# Patient Record
Sex: FEMALE | Race: WHITE | Employment: UNEMPLOYED | URBAN - METROPOLITAN AREA
[De-identification: names, ages, dates, MRNs, and addresses within clinical notes are randomized per-mention and may not be internally consistent; named-entity substitution may affect disease eponyms.]

---

## 2017-04-20 ENCOUNTER — HOSPITAL ENCOUNTER (OUTPATIENT)
Dept: MAMMOGRAPHY | Age: 62
Discharge: HOME | End: 2017-04-20
Attending: FAMILY MEDICINE
Payer: COMMERCIAL

## 2017-04-20 DIAGNOSIS — Z12.31 VISIT FOR SCREENING MAMMOGRAM: ICD-10-CM

## 2017-04-20 PROCEDURE — 77067 SCR MAMMO BI INCL CAD: CPT

## 2018-04-26 ENCOUNTER — HOSPITAL ENCOUNTER (OUTPATIENT)
Dept: MAMMOGRAPHY | Age: 63
Discharge: HOME | End: 2018-04-26
Attending: FAMILY MEDICINE
Payer: COMMERCIAL

## 2018-04-26 DIAGNOSIS — Z12.31 VISIT FOR SCREENING MAMMOGRAM: ICD-10-CM

## 2018-04-26 PROCEDURE — 77063 BREAST TOMOSYNTHESIS BI: CPT

## 2021-05-18 ENCOUNTER — APPOINTMENT (OUTPATIENT)
Dept: GENERAL RADIOLOGY | Age: 66
DRG: 392 | End: 2021-05-18
Attending: EMERGENCY MEDICINE
Payer: COMMERCIAL

## 2021-05-18 ENCOUNTER — APPOINTMENT (OUTPATIENT)
Dept: CT IMAGING | Age: 66
DRG: 392 | End: 2021-05-18
Attending: EMERGENCY MEDICINE
Payer: COMMERCIAL

## 2021-05-18 ENCOUNTER — HOSPITAL ENCOUNTER (INPATIENT)
Age: 66
LOS: 3 days | Discharge: HOME | DRG: 392 | End: 2021-05-21
Attending: EMERGENCY MEDICINE | Admitting: FAMILY MEDICINE
Payer: COMMERCIAL

## 2021-05-18 DIAGNOSIS — K57.20 DIVERTICULITIS OF LARGE INTESTINE WITH ABSCESS WITHOUT BLEEDING: Primary | ICD-10-CM

## 2021-05-18 DIAGNOSIS — K57.92 DIVERTICULITIS: ICD-10-CM

## 2021-05-18 DIAGNOSIS — R10.84 ABDOMINAL PAIN, GENERALIZED: ICD-10-CM

## 2021-05-18 LAB
ALBUMIN SERPL-MCNC: 3.2 G/DL (ref 3.5–5)
ALBUMIN/GLOB SERPL: 0.7 {RATIO} (ref 1.1–2.2)
ALP SERPL-CCNC: 116 U/L (ref 45–117)
ALT SERPL-CCNC: 38 U/L (ref 12–78)
ANION GAP SERPL CALC-SCNC: 4 MMOL/L (ref 5–15)
APPEARANCE UR: CLEAR
AST SERPL-CCNC: 31 U/L (ref 15–37)
ATRIAL RATE: 77 BPM
BACTERIA URNS QL MICRO: NEGATIVE /HPF
BASOPHILS # BLD: 0.1 K/UL (ref 0–0.1)
BASOPHILS NFR BLD: 1 % (ref 0–1)
BILIRUB SERPL-MCNC: 0.9 MG/DL (ref 0.2–1)
BILIRUB UR QL: NEGATIVE
BUN SERPL-MCNC: 9 MG/DL (ref 6–20)
BUN/CREAT SERPL: 16 (ref 12–20)
CALCIUM SERPL-MCNC: 9.2 MG/DL (ref 8.5–10.1)
CALCULATED P AXIS, ECG09: 48 DEGREES
CALCULATED R AXIS, ECG10: 2 DEGREES
CALCULATED T AXIS, ECG11: 20 DEGREES
CHLORIDE SERPL-SCNC: 101 MMOL/L (ref 97–108)
CO2 SERPL-SCNC: 29 MMOL/L (ref 21–32)
COLOR UR: ABNORMAL
COMMENT, HOLDF: NORMAL
CREAT SERPL-MCNC: 0.56 MG/DL (ref 0.55–1.02)
DIAGNOSIS, 93000: NORMAL
DIFFERENTIAL METHOD BLD: ABNORMAL
EOSINOPHIL # BLD: 0.1 K/UL (ref 0–0.4)
EOSINOPHIL NFR BLD: 1 % (ref 0–7)
EPITH CASTS URNS QL MICRO: ABNORMAL /LPF
ERYTHROCYTE [DISTWIDTH] IN BLOOD BY AUTOMATED COUNT: 12.9 % (ref 11.5–14.5)
GLOBULIN SER CALC-MCNC: 4.6 G/DL (ref 2–4)
GLUCOSE SERPL-MCNC: 114 MG/DL (ref 65–100)
GLUCOSE UR STRIP.AUTO-MCNC: NEGATIVE MG/DL
HCT VFR BLD AUTO: 41.6 % (ref 35–47)
HGB BLD-MCNC: 13.1 G/DL (ref 11.5–16)
HGB UR QL STRIP: NEGATIVE
IMM GRANULOCYTES # BLD AUTO: 0.1 K/UL (ref 0–0.04)
IMM GRANULOCYTES NFR BLD AUTO: 1 % (ref 0–0.5)
KETONES UR QL STRIP.AUTO: NEGATIVE MG/DL
LACTATE SERPL-SCNC: 0.9 MMOL/L (ref 0.4–2)
LEUKOCYTE ESTERASE UR QL STRIP.AUTO: ABNORMAL
LYMPHOCYTES # BLD: 1.1 K/UL (ref 0.8–3.5)
LYMPHOCYTES NFR BLD: 11 % (ref 12–49)
MCH RBC QN AUTO: 27.9 PG (ref 26–34)
MCHC RBC AUTO-ENTMCNC: 31.5 G/DL (ref 30–36.5)
MCV RBC AUTO: 88.5 FL (ref 80–99)
MONOCYTES # BLD: 1.6 K/UL (ref 0–1)
MONOCYTES NFR BLD: 16 % (ref 5–13)
NEUTS SEG # BLD: 7.4 K/UL (ref 1.8–8)
NEUTS SEG NFR BLD: 70 % (ref 32–75)
NITRITE UR QL STRIP.AUTO: NEGATIVE
NRBC # BLD: 0 K/UL (ref 0–0.01)
NRBC BLD-RTO: 0 PER 100 WBC
P-R INTERVAL, ECG05: 154 MS
PH UR STRIP: 7.5 [PH] (ref 5–8)
PLATELET # BLD AUTO: 286 K/UL (ref 150–400)
PMV BLD AUTO: 8.8 FL (ref 8.9–12.9)
POTASSIUM SERPL-SCNC: 3.8 MMOL/L (ref 3.5–5.1)
PROT SERPL-MCNC: 7.8 G/DL (ref 6.4–8.2)
PROT UR STRIP-MCNC: NEGATIVE MG/DL
Q-T INTERVAL, ECG07: 380 MS
QRS DURATION, ECG06: 72 MS
QTC CALCULATION (BEZET), ECG08: 430 MS
RBC # BLD AUTO: 4.7 M/UL (ref 3.8–5.2)
RBC #/AREA URNS HPF: ABNORMAL /HPF (ref 0–5)
SAMPLES BEING HELD,HOLD: NORMAL
SODIUM SERPL-SCNC: 134 MMOL/L (ref 136–145)
SP GR UR REFRACTOMETRY: 1.01 (ref 1–1.03)
TROPONIN I SERPL-MCNC: <0.05 NG/ML
UR CULT HOLD, URHOLD: NORMAL
UROBILINOGEN UR QL STRIP.AUTO: 1 EU/DL (ref 0.2–1)
VENTRICULAR RATE, ECG03: 77 BPM
WBC # BLD AUTO: 10.4 K/UL (ref 3.6–11)
WBC URNS QL MICRO: ABNORMAL /HPF (ref 0–4)

## 2021-05-18 PROCEDURE — 74011250637 HC RX REV CODE- 250/637: Performed by: FAMILY MEDICINE

## 2021-05-18 PROCEDURE — 65410000002 HC RM PRIVATE OB

## 2021-05-18 PROCEDURE — 74011000258 HC RX REV CODE- 258: Performed by: EMERGENCY MEDICINE

## 2021-05-18 PROCEDURE — 51798 US URINE CAPACITY MEASURE: CPT

## 2021-05-18 PROCEDURE — 74011250636 HC RX REV CODE- 250/636: Performed by: FAMILY MEDICINE

## 2021-05-18 PROCEDURE — 80053 COMPREHEN METABOLIC PANEL: CPT

## 2021-05-18 PROCEDURE — 81001 URINALYSIS AUTO W/SCOPE: CPT

## 2021-05-18 PROCEDURE — 71046 X-RAY EXAM CHEST 2 VIEWS: CPT

## 2021-05-18 PROCEDURE — 93005 ELECTROCARDIOGRAM TRACING: CPT

## 2021-05-18 PROCEDURE — 74177 CT ABD & PELVIS W/CONTRAST: CPT

## 2021-05-18 PROCEDURE — 99252 IP/OBS CONSLTJ NEW/EST SF 35: CPT | Performed by: SURGERY

## 2021-05-18 PROCEDURE — 87040 BLOOD CULTURE FOR BACTERIA: CPT

## 2021-05-18 PROCEDURE — 83605 ASSAY OF LACTIC ACID: CPT

## 2021-05-18 PROCEDURE — 84484 ASSAY OF TROPONIN QUANT: CPT

## 2021-05-18 PROCEDURE — 74011250636 HC RX REV CODE- 250/636: Performed by: EMERGENCY MEDICINE

## 2021-05-18 PROCEDURE — 36415 COLL VENOUS BLD VENIPUNCTURE: CPT

## 2021-05-18 PROCEDURE — 85025 COMPLETE CBC W/AUTO DIFF WBC: CPT

## 2021-05-18 PROCEDURE — 99284 EMERGENCY DEPT VISIT MOD MDM: CPT

## 2021-05-18 PROCEDURE — 74011000258 HC RX REV CODE- 258: Performed by: FAMILY MEDICINE

## 2021-05-18 PROCEDURE — 74011000636 HC RX REV CODE- 636: Performed by: EMERGENCY MEDICINE

## 2021-05-18 RX ORDER — SODIUM CHLORIDE 0.9 % (FLUSH) 0.9 %
5-40 SYRINGE (ML) INJECTION EVERY 8 HOURS
Status: DISCONTINUED | OUTPATIENT
Start: 2021-05-18 | End: 2021-05-21 | Stop reason: HOSPADM

## 2021-05-18 RX ORDER — SODIUM CHLORIDE 0.9 % (FLUSH) 0.9 %
5-40 SYRINGE (ML) INJECTION AS NEEDED
Status: DISCONTINUED | OUTPATIENT
Start: 2021-05-18 | End: 2021-05-21 | Stop reason: HOSPADM

## 2021-05-18 RX ORDER — ACETAMINOPHEN 325 MG/1
650 TABLET ORAL
Status: DISCONTINUED | OUTPATIENT
Start: 2021-05-18 | End: 2021-05-21 | Stop reason: HOSPADM

## 2021-05-18 RX ORDER — ONDANSETRON 2 MG/ML
4 INJECTION INTRAMUSCULAR; INTRAVENOUS
Status: DISCONTINUED | OUTPATIENT
Start: 2021-05-18 | End: 2021-05-21 | Stop reason: HOSPADM

## 2021-05-18 RX ORDER — MORPHINE SULFATE 2 MG/ML
2 INJECTION, SOLUTION INTRAMUSCULAR; INTRAVENOUS
Status: DISCONTINUED | OUTPATIENT
Start: 2021-05-18 | End: 2021-05-21 | Stop reason: HOSPADM

## 2021-05-18 RX ORDER — METRONIDAZOLE 500 MG/100ML
500 INJECTION, SOLUTION INTRAVENOUS EVERY 12 HOURS
Status: DISCONTINUED | OUTPATIENT
Start: 2021-05-18 | End: 2021-05-21 | Stop reason: HOSPADM

## 2021-05-18 RX ORDER — SODIUM CHLORIDE 9 MG/ML
75 INJECTION, SOLUTION INTRAVENOUS CONTINUOUS
Status: DISCONTINUED | OUTPATIENT
Start: 2021-05-18 | End: 2021-05-21 | Stop reason: HOSPADM

## 2021-05-18 RX ORDER — SODIUM CHLORIDE 9 MG/ML
100 INJECTION, SOLUTION INTRAVENOUS CONTINUOUS
Status: DISCONTINUED | OUTPATIENT
Start: 2021-05-18 | End: 2021-05-21 | Stop reason: HOSPADM

## 2021-05-18 RX ORDER — HYDRALAZINE HYDROCHLORIDE 20 MG/ML
10 INJECTION INTRAMUSCULAR; INTRAVENOUS
Status: DISCONTINUED | OUTPATIENT
Start: 2021-05-18 | End: 2021-05-21 | Stop reason: HOSPADM

## 2021-05-18 RX ADMIN — CEFEPIME 2 G: 2 INJECTION, POWDER, FOR SOLUTION INTRAVENOUS at 20:20

## 2021-05-18 RX ADMIN — PIPERACILLIN AND TAZOBACTAM 3.38 G: 3; .375 INJECTION, POWDER, FOR SOLUTION INTRAVENOUS at 16:21

## 2021-05-18 RX ADMIN — METRONIDAZOLE 500 MG: 500 INJECTION, SOLUTION INTRAVENOUS at 18:56

## 2021-05-18 RX ADMIN — SODIUM CHLORIDE 75 ML/HR: 9 INJECTION, SOLUTION INTRAVENOUS at 18:55

## 2021-05-18 RX ADMIN — IOPAMIDOL 100 ML: 755 INJECTION, SOLUTION INTRAVENOUS at 14:42

## 2021-05-18 RX ADMIN — ACETAMINOPHEN 650 MG: 325 TABLET ORAL at 20:35

## 2021-05-18 NOTE — H&P
History & Physical    Primary Care Provider: Taylor Heard MD  Source of Information: Patient     History of Presenting Illness:   Rachael Dominguez is a 77 y.o. female who presents with abdominal pain. Patient reports she started experiencing abdominal pain that started about a week to 10 days back. Patient reports that initially the pain worsened lower part of the abdomen, she had some poor appetite and nausea associated with vomiting. Patient reports on May 3 she felt like she had a GI bug but her symptoms got better. Patient reports ports that she continued to have this abdominal pain, [facility], was diagnosed with UTI, was started on Macrobid, patient reports the pain continued and got much severe today. Yesterday she felt that she had constipation, and had intense pain in her belly, patient got concerned and decided to come to the hospital.  In the ER patient had a CT scan done which showed diverticulitis with an abscess the patient was requested to be admitted to the hospital service. Patient denies any other complaints or problems. The patient denies any Headache, blurry vision, sore throat, trouble swallowing, trouble with speech, chest pain, SOB, cough, fever, chills,, urinary symptoms, constipation, recent travels, sick contacts, focal or generalized neurological symptoms,  falls, injuries, rashes, contact with COVID-19 diagnosed patients, hematemesis, melena, hemoptysis, hematuria, rashes, denies starting any new medications and denies any other concerns or problems besides as mentioned above. Review of Systems:  A comprehensive review of systems was negative except for that written in the History of Present Illness.    All other systems reviewed, pertinent positives and negatives noted in HPI    Past Medical History:   Diagnosis Date    Glaucoma     H/O seasonal allergies     George Mountain spotted fever     Sinus infection     UTI (urinary tract infection)       History reviewed. No pertinent surgical history. Prior to Admission medications    Not on File     Allergies   Allergen Reactions    Codeine Hives    Levaquin [Levofloxacin] Other (comments)     Joint swelling    Pcn [Penicillins] Hives    Sulfa (Sulfonamide Antibiotics) Hives      History reviewed. No pertinent family history. Family history was discussed with the patient, all pertinent and relevant details are mentioned as above, no other pertinent and relevant family history was noted on my discussion with the patient.   Patient specifically denies any history of Gaucher disease in the family  SOCIAL HISTORY:  Patient resides:  Independently x   Assisted Living    SNF    With family care       Smoking history:   None x   Former    Chronic      Alcohol history:   None    Social x   Chronic      Ambulates:   Independently x   w/cane    w/walker    w/wc    CODE STATUS:  DNR    Full x   Other      Objective:     Physical Exam:     Visit Vitals  BP (!) 179/83   Pulse 80   Temp (!) 96.4 °F (35.8 °C)   Resp 16   SpO2 96%      O2 Device: None (Room air)    General : alert x 3, awake, no acute distress, resting in bed, pleasant /female, appears to be stated age  [de-identified]: PEERL, EOMI, moist mucus membrane, TM clear  Neck: supple, no JVD, no meningeal signs  Chest: Clear to auscultation bilaterally   CVS: S1 S2 heard, Capillary refill less than 2 seconds  Abd: soft/tender to palpation in the left lower quadrant/no rebound/+ guarding  Ext: no clubbing, no cyanosis, no edema, brisk 2+ DP pulses  Neuro/Psych: pleasant mood and affect, CN 2-12 grossly intact, sensory grossly within normal limit, Strength 5/5 in all extremities, DTR 1+ x 4  Skin: warm         Data Review:     Recent Days:  Recent Labs     05/18/21  1213   WBC 10.4   HGB 13.1   HCT 41.6        Recent Labs     05/18/21  1213   *   K 3.8      CO2 29   *   BUN 9   CREA 0.56   CA 9.2 ALB 3.2*   ALT 38     No results for input(s): PH, PCO2, PO2, HCO3, FIO2 in the last 72 hours. 24 Hour Results:  Recent Results (from the past 24 hour(s))   EKG, 12 LEAD, INITIAL    Collection Time: 05/18/21 12:12 PM   Result Value Ref Range    Ventricular Rate 77 BPM    Atrial Rate 77 BPM    P-R Interval 154 ms    QRS Duration 72 ms    Q-T Interval 380 ms    QTC Calculation (Bezet) 430 ms    Calculated P Axis 48 degrees    Calculated R Axis 2 degrees    Calculated T Axis 20 degrees    Diagnosis       Normal sinus rhythm  Inferior infarct , age undetermined  No previous ECGs available  Confirmed by Isaiah Leroy M.D., Isi Valle ([id number]) on 5/18/2021 4:59:37 PM     TROPONIN I    Collection Time: 05/18/21 12:13 PM   Result Value Ref Range    Troponin-I, Qt. <0.05 <0.05 ng/mL   CBC WITH AUTOMATED DIFF    Collection Time: 05/18/21 12:13 PM   Result Value Ref Range    WBC 10.4 3.6 - 11.0 K/uL    RBC 4.70 3.80 - 5.20 M/uL    HGB 13.1 11.5 - 16.0 g/dL    HCT 41.6 35.0 - 47.0 %    MCV 88.5 80.0 - 99.0 FL    MCH 27.9 26.0 - 34.0 PG    MCHC 31.5 30.0 - 36.5 g/dL    RDW 12.9 11.5 - 14.5 %    PLATELET 590 375 - 769 K/uL    MPV 8.8 (L) 8.9 - 12.9 FL    NRBC 0.0 0  WBC    ABSOLUTE NRBC 0.00 0.00 - 0.01 K/uL    NEUTROPHILS 70 32 - 75 %    LYMPHOCYTES 11 (L) 12 - 49 %    MONOCYTES 16 (H) 5 - 13 %    EOSINOPHILS 1 0 - 7 %    BASOPHILS 1 0 - 1 %    IMMATURE GRANULOCYTES 1 (H) 0.0 - 0.5 %    ABS. NEUTROPHILS 7.4 1.8 - 8.0 K/UL    ABS. LYMPHOCYTES 1.1 0.8 - 3.5 K/UL    ABS. MONOCYTES 1.6 (H) 0.0 - 1.0 K/UL    ABS. EOSINOPHILS 0.1 0.0 - 0.4 K/UL    ABS. BASOPHILS 0.1 0.0 - 0.1 K/UL    ABS. IMM.  GRANS. 0.1 (H) 0.00 - 0.04 K/UL    DF AUTOMATED     METABOLIC PANEL, COMPREHENSIVE    Collection Time: 05/18/21 12:13 PM   Result Value Ref Range    Sodium 134 (L) 136 - 145 mmol/L    Potassium 3.8 3.5 - 5.1 mmol/L    Chloride 101 97 - 108 mmol/L    CO2 29 21 - 32 mmol/L    Anion gap 4 (L) 5 - 15 mmol/L    Glucose 114 (H) 65 - 100 mg/dL    BUN 9 6 - 20 MG/DL    Creatinine 0.56 0.55 - 1.02 MG/DL    BUN/Creatinine ratio 16 12 - 20      GFR est AA >60 >60 ml/min/1.73m2    GFR est non-AA >60 >60 ml/min/1.73m2    Calcium 9.2 8.5 - 10.1 MG/DL    Bilirubin, total 0.9 0.2 - 1.0 MG/DL    ALT (SGPT) 38 12 - 78 U/L    AST (SGOT) 31 15 - 37 U/L    Alk. phosphatase 116 45 - 117 U/L    Protein, total 7.8 6.4 - 8.2 g/dL    Albumin 3.2 (L) 3.5 - 5.0 g/dL    Globulin 4.6 (H) 2.0 - 4.0 g/dL    A-G Ratio 0.7 (L) 1.1 - 2.2     LACTIC ACID    Collection Time: 05/18/21 12:13 PM   Result Value Ref Range    Lactic acid 0.9 0.4 - 2.0 MMOL/L   SAMPLES BEING HELD    Collection Time: 05/18/21 12:13 PM   Result Value Ref Range    SAMPLES BEING HELD 1red,1blu     COMMENT        Add-on orders for these samples will be processed based on acceptable specimen integrity and analyte stability, which may vary by analyte. URINALYSIS W/MICROSCOPIC    Collection Time: 05/18/21 12:44 PM   Result Value Ref Range    Color YELLOW/STRAW      Appearance CLEAR CLEAR      Specific gravity 1.006 1.003 - 1.030      pH (UA) 7.5 5.0 - 8.0      Protein Negative NEG mg/dL    Glucose Negative NEG mg/dL    Ketone Negative NEG mg/dL    Bilirubin Negative NEG      Blood Negative NEG      Urobilinogen 1.0 0.2 - 1.0 EU/dL    Nitrites Negative NEG      Leukocyte Esterase TRACE (A) NEG      WBC 0-4 0 - 4 /hpf    RBC 0-5 0 - 5 /hpf    Epithelial cells FEW FEW /lpf    Bacteria Negative NEG /hpf   URINE CULTURE HOLD SAMPLE    Collection Time: 05/18/21 12:44 PM    Specimen: Serum; Urine   Result Value Ref Range    Urine culture hold        Urine on hold in Microbiology dept for 2 days. If unpreserved urine is submitted, it cannot be used for addtional testing after 24 hours, recollection will be required. Imaging:   Xr Chest Pa Lat    Result Date: 5/18/2021  No acute cardiopulmonary process seen    Ct Abd Pelv W Cont    Result Date: 5/18/2021  Diverticulitis with abscess.  Incidental nonobstructing renal calculi, hepatic steatosis, cholelithiasis This result was verbally relayed by me at 1515 hours to Dr. Donna Varner. 1    Assessment/Plan     Acute Diverticulitis with intestinal abscess: Patient will be admitted on a telemetry bed, start patient on broad-spectrum IV antibiotics, IV fluids, n.p.o., blood cultures, pain control, general surgery consult, may consider IR consult if the abscess needs to be drained, supportive care close monitoring and further intervention per hospitalist, reassess as needed    Accelerated hypertension: Likely secondary to pain, IV hydralazine, pain control, supportive care, close monitoring, reassess as needed, further intervention per hospitalist    GI DVT prophylaxis: Patient will be on SCDs             Please note that this dictation was completed with [employer], the computer voice recognition software. Quite often unanticipated grammatical, syntax, homophones, and other interpretive errors are inadvertently transcribed by the computer software. Please disregard these errors. Please excuse any errors that have escaped final proofreading.          Signed By: Umm Munoz MD     May 18, 2021

## 2021-05-18 NOTE — ED TRIAGE NOTES
Pt dx with lower abd pain since Thursday, pt dx with uti but is not getting any better, pt thinks she has some other infection going on for 3 weeks, +fever-100.8 intermittent for weeks, +n/v, +diarrhea, has not been tested for Covid , +fatigue, denies sob or cough

## 2021-05-18 NOTE — PROGRESS NOTES
Admission Medication Reconciliation      In progress:    Unable to speak with patient face to face at this time due to general isolation precautions in the ED related to COVID-19 pandemic. Left voicemail for patient and spouse, awaiting return call and will update once additional information becomes available. Thank you for allowing me to participate in the care of your patient. Linda Oviedo PharmD, RN # [phone]       [facility] pharmacy benefit data reflects medications filled and processed through the patient's insurance, however   this data does NOT capture whether the medication was picked up or is currently being taken by the patient. Allergies:  Codeine, Levaquin [levofloxacin], Pcn [penicillins], and Sulfa (sulfonamide antibiotics)    Significant PMH/Disease States:   Past Medical History:   Diagnosis Date    Glaucoma     H/O seasonal allergies     George Mountain spotted fever     Sinus infection     UTI (urinary tract infection)      Chief Complaint for this Admission:    Chief Complaint   Patient presents with    Abdominal Pain     Prior to Admission Medications:   None     Please contact the main inpatient pharmacy with any questions or concerns at [phone] and we will direct you to the clinical pharmacist covering this patient's care while in-house.    HAL Chavis

## 2021-05-18 NOTE — PROGRESS NOTES
Bedside shift change report given to Roscoe RN and Pastor Arcos RN (oncoming nurse) by Michael Thompson RN (offgoing nurse). Report included the following information SBAR, Kardex, ED Summary, Procedure Summary, Intake/Output and MAR.

## 2021-05-18 NOTE — ED PROVIDER NOTES
68-year-old female with history of sinus infections with recent UTI several days ago which was treated with Avenny Barragan 103 presents to the emergency department today with chief complaint of abdominal discomfort with generalized not feeling well. She tells me that approximate 1 month ago she had a diarrheal illness which resolved and was followed by constipation. [facility] and started on Macrobid for a UTI as well as constipation. She was given symptomatic treatment for the constipation has had started to have stools the last few days. She complains of generalized abdominal discomfort with nausea and vomiting. She complains of fevers at home to 100.8 degrees. She feels that her UTI symptoms have improved since starting Macrobid. The history is provided by the patient, the spouse and medical records. Abdominal Pain   This is a new problem. The current episode started more than 2 days ago. The problem occurs constantly. The problem has been gradually worsening. The pain is associated with vomiting. The pain is located in the generalized abdominal region. The quality of the pain is dull. The pain is moderate. Associated symptoms include diarrhea, nausea, vomiting and constipation. Pertinent negatives include no fever, no dysuria, no headaches, no arthralgias, no myalgias and no chest pain. Past Medical History:   Diagnosis Date    Glaucoma     H/O seasonal allergies     George Mountain spotted fever     Sinus infection     UTI (urinary tract infection)        History reviewed. No pertinent surgical history. History reviewed. No pertinent family history.     Social History     Socioeconomic History    Marital status:      Spouse name: Not on file    Number of children: Not on file    Years of education: Not on file    Highest education level: Not on file   Occupational History    Not on file   Social Needs    Financial resource strain: Not on file   [place] insecurity     Worry: Not on file     Inability: Not on file    Transportation needs     Medical: Not on file     Non-medical: Not on file   Tobacco Use    Smoking status: Not on file   Substance and Sexual Activity    Alcohol use: Not on file    Drug use: Not on file    Sexual activity: Not on file   Lifestyle    Physical activity     Days per week: Not on file     Minutes per session: Not on file    Stress: Not on file   Relationships    Social connections     Talks on phone: Not on file     Gets together: Not on file     Attends Sikh service: Not on file     Active member of club or organization: Not on file     Attends meetings of clubs or organizations: Not on file     Relationship status: Not on file    Intimate partner violence     Fear of current or ex partner: Not on file     Emotionally abused: Not on file     Physically abused: Not on file     Forced sexual activity: Not on file   Other Topics Concern    Not on file   Social History Narrative    Not on file         ALLERGIES: Codeine, Levaquin [levofloxacin], Pcn [penicillins], and Sulfa (sulfonamide antibiotics)    Review of Systems   Constitutional: Negative for fatigue and fever. HENT: Negative for sneezing and sore throat. Respiratory: Negative for cough and shortness of breath. Cardiovascular: Negative for chest pain and leg swelling. Gastrointestinal: Positive for abdominal pain, constipation, diarrhea, nausea and vomiting. Genitourinary: Negative for difficulty urinating and dysuria. Musculoskeletal: Negative for arthralgias and myalgias. Skin: Negative for color change and rash. Neurological: Negative for weakness and headaches. Psychiatric/Behavioral: Negative for agitation and behavioral problems. Vitals:    05/18/21 1142   BP: (!) 179/83   Pulse: 80   Resp: 16   Temp: (!) 96.4 °F (35.8 °C)   SpO2: 96%            Physical Exam  Vitals signs and nursing note reviewed.    Constitutional:       General: She is not in acute distress. Appearance: Normal appearance. She is well-developed. She is obese. She is not ill-appearing, toxic-appearing or diaphoretic. HENT:      Head: Normocephalic and atraumatic. Nose: Nose normal.      Mouth/Throat:      Mouth: Mucous membranes are moist.      Pharynx: Oropharynx is clear. Eyes:      Extraocular Movements: Extraocular movements intact. Conjunctiva/sclera: Conjunctivae normal.      Pupils: Pupils are equal, round, and reactive to light. Neck:      Musculoskeletal: Normal range of motion and neck supple. No neck rigidity or muscular tenderness. Cardiovascular:      Rate and Rhythm: Normal rate and regular rhythm. Pulses: Normal pulses. Heart sounds: Normal heart sounds. Pulmonary:      Effort: Pulmonary effort is normal. No respiratory distress. Breath sounds: Normal breath sounds. No wheezing. Chest:      Chest wall: No tenderness. Abdominal:      General: Abdomen is flat. There is no distension. Palpations: Abdomen is soft. Tenderness: There is no abdominal tenderness. There is no guarding or rebound. Musculoskeletal: Normal range of motion. General: No swelling, tenderness, deformity or signs of injury. Right lower leg: No edema. Left lower leg: No edema. Skin:     General: Skin is warm and dry. Capillary Refill: Capillary refill takes less than 2 seconds. Neurological:      General: No focal deficit present. Mental Status: She is alert and oriented to person, place, and time. Psychiatric:         Mood and Affect: Mood normal.         Behavior: Behavior normal.          MDM  Number of Diagnoses or Management Options  Diagnosis management comments: 14-year-old female presents as above with abdominal pain with evidence of diverticulitis with abscess formation. Plan to start on antibiotics and admit to the hospital for further management.        Amount and/or Complexity of Data Reviewed  Clinical lab tests: reviewed  Tests in the radiology section of CPT®: reviewed  Tests in the medicine section of CPT®: reviewed           Procedures          ED EKG interpretation:  Rhythm: normal sinus rhythm. Rate (approx.): 77. Axis: normal.  ST segment:  No concerning ST elevations or depressions. This EKG was interpreted by Santa Ohara MD,ED Provider. Perfect Serve Consult for Admission  3:20 PM    ED Room Number: ER23/23  Patient Name and age:  Junior Castillo 77 y.o.  female  Working Diagnosis:   1. Diverticulitis of large intestine with abscess without bleeding    2.  Abdominal pain, generalized        COVID-19 Suspicion:  no  Sepsis present:  no  Reassessment needed: N/A  Code Status:  Full Code  Readmission: no  Isolation Requirements:  no  Recommended Level of Care:  med/surg  Department:[facility] Adult ED - [phone]

## 2021-05-18 NOTE — PROGRESS NOTES
Clinical Pharmacy Note: Metronidazole Dosing    Please note that the metronidazole dose for Vergia Gracia has been changed to 500 mg IV q12h per [facility]-approved protocol. Please contact the pharmacy with any questions.     Roberta Go, EPIFANIOD

## 2021-05-19 LAB
ANION GAP SERPL CALC-SCNC: 9 MMOL/L (ref 5–15)
BASOPHILS # BLD: 0.1 K/UL (ref 0–0.1)
BASOPHILS NFR BLD: 1 % (ref 0–1)
BUN SERPL-MCNC: 9 MG/DL (ref 6–20)
BUN/CREAT SERPL: 17 (ref 12–20)
CALCIUM SERPL-MCNC: 8.9 MG/DL (ref 8.5–10.1)
CHLORIDE SERPL-SCNC: 104 MMOL/L (ref 97–108)
CO2 SERPL-SCNC: 25 MMOL/L (ref 21–32)
CREAT SERPL-MCNC: 0.52 MG/DL (ref 0.55–1.02)
DIFFERENTIAL METHOD BLD: ABNORMAL
EOSINOPHIL # BLD: 0.1 K/UL (ref 0–0.4)
EOSINOPHIL NFR BLD: 1 % (ref 0–7)
ERYTHROCYTE [DISTWIDTH] IN BLOOD BY AUTOMATED COUNT: 13 % (ref 11.5–14.5)
GLUCOSE SERPL-MCNC: 109 MG/DL (ref 65–100)
HCT VFR BLD AUTO: 38.5 % (ref 35–47)
HGB BLD-MCNC: 12.4 G/DL (ref 11.5–16)
IMM GRANULOCYTES # BLD AUTO: 0.1 K/UL (ref 0–0.04)
IMM GRANULOCYTES NFR BLD AUTO: 1 % (ref 0–0.5)
LYMPHOCYTES # BLD: 1 K/UL (ref 0.8–3.5)
LYMPHOCYTES NFR BLD: 11 % (ref 12–49)
MCH RBC QN AUTO: 28.6 PG (ref 26–34)
MCHC RBC AUTO-ENTMCNC: 32.2 G/DL (ref 30–36.5)
MCV RBC AUTO: 88.7 FL (ref 80–99)
MONOCYTES # BLD: 1.2 K/UL (ref 0–1)
MONOCYTES NFR BLD: 13 % (ref 5–13)
NEUTS SEG # BLD: 6.7 K/UL (ref 1.8–8)
NEUTS SEG NFR BLD: 73 % (ref 32–75)
NRBC # BLD: 0 K/UL (ref 0–0.01)
NRBC BLD-RTO: 0 PER 100 WBC
PLATELET # BLD AUTO: 260 K/UL (ref 150–400)
PMV BLD AUTO: 9.1 FL (ref 8.9–12.9)
POTASSIUM SERPL-SCNC: 3.8 MMOL/L (ref 3.5–5.1)
RBC # BLD AUTO: 4.34 M/UL (ref 3.8–5.2)
SODIUM SERPL-SCNC: 138 MMOL/L (ref 136–145)
WBC # BLD AUTO: 9.1 K/UL (ref 3.6–11)

## 2021-05-19 PROCEDURE — 74011000258 HC RX REV CODE- 258: Performed by: FAMILY MEDICINE

## 2021-05-19 PROCEDURE — 85025 COMPLETE CBC W/AUTO DIFF WBC: CPT

## 2021-05-19 PROCEDURE — 80048 BASIC METABOLIC PNL TOTAL CA: CPT

## 2021-05-19 PROCEDURE — 36415 COLL VENOUS BLD VENIPUNCTURE: CPT

## 2021-05-19 PROCEDURE — 74011250636 HC RX REV CODE- 250/636: Performed by: FAMILY MEDICINE

## 2021-05-19 PROCEDURE — 99232 SBSQ HOSP IP/OBS MODERATE 35: CPT | Performed by: NURSE PRACTITIONER

## 2021-05-19 PROCEDURE — 74011250637 HC RX REV CODE- 250/637: Performed by: SURGERY

## 2021-05-19 PROCEDURE — 74011250637 HC RX REV CODE- 250/637: Performed by: FAMILY MEDICINE

## 2021-05-19 PROCEDURE — 65410000002 HC RM PRIVATE OB

## 2021-05-19 RX ORDER — DEXTROMETHORPHAN/PSEUDOEPHED 2.5-7.5/.8
40 DROPS ORAL
Status: DISCONTINUED | OUTPATIENT
Start: 2021-05-19 | End: 2021-05-21 | Stop reason: HOSPADM

## 2021-05-19 RX ADMIN — CEFEPIME 2 G: 2 INJECTION, POWDER, FOR SOLUTION INTRAVENOUS at 12:12

## 2021-05-19 RX ADMIN — METRONIDAZOLE 500 MG: 500 INJECTION, SOLUTION INTRAVENOUS at 07:13

## 2021-05-19 RX ADMIN — CEFEPIME 2 G: 2 INJECTION, POWDER, FOR SOLUTION INTRAVENOUS at 19:30

## 2021-05-19 RX ADMIN — SODIUM CHLORIDE 100 ML/HR: 9 INJECTION, SOLUTION INTRAVENOUS at 12:11

## 2021-05-19 RX ADMIN — CEFEPIME 2 G: 2 INJECTION, POWDER, FOR SOLUTION INTRAVENOUS at 04:25

## 2021-05-19 RX ADMIN — Medication 40 MG: at 17:47

## 2021-05-19 RX ADMIN — ACETAMINOPHEN 650 MG: 325 TABLET ORAL at 07:12

## 2021-05-19 RX ADMIN — Medication 40 MG: at 21:01

## 2021-05-19 RX ADMIN — METRONIDAZOLE 500 MG: 500 INJECTION, SOLUTION INTRAVENOUS at 18:24

## 2021-05-19 RX ADMIN — ACETAMINOPHEN 650 MG: 325 TABLET ORAL at 16:22

## 2021-05-19 NOTE — CONSULTS
Chief Complaint:  Perforated diverticulitis    HPI:  78 yo woman with hx glaucoma consulted for perforated diverticulitis. Pt reported she developed abdominal pain since May 5th. Pain has been in LLQ and suprapubic area. Pt reported nausea and vomiting. She also had fever. Pt denied any hx colonoscopy. She does have mild leukocytosis. CT scan showed perforated sigmoid diverticulitis with 4 cm fluid collection. Past Medical History:   Diagnosis Date    Glaucoma     H/O seasonal allergies     George Mountain spotted fever     Sinus infection     UTI (urinary tract infection)        History reviewed. No pertinent surgical history. No current facility-administered medications on file prior to encounter. No current outpatient medications on file prior to encounter.        Allergies   Allergen Reactions    Codeine Rash    Levaquin [Levofloxacin] Other (comments)     Joint swelling    Pcn [Penicillins] Hives    Sulfa (Sulfonamide Antibiotics) Hives     Review of Systems - General ROS: negative  Psychological ROS: negative  Respiratory ROS: negative  Cardiovascular ROS: negative  Gastrointestinal ROS: positive for - abdominal pain and nausea/vomiting    Visit Vitals  BP (!) 147/80 (BP 1 Location: Left upper arm, BP Patient Position: At rest;Semi fowlers)   Pulse 70   Temp 100 °F (37.8 °C)   Resp 18   Ht 5' 2\" (1.575 m)   Wt 212 lb 9.6 oz (96.4 kg)   SpO2 94%   BMI 38.89 kg/m²         Physical Exam:    Gen:  NAD  Pulm:  Unlabored  Abd:  S/ND/moderate TTP in LLQ and suprapubic area without guarding or rebound    Recent Results (from the past 24 hour(s))   EKG, 12 LEAD, INITIAL    Collection Time: 05/18/21 12:12 PM   Result Value Ref Range    Ventricular Rate 77 BPM    Atrial Rate 77 BPM    P-R Interval 154 ms    QRS Duration 72 ms    Q-T Interval 380 ms    QTC Calculation (Bezet) 430 ms    Calculated P Axis 48 degrees    Calculated R Axis 2 degrees    Calculated T Axis 20 degrees    Diagnosis Normal sinus rhythm  Inferior infarct , age undetermined  No previous ECGs available  Confirmed by Sindy Brantley M.D., Delphineshireen Olmedo ([id number]) on 5/18/2021 4:59:37 PM     TROPONIN I    Collection Time: 05/18/21 12:13 PM   Result Value Ref Range    Troponin-I, Qt. <0.05 <0.05 ng/mL   CBC WITH AUTOMATED DIFF    Collection Time: 05/18/21 12:13 PM   Result Value Ref Range    WBC 10.4 3.6 - 11.0 K/uL    RBC 4.70 3.80 - 5.20 M/uL    HGB 13.1 11.5 - 16.0 g/dL    HCT 41.6 35.0 - 47.0 %    MCV 88.5 80.0 - 99.0 FL    MCH 27.9 26.0 - 34.0 PG    MCHC 31.5 30.0 - 36.5 g/dL    RDW 12.9 11.5 - 14.5 %    PLATELET 351 669 - 628 K/uL    MPV 8.8 (L) 8.9 - 12.9 FL    NRBC 0.0 0  WBC    ABSOLUTE NRBC 0.00 0.00 - 0.01 K/uL    NEUTROPHILS 70 32 - 75 %    LYMPHOCYTES 11 (L) 12 - 49 %    MONOCYTES 16 (H) 5 - 13 %    EOSINOPHILS 1 0 - 7 %    BASOPHILS 1 0 - 1 %    IMMATURE GRANULOCYTES 1 (H) 0.0 - 0.5 %    ABS. NEUTROPHILS 7.4 1.8 - 8.0 K/UL    ABS. LYMPHOCYTES 1.1 0.8 - 3.5 K/UL    ABS. MONOCYTES 1.6 (H) 0.0 - 1.0 K/UL    ABS. EOSINOPHILS 0.1 0.0 - 0.4 K/UL    ABS. BASOPHILS 0.1 0.0 - 0.1 K/UL    ABS. IMM. GRANS. 0.1 (H) 0.00 - 0.04 K/UL    DF AUTOMATED     METABOLIC PANEL, COMPREHENSIVE    Collection Time: 05/18/21 12:13 PM   Result Value Ref Range    Sodium 134 (L) 136 - 145 mmol/L    Potassium 3.8 3.5 - 5.1 mmol/L    Chloride 101 97 - 108 mmol/L    CO2 29 21 - 32 mmol/L    Anion gap 4 (L) 5 - 15 mmol/L    Glucose 114 (H) 65 - 100 mg/dL    BUN 9 6 - 20 MG/DL    Creatinine 0.56 0.55 - 1.02 MG/DL    BUN/Creatinine ratio 16 12 - 20      GFR est AA >60 >60 ml/min/1.73m2    GFR est non-AA >60 >60 ml/min/1.73m2    Calcium 9.2 8.5 - 10.1 MG/DL    Bilirubin, total 0.9 0.2 - 1.0 MG/DL    ALT (SGPT) 38 12 - 78 U/L    AST (SGOT) 31 15 - 37 U/L    Alk.  phosphatase 116 45 - 117 U/L    Protein, total 7.8 6.4 - 8.2 g/dL    Albumin 3.2 (L) 3.5 - 5.0 g/dL    Globulin 4.6 (H) 2.0 - 4.0 g/dL    A-G Ratio 0.7 (L) 1.1 - 2.2     LACTIC ACID    Collection Time: 05/18/21 12:13 PM   Result Value Ref Range    Lactic acid 0.9 0.4 - 2.0 MMOL/L   SAMPLES BEING HELD    Collection Time: 05/18/21 12:13 PM   Result Value Ref Range    SAMPLES BEING HELD 1red,1blu     COMMENT        Add-on orders for these samples will be processed based on acceptable specimen integrity and analyte stability, which may vary by analyte. URINALYSIS W/MICROSCOPIC    Collection Time: 05/18/21 12:44 PM   Result Value Ref Range    Color YELLOW/STRAW      Appearance CLEAR CLEAR      Specific gravity 1.006 1.003 - 1.030      pH (UA) 7.5 5.0 - 8.0      Protein Negative NEG mg/dL    Glucose Negative NEG mg/dL    Ketone Negative NEG mg/dL    Bilirubin Negative NEG      Blood Negative NEG      Urobilinogen 1.0 0.2 - 1.0 EU/dL    Nitrites Negative NEG      Leukocyte Esterase TRACE (A) NEG      WBC 0-4 0 - 4 /hpf    RBC 0-5 0 - 5 /hpf    Epithelial cells FEW FEW /lpf    Bacteria Negative NEG /hpf   URINE CULTURE HOLD SAMPLE    Collection Time: 05/18/21 12:44 PM    Specimen: Serum; Urine   Result Value Ref Range    Urine culture hold        Urine on hold in Microbiology dept for 2 days. If unpreserved urine is submitted, it cannot be used for addtional testing after 24 hours, recollection will be required.        AP:  76 yo woman consulted for perforated diverticulitis    - Perforation diverticulitis:  No acute indication for operation.    - Agree with IV antibiotic  - Repeat CT scan on Thursday to evaluate for improvement  - Clear liquids  - Pain control  - Labs in am

## 2021-05-19 NOTE — PROGRESS NOTES
General Surgery Daily Progress Note    Admit Date: 2021  Post-Operative Day: * No surgery found * from * No surgery found *     Subjective:     Last 24 hrs: Pt feels better today; no n/v; had 2 BMs. On cefepime and flagyl       Objective:     Blood pressure 128/69, pulse 74, temperature 98.3 °F (36.8 °C), resp. rate 16, height 5' 2\" (1.575 m), weight 212 lb 9.6 oz (96.4 kg), SpO2 95 %. Temp (24hrs), Av.6 °F (37 °C), Min:96.4 °F (35.8 °C), Max:100 °F (37.8 °C)      _____________________  Physical Exam:     Alert and Oriented, x3, in no acute distress. Cardiovascular: RRR, no peripheral edema  Abdomen: obese, soft, mild tenderness LLQ      Assessment:   Active Problems:    Diverticulitis (2021)            Plan:     Cont IVF  Cont abx  Clear liquids  Interval CT ordered for tomorrow  OOB    Data Review:    Recent Labs     21  0718 21  1213   WBC 9.1 10.4   HGB 12.4 13.1   HCT 38.5 41.6    286     Recent Labs     21  0718 21  1213    134*   K 3.8 3.8    101   CO2 25 29   * 114*   BUN 9 9   CREA 0.52* 0.56   CA 8.9 9.2   ALB  --  3.2*   ALT  --  38     No results for input(s): AML, LPSE in the last 72 hours.         ______________________  Medications:    Current Facility-Administered Medications   Medication Dose Route Frequency    sodium chloride (NS) flush 5-40 mL  5-40 mL IntraVENous Q8H    sodium chloride (NS) flush 5-40 mL  5-40 mL IntraVENous PRN    0.9% sodium chloride infusion  100 mL/hr IntraVENous CONTINUOUS    metroNIDAZOLE (FLAGYL) IVPB premix 500 mg  500 mg IntraVENous Q12H    cefepime (MAXIPIME) 2 g in 0.9% sodium chloride (MBP/ADV) 100 mL MBP  2 g IntraVENous Q8H    acetaminophen (TYLENOL) tablet 650 mg  650 mg Oral Q4H PRN    ondansetron (ZOFRAN) injection 4 mg  4 mg IntraVENous Q4H PRN    morphine injection 2 mg  2 mg IntraVENous Q4H PRN    0.9% sodium chloride infusion  75 mL/hr IntraVENous CONTINUOUS    hydrALAZINE (APRESOLINE) 20 mg/mL injection 10 mg  10 mg IntraVENous Q6H PRN       Zeenat Thomas NP  5/19/2021

## 2021-05-19 NOTE — PROGRESS NOTES
[facility] [facility]  [facility]ist [facility]                                                                                          Hospitalist Progress Note  Gurmeet Jimenez MD  Answering service: [phone] OR [phone] from in house phone        Date of Service:  2021  NAME:  Rajiv Parada  :  1955  MRN:  [id number]      Admission Summary:   Admitted with increasing LLQ pain after a bout of viral like gastroenteritis       Interval history / Subjective:       2021 :    Pain better   surg rec's follow medically /consevatively for now   Pt mark abx, no rashes,   Else as below        Assessment & Plan:     Acute diverticulitis w perforation and abscess,   - on ivf/abx.   - surg following     Accelerated HTN  - resolved as pt's condition improves     Code status: full   DVT prophylaxis: [facility] Problems  Never Reviewed        Codes Class Noted POA    Diverticulitis ICD-10-CM: I16.62  ICD-9-CM: 562.11  2021 Unknown                Review of Systems:   Pertinent items are noted in HPI. Vital Signs:    Last 24hrs VS reviewed since prior progress note. Most recent are:  Visit Vitals  /69 (BP 1 Location: Left arm, BP Patient Position: At rest)   Pulse 74   Temp 98.3 °F (36.8 °C)   Resp 16   Ht 5' 2\" (1.575 m)   Wt 96.4 kg (212 lb 9.6 oz)   SpO2 95%   BMI 38.89 kg/m²         Intake/Output Summary (Last 24 hours) at 2021 1321  Last data filed at 2021 2018  Gross per 24 hour   Intake 0 ml   Output    Net 0 ml        Physical Examination:     I had a face to face encounter with this patient and independently examined them on 2021 as outlined below:          Constitutional:  No acute distress, cooperative, pleasant    ENT:  Oral mucosa moist, oropharynx benign. Resp:  CTA bilaterally. No wheezing/rhonchi/rales. No accessory muscle use   CV:  Regular rhythm, normal rate, no murmurs, gallops, rubs    GI:  Soft, non distended, non tender.  normoactive bowel sounds, no hepatosplenomegaly     Musculoskeletal:  No edema, warm, 2+ pulses throughout    Neurologic:  Moves all extremities. AAOx3, CN II-XII reviewed            Data Review:    Review and/or order of clinical lab test      Labs:     Recent Labs     05/19/21  0718 05/18/21  1213   WBC 9.1 10.4   HGB 12.4 13.1   HCT 38.5 41.6    286     Recent Labs     05/19/21  0718 05/18/21  1213    134*   K 3.8 3.8    101   CO2 25 29   BUN 9 9   CREA 0.52* 0.56   * 114*   CA 8.9 9.2     Recent Labs     05/18/21  1213   ALT 38      TBILI 0.9   TP 7.8   ALB 3.2*   GLOB 4.6*     No results for input(s): INR, PTP, APTT, INREXT in the last 72 hours. No results for input(s): FE, TIBC, PSAT, FERR in the last 72 hours. No results found for: FOL, RBCF   No results for input(s): PH, PCO2, PO2 in the last 72 hours.   Recent Labs     05/18/21  1213   TROIQ <0.05     No results found for: CHOL, CHOLX, CHLST, CHOLV, HDL, HDLP, LDL, LDLC, DLDLP, TGLX, TRIGL, TRIGP, CHHD, CHHDX  No results found for: [facility]  Lab Results   Component Value Date/Time    Color YELLOW/STRAW 05/18/2021 12:44 PM    Appearance CLEAR 05/18/2021 12:44 PM    Specific gravity 1.006 05/18/2021 12:44 PM    pH (UA) 7.5 05/18/2021 12:44 PM    Protein Negative 05/18/2021 12:44 PM    Glucose Negative 05/18/2021 12:44 PM    Ketone Negative 05/18/2021 12:44 PM    Bilirubin Negative 05/18/2021 12:44 PM    Urobilinogen 1.0 05/18/2021 12:44 PM    Nitrites Negative 05/18/2021 12:44 PM    Leukocyte Esterase TRACE (A) 05/18/2021 12:44 PM    Epithelial cells FEW 05/18/2021 12:44 PM    Bacteria Negative 05/18/2021 12:44 PM    WBC 0-4 05/18/2021 12:44 PM    RBC 0-5 05/18/2021 12:44 PM         Medications Reviewed:     Current Facility-Administered Medications   Medication Dose Route Frequency    sodium chloride (NS) flush 5-40 mL  5-40 mL IntraVENous Q8H    sodium chloride (NS) flush 5-40 mL  5-40 mL IntraVENous PRN    0.9% sodium chloride infusion  100 mL/hr IntraVENous CONTINUOUS    metroNIDAZOLE (FLAGYL) IVPB premix 500 mg  500 mg IntraVENous Q12H    cefepime (MAXIPIME) 2 g in 0.9% sodium chloride (MBP/ADV) 100 mL MBP  2 g IntraVENous Q8H    acetaminophen (TYLENOL) tablet 650 mg  650 mg Oral Q4H PRN    ondansetron (ZOFRAN) injection 4 mg  4 mg IntraVENous Q4H PRN    morphine injection 2 mg  2 mg IntraVENous Q4H PRN    0.9% sodium chloride infusion  75 mL/hr IntraVENous CONTINUOUS    hydrALAZINE (APRESOLINE) 20 mg/mL injection 10 mg  10 mg IntraVENous Q6H PRN     ______________________________________________________________________  EXPECTED LENGTH OF STAY: 2d 14h  ACTUAL LENGTH OF STAY:          1                 Anshul Cline MD

## 2021-05-19 NOTE — PROGRESS NOTES
Transition of Care Plan   RUR- Low 7%   DISPOSITION: Return home with    F/U with PCP/Specialist    Transport: , Ginger Medina    Reason for Admission: abdominal pain                     RUR Score:      7%               Plan for utilizing home health: Patient has not had home health before; not recommended currently         PCP: First and Last name:  Iwona Tellez MD     [facility] of [facility]   Are you a current patient: Yes/No: Yes   Approximate date of last visit: Feb 2020   Can you participate in a virtual visit with your PCP: Yes    Patient requested she schedule her own follow up given her 's work schedule. Current Advanced Directive/Advance Care Plan: Full Code; AMD at home      Healthcare Decision Maker: NOK is Ginger  Click here to [address] including selection of the Healthcare Decision Maker Relationship (ie \"Primary\")                         Transition of Care Plan:        CM met with patient at bedside to introduce self and role. Patient lives with  in a 4 story home, 4 steps to enter, about 12 steps on each floor. Patient is independent with ADLs; does not use DME to ambulate. CM confirmed demographics; uses [facility] in [place]. No CM needs identified at this time. Care Management Interventions  PCP Verified by CM: Yes  Last Visit to PCP: 02/19/20  Palliative Care Criteria Met (RRAT>21 & CHF Dx)?: No  Mode of Transport at Discharge: Other (see comment) ()  MyChart Signup: No  Discharge Durable Medical Equipment: No  Health Maintenance Reviewed: Yes  Physical Therapy Consult: No  Occupational Therapy Consult: No  Speech Therapy Consult: No  Current Support Network: Lives with Spouse  Confirm Follow Up Transport: Family  Discharge Location  Discharge Placement: [address] [place] [address] [place]

## 2021-05-20 ENCOUNTER — APPOINTMENT (OUTPATIENT)
Dept: CT IMAGING | Age: 66
DRG: 392 | End: 2021-05-20
Attending: NURSE PRACTITIONER
Payer: COMMERCIAL

## 2021-05-20 PROCEDURE — 74011250637 HC RX REV CODE- 250/637: Performed by: FAMILY MEDICINE

## 2021-05-20 PROCEDURE — 65410000002 HC RM PRIVATE OB

## 2021-05-20 PROCEDURE — 74011000636 HC RX REV CODE- 636: Performed by: RADIOLOGY

## 2021-05-20 PROCEDURE — 74011000258 HC RX REV CODE- 258: Performed by: RADIOLOGY

## 2021-05-20 PROCEDURE — 74011250636 HC RX REV CODE- 250/636: Performed by: FAMILY MEDICINE

## 2021-05-20 PROCEDURE — 74011000258 HC RX REV CODE- 258: Performed by: FAMILY MEDICINE

## 2021-05-20 PROCEDURE — 99231 SBSQ HOSP IP/OBS SF/LOW 25: CPT | Performed by: SURGERY

## 2021-05-20 PROCEDURE — 74011250637 HC RX REV CODE- 250/637: Performed by: SURGERY

## 2021-05-20 PROCEDURE — 94760 N-INVAS EAR/PLS OXIMETRY 1: CPT

## 2021-05-20 PROCEDURE — 74177 CT ABD & PELVIS W/CONTRAST: CPT

## 2021-05-20 RX ORDER — SODIUM CHLORIDE 0.9 % (FLUSH) 0.9 %
10 SYRINGE (ML) INJECTION
Status: DISPENSED | OUTPATIENT
Start: 2021-05-20 | End: 2021-05-20

## 2021-05-20 RX ADMIN — Medication 40 MG: at 03:03

## 2021-05-20 RX ADMIN — SODIUM CHLORIDE 100 ML: 900 INJECTION, SOLUTION INTRAVENOUS at 11:17

## 2021-05-20 RX ADMIN — IOPAMIDOL 100 ML: 755 INJECTION, SOLUTION INTRAVENOUS at 11:17

## 2021-05-20 RX ADMIN — CEFEPIME 2 G: 2 INJECTION, POWDER, FOR SOLUTION INTRAVENOUS at 04:15

## 2021-05-20 RX ADMIN — Medication 40 MG: at 19:03

## 2021-05-20 RX ADMIN — ACETAMINOPHEN 650 MG: 325 TABLET ORAL at 19:03

## 2021-05-20 RX ADMIN — CEFEPIME 2 G: 2 INJECTION, POWDER, FOR SOLUTION INTRAVENOUS at 12:10

## 2021-05-20 RX ADMIN — Medication 1 CAPSULE: at 16:31

## 2021-05-20 RX ADMIN — CEFEPIME 2 G: 2 INJECTION, POWDER, FOR SOLUTION INTRAVENOUS at 20:35

## 2021-05-20 RX ADMIN — ACETAMINOPHEN 650 MG: 325 TABLET ORAL at 03:03

## 2021-05-20 RX ADMIN — METRONIDAZOLE 500 MG: 500 INJECTION, SOLUTION INTRAVENOUS at 07:14

## 2021-05-20 RX ADMIN — METRONIDAZOLE 500 MG: 500 INJECTION, SOLUTION INTRAVENOUS at 19:05

## 2021-05-20 NOTE — PROGRESS NOTES
.Bedside and Verbal shift change report given to Donna Berger RN (oncoming nurse) by Jefferson Calles RN (offgoing nurse). Report included the following information SBAR, Kardex, Intake/Output, MAR, Accordion, Recent Results and Med Rec Status.

## 2021-05-20 NOTE — PROGRESS NOTES
[address] [facility]  Hospitalist Group                                                                                          Hospitalist Progress Note  Rubin DO Sterling  Answering service: [phone] OR [phone] from in house phone        Date of Service:  2021  NAME:  Rachael Dominguez  :  1955  MRN:  [id number]      Admission Summary:   Admitted with increasing LLQ pain and found to have evidence of diverticulitis with abscess       Interval history / Subjective: Follow up diverticulitis. Patient seen and examined. First encounter. States pain 1/10, improves with tylenol and simethicone. No fevers, leukocytosis. Plans for CT abd/pelvis today. Passing gas, +BM. Requesting diet advancement. Assessment & Plan:     Acute diverticulitis with abscess:   -presented with LLQ pain  -CT Abd/pelvis with diverticulitis, abscess  -Gen surgery consulted and following  -continue cefepime, Flagyl  -CT abdomen/pelvis  pending  -Pain control  -Monitor bowel movements    Accelerated hypertension: improved with pain control    Code status: full   DVT prophylaxis: ambulatory     Care Plan discussed with: Patient/Family  Anticipated Disposition: Home w/Family  Anticipated Discharge: 24 hours to 48 hours     Hospital Problems  Never Reviewed        Codes Class Noted POA    Diverticulitis ICD-10-CM: R62.33  ICD-9-CM: 562.11  2021 Unknown                Review of Systems:   Negative unless stated above      Vital Signs:    Last 24hrs VS reviewed since prior progress note.  Most recent are:  Visit Vitals  /81 (BP 1 Location: Left upper arm, BP Patient Position: At rest;Sitting)   Pulse 71   Temp 97.5 °F (36.4 °C)   Resp 16   Ht 5' 2\" (1.575 m)   Wt 96.4 kg (212 lb 9.6 oz)   SpO2 95%   BMI 38.89 kg/m²       No intake or output data in the 24 hours ending 21 1109     Physical Examination:     I had a face to face encounter with this patient and independently examined them on 2021 as outlined below:          Constitutional:  No acute distress, cooperative, pleasant    ENT:  Oral mucosa moist, oropharynx benign. Resp:  CTA bilaterally. No wheezing/rhonchi/rales. No accessory muscle use   CV:  Regular rhythm, normal rate, no murmurs, gallops, rubs    GI:  Soft, mildly distended, non tender. Hypoactive bowel sounds, no hepatosplenomegaly     Musculoskeletal:  No edema, warm, 2+ pulses throughout    Neurologic:  Moves all extremities. Data Review:    Review and/or order of clinical lab test  Review and/or order of tests in the radiology section of CPT  Review and/or order of tests in the medicine section of [facility]      Labs:     Recent Labs     05/19/21  0718 05/18/21  1213   WBC 9.1 10.4   HGB 12.4 13.1   HCT 38.5 41.6    286     Recent Labs     05/19/21  0718 05/18/21  1213    134*   K 3.8 3.8    101   CO2 25 29   BUN 9 9   CREA 0.52* 0.56   * 114*   CA 8.9 9.2     Recent Labs     05/18/21  1213   ALT 38      TBILI 0.9   TP 7.8   ALB 3.2*   GLOB 4.6*     No results for input(s): INR, PTP, APTT, INREXT in the last 72 hours. No results for input(s): FE, TIBC, PSAT, FERR in the last 72 hours. No results found for: FOL, RBCF   No results for input(s): PH, PCO2, PO2 in the last 72 hours.   Recent Labs     05/18/21  1213   TROIQ <0.05     No results found for: CHOL, CHOLX, CHLST, CHOLV, HDL, HDLP, LDL, LDLC, DLDLP, TGLX, TRIGL, TRIGP, CHHD, CHHDX  No results found for: [address]  Lab Results   Component Value Date/Time    Color YELLOW/STRAW 05/18/2021 12:44 PM    Appearance CLEAR 05/18/2021 12:44 PM    Specific gravity 1.006 05/18/2021 12:44 PM    pH (UA) 7.5 05/18/2021 12:44 PM    Protein Negative 05/18/2021 12:44 PM    Glucose Negative 05/18/2021 12:44 PM    Ketone Negative 05/18/2021 12:44 PM    Bilirubin Negative 05/18/2021 12:44 PM    Urobilinogen 1.0 05/18/2021 12:44 PM    Nitrites Negative 05/18/2021 12:44 PM    Leukocyte Esterase TRACE (A) 05/18/2021 12:44 PM    Epithelial cells FEW 05/18/2021 12:44 PM    Bacteria Negative 05/18/2021 12:44 PM    WBC 0-4 05/18/2021 12:44 PM    RBC 0-5 05/18/2021 12:44 PM         Medications Reviewed:     Current Facility-Administered Medications   Medication Dose Route Frequency    sodium chloride 0.9 % bolus infusion 100 mL  100 mL IntraVENous RAD ONCE    iopamidoL (ISOVUE-370) 76 % injection 100 mL  100 mL IntraVENous RAD ONCE    sodium chloride (NS) flush 10 mL  10 mL IntraVENous RAD ONCE    simethicone (MYLICON) 72OR/6.3NQ oral drops 40 mg  40 mg Oral QID PRN    sodium chloride (NS) flush 5-40 mL  5-40 mL IntraVENous Q8H    sodium chloride (NS) flush 5-40 mL  5-40 mL IntraVENous PRN    0.9% sodium chloride infusion  100 mL/hr IntraVENous CONTINUOUS    metroNIDAZOLE (FLAGYL) IVPB premix 500 mg  500 mg IntraVENous Q12H    cefepime (MAXIPIME) 2 g in 0.9% sodium chloride (MBP/ADV) 100 mL MBP  2 g IntraVENous Q8H    acetaminophen (TYLENOL) tablet 650 mg  650 mg Oral Q4H PRN    ondansetron (ZOFRAN) injection 4 mg  4 mg IntraVENous Q4H PRN    morphine injection 2 mg  2 mg IntraVENous Q4H PRN    0.9% sodium chloride infusion  75 mL/hr IntraVENous CONTINUOUS    hydrALAZINE (APRESOLINE) 20 mg/mL injection 10 mg  10 mg IntraVENous Q6H PRN     ______________________________________________________________________  EXPECTED LENGTH OF STAY: 2d 14h  ACTUAL LENGTH OF STAY:          2                 Conchita Burnham DO

## 2021-05-20 NOTE — PROGRESS NOTES
Progress Note    Patient: Angel Gaytan MRN: [id number]  SSN: [id number]    YOB: 1955  Age: 77 y.o. Sex: female      Admit Date: 2021    Perforated diverticulitis    Subjective:     No acute surgical issues. Pt is reporting a lot of diarrhea but is overall feeling better. CT scan showed improvement in sigmoid inflammation and abscess diminishing in size. No leukocytosis or fever. Objective:     Visit Vitals  /81 (BP 1 Location: Left upper arm, BP Patient Position: At rest;Sitting)   Pulse 71   Temp 97.5 °F (36.4 °C)   Resp 16   Ht 5' 2\" (1.575 m)   Wt 212 lb 9.6 oz (96.4 kg)   SpO2 95%   BMI 38.89 kg/m²       Temp (24hrs), Av °F (36.7 °C), Min:97.5 °F (36.4 °C), Max:98.3 °F (36.8 °C)        Physical Exam:    Gen:  NAD  Pulm:  Unlabored  Abd:  S/ND/mild TTP in suprapubic area and LLQ    No results found for this or any previous visit (from the past 24 hour(s)). Assessment:     Hospital Problems  Never Reviewed        Codes Class Noted POA    Diverticulitis ICD-10-CM: N25.83  ICD-9-CM: 562.11  2021 Unknown              Plan/Recommendations/Medical Decision Making:     - Perforated diverticulitis:  No acute indication for operation.   Patient is improving  - Advance to GI lite diet  - Pain control  - Hopefully can DC home tomorrow with oral antibiotic  - Recommend colonoscopy in 3-6 months

## 2021-05-21 VITALS
WEIGHT: 212.6 LBS | OXYGEN SATURATION: 95 % | TEMPERATURE: 98.3 F | HEIGHT: 62 IN | BODY MASS INDEX: 39.12 KG/M2 | RESPIRATION RATE: 16 BRPM | HEART RATE: 70 BPM | SYSTOLIC BLOOD PRESSURE: 162 MMHG | DIASTOLIC BLOOD PRESSURE: 73 MMHG

## 2021-05-21 LAB
ANION GAP SERPL CALC-SCNC: 7 MMOL/L (ref 5–15)
BUN SERPL-MCNC: 6 MG/DL (ref 6–20)
BUN/CREAT SERPL: 11 (ref 12–20)
CALCIUM SERPL-MCNC: 8.9 MG/DL (ref 8.5–10.1)
CHLORIDE SERPL-SCNC: 104 MMOL/L (ref 97–108)
CO2 SERPL-SCNC: 28 MMOL/L (ref 21–32)
CREAT SERPL-MCNC: 0.53 MG/DL (ref 0.55–1.02)
ERYTHROCYTE [DISTWIDTH] IN BLOOD BY AUTOMATED COUNT: 13.1 % (ref 11.5–14.5)
GLUCOSE SERPL-MCNC: 118 MG/DL (ref 65–100)
HCT VFR BLD AUTO: 38.8 % (ref 35–47)
HGB BLD-MCNC: 12.4 G/DL (ref 11.5–16)
MAGNESIUM SERPL-MCNC: 2 MG/DL (ref 1.6–2.4)
MCH RBC QN AUTO: 28.3 PG (ref 26–34)
MCHC RBC AUTO-ENTMCNC: 32 G/DL (ref 30–36.5)
MCV RBC AUTO: 88.6 FL (ref 80–99)
NRBC # BLD: 0 K/UL (ref 0–0.01)
NRBC BLD-RTO: 0 PER 100 WBC
PHOSPHATE SERPL-MCNC: 3.1 MG/DL (ref 2.6–4.7)
PLATELET # BLD AUTO: 307 K/UL (ref 150–400)
PMV BLD AUTO: 8.9 FL (ref 8.9–12.9)
POTASSIUM SERPL-SCNC: 3.7 MMOL/L (ref 3.5–5.1)
RBC # BLD AUTO: 4.38 M/UL (ref 3.8–5.2)
SODIUM SERPL-SCNC: 139 MMOL/L (ref 136–145)
WBC # BLD AUTO: 8.2 K/UL (ref 3.6–11)

## 2021-05-21 PROCEDURE — 85027 COMPLETE CBC AUTOMATED: CPT

## 2021-05-21 PROCEDURE — 99232 SBSQ HOSP IP/OBS MODERATE 35: CPT | Performed by: SURGERY

## 2021-05-21 PROCEDURE — 74011000258 HC RX REV CODE- 258: Performed by: FAMILY MEDICINE

## 2021-05-21 PROCEDURE — 80048 BASIC METABOLIC PNL TOTAL CA: CPT

## 2021-05-21 PROCEDURE — 83735 ASSAY OF MAGNESIUM: CPT

## 2021-05-21 PROCEDURE — 84100 ASSAY OF PHOSPHORUS: CPT

## 2021-05-21 PROCEDURE — 74011250637 HC RX REV CODE- 250/637: Performed by: SURGERY

## 2021-05-21 PROCEDURE — 36415 COLL VENOUS BLD VENIPUNCTURE: CPT

## 2021-05-21 PROCEDURE — 74011250637 HC RX REV CODE- 250/637: Performed by: FAMILY MEDICINE

## 2021-05-21 PROCEDURE — 74011250636 HC RX REV CODE- 250/636: Performed by: FAMILY MEDICINE

## 2021-05-21 RX ORDER — CEFUROXIME AXETIL 500 MG/1
500 TABLET ORAL 2 TIMES DAILY
Qty: 28 TABLET | Refills: 0 | Status: SHIPPED
Start: 2021-05-21 | End: 2021-05-28

## 2021-05-21 RX ORDER — METRONIDAZOLE 500 MG/1
500 TABLET ORAL 3 TIMES DAILY
Qty: 42 TABLET | Refills: 0 | Status: SHIPPED
Start: 2021-05-21 | End: 2021-05-28

## 2021-05-21 RX ADMIN — Medication 1 CAPSULE: at 09:40

## 2021-05-21 RX ADMIN — ACETAMINOPHEN 650 MG: 325 TABLET ORAL at 06:32

## 2021-05-21 RX ADMIN — CEFEPIME 2 G: 2 INJECTION, POWDER, FOR SOLUTION INTRAVENOUS at 04:27

## 2021-05-21 RX ADMIN — Medication 40 MG: at 06:32

## 2021-05-21 RX ADMIN — METRONIDAZOLE 500 MG: 500 INJECTION, SOLUTION INTRAVENOUS at 06:32

## 2021-05-21 NOTE — PROGRESS NOTES
Surgery Progress Note    5/21/2021    Admit Date: 5/18/2021    CC: Abd pain    Subjective:     Feels well. Tolerated GI lite diet. No nausea. No fever    Constitutional: No fever or chills  Neurologic: No headache  Eyes: No scleral icterus or irritated eyes  Nose: No nasal pain or drainage  Mouth: No oral lesions or sore throat  Cardiac: No palpations or chest pain  Pulmonary: No cough or shortness or breath  Gastrointestinal: Minimal abd pain, No nausea, emesis, diarrhea, or constipation  Genitourinary: No dysuria  Musculoskeletal: No muscle or joint tenderness  Skin: No rashes or lesions  Psychiatric: No anxiety or depressed mood    Objective:     Visit Vitals  /71 (BP 1 Location: Left upper arm, BP Patient Position: At rest)   Pulse 69   Temp 98.1 °F (36.7 °C)   Resp 16   Ht 5' 2\" (1.575 m)   Wt 212 lb 9.6 oz (96.4 kg)   SpO2 95%   BMI 38.89 kg/m²       General: No acute distress, conversant  Eyes: PERRLA, no scleral icterus  HENT: Normocephalic without oral lesions  Neck: Trachea midline without LAD  Cardiac: Normal pulse rate and rhythm  Pulmonary: Symmetric chest rise with normal effort  GI: Soft, NT, ND, no hernia, no splenomegaly  Skin: Warm without rash  Extremities: No edema or joint stiffness  Psych: Appropriate mood and affect    Labs, vital signs, and I/O reviewed. Assessment:     76 y/o F with complicated diverticulitis responding to abx    Plan:     PRN pain control  PO gram negative coverage + flagyl per primary team for 14 more days please  Two more weeks of low fiber diet and then transition to high fiber diet going forward  No surgical follow up needed  Follow up with PCP in next two weeks and told patient to try to schedule colonoscopy for end of July / August if able. Please call our office if any concerns.     Dee Dee Junior MD  Bariatric and General Surgeon  [facility]

## 2021-05-21 NOTE — DISCHARGE INSTRUCTIONS
You will need to take Metronidazole and Ceftin antibiotics for 14 days. You should take a probiotic while on antibiotic to prevent infectious diarrhea  You may resume your home medications. You should get a colonoscopy in 3-6 months. You may call [phone] to schedule a follow-up with Dr. Alex iWley as needed. Take tylenol 500 mg or 625 mg every 6 hours as needed for pain      Patient Education        Diverticulitis: Care Instructions  Overview     Diverticulitis occurs when pouches form in the wall of the colon and become inflamed or infected. It can be very painful. Doctors aren't sure what causes diverticulitis. There is no proof that foods such as nuts, seeds, or berries cause it or make it worse. A low-fiber diet may cause the colon to work harder to push stool forward. Pouches may form because of this extra work. It may be hard to think about healthy eating while you're in pain. But as you recover, you might think about how you can use healthy eating for overall better health. Healthy eating may help you avoid future attacks. Follow-up care is a key part of your treatment and safety. Be sure to make and go to all appointments, and call your doctor if you are having problems. It's also a good idea to know your test results and keep a list of the medicines you take. How can you care for yourself at home? · Drink plenty of fluids. If you have kidney, heart, or liver disease and have to limit fluids, talk with your doctor before you increase the amount of fluids you drink. · Stay with liquids or a bland diet (plain rice, bananas, dry toast or crackers, applesauce) until you are feeling better. Then you can return to regular foods and slowly increase the amount of fiber in your diet. · Use a heating pad set on low on your belly to relieve mild cramps and pain. · Get extra rest until you are feeling better. · Be safe with medicines. Read and follow all instructions on the label.   ? If the doctor gave you a prescription medicine for pain, take it as prescribed. ? If you are not taking a prescription pain medicine, ask your doctor if you can take an over-the-counter medicine. · If your doctor prescribed antibiotics, take them as directed. Do not stop taking them just because you feel better. You need to take the full course of antibiotics. · Do not use laxatives or enemas unless your doctor tells you to use them. When should you call for help? Call your doctor now or seek immediate medical care if:    · You have a fever.     · You are vomiting.     · You have new or worse belly pain.     · You cannot pass stools or gas. Watch closely for changes in your health, and be sure to contact your doctor if you have any problems. Where can you learn more? Go to http://www.gray.com/  Enter H901 in the search box to learn more about \"Diverticulitis: Care Instructions. \"  Current as of: April 15, 2020               Content Version: 12.8  © 2006-2021 [employer]. Care instructions adapted under license by [employer] (which disclaims liability or warranty for this information). If you have questions about a medical condition or this instruction, always ask your healthcare professional. [address] any warranty or liability for your use of this information.

## 2021-05-21 NOTE — DISCHARGE SUMMARY
Discharge Summary       PATIENT ID: Alena Rey  MRN: [id number]   YOB: 1955    DATE OF ADMISSION: 5/18/2021 11:53 AM    DATE OF DISCHARGE: 5/21/2021  PRIMARY CARE PROVIDER: Cheko Arango MD     ATTENDING PHYSICIAN: Noemy Vidal DO   DISCHARGING PROVIDER: Noemy Vidal DO    To contact this individual call [phone] and ask the  to page. If unavailable ask to be transferred the Adult Hospitalist Department. CONSULTATIONS: IP CONSULT TO GENERAL SURGERY    PROCEDURES/SURGERIES: * No surgery found *    ADMITTING DIAGNOSES & HOSPITAL COURSE:     68-year-old female presenting to [facility] with back pain for approximately 10 days and associated poor appetite, nausea, vomiting. CT abdomen/pelvis demonstrated diverticulitis with abscess. She was admitted for further evaluation and placed on IV antibiotics. General surgery consulted. Repeat CT scan demonstrated improvement in abscess collections. Patient was stable to discharge home. She had several antibiotic allergies and was transitioned to Ceftin/Flagyl. She was instructed to return to the emergency department for worsening symptoms. Patient voiced understanding and agreement to discharge.       CT abd/pel with contrast 5/8/2021:   IMPRESSION  Diverticulitis with abscess. CT abd/pelv with contrast 5/20/21:   IMPRESSION  Moderate to severe sigmoid colonic diverticulitis. Diminished extraluminal collections as described above. DISCHARGE DIAGNOSES / PLAN:      Acute diverticulitis with abscess:   -presented with LLQ pain  -CT Abd/pelvis with diverticulitis, abscess. Repeat scan with improvement  -Gen surgery consulted and following  -continue cefepime, Flagyl while inpatient.   Transition to Ceftin/Flagyl on discharge x14 additional days  -Pain control with Tylenol     Accelerated hypertension: improved with pain control        ADDITIONAL CARE RECOMMENDATIONS:   You will need to take Metronidazole and Ceftin antibiotics for 14 days. You should take a probiotic while on antibiotic to prevent infectious diarrhea  You may resume your home medications. You should get a colonoscopy in 3-6 months. You may call [phone] to schedule a follow-up with Dr. Manohar Dacosta as needed. Take tylenol 500 mg or 625 mg every 6 hours as needed for pain    PENDING TEST RESULTS:   At the time of discharge the following test results are still pending: none    FOLLOW UP APPOINTMENTS:    Follow-up Information     Follow up With Specialties Details Why Contact Babita Hall MD Family Medicine   [address] [place] [address]  [phone]            DISCHARGE MEDICATIONS:  Current Discharge Medication List      START taking these medications    Details   cefUROXime (CEFTIN) 500 mg tablet Take 1 Tablet by mouth two (2) times a day for 14 days. Qty: 28 Tablet, Refills: 0  Start date: 5/21/2021, End date: 6/4/2021      metroNIDAZOLE (FlagyL) 500 mg tablet Take 1 Tablet by mouth three (3) times daily for 14 days. Qty: 42 Tablet, Refills: 0  Start date: 5/21/2021, End date: 6/4/2021      simethicone 42 mg chew Take 42 mg by mouth four (4) times daily as needed for Nausea for up to 7 days. Qty: 30 Tablet, Refills: 0  Start date: 5/21/2021, End date: 5/28/2021               NOTIFY YOUR PHYSICIAN FOR ANY OF THE FOLLOWING:   Fever over 101 degrees for 24 hours. Chest pain, shortness of breath, fever, chills, nausea, vomiting, diarrhea, change in mentation, falling, weakness, bleeding. Severe pain or pain not relieved by medications. Or, any other signs or symptoms that you may have questions about.     DISPOSITION:   x Home With:   OT  PT  HH  RN       Long term SNF/Inpatient Rehab    Independent/assisted living    Hospice    Other:       PATIENT CONDITION AT DISCHARGE:     Functional status    Poor     Deconditioned    x Independent      Cognition   x  Lucid     Forgetful     Dementia      Catheters/lines (plus indication) Zambrano     PICC     PEG    x None      Code status   x  Full code     DNR      PHYSICAL EXAMINATION AT DISCHARGE:  Constitutional:  No acute distress, cooperative, pleasant    ENT:  Oral mucosa moist, oropharynx benign. Resp:  CTA bilaterally. No wheezing/rhonchi/rales. No accessory muscle use   CV:  Regular rhythm, normal rate, no murmurs, gallops, rubs    GI:  Soft, mildly distended, non tender. Hypoactive bowel sounds, no hepatosplenomegaly     Musculoskeletal:  No edema, warm, 2+ pulses throughout    Neurologic:  Moves all extremities.                                   CHRONIC MEDICAL DIAGNOSES:  Problem List as of 5/21/2021 Never Reviewed        Codes Class Noted - Resolved    Diverticulitis ICD-10-CM: R61.74  ICD-9-CM: 562.11  5/18/2021 - Present              Greater than 30 minutes were spent with the patient on counseling and coordination of care    Signed:   Jeanine Valentino DO  5/21/2021  12:21 PM

## 2021-05-21 NOTE — PROGRESS NOTES
Physician Progress Note      PATIENTKeitha Mortimer  CSN #:                  [id number]  :                       1955  ADMIT DATE:       2021 11:53 AM  DISCH DATE:        2021 2:37 PM  RESPONDING  PROVIDER #:        Héctor MCLAIN DO          QUERY TEXT:    Pt admitted with diverticulitis of large intestine with preformation and abscess. Pt noted to have 'accelerated HTN'. If possible, please document in progress notes and discharge summary if you are evaluating and/or treating any of the following: The medical record reflects the following:  Risk Factors: 65yo with pain  Clinical Indicators:  - H&P: Accelerated hypertension: Likely secondary to pain, IV hydralazine, pain control, supportive care, close monitoring, reassess as needed, further intervention per hospitalist  - Discharge Summary: Accelerated hypertension: improved with pain control  - BP: 114-179/69-83  Treatment: IV hydralazine and pain control    - Hypertensive Crisis, unspecified: at least 2 consecutive readings of SBP > 180 mmHg or DBP > 110 mmHg  - Hypertensive Urgency: Hypertensive crisis w/o associated organ dysfunction. S/s may or may not be present, but can include severe headache, SOB, epistaxis, severe anxiety. Tx: adjustment of oral antihypertensives; IV meds not usually required. - Hypertensive Emergency: Hypertensive crisis w/ associated organ damage (stroke, encephalopathy, WHITNEY, MI, angina, acute or decompensated CHF, acute pulmonary edema, HELLP, etc.). Requires immediate treatment (usually IV meds) & possible ICU admission. Associated organ dysfunction needs documented.   [email or web address]    Thank you,  Yoselin Richardson  [phone]  [phone]  Options provided:  -- Hypertensive Crisis  -- Hypertensive Emergency  -- Hypertensive Urgency  -- Essential HTN  -- Accelerated HTN ruled out  -- Other - I will add my own diagnosis  -- Disagree - Not applicable / Not valid  -- Disagree - Clinically unable to determine / Unknown  -- Refer to Clinical Documentation Reviewer    PROVIDER RESPONSE TEXT:    This patient has hypertensive urgency.     Query created by: Elise Paulino on 5/21/2021 3:40 PM      Electronically signed by:  Wai Cano DO 5/21/2021 6:23 PM

## 2021-05-21 NOTE — PROGRESS NOTES
NUTRITION       Pt admitted for perforated diverticulitis on antibiotics. Reviewed low fiber diet information with pt  And provided high fiber information as well. Continue with low fiber for 2 weeks to gradually increase afterwards. Expect good compliance.      Radha Pena RD

## 2021-05-21 NOTE — PROGRESS NOTES
Bedside and Verbal shift change report given to Ana Laura Chau RN (oncoming nurse) by DELILAH Herrera RN (offgoing nurse). Report included the following information SBAR, Kardex, Intake/Output, MAR and Recent Results.

## 2021-05-23 LAB
BACTERIA SPEC CULT: NORMAL
SERVICE CMNT-IMP: NORMAL

## 2021-05-25 ENCOUNTER — HOSPITAL ENCOUNTER (INPATIENT)
Age: 66
LOS: 2 days | Discharge: HOME | DRG: 607 | End: 2021-05-28
Attending: EMERGENCY MEDICINE | Admitting: STUDENT IN AN ORGANIZED HEALTH CARE EDUCATION/TRAINING PROGRAM
Payer: COMMERCIAL

## 2021-05-25 DIAGNOSIS — T78.40XA ALLERGIC REACTION, INITIAL ENCOUNTER: ICD-10-CM

## 2021-05-25 DIAGNOSIS — G47.09 OTHER INSOMNIA: ICD-10-CM

## 2021-05-25 DIAGNOSIS — B37.2 CANDIDAL DERMATITIS: Primary | ICD-10-CM

## 2021-05-25 DIAGNOSIS — K57.92 DIVERTICULITIS: ICD-10-CM

## 2021-05-25 LAB
ALBUMIN SERPL-MCNC: 3.2 G/DL (ref 3.5–5)
ALBUMIN/GLOB SERPL: 0.7 {RATIO} (ref 1.1–2.2)
ALP SERPL-CCNC: 106 U/L (ref 45–117)
ALT SERPL-CCNC: 38 U/L (ref 12–78)
ANION GAP SERPL CALC-SCNC: 9 MMOL/L (ref 5–15)
AST SERPL-CCNC: 46 U/L (ref 15–37)
BASOPHILS # BLD: 0 K/UL (ref 0–0.1)
BASOPHILS NFR BLD: 0 % (ref 0–1)
BILIRUB SERPL-MCNC: 0.3 MG/DL (ref 0.2–1)
BUN SERPL-MCNC: 11 MG/DL (ref 6–20)
BUN/CREAT SERPL: 20 (ref 12–20)
CALCIUM SERPL-MCNC: 9.5 MG/DL (ref 8.5–10.1)
CHLORIDE SERPL-SCNC: 102 MMOL/L (ref 97–108)
CO2 SERPL-SCNC: 25 MMOL/L (ref 21–32)
COMMENT, HOLDF: NORMAL
CREAT SERPL-MCNC: 0.56 MG/DL (ref 0.55–1.02)
DIFFERENTIAL METHOD BLD: ABNORMAL
EOSINOPHIL # BLD: 0.1 K/UL (ref 0–0.4)
EOSINOPHIL NFR BLD: 1 % (ref 0–7)
ERYTHROCYTE [DISTWIDTH] IN BLOOD BY AUTOMATED COUNT: 13.2 % (ref 11.5–14.5)
GLOBULIN SER CALC-MCNC: 4.6 G/DL (ref 2–4)
GLUCOSE SERPL-MCNC: 156 MG/DL (ref 65–100)
HCT VFR BLD AUTO: 41.8 % (ref 35–47)
HGB BLD-MCNC: 13.2 G/DL (ref 11.5–16)
IMM GRANULOCYTES # BLD AUTO: 0.1 K/UL (ref 0–0.04)
IMM GRANULOCYTES NFR BLD AUTO: 1 % (ref 0–0.5)
LYMPHOCYTES # BLD: 1.5 K/UL (ref 0.8–3.5)
LYMPHOCYTES NFR BLD: 15 % (ref 12–49)
MCH RBC QN AUTO: 27.8 PG (ref 26–34)
MCHC RBC AUTO-ENTMCNC: 31.6 G/DL (ref 30–36.5)
MCV RBC AUTO: 88.2 FL (ref 80–99)
MONOCYTES # BLD: 0.9 K/UL (ref 0–1)
MONOCYTES NFR BLD: 9 % (ref 5–13)
NEUTS SEG # BLD: 7.3 K/UL (ref 1.8–8)
NEUTS SEG NFR BLD: 74 % (ref 32–75)
NRBC # BLD: 0 K/UL (ref 0–0.01)
NRBC BLD-RTO: 0 PER 100 WBC
PLATELET # BLD AUTO: 463 K/UL (ref 150–400)
PMV BLD AUTO: 8.8 FL (ref 8.9–12.9)
POTASSIUM SERPL-SCNC: 4.1 MMOL/L (ref 3.5–5.1)
PROT SERPL-MCNC: 7.8 G/DL (ref 6.4–8.2)
RBC # BLD AUTO: 4.74 M/UL (ref 3.8–5.2)
SAMPLES BEING HELD,HOLD: NORMAL
SODIUM SERPL-SCNC: 136 MMOL/L (ref 136–145)
WBC # BLD AUTO: 9.8 K/UL (ref 3.6–11)

## 2021-05-25 PROCEDURE — 80053 COMPREHEN METABOLIC PANEL: CPT

## 2021-05-25 PROCEDURE — 99283 EMERGENCY DEPT VISIT LOW MDM: CPT

## 2021-05-25 PROCEDURE — 85025 COMPLETE CBC W/AUTO DIFF WBC: CPT

## 2021-05-25 PROCEDURE — 96375 TX/PRO/DX INJ NEW DRUG ADDON: CPT

## 2021-05-25 PROCEDURE — 74011250636 HC RX REV CODE- 250/636: Performed by: EMERGENCY MEDICINE

## 2021-05-25 PROCEDURE — 96374 THER/PROPH/DIAG INJ IV PUSH: CPT

## 2021-05-25 PROCEDURE — 36415 COLL VENOUS BLD VENIPUNCTURE: CPT

## 2021-05-25 RX ORDER — FAMOTIDINE 20 MG/1
20 TABLET, FILM COATED ORAL
Status: DISCONTINUED | OUTPATIENT
Start: 2021-05-25 | End: 2021-05-25

## 2021-05-25 RX ORDER — DEXAMETHASONE SODIUM PHOSPHATE 10 MG/ML
10 INJECTION INTRAMUSCULAR; INTRAVENOUS EVERY 6 HOURS
Status: DISCONTINUED | OUTPATIENT
Start: 2021-05-26 | End: 2021-05-26

## 2021-05-25 RX ORDER — PREDNISONE 20 MG/1
60 TABLET ORAL ONCE
Status: DISCONTINUED | OUTPATIENT
Start: 2021-05-25 | End: 2021-05-25

## 2021-05-25 RX ORDER — DIPHENHYDRAMINE HCL 50 MG
50 CAPSULE ORAL
Status: DISCONTINUED | OUTPATIENT
Start: 2021-05-25 | End: 2021-05-25

## 2021-05-25 RX ORDER — DIPHENHYDRAMINE HYDROCHLORIDE 50 MG/ML
50 INJECTION, SOLUTION INTRAMUSCULAR; INTRAVENOUS
Status: COMPLETED | OUTPATIENT
Start: 2021-05-25 | End: 2021-05-25

## 2021-05-25 RX ADMIN — DIPHENHYDRAMINE HYDROCHLORIDE 50 MG: 50 INJECTION, SOLUTION INTRAMUSCULAR; INTRAVENOUS at 22:50

## 2021-05-25 RX ADMIN — DEXAMETHASONE SODIUM PHOSPHATE 10 MG: 10 INJECTION, SOLUTION INTRAMUSCULAR; INTRAVENOUS at 23:08

## 2021-05-25 RX ADMIN — FAMOTIDINE 20 MG: 10 INJECTION, SOLUTION INTRAVENOUS at 22:50

## 2021-05-26 PROBLEM — T78.40XA ALLERGIC REACTION CAUSED BY A DRUG: Status: ACTIVE | Noted: 2021-05-26

## 2021-05-26 PROCEDURE — 74011000258 HC RX REV CODE- 258: Performed by: EMERGENCY MEDICINE

## 2021-05-26 PROCEDURE — 74011250636 HC RX REV CODE- 250/636: Performed by: INTERNAL MEDICINE

## 2021-05-26 PROCEDURE — 74011250637 HC RX REV CODE- 250/637: Performed by: INTERNAL MEDICINE

## 2021-05-26 PROCEDURE — 74011250637 HC RX REV CODE- 250/637: Performed by: STUDENT IN AN ORGANIZED HEALTH CARE EDUCATION/TRAINING PROGRAM

## 2021-05-26 PROCEDURE — 96375 TX/PRO/DX INJ NEW DRUG ADDON: CPT

## 2021-05-26 PROCEDURE — 97161 PT EVAL LOW COMPLEX 20 MIN: CPT

## 2021-05-26 PROCEDURE — 74011250636 HC RX REV CODE- 250/636: Performed by: STUDENT IN AN ORGANIZED HEALTH CARE EDUCATION/TRAINING PROGRAM

## 2021-05-26 PROCEDURE — 74011000258 HC RX REV CODE- 258: Performed by: STUDENT IN AN ORGANIZED HEALTH CARE EDUCATION/TRAINING PROGRAM

## 2021-05-26 PROCEDURE — 65270000029 HC RM PRIVATE

## 2021-05-26 PROCEDURE — 74011250636 HC RX REV CODE- 250/636: Performed by: EMERGENCY MEDICINE

## 2021-05-26 RX ORDER — HEPARIN SODIUM 5000 [USP'U]/ML
5000 INJECTION, SOLUTION INTRAVENOUS; SUBCUTANEOUS EVERY 8 HOURS
Status: DISCONTINUED | OUTPATIENT
Start: 2021-05-26 | End: 2021-05-28 | Stop reason: HOSPADM

## 2021-05-26 RX ORDER — SODIUM CHLORIDE 0.9 % (FLUSH) 0.9 %
5-40 SYRINGE (ML) INJECTION EVERY 8 HOURS
Status: DISCONTINUED | OUTPATIENT
Start: 2021-05-26 | End: 2021-05-28 | Stop reason: HOSPADM

## 2021-05-26 RX ORDER — ONDANSETRON 2 MG/ML
4 INJECTION INTRAMUSCULAR; INTRAVENOUS
Status: DISCONTINUED | OUTPATIENT
Start: 2021-05-26 | End: 2021-05-28 | Stop reason: HOSPADM

## 2021-05-26 RX ORDER — ZOLPIDEM TARTRATE 5 MG/1
5 TABLET ORAL
Status: DISCONTINUED | OUTPATIENT
Start: 2021-05-26 | End: 2021-05-28 | Stop reason: HOSPADM

## 2021-05-26 RX ORDER — SODIUM CHLORIDE 9 MG/ML
75 INJECTION, SOLUTION INTRAVENOUS CONTINUOUS
Status: DISCONTINUED | OUTPATIENT
Start: 2021-05-26 | End: 2021-05-26

## 2021-05-26 RX ORDER — ACETAMINOPHEN 325 MG/1
650 TABLET ORAL
Status: DISCONTINUED | OUTPATIENT
Start: 2021-05-26 | End: 2021-05-28 | Stop reason: HOSPADM

## 2021-05-26 RX ORDER — SODIUM CHLORIDE 0.9 % (FLUSH) 0.9 %
5-40 SYRINGE (ML) INJECTION AS NEEDED
Status: DISCONTINUED | OUTPATIENT
Start: 2021-05-26 | End: 2021-05-28 | Stop reason: HOSPADM

## 2021-05-26 RX ORDER — PROMETHAZINE HYDROCHLORIDE 25 MG/1
12.5 TABLET ORAL
Status: DISCONTINUED | OUTPATIENT
Start: 2021-05-26 | End: 2021-05-28 | Stop reason: HOSPADM

## 2021-05-26 RX ORDER — POLYETHYLENE GLYCOL 3350 17 G/17G
17 POWDER, FOR SOLUTION ORAL DAILY PRN
Status: DISCONTINUED | OUTPATIENT
Start: 2021-05-26 | End: 2021-05-28 | Stop reason: HOSPADM

## 2021-05-26 RX ORDER — DEXAMETHASONE SODIUM PHOSPHATE 10 MG/ML
10 INJECTION INTRAMUSCULAR; INTRAVENOUS EVERY 12 HOURS
Status: COMPLETED | OUTPATIENT
Start: 2021-05-26 | End: 2021-05-27

## 2021-05-26 RX ORDER — ACETAMINOPHEN 650 MG/1
650 SUPPOSITORY RECTAL
Status: DISCONTINUED | OUTPATIENT
Start: 2021-05-26 | End: 2021-05-28 | Stop reason: HOSPADM

## 2021-05-26 RX ADMIN — DEXAMETHASONE SODIUM PHOSPHATE 10 MG: 10 INJECTION, SOLUTION INTRAMUSCULAR; INTRAVENOUS at 05:40

## 2021-05-26 RX ADMIN — ZOLPIDEM TARTRATE 5 MG: 5 TABLET ORAL at 21:56

## 2021-05-26 RX ADMIN — MEROPENEM 500 MG: 500 INJECTION, POWDER, FOR SOLUTION INTRAVENOUS at 14:57

## 2021-05-26 RX ADMIN — Medication 10 ML: at 05:40

## 2021-05-26 RX ADMIN — Medication 10 ML: at 18:09

## 2021-05-26 RX ADMIN — SODIUM CHLORIDE 100 ML/HR: 9 INJECTION, SOLUTION INTRAVENOUS at 03:28

## 2021-05-26 RX ADMIN — MEROPENEM 500 MG: 500 INJECTION, POWDER, FOR SOLUTION INTRAVENOUS at 07:14

## 2021-05-26 RX ADMIN — MEROPENEM 500 MG: 500 INJECTION, POWDER, FOR SOLUTION INTRAVENOUS at 01:51

## 2021-05-26 RX ADMIN — SODIUM CHLORIDE 75 ML/HR: 9 INJECTION, SOLUTION INTRAVENOUS at 14:07

## 2021-05-26 RX ADMIN — MEROPENEM 500 MG: 500 INJECTION, POWDER, FOR SOLUTION INTRAVENOUS at 19:51

## 2021-05-26 RX ADMIN — DEXAMETHASONE SODIUM PHOSPHATE 10 MG: 10 INJECTION, SOLUTION INTRAMUSCULAR; INTRAVENOUS at 18:08

## 2021-05-26 RX ADMIN — ACETAMINOPHEN 650 MG: 325 TABLET ORAL at 11:18

## 2021-05-26 NOTE — PROGRESS NOTES
Patient refused 12 pm dose of Heparin. Patient stated she has been up at john and ambulating in the halls so she did not need it. Patient was educated on the importance of Heparin.

## 2021-05-26 NOTE — PROGRESS NOTES
PHYSICAL THERAPY EVALUATION/DISCHARGE  Patient: Peri Cuevas (82 y.o. female)  Date: 5/26/2021  Primary Diagnosis: Diverticulitis [K57.92]  Allergic reaction caused by a drug [T78.40XA]       Precautions:          ASSESSMENT  Based on the objective data described below, the patient reports fatigue 2/2 lack of sleep. 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 Pt able to ambulated without DME and navigate stairs using step to pattern. Pt appears to be at baseline for functional mobility and reports no concerns for home. Anticipate no needs and recommend pt to continue OOB to chair and ambulation 3x/day with nursing to prevent deconditioning. Pt returned to bed to rest.     Functional Outcome Measure: The patient scored 100/100 on the Barthel Index outcome measure which is indicative of patient is 0% impaired with self care. Other factors to consider for discharge: lives with      Further skilled acute physical therapy is not indicated at this time. PLAN :  Recommendation for discharge: (in order for the patient to meet his/her long term goals)  No skilled physical therapy/ follow up rehabilitation needs identified at this time. This discharge recommendation:  Has been made in collaboration with the attending provider and/or case management    IF patient discharges home will need the following DME: none       SUBJECTIVE:   Patient stated I did not sleep last night.     OBJECTIVE DATA SUMMARY:   HISTORY:    Past Medical History:   Diagnosis Date    Glaucoma     H/O seasonal allergies     George Mountain spotted fever     Sinus infection     UTI (urinary tract infection)    History reviewed. No pertinent surgical history.     Prior level of function: independent   Personal factors and/or comorbidities impacting plan of care: assists neighbor with IADLs    Home Situation  Home Environment: Private residence  # Steps to Enter: 5  Rails to Enter: Yes  Hand Rails : Bilateral  One/Two Story Residence: Two story  # of Interior Steps: 13  Interior Rails: Both  Living Alone: No  Support Systems: Spouse/Significant Other/Partner  Current DME Used/Available at Home: None  Tub or Shower Type: Shower    EXAMINATION/PRESENTATION/DECISION MAKING:   Hearing: Auditory  Auditory Impairment: None  Skin:  intact  Edema: none  Range Of Motion:  AROM: Within functional limits           PROM: Within functional limits           Strength:    Strength: Within functional limits                    Tone & Sensation:   Tone: Normal              Sensation: Intact               Coordination:  Coordination: Within functional limits  Vision:      Functional Mobility:  Bed Mobility:     Supine to Sit: Independent  Sit to Supine: Independent     Transfers:  Sit to Stand: Modified independent  Stand to Sit: Modified independent                       Balance:   Sitting: Intact  Standing: Intact; Without support  Ambulation/Gait Training:  Distance (ft): 100 Feet (ft) (X2)  Assistive Device: Gait belt  Ambulation - Level of Assistance: Modified independent        Gait Abnormalities: Decreased step clearance              Speed/Chacha: Fluctuations     Stairs:  Number of Stairs Trained: 2  Stairs - Level of Assistance: Modified independent   Rail Use: Both    Functional Measure:  Barthel Index:    Bathin  Bladder: 10  Bowels: 10  Groomin  Dressing: 10  Feeding: 10  Mobility: 15  Stairs: 10  Toilet Use: 10  Transfer (Bed to Chair and Back): 15  Total: 100/100       The Barthel ADL Index: Guidelines  1. The index should be used as a record of what a patient does, not as a record of what a patient could do. 2. The main aim is to establish degree of independence from any help, physical or verbal, however minor and for whatever reason. 3. The need for supervision renders the patient not independent. 4. A patient's performance should be established using the best available evidence.  Asking the patient, friends/relatives and nurses are the usual sources, but direct observation and common sense are also important. However direct testing is not needed. 5. Usually the patient's performance over the preceding 24-48 hours is important, but occasionally longer periods will be relevant. 6. Middle categories imply that the patient supplies over 50 per cent of the effort. 7. Use of aids to be independent is allowed. Misael Johnson., Barthel, D.W. (8302). Functional evaluation: the Barthel Index. [facility] (14)2. Dacia Pickering anais VIN Kam, Rere Montoya., Araceli Hull., [place], [address] (1999). Measuring the change indisability after inpatient rehabilitation; comparison of the responsiveness of the Barthel Index and Functional [place]on Measure. Journal of Neurology, Neurosurgery, and Psychiatry, 66(4), 242-673. SHIRA Mcclellan, HAO Barrett, & Daria Aaron MZacariasA. (2004.) Assessment of post-stroke quality of life in cost-effectiveness studies: The usefulness of the Barthel Index and the EuroQoL-5D. Quality of Life Research, 15, 380-49            Physical Therapy Evaluation Charge Determination   History Examination Presentation Decision-Making   MEDIUM  Complexity : 1-2 comorbidities / personal factors will impact the outcome/ POC  LOW Complexity : 1-2 Standardized tests and measures addressing body structure, function, activity limitation and / or participation in recreation  LOW Complexity : Stable, uncomplicated  Other outcome measures barthel index  LOW       Based on the above components, the patient evaluation is determined to be of the following complexity level: LOW       Activity Tolerance:   Good      After treatment patient left in no apparent distress:   Supine in bed, Call bell within reach and Side rails x 3    COMMUNICATION/EDUCATION:   The patients plan of care was discussed with: Registered nurse, Physician and Case management.      Fall prevention education was provided and the patient/caregiver indicated understanding. and Patient/family have participated as able in goal setting and plan of care.     Thank you for this referral.  Jose Angel Son, PT   Time Calculation: 10 mins

## 2021-05-26 NOTE — ED PROVIDER NOTES
Patient is a 55-year-old female with past medical history significant for UTI, glaucoma, seasonal allergies and recent diverticulitis with abscess requiring admission. She states she was discharged home on a cephalosporin and Flagyl about a week ago and then today started to have significant itching and hives rash around her hairline. This is progressed to around her neck under her breasts. She states that she is not taking any medicine for it thus far. She states she was seen by her primary care doctor earlier today and prescribed some kind of ointment which the pharmacy did not have so she not been able to start that either. She states this evening she started to have swelling of her lip which prompted her visit to the emergency department. She states her abdominal pain is improved but she still having some. She notes that they wanted her to be on antibiotics for 14 days total.  Patient denies any difficulty swallowing or breathing. No voice change           Past Medical History:   Diagnosis Date    Glaucoma     H/O seasonal allergies     George Mountain spotted fever     Sinus infection     UTI (urinary tract infection)        History reviewed. No pertinent surgical history. History reviewed. No pertinent family history.     Social History     Socioeconomic History    Marital status:      Spouse name: Not on file    Number of children: Not on file    Years of education: Not on file    Highest education level: Not on file   Occupational History    Not on file   Tobacco Use    Smoking status: Never Smoker    Smokeless tobacco: Never Used   Substance and Sexual Activity    Alcohol use: Not Currently    Drug use: Not Currently    Sexual activity: Not on file   Other Topics Concern    Not on file   Social History Narrative    Not on file     Social Determinants of Health     Financial Resource Strain:     Difficulty of Paying Living Expenses:    Food Insecurity:     Worried About Running Out of Food in the Last Year:    [address] in the Last Year:    Transportation Needs:     Lack of Transportation (Medical):  Lack of Transportation (Non-Medical):    Physical Activity:     Days of Exercise per Week:     Minutes of Exercise per Session:    Stress:     Feeling of Stress :    Social Connections:     Frequency of Communication with Friends and Family:     Frequency of Social Gatherings with Friends and Family:     Attends Holiness Services:     Active Member of Clubs or Organizations:     Attends Club or Organization Meetings:     Marital Status:    Intimate Partner Violence:     Fear of Current or Ex-Partner:     Emotionally Abused:     Physically Abused:     Sexually Abused: ALLERGIES: Codeine, Levaquin [levofloxacin], Pcn [penicillins], and Sulfa (sulfonamide antibiotics)    Review of Systems   Constitutional: Negative for chills and fever. HENT: Positive for facial swelling. Negative for drooling, trouble swallowing and voice change. Eyes: Negative for photophobia. Respiratory: Negative for choking and shortness of breath. Cardiovascular: Negative for chest pain. Gastrointestinal: Negative for abdominal pain and vomiting. Musculoskeletal: Negative for joint swelling and neck stiffness. Skin: Positive for rash. Allergic/Immunologic: Negative for immunocompromised state. Neurological: Negative for dizziness, seizures, syncope and speech difficulty. Hematological: Does not bruise/bleed easily. Psychiatric/Behavioral: Negative. Vitals:    05/25/21 2156   BP: (!) 160/90   Pulse: 89   Resp: 16   Temp: 98 °F (36.7 °C)   SpO2: 98%            Physical Exam  Vitals and nursing note reviewed. Constitutional:       General: She is not in acute distress. Appearance: Normal appearance. She is not ill-appearing, toxic-appearing or diaphoretic. HENT:      Head: Normocephalic and atraumatic.       Right Ear: External ear normal. Left Ear: External ear normal.      Nose: Nose normal.      Mouth/Throat:      Mouth: No oral lesions. Pharynx: Uvula midline. No pharyngeal swelling, posterior oropharyngeal erythema or uvula swelling. Comments: Minimal swelling of lower lip  Eyes:      General: No scleral icterus. Cardiovascular:      Rate and Rhythm: Normal rate. Pulses: Normal pulses. Pulmonary:      Effort: Pulmonary effort is normal. No respiratory distress. Breath sounds: Normal breath sounds. No stridor. No wheezing or rales. Abdominal:      Palpations: Abdomen is soft. Musculoskeletal:      Cervical back: Neck supple. Right lower leg: No edema. Left lower leg: No edema. Skin:     Findings: Rash present. Comments: Urticarial rash most pronounced around neckline and hairline. Also with some erythema under breasts bilaterally possibly consistent with Candida   Neurological:      Mental Status: She is alert and oriented to person, place, and time. Psychiatric:         Mood and Affect: Mood normal.         Behavior: Behavior normal.         Thought Content: Thought content normal.         Judgment: Judgment normal.          MDM  Number of Diagnoses or Management Options  Allergic reaction, initial encounter: new and requires workup  Candidal dermatitis  Diverticulitis: established and improving  Diagnosis management comments: Patient discussed with pharmacist.  Unfortunately does not seem like there is any other viable options to treat her known diverticulitis other than IV antibiotics. Patient and her family advised of this and agrees to admission.        Amount and/or Complexity of Data Reviewed  Clinical lab tests: ordered and reviewed  Tests in the radiology section of CPT®: reviewed  Discuss the patient with other providers: yes           Procedures      Perfect Serve Consult for Admission  12:54 AM    ED Room Number: ER20/20  Patient Name and age:  Sadi Arce 77 y.o.  female  Working Diagnosis:   1. Candidal dermatitis    2. Allergic reaction, initial encounter    3. Diverticulitis        COVID-19 Suspicion:  no  Sepsis present:  no  Reassessment needed: no  Code Status:  Full Code  Readmission: yes  Isolation Requirements:  no  Recommended Level of Care:  med/surg  Department:[facility] Adult ED - 21   Other: Patient is a 58-year-old female who was recently admitted to the hospital for severe diverticulitis with small abscess that improved with IV antibiotics alone. CT scan still showed severe diverticulitis however upon discharge. Patient was sent home on a cephalosporin and Flagyl however today started to have a urticarial rash and lip swelling. Patient has had improvement with medicines for allergic reaction however she is allergic to Levaquin penicillins and sulfa. As such she has no other options other than IV antibiotics for continued management of her known diverticulitis. Patient states that abdominal pain is still present although not worse than it was on discharge she states she does feel that it is little bit better. Vital signs and labs are stable. Recommendation is for admission for IV meropenem and repeat CT scan in the next day or 2 as initial recommendation was for at least 14 days of antibiotics.

## 2021-05-26 NOTE — PROGRESS NOTES
Transition of Care    RUR: 10%  Disposition: home  Follow up: pcp  Transport:     Chart reviewed, discussed during rounds. Patient voiced frustration with frequent visitors/interruptions, so CM will complete assessment at another time.      Marin MAZARIEGOS, ACM-SW

## 2021-05-26 NOTE — ED TRIAGE NOTES
Patient arrives from home. Patient was discharged Friday from here after being treated for Diverticulitis. Patient is still taking antibiotics, patient began breaking out into hives starting this evening. +lip swelling. Denies shortness of breath , respirations even and unlabored.

## 2021-05-26 NOTE — PROGRESS NOTES
Orders received, chart reviewed and patient evaluated by physical therapy. Recommend:  No skilled physical therapy/ follow up rehabilitation needs identified at this time. Recommend with nursing OOB to chair 3x/day and walking daily with supervision. Thank you for completing as able in order to maintain patient strength, endurance and independence. Full evaluation to follow.

## 2021-05-26 NOTE — PROGRESS NOTES
Pt seen and examined at bedside, history revisited, charts reviewed. Agree with Dr Zeferino Green p/w Diverticulitis- allergy to cephalosporines. Allergic to different abx. Will keep inpatient to finish 10days course of abx '2 more days of meropenm'. Tired from lack of sleep, allow rest/minimize interruption.

## 2021-05-26 NOTE — ROUTINE PROCESS
TRANSFER - OUT REPORT: 
 
Verbal report given to CATHY Maria(name) on Alex Cazares  being transferred to (unit) for routine progression of care Report consisted of patients Situation, Background, Assessment and  
Recommendations(SBAR). Information from the following report(s) SBAR, ED Summary, [facility] and Recent Results was reviewed with the receiving nurse. Lines:  
Peripheral IV 05/25/21 Anterior; Left Hand (Active) Opportunity for questions and clarification was provided. Patient transported with: 
 Banjo

## 2021-05-26 NOTE — PROGRESS NOTES
Occupational Therapy    Acknowledge OT order and completed chart review. She was admitted on 5/25/21 for allergic reactions to her treatment for diverticulitis. 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 She denies any difficulty with ADL tasks or mobility at this time. Will complete orders at this time. Please re-consult if change in function.      Thank you,    Alan King, OT

## 2021-05-26 NOTE — ROUTINE PROCESS
TRANSFER - IN REPORT: 
 
Verbal report received from Vadim (name) on Kaylin Fowler  being received from ED (unit) for routine progression of care Report consisted of patients Situation, Background, Assessment and  
Recommendations(SBAR). Information from the following report(s) SBAR, Kardex, ED Summary, Intake/Output and MAR was reviewed with the receiving nurse. Opportunity for questions and clarification was provided. Assessment completed upon patients arrival to unit and care assumed.

## 2021-05-26 NOTE — H&P
History & Physical    Primary Care Provider: Luda Espinosa MD  Source of Information: Patient and chart review    History of Presenting Illness:   Evangelina Putnam is a 77 y.o. female with known past medical history of hypertension diverticulitis who presents to hospital with concerns of allergic reaction. Chart review shows patient was recently admitted to hospital from May 18-21 with diagnosis of diverticulitis with contained abscess. She was treated with IV antibiotics and discharged on regiment of Ceftin plus Flagyl for 14 additional days of therapy. States she has been compliant with that regimen but today noted hives and lip swelling after taking antibiotics. Patient still complains of mild intermittent abdominal pain but overall markedly improved from her previous admission. Denies any melena or hematochezia. Denies any nausea, vomiting, fevers, chills or night sweats. Has had no associated hematuria, increased urinary frequency, urgency or dysuria. Vitals on ER presentation remarkable for BP of 160/90. Labs overall unremarkable. Patient was treated in ER with Decadron, Benadryl and Pepcid. Review of Systems:  A comprehensive review of systems was negative except for that written in the History of Present Illness. Past Medical History:   Diagnosis Date    Glaucoma     H/O seasonal allergies     [place] spotted fever     Sinus infection     UTI (urinary tract infection)       History reviewed. No pertinent surgical history. Prior to Admission medications    Medication Sig Start Date End Date Taking? Authorizing Provider   cefUROXime (CEFTIN) 500 mg tablet Take 1 Tablet by mouth two (2) times a day for 14 days. 5/21/21 6/4/21  Dipak MARTINES DO   metroNIDAZOLE (FlagyL) 500 mg tablet Take 1 Tablet by mouth three (3) times daily for 14 days.  5/21/21 6/4/21  Dipak MARTINES DO   simethicone 42 mg chew Take 42 mg by mouth four (4) times daily as needed for Nausea for up to 7 days. 5/21/21 5/28/21  Bobo Amin M, DO     Allergies   Allergen Reactions    Codeine Rash    Levaquin [Levofloxacin] Other (comments)     Joint swelling    Pcn [Penicillins] Hives    Sulfa (Sulfonamide Antibiotics) Hives      History reviewed. No pertinent family history. SOCIAL HISTORY:  Patient resides:  Independently x   Assisted Living    SNF    With family care       Smoking history:   None x   Former    Chronic      Alcohol history:   None x   Social    Chronic      Ambulates:   Independently x   w/cane    w/walker    w/wc    CODE STATUS:  DNR    Full x   Other      Objective:     Physical Exam:     Visit Vitals  /79   Pulse 89   Temp 98 °F (36.7 °C)   Resp 16   SpO2 96%      O2 Device: None (Room air)    General:  Alert, cooperative, no distress, appears stated age. Head:  Normocephalic, without obvious abnormality, atraumatic. Eyes:  Conjunctivae/corneas clear. PERRL, EOMs intact. Nose: Nares normal. Septum midline. Mucosa normal.        Neck: Supple, symmetrical, trachea midline, no carotid bruit and no JVD. Lungs:   Clear to auscultation bilaterally. Chest wall:  No tenderness or deformity. Heart:  Regular rate and rhythm, S1, S2 normal, no murmur, click, rub or gallop. Abdomen:   Soft, non-tender. Obese abdomen. Bowel sounds normal. No masses,  No organomegaly. Extremities: Extremities normal, atraumatic, no cyanosis or edema. Pulses: 2+ and symmetric all extremities. Skin: Skin color, texture, turgor normal. No rashes or lesions   Neurologic: CNII-XII intact. Data Review:     Recent Days:  Recent Labs     05/25/21  2202   WBC 9.8   HGB 13.2   HCT 41.8   *     Recent Labs     05/25/21  2202      K 4.1      CO2 25   *   BUN 11   CREA 0.56   CA 9.5   ALB 3.2*   ALT 38     No results for input(s): PH, PCO2, PO2, HCO3, FIO2 in the last 72 hours.     24 Hour Results:  Recent Results (from the past 24 hour(s))   SAMPLES BEING HELD    Collection Time: 05/25/21 10:02 PM   Result Value Ref Range    SAMPLES BEING HELD 1RED,1BLU     COMMENT        Add-on orders for these samples will be processed based on acceptable specimen integrity and analyte stability, which may vary by analyte. CBC WITH AUTOMATED DIFF    Collection Time: 05/25/21 10:02 PM   Result Value Ref Range    WBC 9.8 3.6 - 11.0 K/uL    RBC 4.74 3.80 - 5.20 M/uL    HGB 13.2 11.5 - 16.0 g/dL    HCT 41.8 35.0 - 47.0 %    MCV 88.2 80.0 - 99.0 FL    MCH 27.8 26.0 - 34.0 PG    MCHC 31.6 30.0 - 36.5 g/dL    RDW 13.2 11.5 - 14.5 %    PLATELET 666 (H) 690 - 400 K/uL    MPV 8.8 (L) 8.9 - 12.9 FL    NRBC 0.0 0  WBC    ABSOLUTE NRBC 0.00 0.00 - 0.01 K/uL    NEUTROPHILS 74 32 - 75 %    LYMPHOCYTES 15 12 - 49 %    MONOCYTES 9 5 - 13 %    EOSINOPHILS 1 0 - 7 %    BASOPHILS 0 0 - 1 %    IMMATURE GRANULOCYTES 1 (H) 0.0 - 0.5 %    ABS. NEUTROPHILS 7.3 1.8 - 8.0 K/UL    ABS. LYMPHOCYTES 1.5 0.8 - 3.5 K/UL    ABS. MONOCYTES 0.9 0.0 - 1.0 K/UL    ABS. EOSINOPHILS 0.1 0.0 - 0.4 K/UL    ABS. BASOPHILS 0.0 0.0 - 0.1 K/UL    ABS. IMM. GRANS. 0.1 (H) 0.00 - 0.04 K/UL    DF AUTOMATED     METABOLIC PANEL, COMPREHENSIVE    Collection Time: 05/25/21 10:02 PM   Result Value Ref Range    Sodium 136 136 - 145 mmol/L    Potassium 4.1 3.5 - 5.1 mmol/L    Chloride 102 97 - 108 mmol/L    CO2 25 21 - 32 mmol/L    Anion gap 9 5 - 15 mmol/L    Glucose 156 (H) 65 - 100 mg/dL    BUN 11 6 - 20 MG/DL    Creatinine 0.56 0.55 - 1.02 MG/DL    BUN/Creatinine ratio 20 12 - 20      GFR est AA >60 >60 ml/min/1.73m2    GFR est non-AA >60 >60 ml/min/1.73m2    Calcium 9.5 8.5 - 10.1 MG/DL    Bilirubin, total 0.3 0.2 - 1.0 MG/DL    ALT (SGPT) 38 12 - 78 U/L    AST (SGOT) 46 (H) 15 - 37 U/L    Alk.  phosphatase 106 45 - 117 U/L    Protein, total 7.8 6.4 - 8.2 g/dL    Albumin 3.2 (L) 3.5 - 5.0 g/dL    Globulin 4.6 (H) 2.0 - 4.0 g/dL    A-G Ratio 0.7 (L) 1.1 - 2.2           Imaging: Assessment:     Sadi Arce is a 77 y.o. female with known past medical history of hypertension diverticulitis who is admitted for diverticulitis and acute allergic reaction. Plan:       Drug Allergic reaction  -Suspect cephalosporin as trigger for allergic reaction given penicillin allergy  -Marked improvement with Decadron, Benadryl and Pepcid  -Discontinue Ceftin and start meropenem  -Monitor closely for signs of allergic reaction    Diverticulitis  -Continue antibiotic therapy with IV meropenem  -Plan for repeat CT in 2 to 3 days  -GI lyte diet    Hypertension  -On no chronic antihypertensive  -Monitor and treatment.   Medications                FEN/GI -  NS @ 100 ml/hr  Activity - As tolerated  DVT prophylaxis - Heparin  GI prophylaxis -  NI  Disposition - Home    CODE STATUS:  Full code       Signed By: Carmelita Coelho MD     May 26, 2021

## 2021-05-27 LAB
ALBUMIN SERPL-MCNC: 3 G/DL (ref 3.5–5)
ALBUMIN/GLOB SERPL: 0.8 {RATIO} (ref 1.1–2.2)
ALP SERPL-CCNC: 89 U/L (ref 45–117)
ALT SERPL-CCNC: 29 U/L (ref 12–78)
ANION GAP SERPL CALC-SCNC: 8 MMOL/L (ref 5–15)
AST SERPL-CCNC: 16 U/L (ref 15–37)
BASOPHILS # BLD: 0 K/UL (ref 0–0.1)
BASOPHILS NFR BLD: 0 % (ref 0–1)
BILIRUB SERPL-MCNC: 0.3 MG/DL (ref 0.2–1)
BUN SERPL-MCNC: 9 MG/DL (ref 6–20)
BUN/CREAT SERPL: 20 (ref 12–20)
CALCIUM SERPL-MCNC: 9.2 MG/DL (ref 8.5–10.1)
CHLORIDE SERPL-SCNC: 106 MMOL/L (ref 97–108)
CO2 SERPL-SCNC: 25 MMOL/L (ref 21–32)
CREAT SERPL-MCNC: 0.45 MG/DL (ref 0.55–1.02)
DIFFERENTIAL METHOD BLD: ABNORMAL
EOSINOPHIL # BLD: 0 K/UL (ref 0–0.4)
EOSINOPHIL NFR BLD: 0 % (ref 0–7)
ERYTHROCYTE [DISTWIDTH] IN BLOOD BY AUTOMATED COUNT: 13.6 % (ref 11.5–14.5)
GLOBULIN SER CALC-MCNC: 4 G/DL (ref 2–4)
GLUCOSE SERPL-MCNC: 159 MG/DL (ref 65–100)
HCT VFR BLD AUTO: 39.3 % (ref 35–47)
HGB BLD-MCNC: 12.5 G/DL (ref 11.5–16)
IMM GRANULOCYTES # BLD AUTO: 0.2 K/UL (ref 0–0.04)
IMM GRANULOCYTES NFR BLD AUTO: 1 % (ref 0–0.5)
LYMPHOCYTES # BLD: 1 K/UL (ref 0.8–3.5)
LYMPHOCYTES NFR BLD: 8 % (ref 12–49)
MCH RBC QN AUTO: 28 PG (ref 26–34)
MCHC RBC AUTO-ENTMCNC: 31.8 G/DL (ref 30–36.5)
MCV RBC AUTO: 88.1 FL (ref 80–99)
MONOCYTES # BLD: 0.4 K/UL (ref 0–1)
MONOCYTES NFR BLD: 3 % (ref 5–13)
NEUTS SEG # BLD: 11.7 K/UL (ref 1.8–8)
NEUTS SEG NFR BLD: 88 % (ref 32–75)
NRBC # BLD: 0 K/UL (ref 0–0.01)
NRBC BLD-RTO: 0 PER 100 WBC
PLATELET # BLD AUTO: 397 K/UL (ref 150–400)
PMV BLD AUTO: 8.7 FL (ref 8.9–12.9)
POTASSIUM SERPL-SCNC: 4.2 MMOL/L (ref 3.5–5.1)
PROT SERPL-MCNC: 7 G/DL (ref 6.4–8.2)
RBC # BLD AUTO: 4.46 M/UL (ref 3.8–5.2)
SODIUM SERPL-SCNC: 139 MMOL/L (ref 136–145)
WBC # BLD AUTO: 13.3 K/UL (ref 3.6–11)

## 2021-05-27 PROCEDURE — 65270000029 HC RM PRIVATE

## 2021-05-27 PROCEDURE — 36415 COLL VENOUS BLD VENIPUNCTURE: CPT

## 2021-05-27 PROCEDURE — 74011000258 HC RX REV CODE- 258: Performed by: STUDENT IN AN ORGANIZED HEALTH CARE EDUCATION/TRAINING PROGRAM

## 2021-05-27 PROCEDURE — 74011250636 HC RX REV CODE- 250/636: Performed by: INTERNAL MEDICINE

## 2021-05-27 PROCEDURE — 85025 COMPLETE CBC W/AUTO DIFF WBC: CPT

## 2021-05-27 PROCEDURE — 80053 COMPREHEN METABOLIC PANEL: CPT

## 2021-05-27 PROCEDURE — 74011250637 HC RX REV CODE- 250/637: Performed by: STUDENT IN AN ORGANIZED HEALTH CARE EDUCATION/TRAINING PROGRAM

## 2021-05-27 PROCEDURE — 74011250637 HC RX REV CODE- 250/637: Performed by: INTERNAL MEDICINE

## 2021-05-27 PROCEDURE — 74011250636 HC RX REV CODE- 250/636: Performed by: STUDENT IN AN ORGANIZED HEALTH CARE EDUCATION/TRAINING PROGRAM

## 2021-05-27 RX ORDER — DIPHENHYDRAMINE HYDROCHLORIDE 50 MG/ML
25 INJECTION, SOLUTION INTRAMUSCULAR; INTRAVENOUS
Status: DISCONTINUED | OUTPATIENT
Start: 2021-05-27 | End: 2021-05-28 | Stop reason: HOSPADM

## 2021-05-27 RX ADMIN — MEROPENEM 500 MG: 500 INJECTION, POWDER, FOR SOLUTION INTRAVENOUS at 07:00

## 2021-05-27 RX ADMIN — DIPHENHYDRAMINE HYDROCHLORIDE 25 MG: 50 INJECTION, SOLUTION INTRAMUSCULAR; INTRAVENOUS at 19:15

## 2021-05-27 RX ADMIN — ACETAMINOPHEN 650 MG: 325 TABLET ORAL at 14:19

## 2021-05-27 RX ADMIN — MEROPENEM 500 MG: 500 INJECTION, POWDER, FOR SOLUTION INTRAVENOUS at 02:32

## 2021-05-27 RX ADMIN — MEROPENEM 500 MG: 500 INJECTION, POWDER, FOR SOLUTION INTRAVENOUS at 14:19

## 2021-05-27 RX ADMIN — ZOLPIDEM TARTRATE 5 MG: 5 TABLET ORAL at 23:07

## 2021-05-27 RX ADMIN — DEXAMETHASONE SODIUM PHOSPHATE 10 MG: 10 INJECTION, SOLUTION INTRAMUSCULAR; INTRAVENOUS at 06:58

## 2021-05-27 RX ADMIN — Medication 10 ML: at 23:08

## 2021-05-27 RX ADMIN — MEROPENEM 500 MG: 500 INJECTION, POWDER, FOR SOLUTION INTRAVENOUS at 19:14

## 2021-05-27 RX ADMIN — Medication 10 ML: at 06:59

## 2021-05-27 RX ADMIN — Medication 1 CAPSULE: at 12:13

## 2021-05-27 NOTE — ROUTINE PROCESS
Bedside shift change report given to Zonia N Rafaela Ponce (oncoming nurse) by Faby Dunn RN (offgoing nurse). Report included the following information SBAR and Kardex.

## 2021-05-27 NOTE — PROGRESS NOTES
Transition of Care    RUR: 10%  Disposition: home with family support  Follow up: pcp  Transport: family (spouse is Eddie Bolaños)      Readmission Assessment  Number of days since last admission?: 8-30 days  Previous disposition: Home with Family  Who is being interviewed?: Patient     Reason for Readmission:     Allergic reaction         RUR Score/Risk Level:     10%, low    PCP: First and Last name:  Rizwan Coffey   Name of Practice:    Are you a current patient: Yes/No: yes   Approximate date of last visit:    Can you participate in a virtual visit with your PCP: yes    Is a Care Conference indicated: no      Did you attend your follow up appointment (s): If not, why not:yes         Resources/supports as identified by patient/family:   Lives with supportive        Top Challenges facing patient (as identified by patient/family and CM): Finances/Medication cost?     No concerns  Transportation       to transport  Support system or lack thereof? See above  Living arrangements? Lives with           Self-care/ADLs/Cognition? Independent with ADLs prior to admission       Current Advanced Directive/Advance Care Plan:  No documents on file, patient is a [address] [place] [address] Ave for utilizing home health:   Most likely will not be needed             Transition of Care Plan:    Based on readmission, the patient's previous Plan of Care   has been evaluated and/or modified. The current Transition of Care Plan is:       Patient admitted due to having an allergic reaction from antibiotics. Patient was admitted to the hospital May 18-21 for diverticulitis with contained abscess. Patient was discharged on ABX and returned with an allergic reaction. No CM needs anticipated at this time. Will follow along. Care Management Interventions  PCP Verified by CM:  Yes  Palliative Care Criteria Met (RRAT>21 & CHF Dx)?: No  MyChart Signup: Yes  Discharge Durable Medical Equipment: No  Health Maintenance Reviewed: Yes  Physical Therapy Consult: Yes  Occupational Therapy Consult: Yes  Speech Therapy Consult: No  Current Support Network: Lives with Spouse  Confirm Follow Up Transport: Family  [employer] Information Provided?: No  Discharge Location  Discharge Placement: Home with family assistance    S.  [employer] , [place]

## 2021-05-27 NOTE — PROGRESS NOTES
Bedside shift change report given to [address] I[address] Shaikh (oncoming nurse) by Nile Ling (offgoing nurse). Report included the following information SBAR, Kardex, MAR and Recent Results.

## 2021-05-27 NOTE — ADT AUTH CERT NOTES
[address] 
  
FACILITY NPI : [id number] FACILITY TAX ID :  
  
[id number]  
[address] [place] [address] [phone] 
  
  
  DEPT CONTACT: 
Waleska Roberts 
FLOWER#[phone] MBO#[phone] Patient Name :Rambo Kasper  : 1955 (66 yrs) MRN : [id number] 
  
Patient Mailing Address [address] [place] [address] [] , [phone]          
  
  . 
  
   
Insurance Plan Payor: BLUE CROSS / Plan: [address] [place] [address] / Product Type: PPO /  
  
Primary Coverage Subscriber ID : [id number] 
  
Secondary [place]age:  [place] 
  
Secondary Subscriber ID :  [id number] 
   
Current Patient Class : INPATIENT -ER ADMISSION Admit Date : 2021 
  
REQUESTED LEVEL OF CARE: INPATIENT [101] Diagnosis : Diverticulitis; Allergic reaction caused by a drug 
                    
  
ICD10 Code : Diverticulitis [K57.92] Allergic reaction caused by a drug [T78.40XA] 
  
Current Room and Bed 506/01 
  
Admitting and Attending Info: 
Admitting Provider : Germania Leroy MD   NPI: [id number] Admitting Provider Phone. [phone] Admitting Provider Address: [address] [place] [address], [address] Floor 
  
Attending Provider Aman Bender MD   [id number] Attending Provider Address:  [facility] 
  
Attending Provider Phone: Attending jocelin phone: [phone] 
  
   
 
 
 
Utilization Reviews 
 
  
Systemic or Infectious Condition GRG - Care Day 2 (2021) by Santa Matson RN 
 
  
Review Entered Review Status 2021 13:18 Completed  
  
Criteria Review Care Day: 2 Care Date: 5/27/2021 Level of Care: Telemetry Guideline Day 2 Level Of Care (X) Floor 5/27/2021 13:18:34 EDT by Yvette trent medical   
Clinical Status   
(X) * No ICU or intermediate care needs 5/27/2021 13:18:34 EDT by Yvette Parra   
  IP medical   
Interventions (X) Inpatient interventions continue 5/27/2021 13:18:34 EDT by Yvette Parra   
  iv abx until tomorrow * Milestone Additional Notes 5/27/2021 LOC IP MEDICAL  
VS  98.4 68 142/81 18 94% ROOM AIR  
LABS WBC 13.3 Glucose: 159 (H) BUN: 9 Creatinine: 0.45 (L) BUN/Creatinine ratio: 20 Calcium: 9.2 GFR est non-AA: >60  
GFR est AA: >60 Bilirubin, total: 0.3 Protein, total: 7.0 Albumin: 3.0 (L) Globulin: 4.0 A-G Ratio: 0.8 (L)               
  
MEDS Risaquad 1 cap po qday Merrem 500mg iv q6 Decadron 10mg iv q12 ATTENDING NOTE Patient seen and examined at bedside, feels well, rash disappeared. Tolerating diet well. Will finish meropenem 'total abx 10 course' tomorrow. Then dc.  
   
Assessment & Plan:  
   
Drug Allergic reaction  
-Suspect cephalosporin as trigger for allergic reaction given penicillin allergy  
-Marked improvement with Decadron, Benadryl and Pepcid  
-Discontinue Ceftin and start meropenem  
   
Diverticulitis- IV meropenem to finish total 10 days course. -GI lyte diet Hypertension-On no chronic antihypertensive-Monitor   
   
Code status: Full DVT prophylaxis: SCDs Care Plan discussed with: Patient/Family and Nurse Disposition: TBD 1-2days General:  Alert, oriented, No acute distress, pleasant [employer]. Comfortable. Card:  S1, S2 without murmur, good peripheral perfusion Resp:  No accessory muscle use, Good AE, no wheezes. no crepitations Abd:  Soft, non-tender, non-distended, BS+ Extremities:  No cyanosis or clubbing, no significant edema Neuro:  Grossly normal, no focal neuro deficits, follows commands Psych:  Good insight, not agitated.   
Systemic or Infectious Condition GRG - Care Day 1 (5/26/2021) by Mercedes Ibarra RN 
 
  
Review Entered Review Status 5/27/2021 10:31 Completed  
  
Criteria Review Care Day: 1 Care Date: 5/26/2021 Level of Care: Telemetry Guideline Day 1 Level Of Care (X) ICU or intermediate care 5/27/2021 10:31:38 EDT by Kunal Deglado   
  ip telemetry Clinical Status ( ) * Clinical Indications met Interventions (X) Inpatient interventions as needed 5/27/2021 10:31:38 EDT by Kunal Delgado   
  iv steroids, iv abx since allergic reaction to PO meds,   
* Milestone Additional Notes 5/26/2021 LOC IP TELEMETRY  
VS 98 89 160/90 16 98% ROOM AIR  
LABS Albumin: 3.2 (L) Globulin: 4.6 (H) A-G Ratio: 0.7 (L) ALT: 38 AST: 46 (H) MEDS Tylenol 650mg po q6 prn x1 Merrem 500mg iv q6 Decadron 10mg iv q12 NS @ 75 cc/hr PLAN: activity as tolerated w/ assistance, regular low fiber diet,   
  
H&P Emy Stacy is a 77 y.o. female with known past medical history of hypertension diverticulitis who presents to hospital with concerns of allergic reaction.  Chart review shows patient was recently admitted to hospital from May 18-21 with diagnosis of diverticulitis with contained abscess.  She was treated with IV antibiotics and discharged on regiment of Ceftin plus Flagyl for 14 additional days of therapy.  States she has been compliant with that regimen but today noted hives and lip swelling after taking antibiotics. Patient still complains of mild intermittent abdominal pain but overall markedly improved from her previous admission.  Denies any melena or hematochezia.  Denies any nausea, vomiting, fevers, chills or night sweats.  Has had no associated hematuria, increased urinary frequency, urgency or dysuria.  
   
   
Vitals on ER presentation remarkable for BP of 160/90. Labs overall unremarkable.   
Patient was treated in ER with Decadron, Benadryl and Pepcid. General: Alert, cooperative, no distress, appears stated age. Head: Normocephalic, without obvious abnormality, atraumatic. Eyes: Conjunctivae/corneas clear. PERRL, EOMs intact. Nose: Nares normal. Septum midline. Mucosa normal.   
    
Neck: Supple, symmetrical, trachea midline, no carotid bruit and no JVD.  
    
Lungs:   Clear to auscultation bilaterally. Chest wall: No tenderness or deformity. Heart: Regular rate and rhythm, S1, S2 normal, no murmur, click, rub or gallop. Abdomen:   Soft, non-tender.  Obese abdomen.  Bowel sounds normal. No masses,  No organomegaly. Extremities: Extremities normal, atraumatic, no cyanosis or edema. Pulses: 2+ and symmetric all extremities. Skin: Skin color, texture, turgor normal. No rashes or lesions Neurologic: CNII-XII intact.   
   
Otf Coulter is a 77 y.o. female with known past medical history of hypertension diverticulitis who is admitted for diverticulitis and acute allergic reaction.  
   
   
Plan:  
   
   
Drug Allergic reaction  
-Suspect cephalosporin as trigger for allergic reaction given penicillin allergy  
-Marked improvement with Decadron, Benadryl and Pepcid  
-Discontinue Ceftin and start meropenem  
-Monitor closely for signs of allergic reaction  
   
Diverticulitis  
-Continue antibiotic therapy with IV meropenem  
-Plan for repeat CT in 2 to 3 days  
-GI lyte diet  
   
Hypertension  
-On no chronic antihypertensive  
-Monitor and treatment.  Medications  
 FEN/GI -  NS @ 100 ml/hr Activity - As tolerated DVT prophylaxis - Heparin GI prophylaxis -  NI Disposition - Home  
   
CODE STATUS:  Full code  
  
  
Systemic or Infectious Condition GRG - Clinical Indications for Admission to Inpatient Care by Dillan Blank RN 
 
  
Review Entered Review Status 5/27/2021 10:30 Completed  
  
Criteria Review Clinical Indications for Admission to Inpatient Care Most Recent : Venancio Arias Most Recent Date: 5/27/2021 10:30:30 EDT Additional Notes ED COURSE Patient is a 80-year-old female with past medical history significant for UTI, glaucoma, seasonal allergies and recent diverticulitis with abscess requiring admission.  She states she was discharged home on a cephalosporin and Flagyl about a week ago and then today started to have significant itching and hives rash around her hairline.  This is progressed to around her neck under her breasts. Lamar Angelnando states that she is not taking any medicine for it thus far.  She states she was seen by her primary care doctor earlier today and prescribed some kind of ointment which the pharmacy did not have so she not been able to start that either.  She states this evening she started to have swelling of her lip which prompted her visit to the emergency department. Lamar Roque states her abdominal pain is improved but she still having some.  She notes that they wanted her to be on antibiotics for 14 days total.  Patient denies any difficulty swallowing or breathing.  No voice change  
 Physical Exam  
Vitals and nursing note reviewed. Constitutional:    
   General: She is not in acute distress.  
   Appearance: Normal appearance. She is not ill-appearing, toxic-appearing or diaphoretic. HENT:   
   Head: Normocephalic and atraumatic.   
   Right Ear: External ear normal.   
   Left Ear: External ear normal.   
   Nose: Nose normal.   
   Mouth/Throat:   
   Mouth: No oral lesions.   
   Pharynx: Uvula midline. No pharyngeal swelling, posterior oropharyngeal erythema or uvula swelling.   
   Comments: Minimal swelling of lower lip Eyes:   
   General: No scleral icterus. Cardiovascular:   
   Rate and Rhythm: Normal rate.   
   Pulses: Normal pulses. Pulmonary:   
   Effort: Pulmonary effort is normal. No respiratory distress.   
   Breath sounds: Normal breath sounds. No stridor. No wheezing or rales.    
Abdominal:   
   Palpations: Abdomen is soft. Musculoskeletal:   
   Cervical back: Neck supple.   
   Right lower leg: No edema.   
   Left lower leg: No edema. Skin:  
   Findings: Rash present.   
   Comments: Urticarial rash most pronounced around neckline and hairline.  Also with some erythema under breasts bilaterally possibly consistent with Candida Neurological:   
   Mental Status: She is alert and oriented to person, place, and time. Psychiatric:      
   Mood and Affect: Mood normal.      
   Behavior: Behavior normal.      
   Thought Content: Thought content normal.      
   Judgment: Judgment normal.  
Diagnosis management comments: Patient discussed with pharmacist.  Unfortunately does not seem like there is any other viable options to treat her known diverticulitis other than IV antibiotics.  Patient and her family advised of this and agrees to admission.  
   
Working Diagnosis: 1. Candidal dermatitis 2. Allergic reaction, initial encounter 3. Diverticulitis   
   
  
  
 
 
 
H&P Notes 
 
 H&P by Jose Danielle MD at 05/26/21 0141 documented on ED to Hosp-Admission (Current) from 5/25/2021 in [facility] [phone] Author: Jose Danielle MD Author Type: Physician Filed: 05/26/21 0918 Note Status: Signed Cosign: Cosign Not Required Date of Service: 05/26/21 0141 : Jose Danielle MD (Physician) Expand AllCollapse All   
                              
  
  
History & Physical 
  
Primary Care Provider: Gary Colon MD 
Source of Information: Patient and chart review 
  History of Presenting Illness:  
Srinivasa Carroll is a 77 y.o. female with known past medical history of hypertension diverticulitis who presents to hospital with concerns of allergic reaction. Chart review shows patient was recently admitted to hospital from May 18-21 with diagnosis of diverticulitis with contained abscess.   She was treated with IV antibiotics and discharged on regiment of Ceftin plus Flagyl for 14 additional days of therapy. States she has been compliant with that regimen but today noted hives and lip swelling after taking antibiotics. Patient still complains of mild intermittent abdominal pain but overall markedly improved from her previous admission. Denies any melena or hematochezia. Denies any nausea, vomiting, fevers, chills or night sweats. Has had no associated hematuria, increased urinary frequency, urgency or dysuria. 
  
  
Vitals on ER presentation remarkable for BP of 160/90. Labs overall unremarkable. Patient was treated in ER with Decadron, Benadryl and Pepcid.   
  
Review of Systems: A comprehensive review of systems was negative except for that written in the History of Present Illness.  
  
    
Past Medical History:  
Diagnosis Date  Glaucoma    
 H/O seasonal allergies    
 [place] spotted fever    
 Sinus infection    
 UTI (urinary tract infection)   History reviewed. No pertinent surgical history. Prior to Admission medications Medication Sig Start Date End Date Taking? Authorizing Provider  
cefUROXime (CEFTIN) 500 mg tablet Take 1 Tablet by mouth two (2) times a day for 14 days. 5/21/21 6/4/21   Conchita El DO  
metroNIDAZOLE (FlagyL) 500 mg tablet Take 1 Tablet by mouth three (3) times daily for 14 days. 5/21/21 6/4/21   Conchita El DO  
simethicone 42 mg chew Take 42 mg by mouth four (4) times daily as needed for Nausea for up to 7 days. 5/21/21 5/28/21   Harley Rose DO  
  
     
Allergies Allergen Reactions  Codeine Rash  Levaquin [Levofloxacin] Other (comments)  
    Joint swelling  Pcn [Penicillins] Hives  Sulfa (Sulfonamide Antibiotics) Hives History reviewed. No pertinent family history.  
  
SOCIAL HISTORY: 
Patient resides: 
Independently x Assisted Living    
SNF    
With family care    
  
 Smoking history:  
None x Former    
Chronic    
  
Alcohol history:  
None x Social    
Chronic    
  
Ambulates:  
Independently x  
w/cane    
w/walker    
w/wc    
CODE STATUS: 
DNR    
Full x Other    
  
Objective:  
  
Physical Exam:  
  
Visit Vitals /79 Pulse 89 Temp 98 °F (36.7 °C) Resp 16 SpO2 96% O2 Device: None (Room air) 
  
General:  Alert, cooperative, no distress, appears stated age. Head:  Normocephalic, without obvious abnormality, atraumatic. Eyes:  Conjunctivae/corneas clear. PERRL, EOMs intact. Nose: Nares normal. Septum midline. Mucosa normal.   
     
Neck: Supple, symmetrical, trachea midline, no carotid bruit and no JVD.  
     
Lungs:   Clear to auscultation bilaterally. Chest wall:  No tenderness or deformity. Heart:  Regular rate and rhythm, S1, S2 normal, no murmur, click, rub or gallop. Abdomen:   Soft, non-tender. Obese abdomen. Bowel sounds normal. No masses,  No organomegaly. Extremities: Extremities normal, atraumatic, no cyanosis or edema. Pulses: 2+ and symmetric all extremities. Skin: Skin color, texture, turgor normal. No rashes or lesions Neurologic: CNII-XII intact.   
  
  
Data Review:  
  
Recent Days: 
   
Recent Labs  
  05/25/21 
2202 WBC 9.8 HGB 13.2 HCT 41.8 *  
  
   
Recent Labs  
  05/25/21 
2202   
K 4.1  CO2 25 * BUN 11  
CREA 0.56 CA 9.5 ALB 3.2* ALT 38  
  
No results for input(s): PH, PCO2, PO2, HCO3, FIO2 in the last 72 hours. 
  
24 Hour Results: 
Recent Results Recent Results (from the past 24 hour(s)) SAMPLES BEING HELD  
  Collection Time: 05/25/21 10:02 PM  
Result Value Ref Range  
  SAMPLES BEING HELD 1RED,1BLU    
  COMMENT      
    Add-on orders for these samples will be processed based on acceptable specimen integrity and analyte stability, which may vary by analyte.   
CBC WITH AUTOMATED DIFF  
  Collection Time: 05/25/21 10:02 PM  
Result Value Ref Range  
  WBC 9.8 3.6 - 11.0 K/uL  
  RBC 4.74 3.80 - 5.20 M/uL  
  HGB 13.2 11.5 - 16.0 g/dL  
  HCT 41.8 35.0 - 47.0 %  
  MCV 88.2 80.0 - 99.0 FL  
  MCH 27.8 26.0 - 34.0 PG  
  MCHC 31.6 30.0 - 36.5 g/dL  
  RDW 13.2 11.5 - 14.5 %  
  PLATELET 847 (H) 401 - 400 K/uL  
  MPV 8.8 (L) 8.9 - 12.9 FL  
  NRBC 0.0 0  WBC  
  ABSOLUTE NRBC 0.00 0.00 - 0.01 K/uL  
  NEUTROPHILS 74 32 - 75 %  
  LYMPHOCYTES 15 12 - 49 %  
  MONOCYTES 9 5 - 13 %  
  EOSINOPHILS 1 0 - 7 %  
  BASOPHILS 0 0 - 1 %  
  IMMATURE GRANULOCYTES 1 (H) 0.0 - 0.5 %  
  ABS. NEUTROPHILS 7.3 1.8 - 8.0 K/UL  
  ABS. LYMPHOCYTES 1.5 0.8 - 3.5 K/UL  
  ABS. MONOCYTES 0.9 0.0 - 1.0 K/UL  
  ABS. EOSINOPHILS 0.1 0.0 - 0.4 K/UL  
  ABS. BASOPHILS 0.0 0.0 - 0.1 K/UL  
  ABS. IMM. GRANS. 0.1 (H) 0.00 - 0.04 K/UL  
  DF AUTOMATED METABOLIC PANEL, COMPREHENSIVE  
  Collection Time: 05/25/21 10:02 PM  
Result Value Ref Range  
  Sodium 136 136 - 145 mmol/L  
  Potassium 4.1 3.5 - 5.1 mmol/L  
  Chloride 102 97 - 108 mmol/L  
  CO2 25 21 - 32 mmol/L  
  Anion gap 9 5 - 15 mmol/L  
  Glucose 156 (H) 65 - 100 mg/dL  
  BUN 11 6 - 20 MG/DL  
  Creatinine 0.56 0.55 - 1.02 MG/DL  
  BUN/Creatinine ratio 20 12 - 20    
  GFR est AA >60 >60 ml/min/1.73m2  
  GFR est non-AA >60 >60 ml/min/1.73m2  
  Calcium 9.5 8.5 - 10.1 MG/DL  
  Bilirubin, total 0.3 0.2 - 1.0 MG/DL  
  ALT (SGPT) 38 12 - 78 U/L  
  AST (SGOT) 46 (H) 15 - 37 U/L  
  Alk.  phosphatase 106 45 - 117 U/L  
  Protein, total 7.8 6.4 - 8.2 g/dL  
  Albumin 3.2 (L) 3.5 - 5.0 g/dL  
  Globulin 4.6 (H) 2.0 - 4.0 g/dL  
  A-G Ratio 0.7 (L) 1.1 - 2.2    
  
  
  
  
Imaging:  
  
Assessment:  
  
Latosha Coleman is a 77 y.o. female with known past medical history of hypertension diverticulitis who is admitted for diverticulitis and acute allergic reaction.  
  
  
Plan:  
  
  
Drug Allergic reaction 
-Suspect cephalosporin as trigger for allergic reaction given penicillin allergy 
-Marked improvement with Decadron, Benadryl and Pepcid 
-Discontinue Ceftin and start meropenem 
-Monitor closely for signs of allergic reaction 
  
Diverticulitis 
-Continue antibiotic therapy with IV meropenem 
-Plan for repeat CT in 2 to 3 days 
-GI lyte diet 
  
Hypertension 
-On no chronic antihypertensive 
-Monitor and treatment. Medications   
  
  
  
  
  
  
FEN/GI -  NS @ 100 ml/hr Activity - As tolerated DVT prophylaxis - Heparin GI prophylaxis -  NI Disposition - Home 
  
CODE STATUS:  Full code  
  
  
Signed By: Mehrdad Isidro MD   
  May 26, 2021

## 2021-05-27 NOTE — PROGRESS NOTES
Hospitalist Progress Note  Nicky Choi MD  Answering service: [phone] from in house phone      Date of Service:  2021  NAME:  Angel Gaytan  :  1955  MRN:  [id number]    Admission Summary:   66F p/w allergic reaction to cephalosporines 'getting them for diverticulitis'. Interval history / Subjective:   Patient seen and examined at bedside, feels well, rash disappeared. Tolerating diet well. Will finish meropenem 'total abx 10 course' tomorrow. Then dc. Assessment & Plan:     Drug Allergic reaction  -Suspect cephalosporin as trigger for allergic reaction given penicillin allergy  -Marked improvement with Decadron, Benadryl and Pepcid  -Discontinue Ceftin and start meropenem     Diverticulitis- IV meropenem to finish total 10 days course. -GI lyte diet  Hypertension-On no chronic antihypertensive-Monitor     Code status: Full  DVT prophylaxis: SCDs  Care Plan discussed with: Patient/Family and Nurse  Disposition: TBD 1-2days     Hospital Problems  Never Reviewed        Codes Class Noted POA    Allergic reaction caused by a drug ICD-10-CM: T78.40XA  ICD-9-CM: 995.27  2021 Unknown        Diverticulitis ICD-10-CM: P08.59  ICD-9-CM: 562.11  2021 Unknown            Review of Systems:   Pertinent items are mentioned in interval history. Vital Signs:    Last 24hrs VS reviewed since prior progress note. Most recent are:  Visit Vitals  BP (!) 142/81   Pulse 68   Temp 98.4 °F (36.9 °C)   Resp 18   Ht 5' 2\" (1.575 m)   Wt 96.2 kg (212 lb)   SpO2 94%   BMI 38.78 kg/m²       No intake or output data in the 24 hours ending 21 1116     Physical Examination:   Evaluated face to face and examined 21    General:  Alert, oriented, No acute distress, pleasant [employer]. Comfortable. Card:  S1, S2 without murmur, good peripheral perfusion  Resp:  No accessory muscle use, Good AE, no wheezes.  no crepitations  Abd:  Soft, non-tender, non-distended, BS+  Extremities:  No cyanosis or clubbing, no significant edema  Neuro:  Grossly normal, no focal neuro deficits, follows commands   Psych:  Good insight, not agitated. Data Review:    Review and/or order of clinical lab test  Review and/or order of tests in the radiology section of [facility]  Review and/or order of tests in the medicine section of [facility]  Labs:     Recent Labs     05/27/21 0328 05/25/21 2202   WBC 13.3* 9.8   HGB 12.5 13.2   HCT 39.3 41.8    463*     Recent Labs     05/27/21 0328 05/25/21 2202    136   K 4.2 4.1    102   CO2 25 25   BUN 9 11   CREA 0.45* 0.56   * 156*   CA 9.2 9.5     Recent Labs     05/27/21 0328 05/25/21 2202   ALT 29 38   AP 89 106   TBILI 0.3 0.3   TP 7.0 7.8   ALB 3.0* 3.2*   GLOB 4.0 4.6*     No results for input(s): INR, PTP, APTT, INREXT in the last 72 hours. No results for input(s): FE, TIBC, PSAT, FERR in the last 72 hours. No results found for: FOL, RBCF   No results for input(s): PH, PCO2, PO2 in the last 72 hours. No results for input(s): CPK, CKNDX, TROIQ in the last 72 hours.     No lab exists for component: CPKMB  No results found for: CHOL, CHOLX, CHLST, CHOLV, HDL, HDLP, LDL, LDLC, DLDLP, TGLX, TRIGL, TRIGP, CHHD, CHHDX  No results found for: [facility]  Lab Results   Component Value Date/Time    Color YELLOW/STRAW 05/18/2021 12:44 PM    Appearance CLEAR 05/18/2021 12:44 PM    Specific gravity 1.006 05/18/2021 12:44 PM    pH (UA) 7.5 05/18/2021 12:44 PM    Protein Negative 05/18/2021 12:44 PM    Glucose Negative 05/18/2021 12:44 PM    Ketone Negative 05/18/2021 12:44 PM    Bilirubin Negative 05/18/2021 12:44 PM    Urobilinogen 1.0 05/18/2021 12:44 PM    Nitrites Negative 05/18/2021 12:44 PM    Leukocyte Esterase TRACE (A) 05/18/2021 12:44 PM    Epithelial cells FEW 05/18/2021 12:44 PM    Bacteria Negative 05/18/2021 12:44 PM    WBC 0-4 05/18/2021 12:44 PM    RBC 0-5 05/18/2021 12:44 PM     Medications Reviewed:     Current Facility-Administered Medications   Medication Dose Route Frequency    L.acidophilus-paracasei-S.thermophil-bifidobacter (RISAQUAD) 8 billion cell capsule  1 Capsule Oral DAILY    sodium chloride (NS) flush 5-40 mL  5-40 mL IntraVENous Q8H    sodium chloride (NS) flush 5-40 mL  5-40 mL IntraVENous PRN    acetaminophen (TYLENOL) tablet 650 mg  650 mg Oral Q6H PRN    Or    acetaminophen (TYLENOL) suppository 650 mg  650 mg Rectal Q6H PRN    polyethylene glycol (MIRALAX) packet 17 g  17 g Oral DAILY PRN    promethazine (PHENERGAN) tablet 12.5 mg  12.5 mg Oral Q6H PRN    Or    ondansetron (ZOFRAN) injection 4 mg  4 mg IntraVENous Q6H PRN    heparin (porcine) injection 5,000 Units  5,000 Units SubCUTAneous Q8H    meropenem (MERREM) 500 mg in 0.9% sodium chloride (MBP/ADV) 50 mL MBP  0.5 g IntraVENous Q6H    zolpidem (AMBIEN) tablet 5 mg  5 mg Oral QHS PRN   ______________________________________________________________________  EXPECTED LENGTH OF STAY: 2d 7h  ACTUAL LENGTH OF STAY:          1               Shaina Henson MD

## 2021-05-28 VITALS
BODY MASS INDEX: 39.01 KG/M2 | RESPIRATION RATE: 17 BRPM | SYSTOLIC BLOOD PRESSURE: 148 MMHG | DIASTOLIC BLOOD PRESSURE: 83 MMHG | OXYGEN SATURATION: 96 % | WEIGHT: 212 LBS | HEART RATE: 61 BPM | TEMPERATURE: 98.4 F | HEIGHT: 62 IN

## 2021-05-28 PROCEDURE — 74011250636 HC RX REV CODE- 250/636: Performed by: STUDENT IN AN ORGANIZED HEALTH CARE EDUCATION/TRAINING PROGRAM

## 2021-05-28 PROCEDURE — 74011250637 HC RX REV CODE- 250/637: Performed by: INTERNAL MEDICINE

## 2021-05-28 PROCEDURE — 74011000258 HC RX REV CODE- 258: Performed by: STUDENT IN AN ORGANIZED HEALTH CARE EDUCATION/TRAINING PROGRAM

## 2021-05-28 RX ORDER — ZOLPIDEM TARTRATE 5 MG/1
5 TABLET ORAL
Qty: 5 TABLET | Refills: 0 | Status: SHIPPED | OUTPATIENT
Start: 2021-05-28 | End: 2021-06-02

## 2021-05-28 RX ADMIN — Medication 1 CAPSULE: at 09:52

## 2021-05-28 RX ADMIN — Medication 10 ML: at 13:06

## 2021-05-28 RX ADMIN — MEROPENEM 500 MG: 500 INJECTION, POWDER, FOR SOLUTION INTRAVENOUS at 07:46

## 2021-05-28 RX ADMIN — Medication 10 ML: at 02:50

## 2021-05-28 RX ADMIN — MEROPENEM 500 MG: 500 INJECTION, POWDER, FOR SOLUTION INTRAVENOUS at 02:50

## 2021-05-28 RX ADMIN — MEROPENEM 500 MG: 500 INJECTION, POWDER, FOR SOLUTION INTRAVENOUS at 13:05

## 2021-05-28 RX ADMIN — Medication 10 ML: at 07:46

## 2021-05-28 NOTE — ROUTINE PROCESS
Pt d/c'd home in stable condition per md order. Pt provided discharge instructions. All questions answered. IV site removed. Pt requested transport to be called when  arrives to the room. Pt to be taken via wheelchair to private vehicle.

## 2021-05-28 NOTE — PROGRESS NOTES
Hospital follow-up PCP transitional care appointment has been scheduled with Dr. Jenny Hamilton for Thursday, 6/10/21 at 11:30 a.m. This is the earliest appointment available. Pending patient discharge.   Gurmeet Zavala, Care Management Specialist.

## 2021-05-28 NOTE — ROUTINE PROCESS
Bedside and Verbal shift change report given to [facility] (oncoming nurse) by Linda Velasco (offgoing nurse). Report included the following information SBAR, Kardex, MAR and Recent Results. Patient called out stating that they thought they were breaking out in hives. This RN notified MD Matos that this was occurring after assessing the patient. Order was placed for IV Benadryl and was given to the patient. Will continue to monitor. IV appeared to be infiltrated when ABX was infusing. After being unhooked from the IV the patient wanted to be rehooked to the IV despite this RN and [facility] RN advising against this since medications can damage the body when not in the vein. Patient stated she though the antibiotic was infusing too quickly and wanted to be rehooked up.  Patient was attached to IV and [facility] RN will assess IV at a later time per the patient's request.

## 2021-05-28 NOTE — DISCHARGE SUMMARY
Discharge Summary       PATIENT ID: Srinivasa Carroll  MRN: [id number]   YOB: 1955    DATE OF ADMISSION: 5/25/2021 10:05 PM    DATE OF DISCHARGE: 5/28/2021   PRIMARY CARE PROVIDER: Gary Colon MD     ATTENDING PHYSICIAN: Rosy Yip MD  DISCHARGING PROVIDER: Rosy Yip MD    To contact this individual call [phone] and ask the  to page. If unavailable ask to be transferred the Adult Hospitalist Department. CONSULTATIONS: IP CONSULT TO HOSPITALIST    PROCEDURES/SURGERIES: * No surgery found *    ADMITTING [address] [place] [address] COURSE:   Evaluated and treated for allergic reaction to cephalosporine abx, changed to finish course iv meropenem. Doing well. Risk of Re-Admission: low  DISCHARGE DIAGNOSES / PLAN:      Drug Allergic reaction  -Suspect cephalosporin as trigger for allergic reaction given penicillin allergy  -Marked improvement with Decadron, Benadryl and Pepcid  -Discontinue Ceftin and start meropenem to finish total 10days course. Doing well on dc. She has multiple allergies, will f/u with her allergist and use antiHistamines prn.     Diverticulitis- IV meropenem to finish total 10 days course. -GI lyte diet  Hypertension-On no chronic antihypertensive-Monitor  Insomnia: PRN ambien for short course. FOLLOW UP APPOINTMENTS:    Follow-up Information     Follow up With Specialties Details Why Contact Info    Gary Colon MD Family Medicine In 1 week  [address] [place] [address] [phone]        In 2 weeks  allergy specialist         ADDITIONAL CARE RECOMMENDATIONS:  Follow up with PCP, and allergy specialist    DIET: Resume previous diet    ACTIVITY: Activity as tolerated    DISCHARGE MEDICATIONS:  Current Discharge Medication List      START taking these medications    Details   zolpidem (AMBIEN) 5 mg tablet Take 1 Tablet by mouth nightly as needed for Sleep for up to 5 days. Max Daily Amount: 5 mg.   Qty: 5 Tablet, Refills: 0  Start date: 5/28/2021, End date: 6/2/2021    Associated Diagnoses: Other insomnia         CONTINUE these medications which have NOT CHANGED    Details   simethicone 42 mg chew Take 42 mg by mouth four (4) times daily as needed for Nausea for up to 7 days. Qty: 30 Tablet, Refills: 0         STOP taking these medications       cefUROXime (CEFTIN) 500 mg tablet Comments:   Reason for Stopping:         metroNIDAZOLE (FlagyL) 500 mg tablet Comments:   Reason for Stopping:             NOTIFY YOUR PHYSICIAN FOR ANY OF THE FOLLOWING:   Fever over 101 degrees for 24 hours. Chest pain, shortness of breath, fever, chills, nausea, vomiting, diarrhea, change in mentation, falling, weakness, bleeding. Severe pain or pain not relieved by medications. Or, any other signs or symptoms that you may have questions about. DISPOSITION:    Home With:   OT  PT  HH  RN       Long term SNF/Inpatient Rehab   x Independent/assisted living    Hospice    Other:     PATIENT CONDITION AT DISCHARGE:     Functional status    Poor     Deconditioned     Independent      Cognition     Lucid     Forgetful     Dementia      Catheters/lines (plus indication)    Zambrano     PICC     PEG     None      Code status     Full code     DNR      PHYSICAL EXAMINATION AT DISCHARGE:  Patient seen and examined at bedside, Condition stable, explained discharge and follow up plans. BP (!) 148/83 (BP 1 Location: Right upper arm, BP Patient Position: Sitting)   Pulse 61   Temp 98.4 °F (36.9 °C)   Resp 17   Ht 5' 2\" (1.575 m)   Wt 96.2 kg (212 lb)   SpO2 96%   BMI 38.78 kg/m²   General:  Alert, oriented, No acute distress. Comfortable, no skin rashes. Resp:  No accessory muscle use, Good AE.   Neuro:  Grossly normal, no focal neuro deficits, follows commands     CHRONIC MEDICAL DIAGNOSES:  Problem List as of 5/28/2021 Never Reviewed        Codes Class Noted - Resolved    Allergic reaction caused by a drug ICD-10-CM: T78.40XA  ICD-9-CM: 995.27  5/26/2021 - Present Diverticulitis ICD-10-CM: V74.47  ICD-9-CM: 562.11  5/18/2021 - Present              38 minutes were spent with the patient on counseling and coordination of care.     Signed:   Lisa Pillai MD  5/28/2021  11:28 AM

## 2021-05-28 NOTE — DISCHARGE INSTRUCTIONS
Discharge Instructions       PATIENT ID: Sadi Arce  MRN: [id number]   YOB: 1955    DATE OF ADMISSION: 5/25/2021 10:05 PM    DATE OF DISCHARGE: 5/28/2021    PRIMARY CARE PROVIDER: Elli Ross MD     ATTENDING PHYSICIAN: Rachael Salazar MD  DISCHARGING PROVIDER: Temitope Wells MD    To contact this individual call [phone] and ask the  to page. If unavailable ask to be transferred the Adult Hospitalist Department. DISCHARGE DIAGNOSES Acute diverticulitis- developed allergy to po meds, finished course of abx iv inpatient. CONSULTATIONS: IP CONSULT TO HOSPITALIST    PROCEDURES/SURGERIES: * No surgery found *    FOLLOW UP APPOINTMENTS:   Follow-up Information     Follow up With Specialties Details Why Contact Info    Elli Ross MD Family Medicine In 1 week  [address] [place] [address]        In 2 weeks  allergy specialist           ADDITIONAL CARE RECOMMENDATIONS: follow up with PCP and allergy team    DIET: Resume previous diet    DISCHARGE MEDICATIONS:  ambien short course of insomnia    · It is important that you take the medication exactly as they are prescribed. · Keep your medication in the bottles provided by the pharmacist and keep a list of the medication names, dosages, and times to be taken in your wallet. · Do not take other medications without consulting your doctor. NOTIFY YOUR PHYSICIAN FOR ANY OF THE FOLLOWING:   Fever over 101 degrees for 24 hours. Chest pain, shortness of breath, fever, chills, nausea, vomiting, diarrhea, change in mentation, falling, weakness, bleeding. Severe pain or pain not relieved by medications. Or, any other signs or symptoms that you may have questions about. It was our pleasure to help take care of you and we hope you get well very soon.     DISPOSITION:    Home With:   OT  PT  HH  RN       SNF/Inpatient Rehab/LTAC   x Independent/assisted living    Hospice    Other:     CDMP Checked:   Yes x     PROBLEM LIST Updated:  Yes x     Signed:   Joyce Benitez MD  5/28/2021  11:34 AM

## 2021-05-28 NOTE — ROUTINE PROCESS
Bedside shift change report given to German Regan (oncoming nurse) by Melyssa Alexis RN (offgoing nurse). Report included the following information SBAR and Kardex.

## 2021-06-25 ENCOUNTER — TRANSCRIBE ORDER (OUTPATIENT)
Dept: SCHEDULING | Age: 66
End: 2021-06-25

## 2021-06-25 DIAGNOSIS — Z12.31 VISIT FOR SCREENING MAMMOGRAM: Primary | ICD-10-CM

## 2021-07-30 ENCOUNTER — HOSPITAL ENCOUNTER (OUTPATIENT)
Dept: MAMMOGRAPHY | Age: 66
Discharge: HOME | End: 2021-07-30
Attending: FAMILY MEDICINE
Payer: COMMERCIAL

## 2021-07-30 DIAGNOSIS — Z12.31 VISIT FOR SCREENING MAMMOGRAM: ICD-10-CM

## 2021-07-30 PROCEDURE — 77067 SCR MAMMO BI INCL CAD: CPT

## 2021-08-29 ENCOUNTER — HOSPITAL ENCOUNTER (INPATIENT)
Age: 66
LOS: 3 days | Discharge: HOME | DRG: 872 | End: 2021-09-01
Attending: EMERGENCY MEDICINE | Admitting: HOSPITALIST
Payer: COMMERCIAL

## 2021-08-29 ENCOUNTER — APPOINTMENT (OUTPATIENT)
Dept: CT IMAGING | Age: 66
DRG: 872 | End: 2021-08-29
Attending: PHYSICIAN ASSISTANT
Payer: COMMERCIAL

## 2021-08-29 DIAGNOSIS — N39.0 URINARY TRACT INFECTION WITH HEMATURIA, SITE UNSPECIFIED: Primary | ICD-10-CM

## 2021-08-29 DIAGNOSIS — K57.32 DIVERTICULITIS LARGE INTESTINE W/O PERFORATION OR ABSCESS W/O BLEEDING: ICD-10-CM

## 2021-08-29 DIAGNOSIS — R31.9 URINARY TRACT INFECTION WITH HEMATURIA, SITE UNSPECIFIED: Primary | ICD-10-CM

## 2021-08-29 DIAGNOSIS — K57.20 ABSCESS OF SIGMOID COLON DUE TO DIVERTICULITIS: ICD-10-CM

## 2021-08-29 DIAGNOSIS — K57.32 SIGMOID DIVERTICULITIS: ICD-10-CM

## 2021-08-29 LAB
ALBUMIN SERPL-MCNC: 3.2 G/DL (ref 3.5–5)
ALBUMIN/GLOB SERPL: 0.7 {RATIO} (ref 1.1–2.2)
ALP SERPL-CCNC: 172 U/L (ref 45–117)
ALT SERPL-CCNC: 32 U/L (ref 12–78)
ANION GAP SERPL CALC-SCNC: 8 MMOL/L (ref 5–15)
APPEARANCE UR: ABNORMAL
AST SERPL-CCNC: 25 U/L (ref 15–37)
BACTERIA URNS QL MICRO: ABNORMAL /HPF
BASOPHILS # BLD: 0.1 K/UL (ref 0–0.1)
BASOPHILS NFR BLD: 0 % (ref 0–1)
BILIRUB SERPL-MCNC: 0.7 MG/DL (ref 0.2–1)
BILIRUB UR QL: NEGATIVE
BUN SERPL-MCNC: 9 MG/DL (ref 6–20)
BUN/CREAT SERPL: 17 (ref 12–20)
CALCIUM SERPL-MCNC: 9.3 MG/DL (ref 8.5–10.1)
CHLORIDE SERPL-SCNC: 100 MMOL/L (ref 97–108)
CO2 SERPL-SCNC: 26 MMOL/L (ref 21–32)
COLOR UR: ABNORMAL
COMMENT, HOLDF: NORMAL
CREAT SERPL-MCNC: 0.52 MG/DL (ref 0.55–1.02)
DIFFERENTIAL METHOD BLD: ABNORMAL
EOSINOPHIL # BLD: 0 K/UL (ref 0–0.4)
EOSINOPHIL NFR BLD: 0 % (ref 0–7)
EPITH CASTS URNS QL MICRO: ABNORMAL /LPF
ERYTHROCYTE [DISTWIDTH] IN BLOOD BY AUTOMATED COUNT: 13 % (ref 11.5–14.5)
GLOBULIN SER CALC-MCNC: 4.6 G/DL (ref 2–4)
GLUCOSE SERPL-MCNC: 126 MG/DL (ref 65–100)
GLUCOSE UR STRIP.AUTO-MCNC: NEGATIVE MG/DL
HCT VFR BLD AUTO: 39 % (ref 35–47)
HGB BLD-MCNC: 12.9 G/DL (ref 11.5–16)
HGB UR QL STRIP: ABNORMAL
IMM GRANULOCYTES # BLD AUTO: 0.2 K/UL (ref 0–0.04)
IMM GRANULOCYTES NFR BLD AUTO: 1 % (ref 0–0.5)
KETONES UR QL STRIP.AUTO: 80 MG/DL
LACTATE BLD-SCNC: 1.03 MMOL/L (ref 0.4–2)
LEUKOCYTE ESTERASE UR QL STRIP.AUTO: ABNORMAL
LIPASE SERPL-CCNC: 339 U/L (ref 73–393)
LYMPHOCYTES # BLD: 1 K/UL (ref 0.8–3.5)
LYMPHOCYTES NFR BLD: 9 % (ref 12–49)
MCH RBC QN AUTO: 29.3 PG (ref 26–34)
MCHC RBC AUTO-ENTMCNC: 33.1 G/DL (ref 30–36.5)
MCV RBC AUTO: 88.6 FL (ref 80–99)
MONOCYTES # BLD: 1.7 K/UL (ref 0–1)
MONOCYTES NFR BLD: 14 % (ref 5–13)
NEUTS SEG # BLD: 8.7 K/UL (ref 1.8–8)
NEUTS SEG NFR BLD: 76 % (ref 32–75)
NITRITE UR QL STRIP.AUTO: POSITIVE
NRBC # BLD: 0 K/UL (ref 0–0.01)
NRBC BLD-RTO: 0 PER 100 WBC
PH UR STRIP: 5.5 [PH] (ref 5–8)
PLATELET # BLD AUTO: 305 K/UL (ref 150–400)
PMV BLD AUTO: 8.8 FL (ref 8.9–12.9)
POTASSIUM SERPL-SCNC: 3.6 MMOL/L (ref 3.5–5.1)
PROT SERPL-MCNC: 7.8 G/DL (ref 6.4–8.2)
PROT UR STRIP-MCNC: 30 MG/DL
RBC # BLD AUTO: 4.4 M/UL (ref 3.8–5.2)
RBC #/AREA URNS HPF: ABNORMAL /HPF (ref 0–5)
SAMPLES BEING HELD,HOLD: NORMAL
SODIUM SERPL-SCNC: 134 MMOL/L (ref 136–145)
SP GR UR REFRACTOMETRY: 1.03 (ref 1–1.03)
TROPONIN I SERPL-MCNC: <0.05 NG/ML
TROPONIN I SERPL-MCNC: <0.05 NG/ML
UR CULT HOLD, URHOLD: NORMAL
UROBILINOGEN UR QL STRIP.AUTO: 1 EU/DL (ref 0.2–1)
WBC # BLD AUTO: 11.6 K/UL (ref 3.6–11)
WBC URNS QL MICRO: ABNORMAL /HPF (ref 0–4)

## 2021-08-29 PROCEDURE — 74011250637 HC RX REV CODE- 250/637: Performed by: HOSPITALIST

## 2021-08-29 PROCEDURE — 87086 URINE CULTURE/COLONY COUNT: CPT

## 2021-08-29 PROCEDURE — 96365 THER/PROPH/DIAG IV INF INIT: CPT

## 2021-08-29 PROCEDURE — 74011250636 HC RX REV CODE- 250/636: Performed by: HOSPITALIST

## 2021-08-29 PROCEDURE — 36415 COLL VENOUS BLD VENIPUNCTURE: CPT

## 2021-08-29 PROCEDURE — 96375 TX/PRO/DX INJ NEW DRUG ADDON: CPT

## 2021-08-29 PROCEDURE — 99253 IP/OBS CNSLTJ NEW/EST LOW 45: CPT | Performed by: SURGERY

## 2021-08-29 PROCEDURE — 84484 ASSAY OF TROPONIN QUANT: CPT

## 2021-08-29 PROCEDURE — 81001 URINALYSIS AUTO W/SCOPE: CPT

## 2021-08-29 PROCEDURE — 74011250636 HC RX REV CODE- 250/636: Performed by: PHYSICIAN ASSISTANT

## 2021-08-29 PROCEDURE — 93005 ELECTROCARDIOGRAM TRACING: CPT

## 2021-08-29 PROCEDURE — 87040 BLOOD CULTURE FOR BACTERIA: CPT

## 2021-08-29 PROCEDURE — 99284 EMERGENCY DEPT VISIT MOD MDM: CPT

## 2021-08-29 PROCEDURE — 83605 ASSAY OF LACTIC ACID: CPT

## 2021-08-29 PROCEDURE — 83690 ASSAY OF LIPASE: CPT

## 2021-08-29 PROCEDURE — 65270000029 HC RM PRIVATE

## 2021-08-29 PROCEDURE — 74177 CT ABD & PELVIS W/CONTRAST: CPT

## 2021-08-29 PROCEDURE — 80053 COMPREHEN METABOLIC PANEL: CPT

## 2021-08-29 PROCEDURE — 85025 COMPLETE CBC W/AUTO DIFF WBC: CPT

## 2021-08-29 PROCEDURE — 96361 HYDRATE IV INFUSION ADD-ON: CPT

## 2021-08-29 PROCEDURE — 74011000258 HC RX REV CODE- 258: Performed by: PHYSICIAN ASSISTANT

## 2021-08-29 PROCEDURE — 74011000636 HC RX REV CODE- 636: Performed by: RADIOLOGY

## 2021-08-29 RX ORDER — HYDROMORPHONE HYDROCHLORIDE 1 MG/ML
1 INJECTION, SOLUTION INTRAMUSCULAR; INTRAVENOUS; SUBCUTANEOUS
Status: DISCONTINUED | OUTPATIENT
Start: 2021-08-29 | End: 2021-09-01

## 2021-08-29 RX ORDER — SODIUM CHLORIDE 0.9 % (FLUSH) 0.9 %
5-40 SYRINGE (ML) INJECTION AS NEEDED
Status: DISCONTINUED | OUTPATIENT
Start: 2021-08-29 | End: 2021-09-01 | Stop reason: HOSPADM

## 2021-08-29 RX ORDER — ONDANSETRON 2 MG/ML
4 INJECTION INTRAMUSCULAR; INTRAVENOUS
Status: DISCONTINUED | OUTPATIENT
Start: 2021-08-29 | End: 2021-09-01 | Stop reason: HOSPADM

## 2021-08-29 RX ORDER — SODIUM CHLORIDE 0.9 % (FLUSH) 0.9 %
5-40 SYRINGE (ML) INJECTION EVERY 8 HOURS
Status: DISCONTINUED | OUTPATIENT
Start: 2021-08-29 | End: 2021-09-01 | Stop reason: HOSPADM

## 2021-08-29 RX ORDER — ONDANSETRON 4 MG/1
4 TABLET, ORALLY DISINTEGRATING ORAL
Status: DISCONTINUED | OUTPATIENT
Start: 2021-08-29 | End: 2021-09-01 | Stop reason: HOSPADM

## 2021-08-29 RX ORDER — TRAMADOL HYDROCHLORIDE 50 MG/1
50 TABLET ORAL
Status: DISCONTINUED | OUTPATIENT
Start: 2021-08-29 | End: 2021-09-01 | Stop reason: HOSPADM

## 2021-08-29 RX ORDER — ACETAMINOPHEN 325 MG/1
650 TABLET ORAL
Status: DISCONTINUED | OUTPATIENT
Start: 2021-08-29 | End: 2021-09-01 | Stop reason: HOSPADM

## 2021-08-29 RX ORDER — KETOROLAC TROMETHAMINE 30 MG/ML
15 INJECTION, SOLUTION INTRAMUSCULAR; INTRAVENOUS
Status: COMPLETED | OUTPATIENT
Start: 2021-08-29 | End: 2021-08-29

## 2021-08-29 RX ORDER — SODIUM CHLORIDE 9 MG/ML
75 INJECTION, SOLUTION INTRAVENOUS CONTINUOUS
Status: DISCONTINUED | OUTPATIENT
Start: 2021-08-29 | End: 2021-08-31

## 2021-08-29 RX ORDER — ACETAMINOPHEN 650 MG/1
650 SUPPOSITORY RECTAL
Status: DISCONTINUED | OUTPATIENT
Start: 2021-08-29 | End: 2021-09-01 | Stop reason: HOSPADM

## 2021-08-29 RX ORDER — ONDANSETRON 2 MG/ML
4 INJECTION INTRAMUSCULAR; INTRAVENOUS
Status: COMPLETED | OUTPATIENT
Start: 2021-08-29 | End: 2021-08-29

## 2021-08-29 RX ADMIN — SODIUM CHLORIDE 1000 ML: 9 INJECTION, SOLUTION INTRAVENOUS at 15:33

## 2021-08-29 RX ADMIN — ONDANSETRON 4 MG: 2 INJECTION INTRAMUSCULAR; INTRAVENOUS at 15:33

## 2021-08-29 RX ADMIN — IOPAMIDOL 100 ML: 755 INJECTION, SOLUTION INTRAVENOUS at 16:06

## 2021-08-29 RX ADMIN — PIPERACILLIN AND TAZOBACTAM 3.38 G: 3; .375 INJECTION, POWDER, LYOPHILIZED, FOR SOLUTION INTRAVENOUS at 17:49

## 2021-08-29 RX ADMIN — SODIUM CHLORIDE 100 ML/HR: 9 INJECTION, SOLUTION INTRAVENOUS at 23:49

## 2021-08-29 RX ADMIN — KETOROLAC TROMETHAMINE 15 MG: 30 INJECTION, SOLUTION INTRAMUSCULAR; INTRAVENOUS at 15:33

## 2021-08-29 RX ADMIN — ACETAMINOPHEN 650 MG: 325 TABLET ORAL at 23:48

## 2021-08-29 RX ADMIN — Medication 10 ML: at 23:48

## 2021-08-29 NOTE — H&P
HISTORY AND PHYSICAL      PCP: Kwaku Walker MD  History source: the patient      CC: abdominal pain      HPI: 77 y.o lady with a history of RMSF and recent admission for acute complicated diverticulitis who presents with abdominal pain. She was admitted here and treated with IV antibiotics, then discharged home on oral antibiotics and returned here for re-admission due to an allergic reaction. She then completed her faull treatment course with IV meropenem. She was feeling better but never felt completely better, she felt fatigued and had low-grade temperatures. A few days ago she developed bilateral flank pain as well as mucus-like diarrhea. Her fatigue got worse as did her fever. She didn't have any vomiting or bleeding. PMH/PSH:  Past Medical History:   Diagnosis Date    Glaucoma     H/O seasonal allergies     [place] spotted fever     Sinus infection     UTI (urinary tract infection)      No past surgical history on file. Home meds:   Prior to Admission medications    Not on File       Allergies: Allergies   Allergen Reactions    Ceftin [Cefuroxime Axetil] Hives    Codeine Rash    Levaquin [Levofloxacin] Other (comments)     Joint swelling    Pcn [Penicillins] Hives    Sulfa (Sulfonamide Antibiotics) Hives       FH:  No family history on file. SH:  Social History     Tobacco Use    Smoking status: Never Smoker    Smokeless tobacco: Never Used   Substance Use Topics    Alcohol use: Not Currently       ROS: A comprehensive review of systems was negative except for that written in the HPI.       PHYSICAL EXAM:  Visit Vitals  BP (!) 168/80 (BP 1 Location: Right upper arm, BP Patient Position: At rest)   Pulse 88   Temp 99.3 °F (37.4 °C)   Resp 18   SpO2 98%       Gen: NAD, non-toxic  HEENT: anicteric sclerae, normal conjunctiva  Neck: supple, trachea midline, no adenopathy  Heart: RRR, no MRG, no JVD, no peripheral edema  Lungs: CTA b/l, non-labored respirations  Abd: soft, NT, ND, BS+, no organomegaly  Extr: warm  Skin: dry, no rash  Neuro: CN II-XII grossly intact, normal speech, moves all extremities  Psych: normal mood, appropriate affect      Labs/Imaging:  Recent Results (from the past 24 hour(s))   POC LACTIC ACID    Collection Time: 08/29/21  3:05 PM   Result Value Ref Range    Lactic Acid (POC) 1.03 0.40 - 2.00 mmol/L   SAMPLES BEING HELD    Collection Time: 08/29/21  3:09 PM   Result Value Ref Range    SAMPLES BEING HELD 1red     COMMENT        Add-on orders for these samples will be processed based on acceptable specimen integrity and analyte stability, which may vary by analyte. CBC WITH AUTOMATED DIFF    Collection Time: 08/29/21  3:09 PM   Result Value Ref Range    WBC 11.6 (H) 3.6 - 11.0 K/uL    RBC 4.40 3.80 - 5.20 M/uL    HGB 12.9 11.5 - 16.0 g/dL    HCT 39.0 35.0 - 47.0 %    MCV 88.6 80.0 - 99.0 FL    MCH 29.3 26.0 - 34.0 PG    MCHC 33.1 30.0 - 36.5 g/dL    RDW 13.0 11.5 - 14.5 %    PLATELET 642 914 - 485 K/uL    MPV 8.8 (L) 8.9 - 12.9 FL    NRBC 0.0 0  WBC    ABSOLUTE NRBC 0.00 0.00 - 0.01 K/uL    NEUTROPHILS 76 (H) 32 - 75 %    LYMPHOCYTES 9 (L) 12 - 49 %    MONOCYTES 14 (H) 5 - 13 %    EOSINOPHILS 0 0 - 7 %    BASOPHILS 0 0 - 1 %    IMMATURE GRANULOCYTES 1 (H) 0.0 - 0.5 %    ABS. NEUTROPHILS 8.7 (H) 1.8 - 8.0 K/UL    ABS. LYMPHOCYTES 1.0 0.8 - 3.5 K/UL    ABS. MONOCYTES 1.7 (H) 0.0 - 1.0 K/UL    ABS. EOSINOPHILS 0.0 0.0 - 0.4 K/UL    ABS. BASOPHILS 0.1 0.0 - 0.1 K/UL    ABS. IMM.  GRANS. 0.2 (H) 0.00 - 0.04 K/UL    DF AUTOMATED     METABOLIC PANEL, COMPREHENSIVE    Collection Time: 08/29/21  3:09 PM   Result Value Ref Range    Sodium 134 (L) 136 - 145 mmol/L    Potassium 3.6 3.5 - 5.1 mmol/L    Chloride 100 97 - 108 mmol/L    CO2 26 21 - 32 mmol/L    Anion gap 8 5 - 15 mmol/L    Glucose 126 (H) 65 - 100 mg/dL    BUN 9 6 - 20 MG/DL    Creatinine 0.52 (L) 0.55 - 1.02 MG/DL    BUN/Creatinine ratio 17 12 - 20      GFR est AA >60 >60 ml/min/1.73m2    GFR est non-AA >60 >60 ml/min/1.73m2    Calcium 9.3 8.5 - 10.1 MG/DL    Bilirubin, total 0.7 0.2 - 1.0 MG/DL    ALT (SGPT) 32 12 - 78 U/L    AST (SGOT) 25 15 - 37 U/L    Alk. phosphatase 172 (H) 45 - 117 U/L    Protein, total 7.8 6.4 - 8.2 g/dL    Albumin 3.2 (L) 3.5 - 5.0 g/dL    Globulin 4.6 (H) 2.0 - 4.0 g/dL    A-G Ratio 0.7 (L) 1.1 - 2.2     LIPASE    Collection Time: 08/29/21  3:09 PM   Result Value Ref Range    Lipase 339 73 - 393 U/L   TROPONIN I    Collection Time: 08/29/21  3:09 PM   Result Value Ref Range    Troponin-I, Qt. <0.05 <0.05 ng/mL   URINALYSIS W/MICROSCOPIC    Collection Time: 08/29/21  3:52 PM   Result Value Ref Range    Color YELLOW/STRAW      Appearance CLOUDY (A) CLEAR      Specific gravity 1.026 1.003 - 1.030      pH (UA) 5.5 5.0 - 8.0      Protein 30 (A) NEG mg/dL    Glucose Negative NEG mg/dL    Ketone 80 (A) NEG mg/dL    Bilirubin Negative NEG      Blood SMALL (A) NEG      Urobilinogen 1.0 0.2 - 1.0 EU/dL    Nitrites Positive (A) NEG      Leukocyte Esterase MODERATE (A) NEG      WBC 20-50 0 - 4 /hpf    RBC 0-5 0 - 5 /hpf    Epithelial cells FEW FEW /lpf    Bacteria 2+ (A) NEG /hpf   URINE CULTURE HOLD SAMPLE    Collection Time: 08/29/21  3:52 PM    Specimen: Serum; Urine   Result Value Ref Range    Urine culture hold        Urine on hold in Microbiology dept for 2 days. If unpreserved urine is submitted, it cannot be used for addtional testing after 24 hours, recollection will be required.    EKG, 12 LEAD, INITIAL    Collection Time: 08/29/21  4:55 PM   Result Value Ref Range    Ventricular Rate 90 BPM    Atrial Rate 90 BPM    P-R Interval 154 ms    QRS Duration 74 ms    Q-T Interval 334 ms    QTC Calculation (Bezet) 408 ms    Calculated P Axis 53 degrees    Calculated R Axis 14 degrees    Calculated T Axis 0 degrees    Diagnosis       Normal sinus rhythm  Nonspecific ST abnormality  Abnormal ECG  When compared with ECG of 18-MAY-2021 12:12,  No significant change was found     TROPONIN I Collection Time: 08/29/21  5:51 PM   Result Value Ref Range    Troponin-I, Qt. <0.05 <0.05 ng/mL       Recent Labs     08/29/21  1509   WBC 11.6*   HGB 12.9   HCT 39.0        Recent Labs     08/29/21  1509   *   K 3.6      CO2 26   BUN 9   CREA 0.52*   *   CA 9.3     Recent Labs     08/29/21  1509   ALT 32   *   TBILI 0.7   TP 7.8   ALB 3.2*   GLOB 4.6*   LPSE 339       Recent Labs     08/29/21  1751 08/29/21  1509   TROIQ <0.05 <0.05       No results for input(s): INR, PTP, APTT, INREXT in the last 72 hours. No results for input(s): PH, PCO2, PO2 in the last 72 hours. CT ABD PELV W CONT    Result Date: 8/29/2021  1. Sigmoid diverticulitis complicated by perisigmoid abscess and portal venous gas in the liver as well as probable secondary inflammation of the superior bladder wall. 2. Cholecystolithiasis and additional incidentals as above. The findings were called to ED PA on 8/29/2021 at 16:38 by myself. [id number]           Assessment & Plan:     Acute complicated diverticulitis: suspect her previous episode never completely resolved.  -resume IV meropenem given multiple drug allergies.  Consult ID for antibiotic recs in the AM  -general surgery consulted and feels that the collection is not amenable to drainage, but the patient may need a colectomy in the future  -bowel rest and IV fluids  -pain control    Abnormal UA:   -f/u urine culture  -covered by current abx    Mild hyponatremia, due to dehydration    History of RMSF    DVT ppx: SCDs  Code status: full  Disposition: home when ready    Signed By: Marcos Sexton MD     August 29, 2021

## 2021-08-29 NOTE — ED PROVIDER NOTES
Fatimah Payne is a 77 y.o. female with PMhx of UTI, recommend spotted fever, diverticulitis who presents to ED with cc of onset of right upper quadrant and left lower quadrant abdominal pain since Monday. Patient endorses nausea without vomiting. Notes decreased p.o. intake. Denies fevers and notes she has been checking daily. She notes intermittent constipation and loose stools, denies blood in her stool. Notes admission in May for diverticulitis with abscess and states she had difficulty with antibiotics. Notes they tried to send her out with p.o. antibiotic course and she returned with rash secondary to one of the medications, states that they kept her for 11 days for IV antibiotics after such. Pt denies additional symptoms of cough, congestion, CP, SOB. PMHx: Reviewed, as below. PSHx: Reviewed, as below. PCP: Kwaku Walker MD    There are no other complaints verbalized at this time. Past Medical History:   Diagnosis Date    Glaucoma     H/O seasonal allergies     [place] spotted fever     Sinus infection     UTI (urinary tract infection)        No past surgical history on file. No family history on file.     Social History     Socioeconomic History    Marital status:      Spouse name: Not on file    Number of children: Not on file    Years of education: Not on file    Highest education level: Not on file   Occupational History    Not on file   Tobacco Use    Smoking status: Never Smoker    Smokeless tobacco: Never Used   Substance and Sexual Activity    Alcohol use: Not Currently    Drug use: Not Currently    Sexual activity: Not on file   Other Topics Concern    Not on file   Social History Narrative    Not on file     Social Determinants of Health     Financial Resource Strain:     Difficulty of Paying Living Expenses:    Food Insecurity:     Worried About Running Out of Food in the Last Year:     [address] in the Last Year: Transportation Needs:     Lack of Transportation (Medical):  Lack of Transportation (Non-Medical):    Physical Activity:     Days of Exercise per Week:     Minutes of Exercise per Session:    Stress:     Feeling of Stress :    Social Connections:     Frequency of Communication with Friends and Family:     Frequency of Social Gatherings with Friends and Family:     Attends Sabianism Services:     Active Member of Clubs or Organizations:     Attends Club or Organization Meetings:     Marital Status:    Intimate Partner Violence:     Fear of Current or Ex-Partner:     Emotionally Abused:     Physically Abused:     Sexually Abused: ALLERGIES: Ceftin [cefuroxime axetil], Codeine, Levaquin [levofloxacin], Pcn [penicillins], and Sulfa (sulfonamide antibiotics)    Review of Systems   Constitutional: Negative for fever. HENT: Negative for congestion. Respiratory: Negative for cough and shortness of breath. Cardiovascular: Negative for chest pain. Gastrointestinal: Positive for abdominal pain, constipation, diarrhea and nausea. Negative for blood in stool and vomiting. All other systems reviewed and are negative. Vitals:    08/29/21 1441 08/29/21 1748   BP: 125/89 (!) 168/80   Pulse: (!) 101 88   Resp: 18 18   Temp: (!) 102.5 °F (39.2 °C) 99.3 °F (37.4 °C)   SpO2: 95% 98%            Physical Exam  Vitals and nursing note reviewed. Constitutional:       Appearance: Normal appearance. She is not diaphoretic. Comments: Febrile. HENT:      Head: Atraumatic. Right Ear: External ear normal.      Left Ear: External ear normal.   Eyes:      Conjunctiva/sclera: Conjunctivae normal.   Cardiovascular:      Rate and Rhythm: Normal rate and regular rhythm. Heart sounds: Normal heart sounds. No murmur heard. No friction rub. No gallop. Pulmonary:      Effort: No respiratory distress. Breath sounds: Normal breath sounds. No stridor. No wheezing, rhonchi or rales. Abdominal:      General: Bowel sounds are normal. There is no distension. Palpations: Abdomen is soft. Tenderness: There is no abdominal tenderness. There is no guarding or rebound. Hernia: No hernia is present. Musculoskeletal:         General: No swelling or deformity. Cervical back: Normal range of motion. No rigidity. Skin:     General: Skin is warm and dry. Neurological:      Mental Status: She is alert and oriented to person, place, and time. Mental status is at baseline. MDM  Number of Diagnoses or Management Options     Amount and/or Complexity of Data Reviewed  Clinical lab tests: ordered and reviewed  Tests in the radiology section of CPT®: ordered and reviewed  Tests in the medicine section of CPT®: ordered and reviewed  Discuss the patient with other providers: yes (Dr Dayanna Orta, ED attending)           Procedures        CONSULT NOTE:   5:40 PM  Yudy Charles PA-C spoke with Dr Yvette Au,   Specialty: General Surgery  Discussed pt's hx, disposition, and available diagnostic and imaging results. Reviewed care plans. States he will go see and speak with patient. Dr Yvette Au recommends admission to hospitalist service. Perfect Serve Consult for Admission  6:54 PM    ED Room Number: R40/R40  Patient Name and age:  Jesús Olea 77 y.o.  female  Working Diagnosis:   1. Urinary tract infection with hematuria, site unspecified    2. Sigmoid diverticulitis    3. Abscess of sigmoid colon due to diverticulitis        COVID-19 Suspicion:  no  Sepsis present:  Other  Reassessment needed: yes  Code Status:  Full Code  Readmission: no  Isolation Requirements:  no  Recommended Level of Care:  telemetry  Department:[facility] Adult ED - 21   Other: 60-year-old female history of sigmoid diverticulitis with abscess in May, required admission, failed outpatient p.o. treatment and required admission with IV antibiotics for 11 days.  Presents with left lower quadrant and right upper quadrant abdominal pain. UA demonstrating infection. CT demonstrating sigmoid diverticulitis complicated by perisigmoid abscess and portal venous gas in the liver as well as probable secondary inflammation of the superior bladder wall. I have discussed with general surgery who recommends admission but states there is no surgical plan at this time. Has had an episode of chest tightness here in ED for which we got an EKG and troponin both of which were negative. VITAL SIGNS:  Vitals:    08/29/21 1441 08/29/21 1748 08/29/21 1940   BP: 125/89 (!) 168/80    Pulse: (!) 101 88    Resp: 18 18    Temp: (!) 102.5 °F (39.2 °C) 99.3 °F (37.4 °C)    SpO2: 95% 98%    Weight:   96.1 kg (211 lb 13.8 oz)   Height:   5' 2\" (1.575 m)         LABS:  Recent Results (from the past 24 hour(s))   POC LACTIC ACID    Collection Time: 08/29/21  3:05 PM   Result Value Ref Range    Lactic Acid (POC) 1.03 0.40 - 2.00 mmol/L   SAMPLES BEING HELD    Collection Time: 08/29/21  3:09 PM   Result Value Ref Range    SAMPLES BEING HELD 1red     COMMENT        Add-on orders for these samples will be processed based on acceptable specimen integrity and analyte stability, which may vary by analyte. CBC WITH AUTOMATED DIFF    Collection Time: 08/29/21  3:09 PM   Result Value Ref Range    WBC 11.6 (H) 3.6 - 11.0 K/uL    RBC 4.40 3.80 - 5.20 M/uL    HGB 12.9 11.5 - 16.0 g/dL    HCT 39.0 35.0 - 47.0 %    MCV 88.6 80.0 - 99.0 FL    MCH 29.3 26.0 - 34.0 PG    MCHC 33.1 30.0 - 36.5 g/dL    RDW 13.0 11.5 - 14.5 %    PLATELET 069 433 - 179 K/uL    MPV 8.8 (L) 8.9 - 12.9 FL    NRBC 0.0 0  WBC    ABSOLUTE NRBC 0.00 0.00 - 0.01 K/uL    NEUTROPHILS 76 (H) 32 - 75 %    LYMPHOCYTES 9 (L) 12 - 49 %    MONOCYTES 14 (H) 5 - 13 %    EOSINOPHILS 0 0 - 7 %    BASOPHILS 0 0 - 1 %    IMMATURE GRANULOCYTES 1 (H) 0.0 - 0.5 %    ABS. NEUTROPHILS 8.7 (H) 1.8 - 8.0 K/UL    ABS. LYMPHOCYTES 1.0 0.8 - 3.5 K/UL    ABS.  MONOCYTES 1.7 (H) 0.0 - 1.0 K/UL    ABS. EOSINOPHILS 0.0 0.0 - 0.4 K/UL    ABS. BASOPHILS 0.1 0.0 - 0.1 K/UL    ABS. IMM. GRANS. 0.2 (H) 0.00 - 0.04 K/UL    DF AUTOMATED     METABOLIC PANEL, COMPREHENSIVE    Collection Time: 08/29/21  3:09 PM   Result Value Ref Range    Sodium 134 (L) 136 - 145 mmol/L    Potassium 3.6 3.5 - 5.1 mmol/L    Chloride 100 97 - 108 mmol/L    CO2 26 21 - 32 mmol/L    Anion gap 8 5 - 15 mmol/L    Glucose 126 (H) 65 - 100 mg/dL    BUN 9 6 - 20 MG/DL    Creatinine 0.52 (L) 0.55 - 1.02 MG/DL    BUN/Creatinine ratio 17 12 - 20      GFR est AA >60 >60 ml/min/1.73m2    GFR est non-AA >60 >60 ml/min/1.73m2    Calcium 9.3 8.5 - 10.1 MG/DL    Bilirubin, total 0.7 0.2 - 1.0 MG/DL    ALT (SGPT) 32 12 - 78 U/L    AST (SGOT) 25 15 - 37 U/L    Alk. phosphatase 172 (H) 45 - 117 U/L    Protein, total 7.8 6.4 - 8.2 g/dL    Albumin 3.2 (L) 3.5 - 5.0 g/dL    Globulin 4.6 (H) 2.0 - 4.0 g/dL    A-G Ratio 0.7 (L) 1.1 - 2.2     LIPASE    Collection Time: 08/29/21  3:09 PM   Result Value Ref Range    Lipase 339 73 - 393 U/L   TROPONIN I    Collection Time: 08/29/21  3:09 PM   Result Value Ref Range    Troponin-I, Qt. <0.05 <0.05 ng/mL   URINALYSIS W/MICROSCOPIC    Collection Time: 08/29/21  3:52 PM   Result Value Ref Range    Color YELLOW/STRAW      Appearance CLOUDY (A) CLEAR      Specific gravity 1.026 1.003 - 1.030      pH (UA) 5.5 5.0 - 8.0      Protein 30 (A) NEG mg/dL    Glucose Negative NEG mg/dL    Ketone 80 (A) NEG mg/dL    Bilirubin Negative NEG      Blood SMALL (A) NEG      Urobilinogen 1.0 0.2 - 1.0 EU/dL    Nitrites Positive (A) NEG      Leukocyte Esterase MODERATE (A) NEG      WBC 20-50 0 - 4 /hpf    RBC 0-5 0 - 5 /hpf    Epithelial cells FEW FEW /lpf    Bacteria 2+ (A) NEG /hpf   URINE CULTURE HOLD SAMPLE    Collection Time: 08/29/21  3:52 PM    Specimen: Serum; Urine   Result Value Ref Range    Urine culture hold        Urine on hold in Microbiology dept for 2 days.   If unpreserved urine is submitted, it cannot be used for addtional testing after 24 hours, recollection will be required. EKG, 12 LEAD, INITIAL    Collection Time: 08/29/21  4:55 PM   Result Value Ref Range    Ventricular Rate 90 BPM    Atrial Rate 90 BPM    P-R Interval 154 ms    QRS Duration 74 ms    Q-T Interval 334 ms    QTC Calculation (Bezet) 408 ms    Calculated P Axis 53 degrees    Calculated R Axis 14 degrees    Calculated T Axis 0 degrees    Diagnosis       Normal sinus rhythm  Nonspecific ST abnormality  Abnormal ECG  When compared with ECG of 18-MAY-2021 12:12,  No significant change was found     TROPONIN I    Collection Time: 08/29/21  5:51 PM   Result Value Ref Range    Troponin-I, Qt. <0.05 <0.05 ng/mL         IMAGING:  CT ABD PELV W CONT   Final Result      1. Sigmoid diverticulitis complicated by perisigmoid abscess and portal venous   gas in the liver as well as probable secondary inflammation of the superior   bladder wall. 2. Cholecystolithiasis and additional incidentals as above. The findings were called to ED PA on 8/29/2021 at 16:38 by myself.   [id number]                  Medications During Visit:  Medications   sodium chloride (NS) flush 5-40 mL (has no administration in time range)   sodium chloride (NS) flush 5-40 mL (has no administration in time range)   acetaminophen (TYLENOL) tablet 650 mg (has no administration in time range)     Or   acetaminophen (TYLENOL) suppository 650 mg (has no administration in time range)   ondansetron (ZOFRAN ODT) tablet 4 mg (has no administration in time range)     Or   ondansetron (ZOFRAN) injection 4 mg (has no administration in time range)   traMADoL (ULTRAM) tablet 50 mg (has no administration in time range)   HYDROmorphone (DILAUDID) injection 1 mg (has no administration in time range)   0.9% sodium chloride infusion (has no administration in time range)   meropenem (MERREM) 500 mg in 0.9% sodium chloride (MBP/ADV) 50 mL MBP (has no administration in time range) L.acidophilus-paracasei-S.thermophil-bifidobacter (RISAQUAD) 8 billion cell capsule (has no administration in time range)   sodium chloride 0.9 % bolus infusion 1,000 mL (1,000 mL IntraVENous New Bag 8/29/21 1533)   ondansetron (ZOFRAN) injection 4 mg (4 mg IntraVENous Given 8/29/21 1533)   ketorolac (TORADOL) injection 15 mg (15 mg IntraVENous Given 8/29/21 1533)   iopamidoL (ISOVUE-370) 76 % injection 100 mL (100 mL IntraVENous Given 8/29/21 1606)   piperacillin-tazobactam (ZOSYN) 3.375 g in 0.9% sodium chloride (MBP/ADV) 100 mL MBP (3.375 g IntraVENous New Bag 8/29/21 1749)         DECISION MAKING:    Batsheva Stoddard is a 77 y.o. female who comes in as above. Presents febrile and with complaint of abdominal pain, nausea. On physical exam, she has no tenderness to palpation to either areas of complaints. CBC with WBC 11.6. CMP and lipase without acute etiology. POC lactic 1.03.  UA demonstrating nitrite positive, moderate leukocytes with 2050 WBCs and 2+ bacteria. CT demonstrating sigmoid diverticulitis complicated by perisigmoid abscess and portal venous gas in the liver as well as probable secondary inflammation of the superior bladder wall. I have consulted general surgery who has been in to see and speak with patient. Notes he does not believe there is a drainable collection at this point, recommends consulting hospitalist for admission. Hospitalist has been consulted and patient has been updated on results as we have obtained. Zosyn has been started while in ED given CT and UA findings. Symptoms treated with 1 dose of Zofran and Toradol and patient notes good improvement of nausea and discomfort with such. *Patient was noted to have episode of chest tightness while in ED. He EKG and initial troponin noted to be negative. Repeat troponin also noted to be negative. Pt denies cardiac history. IMPRESSION:  1. Urinary tract infection with hematuria, site unspecified    2.  Sigmoid diverticulitis    3. Abscess of sigmoid colon due to diverticulitis        DISPOSITION:  Admitted      Please note that this dictation was completed with [employer], the computer voice recognition software. Quite often unanticipated grammatical, syntax, homophones, and other interpretive errors are inadvertently transcribed by the computer software. Please disregard these errors. Please excuse any errors that have escaped final proofreading.

## 2021-08-29 NOTE — CONSULTS
Surgery Consult    Subjective:      Erik Hill is a 77 y.o. female who was initially diagnosed with diverticulitis with abscess approximately the 3-1/2 months ago. At that time she was treated for a few days of IV antibiotics and then sent home with oral antibiotics. She was home for about 3 days had an allergic reaction and came back to the hospital where she had another 3 days of antibiotics. Apparently they could not decide on a an oral antibiotic for her to be discharged on. She had a total of 11 days of antibiotics. During this time she had a repeat CT scan that showed mild improvement of the diverticulitis. She had been doing well until about 6 days ago when she began having similar abdominal pain again. She states that she has pain on both sides down on the left lower quadrant and epigastric region. She said this is very similar. She was watching it at home and planning to see her primary care physician tomorrow however she developed a temperature of 100.4 and decided to come in to be evaluated. She states it does not really hurt when anybody presses on her. Patient Active Problem List    Diagnosis Date Noted    Allergic reaction caused by a drug 05/26/2021    Diverticulitis 05/18/2021     Past Medical History:   Diagnosis Date    Glaucoma     H/O seasonal allergies     [place] spotted fever     Sinus infection     UTI (urinary tract infection)       No past surgical history on file. Social History     Tobacco Use    Smoking status: Never Smoker    Smokeless tobacco: Never Used   Substance Use Topics    Alcohol use: Not Currently      No family history on file. No current facility-administered medications for this encounter. No current outpatient medications on file.       Allergies   Allergen Reactions    Ceftin [Cefuroxime Axetil] Hives    Codeine Rash    Levaquin [Levofloxacin] Other (comments)     Joint swelling    Pcn [Penicillins] Hives    Sulfa (Sulfonamide Antibiotics) Hives       Review of Systems:    Pertinent items are noted in the History of Present Illness. Objective:        Visit Vitals  BP (!) 168/80 (BP 1 Location: Right upper arm, BP Patient Position: At rest)   Pulse 88   Temp 99.3 °F (37.4 °C)   Resp 18   SpO2 98%       Physical Exam:  GENERAL: alert, cooperative, no distress, appears stated age, EYE: negative, THROAT & NECK: normal and no erythema or exudates noted. , LUNG: clear to auscultation bilaterally, HEART: regular rate and rhythm, ABDOMEN: Soft nondistended minimal tenderness in the left lower quadrant no rebound no guarding no peritoneal signs, EXTREMITIES:  no edema, SKIN: Normal., NEUROLOGIC: negative, PSYCH: non focal    Imaging:  images and reports reviewed  CT-Sigmoid diverticulitis complicated by perisigmoid abscess and portal venous  gas in the liver as well as probable secondary inflammation of the superior  bladder wall.     2. Cholecystolithiasis and additional incidentals as above  Lab/Data Review: All lab results for the last 24 hours reviewed. Recent Results (from the past 24 hour(s))   POC LACTIC ACID    Collection Time: 08/29/21  3:05 PM   Result Value Ref Range    Lactic Acid (POC) 1.03 0.40 - 2.00 mmol/L   SAMPLES BEING HELD    Collection Time: 08/29/21  3:09 PM   Result Value Ref Range    SAMPLES BEING HELD 1red     COMMENT        Add-on orders for these samples will be processed based on acceptable specimen integrity and analyte stability, which may vary by analyte.    CBC WITH AUTOMATED DIFF    Collection Time: 08/29/21  3:09 PM   Result Value Ref Range    WBC 11.6 (H) 3.6 - 11.0 K/uL    RBC 4.40 3.80 - 5.20 M/uL    HGB 12.9 11.5 - 16.0 g/dL    HCT 39.0 35.0 - 47.0 %    MCV 88.6 80.0 - 99.0 FL    MCH 29.3 26.0 - 34.0 PG    MCHC 33.1 30.0 - 36.5 g/dL    RDW 13.0 11.5 - 14.5 %    PLATELET 974 275 - 961 K/uL    MPV 8.8 (L) 8.9 - 12.9 FL    NRBC 0.0 0  WBC    ABSOLUTE NRBC 0.00 0.00 - 0.01 K/uL NEUTROPHILS 76 (H) 32 - 75 %    LYMPHOCYTES 9 (L) 12 - 49 %    MONOCYTES 14 (H) 5 - 13 %    EOSINOPHILS 0 0 - 7 %    BASOPHILS 0 0 - 1 %    IMMATURE GRANULOCYTES 1 (H) 0.0 - 0.5 %    ABS. NEUTROPHILS 8.7 (H) 1.8 - 8.0 K/UL    ABS. LYMPHOCYTES 1.0 0.8 - 3.5 K/UL    ABS. MONOCYTES 1.7 (H) 0.0 - 1.0 K/UL    ABS. EOSINOPHILS 0.0 0.0 - 0.4 K/UL    ABS. BASOPHILS 0.1 0.0 - 0.1 K/UL    ABS. IMM. GRANS. 0.2 (H) 0.00 - 0.04 K/UL    DF AUTOMATED     METABOLIC PANEL, COMPREHENSIVE    Collection Time: 08/29/21  3:09 PM   Result Value Ref Range    Sodium 134 (L) 136 - 145 mmol/L    Potassium 3.6 3.5 - 5.1 mmol/L    Chloride 100 97 - 108 mmol/L    CO2 26 21 - 32 mmol/L    Anion gap 8 5 - 15 mmol/L    Glucose 126 (H) 65 - 100 mg/dL    BUN 9 6 - 20 MG/DL    Creatinine 0.52 (L) 0.55 - 1.02 MG/DL    BUN/Creatinine ratio 17 12 - 20      GFR est AA >60 >60 ml/min/1.73m2    GFR est non-AA >60 >60 ml/min/1.73m2    Calcium 9.3 8.5 - 10.1 MG/DL    Bilirubin, total 0.7 0.2 - 1.0 MG/DL    ALT (SGPT) 32 12 - 78 U/L    AST (SGOT) 25 15 - 37 U/L    Alk.  phosphatase 172 (H) 45 - 117 U/L    Protein, total 7.8 6.4 - 8.2 g/dL    Albumin 3.2 (L) 3.5 - 5.0 g/dL    Globulin 4.6 (H) 2.0 - 4.0 g/dL    A-G Ratio 0.7 (L) 1.1 - 2.2     LIPASE    Collection Time: 08/29/21  3:09 PM   Result Value Ref Range    Lipase 339 73 - 393 U/L   TROPONIN I    Collection Time: 08/29/21  3:09 PM   Result Value Ref Range    Troponin-I, Qt. <0.05 <0.05 ng/mL   URINALYSIS W/MICROSCOPIC    Collection Time: 08/29/21  3:52 PM   Result Value Ref Range    Color YELLOW/STRAW      Appearance CLOUDY (A) CLEAR      Specific gravity 1.026 1.003 - 1.030      pH (UA) 5.5 5.0 - 8.0      Protein 30 (A) NEG mg/dL    Glucose Negative NEG mg/dL    Ketone 80 (A) NEG mg/dL    Bilirubin Negative NEG      Blood SMALL (A) NEG      Urobilinogen 1.0 0.2 - 1.0 EU/dL    Nitrites Positive (A) NEG      Leukocyte Esterase MODERATE (A) NEG      WBC 20-50 0 - 4 /hpf    RBC 0-5 0 - 5 /hpf    Epithelial cells FEW FEW /lpf    Bacteria 2+ (A) NEG /hpf   URINE CULTURE HOLD SAMPLE    Collection Time: 08/29/21  3:52 PM    Specimen: Serum; Urine   Result Value Ref Range    Urine culture hold        Urine on hold in Microbiology dept for 2 days. If unpreserved urine is submitted, it cannot be used for addtional testing after 24 hours, recollection will be required. EKG, 12 LEAD, INITIAL    Collection Time: 08/29/21  4:55 PM   Result Value Ref Range    Ventricular Rate 90 BPM    Atrial Rate 90 BPM    P-R Interval 154 ms    QRS Duration 74 ms    Q-T Interval 334 ms    QTC Calculation (Bezet) 408 ms    Calculated P Axis 53 degrees    Calculated R Axis 14 degrees    Calculated T Axis 0 degrees    Diagnosis       Normal sinus rhythm  Nonspecific ST abnormality  Abnormal ECG  When compared with ECG of 18-MAY-2021 12:12,  No significant change was found     TROPONIN I    Collection Time: 08/29/21  5:51 PM   Result Value Ref Range    Troponin-I, Qt. <0.05 <0.05 ng/mL       Assessment: Plan   Continued diverticulitis with abscess. I am not sure that this ever completely got better. In comparing the CT scans from a few months ago to today the images are very similar. I do not believe that there is a drainable collection at this point. With regards to the dots of portal venous gas, while these can reflect intestinal ischemia the patient has no clinical signs of this. She her lactic acid is normal and she has no abdominal tenderness or peritoneal signs. For now would treat with conservative treatment with IV fluids and antibiotics. May need ID consult as previously the patient was unable to tolerate any oral antibiotics she may eventually need to be discharged home with a PICC line in order to get a full course.   No plans for operative intervention at this time though eventually once all of this cools down may require colectomy

## 2021-08-29 NOTE — ED TRIAGE NOTES
Pt arrives due to a fever of 100.4, nausea, and abdominal pain. Pt states that she was in the hospital in May due to diverticulitis and she feels as though this is a similar reflection of that.

## 2021-08-29 NOTE — PROGRESS NOTES
Renal Dosing/Monitoring  Medication: meropenem   Current regimen:  500mg every 8 hr  Recent Labs     08/29/21  1509   CREA 0.52*   BUN 9     Estimated CrCl:  85 ml/min  Plan: Change to meropenem 500mg q 6h

## 2021-08-29 NOTE — PROGRESS NOTES
Renal Dosing/Monitoring  Medication: meropenem   Current regimen:  500mg every 8 hr  Recent Labs     08/29/21  1509   CREA 0.52*   BUN 9     Estimated CrCl:  85 ml/min  Plan: Change to 500mg q6h

## 2021-08-30 PROBLEM — K57.32 DIVERTICULITIS LARGE INTESTINE W/O PERFORATION OR ABSCESS W/O BLEEDING: Status: ACTIVE | Noted: 2021-08-30

## 2021-08-30 LAB
ATRIAL RATE: 90 BPM
CALCULATED P AXIS, ECG09: 53 DEGREES
CALCULATED R AXIS, ECG10: 14 DEGREES
CALCULATED T AXIS, ECG11: 0 DEGREES
CEA SERPL-MCNC: 0.8 NG/ML
COMMENT, HOLDF: NORMAL
DIAGNOSIS, 93000: NORMAL
P-R INTERVAL, ECG05: 154 MS
Q-T INTERVAL, ECG07: 334 MS
QRS DURATION, ECG06: 74 MS
QTC CALCULATION (BEZET), ECG08: 408 MS
SAMPLES BEING HELD,HOLD: NORMAL
VENTRICULAR RATE, ECG03: 90 BPM

## 2021-08-30 PROCEDURE — 74011250637 HC RX REV CODE- 250/637: Performed by: HOSPITALIST

## 2021-08-30 PROCEDURE — 36415 COLL VENOUS BLD VENIPUNCTURE: CPT

## 2021-08-30 PROCEDURE — 74011250636 HC RX REV CODE- 250/636: Performed by: NURSE PRACTITIONER

## 2021-08-30 PROCEDURE — 74011250637 HC RX REV CODE- 250/637: Performed by: NURSE PRACTITIONER

## 2021-08-30 PROCEDURE — 65270000032 HC RM SEMIPRIVATE

## 2021-08-30 PROCEDURE — 99255 IP/OBS CONSLTJ NEW/EST HI 80: CPT | Performed by: INTERNAL MEDICINE

## 2021-08-30 PROCEDURE — 74011000258 HC RX REV CODE- 258: Performed by: HOSPITALIST

## 2021-08-30 PROCEDURE — 74011250636 HC RX REV CODE- 250/636: Performed by: HOSPITALIST

## 2021-08-30 PROCEDURE — 82378 CARCINOEMBRYONIC ANTIGEN: CPT

## 2021-08-30 PROCEDURE — 99233 SBSQ HOSP IP/OBS HIGH 50: CPT | Performed by: SURGERY

## 2021-08-30 RX ORDER — HEPARIN SODIUM 5000 [USP'U]/ML
5000 INJECTION, SOLUTION INTRAVENOUS; SUBCUTANEOUS EVERY 8 HOURS
Status: DISCONTINUED | OUTPATIENT
Start: 2021-08-30 | End: 2021-09-01 | Stop reason: HOSPADM

## 2021-08-30 RX ORDER — ZOLPIDEM TARTRATE 5 MG/1
5 TABLET ORAL
Status: DISCONTINUED | OUTPATIENT
Start: 2021-08-30 | End: 2021-08-31

## 2021-08-30 RX ORDER — ZOLPIDEM TARTRATE 5 MG/1
5 TABLET ORAL
COMMUNITY

## 2021-08-30 RX ORDER — POTASSIUM CHLORIDE 7.45 MG/ML
10 INJECTION INTRAVENOUS
Status: COMPLETED | OUTPATIENT
Start: 2021-08-30 | End: 2021-08-30

## 2021-08-30 RX ADMIN — MEROPENEM 500 MG: 500 INJECTION, POWDER, FOR SOLUTION INTRAVENOUS at 07:03

## 2021-08-30 RX ADMIN — SODIUM CHLORIDE 75 ML/HR: 9 INJECTION, SOLUTION INTRAVENOUS at 21:44

## 2021-08-30 RX ADMIN — ZOLPIDEM TARTRATE 5 MG: 5 TABLET ORAL at 21:47

## 2021-08-30 RX ADMIN — MEROPENEM 500 MG: 500 INJECTION, POWDER, FOR SOLUTION INTRAVENOUS at 15:25

## 2021-08-30 RX ADMIN — Medication 1 CAPSULE: at 08:03

## 2021-08-30 RX ADMIN — ACETAMINOPHEN 650 MG: 325 TABLET ORAL at 07:04

## 2021-08-30 RX ADMIN — POTASSIUM CHLORIDE 10 MEQ: 7.46 INJECTION, SOLUTION INTRAVENOUS at 09:05

## 2021-08-30 RX ADMIN — MEROPENEM 500 MG: 500 INJECTION, POWDER, FOR SOLUTION INTRAVENOUS at 21:46

## 2021-08-30 RX ADMIN — Medication 10 ML: at 15:25

## 2021-08-30 RX ADMIN — Medication 10 ML: at 21:46

## 2021-08-30 RX ADMIN — ONDANSETRON 4 MG: 2 INJECTION INTRAMUSCULAR; INTRAVENOUS at 08:10

## 2021-08-30 RX ADMIN — POTASSIUM CHLORIDE 10 MEQ: 7.46 INJECTION, SOLUTION INTRAVENOUS at 08:04

## 2021-08-30 RX ADMIN — ACETAMINOPHEN 650 MG: 325 TABLET ORAL at 19:32

## 2021-08-30 RX ADMIN — ACETAMINOPHEN 650 MG: 325 TABLET ORAL at 13:57

## 2021-08-30 NOTE — CONSULTS
Infectious Disease Consult Note    Reason for Consult: Sigmoid diverticulitis, abscess  Date of Consultation: August 30, 2021  Date of Admission: 8/29/2021  Referring Physician: Dr Aide Avila       HPI: I received a consult request today for antibiotic recommendations  Patient was seen earlier in the day today    Ms Jelena Grimaldo is a 70-year-old lady with a history of diverticulitis, RMSF who presented with abdominal pain. She had similar symptoms in May of this year and treated with antibiotics for diverticulitis. She was discharged on Flagyl and oral cephalosporin and developed hives after discharge. She was readmitted and antibiotic switched to meropenem which she tolerated well in May 2021 per discussion. Patient reports history of allergy to multiple antibiotics including sulfa, penicillin. She is currently tolerating meropenem. She reports feeling fairly at her baseline in June and July of this year. She says she was happy that her stool was formed and she was almost having normal bowel movements. About 2 weeks ago she started developing abdominal pain and some diarrhea. She denies being on recent antibiotics at home. She also developed fevers. She denies any chills or night sweats. She denies any nausea or vomiting. She denies any other symptoms including headache or upper respiratory symptoms. She reports she is received the Covid vaccine and completed vaccination. She says she was supposed to get her shingles second dose but has not received it yet. She denies any sick contacts. She denies any travel history or eating anything differently or new. She denies any uncooked meat. She says she had adjusted her diet to what was recommended to help her with her abdominal symptoms after her last discharge.   Upon asking she denied any symptoms of dysuria or flank pain          Past Medical History:  Past Medical History:   Diagnosis Date    Glaucoma     H/O seasonal allergies    [address] spotted fever     Sinus infection     UTI (urinary tract infection)          Surgical History:  No past surgical history on file. Family History:   No family history on file. Social History:     · Living Situation: At home   · Tobacco: Denied  · Alcohol: Denied  · Illicit Drugs: Denied  · Animal exposure she has a dog at home  · Travel History denied    Allergies: Allergies   Allergen Reactions    Ceftin [Cefuroxime Axetil] Hives    Codeine Rash    Levaquin [Levofloxacin] Other (comments)     Joint swelling    Pcn [Penicillins] Hives    Sulfa (Sulfonamide Antibiotics) Hives         Review of Systems:     Gen: Positive for fever    HEENT: Negative for headache, vision changes, ear ache or discharge, tingling,  nasal discharge, swelling, lumps in neck, sores on tongue   CV:  Negative for chest pain, dyspnea on exertion, leg edema   Lungs: Negative for shortness of breath, cough, wheezing   Abdomen: Positive for abdominal pain and diarrhea   Genitourinary: Negative for genital pain or genital discharge     Neuro: Negative for headache, numbness, tingling, extremity weakness,  syncope, seizures    Skin: Negative for rash, sores/open wounds   Musculoskeletal: Negative for joint pain, joint swelling, joint erythema    Endocrine: Negative for high or low blood sugars, heat or cold intolerance    Psych: Negative for manic behavior     Medications:  No current facility-administered medications on file prior to encounter. No current outpatient medications on file prior to encounter.            Physical Exam:    Vitals:   Patient Vitals for the past 24 hrs:   Temp Pulse Resp BP SpO2   08/30/21 1446 98.9 °F (37.2 °C) 74 16 (!) 146/80 96 %   08/30/21 1305 97.8 °F (36.6 °C) 75 16 128/88 98 %   08/30/21 1009 98.3 °F (36.8 °C) 73 14 (!) 140/79 96 %   08/30/21 0811 99.1 °F (37.3 °C) 73 17 129/74 99 %   ·   · GEN: NAD  · HEENT: , no thrush no scleral icterus  ·  CV: S1, S2 heard regularly  · Lungs: Clear to auscultation bilaterally  · Abdomen: soft, non distended, non tender,   · Extremities: no edema  · Neuro: Alert, oriented to time, place and situation, moves all extremities to commands, verbal   · Skin: no rash  · Psych: None tearful  · Musculoskeletal nontender to palpation of knees ankles bilaterally      Labs:   Recent Results (from the past 24 hour(s))   CEA    Collection Time: 08/30/21 12:27 PM   Result Value Ref Range    CEA 0.8 ng/mL   SAMPLES BEING HELD    Collection Time: 08/30/21 12:27 PM   Result Value Ref Range    SAMPLES BEING HELD 1BLU 1PST 1LAV     COMMENT        Add-on orders for these samples will be processed based on acceptable specimen integrity and analyte stability, which may vary by analyte. Microbiology Data:        Blood:0 Result Notes   Ref Range & Units 8/29/21 1509   Special Requests:   NO SPECIAL REQUESTS P    Culture result:   NO GROWTH AFTER 12 HOURS P                      Urine:  Specimen Information: Clean catch; Urine         0 Result Notes   Ref Range & Units 8/29/21 1552   Special Requests:   NO SPECIAL REQUESTS P    Anderson Count   >100,000   COLONIES/mL  P      Culture result:   CULTURE IN PROGRESS,FURTHER UPDATES TO FOLLOW                  Imaging:   FINDINGS:   LOWER THORAX: No significant abnormality in the incidentally imaged lower chest.  LIVER: There is peripheral portal venous gas in the left lobe. BILIARY TREE: Multiple calcified stones within gallbladder lumen which otherwise  appears unremarkable. CBD is not dilated. SPLEEN: Unremarkable. PANCREAS: No mass or ductal dilatation. ADRENALS: Unremarkable. KIDNEYS: Punctate hypodensities which are too small to characterize but  statistically likely to be benign and for which no further follow-up is thought  to be required. No calculus, hydronephrosis, enhancing mass, or other  abnormality evident. STOMACH: Unremarkable. SMALL BOWEL: No dilatation or wall thickening.   COLON: Sigmoid inflammation with multiple diverticula and a perisigmoid gas and  fluid containing collection in the deep pelvis measuring 1.7 x 2.4 x 3.6 cm. Left colon diverticula are also noted. APPENDIX: Unremarkable. PERITONEUM: No ascites or pneumoperitoneum. RETROPERITONEUM: No lymphadenopathy or aortic aneurysm. REPRODUCTIVE ORGANS: Uterus is surgically absent. Right ovary is unremarkable. Left ovary is not seen and may be surgically absent. URINARY BLADDER: Partially distended with thickened appearing wall particularly  at the roof which underlies inflammatory changes associated with the sigmoid  colon. BONES: Degenerative spine change. No acute fracture or aggressive lesion. ABDOMINAL WALL: No mass or hernia. ADDITIONAL COMMENTS: N/A     IMPRESSION     1. Sigmoid diverticulitis complicated by perisigmoid abscess and portal venous  gas in the liver as well as probable secondary inflammation of the superior  bladder wall.     2. Cholecystolithiasis and additional incidentals as above. Assessment / Plan:     Sigmoid diverticulitis, with concern for abscess  History of diverticulitis in May 2021  Multiple antibiotic allergies   History of RMSF  Fever  Leukocytosis    She is tolerating meropenem IV and would continue  Surgical intervention per general surgery given the concern for abscess, recurrent diverticulitis  Pending final blood cultures  At risk for C. difficile and other antibiotic associated adverse effects  Even though urine cultures with 100,000 gram-negative ema, she denies having had patsy dysuria symptoms to me/or flank pain--regardless on antibiotics that would cover urinary organisms        Thank for the opportunity to participate in the care of this patient. Please contact with questions or concerns.        David Ching,   6:21 PM

## 2021-08-30 NOTE — PROGRESS NOTES
[facility]  Hospitalist Group                                                                                          Hospitalist Progress Note  Raquel Harden NP  Answering service: [phone] OR [phone] from in house phone        Date of Service:  2021  NAME:  Jesús Olea  :  1955  MRN:  [id number]      Admission Summary:   77year old female presents to ED with abdominal pain and low grade fever. She was admitted for acute complicated diverticulitis from  to 2021 and treated with cefepime/flagyl as inpatient then transitioned to ceftin/flagyl at discharge to complete 14 days of antibiotics. She was readmitted from  to  for an acute allergic reaction to ceftin and treated with decadron/benadryl/pepcid and at that time was transitioned to meropenem to finish her 10 day course. She had a total of 11 days of antibiotics. On the day of admission she complained of abdominal pain in the left lower quadrant and epigastric region. She says this is similar to her previous pain. She also had a low grade temperature of 100.4 which prompted her to come to the hospital. She has been seen in consult by General Surgery with recommendations for conservative management with no plans for operative intervention at this time, but may require colectomy in future. Interval history / Subjective:   Remains in ED, up and moving around in room. Denies abdominal pain but does complain of back pain in lumbar area from sitting in chair all night. She says she is hungry, exhausted from not sleeping all night. Also complains of nausea.       Assessment & Plan:     Acute complicated diverticulitis  -gen surg following with no plans for surgical intervention at this time - recommend consulting GI for flexible sigmoidoscopy in the setting of the perforation to determine if malignancy present, check CEA, GI consulted  -continue merem, may need PICC and outpatient antibiotics, consult ID  -nausea and pain control  -clear liquid diet, advance as tolerated    Sepsis secondary to diverticulitis/UTI, present on admission  -now afebrile, continue merem. CBC AM    Urinary tract infection, present on admission  -urine culture pending, she is on merem which will cover    Hypokalemia, mild  -replace and recheck BMP in AM    Hypovolemic hyponatremia, mild  -maintenance IVF until able to take PO, recheck BMP AM    Back pain  -prn tylenol    Code status: Full code  DVT prophylaxis: add 3 Andrey Cleveland discussed with: Patient/Family  Anticipated Disposition: Home w/Family  Anticipated Discharge: 24 hours to 48 hours     Hospital Problems  Never Reviewed        Codes Class Noted POA    Diverticulitis ICD-10-CM: O66.96  ICD-9-CM: 562.11  5/18/2021 Unknown                Review of Systems:   A comprehensive review of systems was negative except for that written in the HPI. Vital Signs:    Last 24hrs VS reviewed since prior progress note. Most recent are:  Visit Vitals  /74   Pulse 73   Temp 99.1 °F (37.3 °C)   Resp 17   Ht 5' 2\" (1.575 m)   Wt 96.1 kg (211 lb 13.8 oz)   SpO2 99%   BMI 38.75 kg/m²       No intake or output data in the 24 hours ending 08/30/21 0857     Physical Examination:     I had a face to face encounter with this patient and independently examined them on 8/30/2021 as outlined below:          Constitutional:  No acute distress, cooperative, pleasant    ENT:  Oral mucosa moist, oropharynx benign. Resp:  CTA bilaterally. No wheezing/rhonchi/rales. No accessory muscle use   CV:  Regular rhythm, normal rate, no murmurs, gallops, rubs    GI:  Soft, non distended, mild LLQ tenderness, no rebound or guarding. normoactive bowel sounds, no hepatosplenomegaly     Musculoskeletal:  No edema, warm, 2+ pulses throughout    Neurologic:  Moves all extremities.   AAOx3, CN II-XII reviewed            Data Review:    Review and/or order of clinical lab test   Review of imaging and consult notes. Labs:     Recent Labs     08/29/21  1509   WBC 11.6*   HGB 12.9   HCT 39.0        Recent Labs     08/29/21  1509   *   K 3.6      CO2 26   BUN 9   CREA 0.52*   *   CA 9.3     Recent Labs     08/29/21  1509   ALT 32   *   TBILI 0.7   TP 7.8   ALB 3.2*   GLOB 4.6*   LPSE 339     No results for input(s): INR, PTP, APTT, INREXT in the last 72 hours. No results for input(s): FE, TIBC, PSAT, FERR in the last 72 hours. No results found for: FOL, RBCF   No results for input(s): PH, PCO2, PO2 in the last 72 hours.   Recent Labs     08/29/21  1751 08/29/21  1509   TROIQ <0.05 <0.05     No results found for: CHOL, CHOLX, CHLST, CHOLV, HDL, HDLP, LDL, LDLC, DLDLP, TGLX, TRIGL, TRIGP, CHHD, CHHDX  No results found for: [facility]  Lab Results   Component Value Date/Time    Color YELLOW/STRAW 08/29/2021 03:52 PM    Appearance CLOUDY (A) 08/29/2021 03:52 PM    Specific gravity 1.026 08/29/2021 03:52 PM    pH (UA) 5.5 08/29/2021 03:52 PM    Protein 30 (A) 08/29/2021 03:52 PM    Glucose Negative 08/29/2021 03:52 PM    Ketone 80 (A) 08/29/2021 03:52 PM    Bilirubin Negative 08/29/2021 03:52 PM    Urobilinogen 1.0 08/29/2021 03:52 PM    Nitrites Positive (A) 08/29/2021 03:52 PM    Leukocyte Esterase MODERATE (A) 08/29/2021 03:52 PM    Epithelial cells FEW 08/29/2021 03:52 PM    Bacteria 2+ (A) 08/29/2021 03:52 PM    WBC 20-50 08/29/2021 03:52 PM    RBC 0-5 08/29/2021 03:52 PM         Medications Reviewed:     Current Facility-Administered Medications   Medication Dose Route Frequency    potassium chloride 10 mEq in 100 ml IVPB  10 mEq IntraVENous Q1H    sodium chloride (NS) flush 5-40 mL  5-40 mL IntraVENous Q8H    sodium chloride (NS) flush 5-40 mL  5-40 mL IntraVENous PRN    acetaminophen (TYLENOL) tablet 650 mg  650 mg Oral Q6H PRN    Or    acetaminophen (TYLENOL) suppository 650 mg  650 mg Rectal Q6H PRN    ondansetron (ZOFRAN ODT) tablet 4 mg  4 mg Oral Q8H PRN    Or    ondansetron (ZOFRAN) injection 4 mg  4 mg IntraVENous Q6H PRN    traMADoL (ULTRAM) tablet 50 mg  50 mg Oral Q6H PRN    HYDROmorphone (DILAUDID) injection 1 mg  1 mg IntraVENous Q3H PRN    0.9% sodium chloride infusion  75 mL/hr IntraVENous CONTINUOUS    meropenem (MERREM) 500 mg in 0.9% sodium chloride (MBP/ADV) 50 mL MBP  0.5 g IntraVENous Q6H    L.acidophilus-paracasei-S.thermophil-bifidobacter (RISAQUAD) 8 billion cell capsule  1 Capsule Oral DAILY     No current outpatient medications on file.     ______________________________________________________________________  EXPECTED LENGTH OF STAY: 2-3 days  ACTUAL LENGTH OF STAY:          1                 Marquise Osorio NP

## 2021-08-30 NOTE — PROGRESS NOTES
HESHAM PLAN:  RUR-11%  Disposition-Home with family  Transportation with   F/U- with PCP/Specialist    Medicare pt has received, reviewed, and signed 1 st IM letter informing them of their right to appeal the admission. Signed copied has been placed on pt bedside chart. Reason for Admission:  Fatigue, nausea, abd pain, fever                     RUR Score:    11%                 Plan for utilizing home health:   TBD       PCP: First and Last name:  Lachelle Solano MD     Name of Practice:    Are you a current patient: Yes/No: Yes   Approximate date of last visit: 6/10/21   Can you participate in a virtual visit with your PCP: Yes                    Current Advanced Directive/Advance Care Plan: Full Code      Healthcare Decision Maker: Ryan Mora  Click here to [address] including selection of the Healthcare Decision Maker Relationship (ie \"Primary\")                             Transition of Care Plan:  CM spoke with patient to discuss discharge planning. Patient lives with her  in their private residence. She is independent without any assistive devices. Patient verified her demographic information and did not voice any discharge barriers. CM will continue to follow for any discharge needs that may arise. Deepika Garrett MSA, RN, CRM    Care Management Interventions  PCP Verified by CM: Yes  Palliative Care Criteria Met (RRAT>21 & CHF Dx)?: No  Mode of Transport at Discharge:  Other (see comment)  Transition of Care Consult (CM Consult): Discharge Planning  MyChart Signup: No  Discharge Durable Medical Equipment: No  Health Maintenance Reviewed: Yes  Physical Therapy Consult: No  Occupational Therapy Consult: No  Speech Therapy Consult: No  Current Support Network: Lives with Spouse  Confirm Follow Up Transport: Family  Discharge Location  Discharge Placement: Home with family assistance

## 2021-08-30 NOTE — ROUTINE PROCESS
TRANSFER - OUT REPORT:    Verbal report given to [address] RN (name) on Minal Moya  being transferred to [phone] (01)(unit) for routine progression of care       Report consisted of patients Situation, Background, Assessment and   Recommendations(SBAR). Information from the following report(s) SBAR, Kardex, ED Summary, Intake/Output, MAR, Recent Results and Cardiac Rhythm NSR was reviewed with the receiving nurse. Lines:   Peripheral IV 08/30/21 Right Antecubital (Active)   Site Assessment Clean, dry, & intact 08/30/21 1225   Phlebitis Assessment 0 08/30/21 1225   Infiltration Assessment 0 08/30/21 1225   Dressing Status Clean, dry, & intact 08/30/21 1225   Dressing Type Transparent 08/30/21 1225        Opportunity for questions and clarification was provided.       Patient transported with:   [place]

## 2021-08-30 NOTE — PROGRESS NOTES
Admit Date: 2021      POD * No surgery found *  * No surgery found *      Procedure:  * No surgery found *        HOSPITAL DAY:     ANTIBIOTICS: Meropenem    HPI:  Patient with only minimal abdominal pain main reason she came to the hospital with fever and off-and-on food intolerance, CT scan suggest persistent pelvic diverticular abscess although has not shown any improvement and she has never had a colonoscopy. It is possible this could be a perforated colon cancer as well. Abdomen is nonsurgical despite the CT findings. Review of Systems   Constitutional: Positive for fatigue. Gastrointestinal: Positive for nausea. Minimal abdominal pain and fever   All other systems reviewed and are negative. Temp:  [98.3 °F (36.8 °C)-102.5 °F (39.2 °C)]   Pulse (Heart Rate):  []   BP: (125-168)/(74-89)   Resp Rate:  [14-18]   O2 Sat (%):  [95 %-99 %]   Weight:  [96.1 kg (211 lb 13.8 oz)]      Blood pressure (!) 140/79, pulse 73, temperature 98.3 °F (36.8 °C), resp. rate 14, height 5' 2\" (1.575 m), weight 96.1 kg (211 lb 13.8 oz), SpO2 96 %. Temp (24hrs), Av.8 °F (37.7 °C), Min:98.3 °F (36.8 °C), Max:102.5 °F (39.2 °C)      Recent Results (from the past 48 hour(s))   POC LACTIC ACID    Collection Time: 21  3:05 PM   Result Value Ref Range    Lactic Acid (POC) 1.03 0.40 - 2.00 mmol/L   SAMPLES BEING HELD    Collection Time: 21  3:09 PM   Result Value Ref Range    SAMPLES BEING HELD 1red     COMMENT        Add-on orders for these samples will be processed based on acceptable specimen integrity and analyte stability, which may vary by analyte.    CBC WITH AUTOMATED DIFF    Collection Time: 21  3:09 PM   Result Value Ref Range    WBC 11.6 (H) 3.6 - 11.0 K/uL    RBC 4.40 3.80 - 5.20 M/uL    HGB 12.9 11.5 - 16.0 g/dL    HCT 39.0 35.0 - 47.0 %    MCV 88.6 80.0 - 99.0 FL    MCH 29.3 26.0 - 34.0 PG    MCHC 33.1 30.0 - 36.5 g/dL    RDW 13.0 11.5 - 14.5 %    PLATELET 789 466 - 608 K/uL    MPV 8.8 (L) 8.9 - 12.9 FL    NRBC 0.0 0  WBC    ABSOLUTE NRBC 0.00 0.00 - 0.01 K/uL    NEUTROPHILS 76 (H) 32 - 75 %    LYMPHOCYTES 9 (L) 12 - 49 %    MONOCYTES 14 (H) 5 - 13 %    EOSINOPHILS 0 0 - 7 %    BASOPHILS 0 0 - 1 %    IMMATURE GRANULOCYTES 1 (H) 0.0 - 0.5 %    ABS. NEUTROPHILS 8.7 (H) 1.8 - 8.0 K/UL    ABS. LYMPHOCYTES 1.0 0.8 - 3.5 K/UL    ABS. MONOCYTES 1.7 (H) 0.0 - 1.0 K/UL    ABS. EOSINOPHILS 0.0 0.0 - 0.4 K/UL    ABS. BASOPHILS 0.1 0.0 - 0.1 K/UL    ABS. IMM. GRANS. 0.2 (H) 0.00 - 0.04 K/UL    DF AUTOMATED     METABOLIC PANEL, COMPREHENSIVE    Collection Time: 08/29/21  3:09 PM   Result Value Ref Range    Sodium 134 (L) 136 - 145 mmol/L    Potassium 3.6 3.5 - 5.1 mmol/L    Chloride 100 97 - 108 mmol/L    CO2 26 21 - 32 mmol/L    Anion gap 8 5 - 15 mmol/L    Glucose 126 (H) 65 - 100 mg/dL    BUN 9 6 - 20 MG/DL    Creatinine 0.52 (L) 0.55 - 1.02 MG/DL    BUN/Creatinine ratio 17 12 - 20      GFR est AA >60 >60 ml/min/1.73m2    GFR est non-AA >60 >60 ml/min/1.73m2    Calcium 9.3 8.5 - 10.1 MG/DL    Bilirubin, total 0.7 0.2 - 1.0 MG/DL    ALT (SGPT) 32 12 - 78 U/L    AST (SGOT) 25 15 - 37 U/L    Alk.  phosphatase 172 (H) 45 - 117 U/L    Protein, total 7.8 6.4 - 8.2 g/dL    Albumin 3.2 (L) 3.5 - 5.0 g/dL    Globulin 4.6 (H) 2.0 - 4.0 g/dL    A-G Ratio 0.7 (L) 1.1 - 2.2     CULTURE, BLOOD, PAIRED    Collection Time: 08/29/21  3:09 PM    Specimen: Blood   Result Value Ref Range    Special Requests: NO SPECIAL REQUESTS      Culture result: NO GROWTH AFTER 12 HOURS     LIPASE    Collection Time: 08/29/21  3:09 PM   Result Value Ref Range    Lipase 339 73 - 393 U/L   TROPONIN I    Collection Time: 08/29/21  3:09 PM   Result Value Ref Range    Troponin-I, Qt. <0.05 <0.05 ng/mL   URINALYSIS W/MICROSCOPIC    Collection Time: 08/29/21  3:52 PM   Result Value Ref Range    Color YELLOW/STRAW      Appearance CLOUDY (A) CLEAR      Specific gravity 1.026 1.003 - 1.030      pH (UA) 5.5 5.0 - 8.0      Protein 30 (A) NEG mg/dL    Glucose Negative NEG mg/dL    Ketone 80 (A) NEG mg/dL    Bilirubin Negative NEG      Blood SMALL (A) NEG      Urobilinogen 1.0 0.2 - 1.0 EU/dL    Nitrites Positive (A) NEG      Leukocyte Esterase MODERATE (A) NEG      WBC 20-50 0 - 4 /hpf    RBC 0-5 0 - 5 /hpf    Epithelial cells FEW FEW /lpf    Bacteria 2+ (A) NEG /hpf   URINE CULTURE HOLD SAMPLE    Collection Time: 08/29/21  3:52 PM    Specimen: Serum; Urine   Result Value Ref Range    Urine culture hold        Urine on hold in Microbiology dept for 2 days. If unpreserved urine is submitted, it cannot be used for addtional testing after 24 hours, recollection will be required. EKG, 12 LEAD, INITIAL    Collection Time: 08/29/21  4:55 PM   Result Value Ref Range    Ventricular Rate 90 BPM    Atrial Rate 90 BPM    P-R Interval 154 ms    QRS Duration 74 ms    Q-T Interval 334 ms    QTC Calculation (Bezet) 408 ms    Calculated P Axis 53 degrees    Calculated R Axis 14 degrees    Calculated T Axis 0 degrees    Diagnosis       Normal sinus rhythm  Nonspecific ST abnormality  Abnormal ECG  When compared with ECG of 18-MAY-2021 12:12,  No significant change was found     TROPONIN I    Collection Time: 08/29/21  5:51 PM   Result Value Ref Range    Troponin-I, Qt. <0.05 <0.05 ng/mL         Physical Exam  Vitals and nursing note reviewed. Exam conducted with a chaperone present (KRISHAN Michele). Constitutional:       Appearance: Normal appearance. She is obese. She is not ill-appearing. Comments: Tired appearing   Cardiovascular:      Rate and Rhythm: Normal rate. Pulmonary:      Effort: Pulmonary effort is normal.   Abdominal:      General: Abdomen is flat. Palpations: Abdomen is soft. Comments: Obese, minimally to nontender in the pelvic area, no peritoneal signs   Skin:     General: Skin is warm and dry. Neurological:      General: No focal deficit present. Mental Status: She is alert and oriented to person, place, and time. Psychiatric:         Mood and Affect: Mood normal.         Behavior: Behavior normal.         Thought Content: Thought content normal.         Judgment: Judgment normal.           Active Problems:    Diverticulitis (5/18/2021)      Diverticulitis large intestine w/o perforation or abscess w/o bleeding (8/30/2021)          ASSESSMENT/PLAN  Plan IV antibiotics for the recurring/chronic diverticulitis, may require home IV antibiotics, infectious disease consulted, may need PICC line, if does not improve may well need surgical intervention for resection of this area plus or minus anastomosis plus or minus diverting ostomy or ileostomy depending on the operative findings. Okay for p.o. at this point, follow-up CT scan in several days, will check CEA as a screen to see if any evidence of colon cancer is the source of her persistent perforation,    Consider GI consult to see if they would be willing to perform a flexible sigmoidoscopy in the setting of the perforation to help document whether this is malignancy or not because if it is malignant then the perforation likely will not improve with time and antibiotics.     All of the above reviewed with patient and secondarily with  through nurse practitioner Leandro Cordova, benefits risks and alternatives and if does improve needs colonoscopy in several months      [facility] time including review of any indicated imaging, discussion with patient, and other providers, exam and discussion with patient:   35         minutes    END:

## 2021-08-31 ENCOUNTER — TELEPHONE (OUTPATIENT)
Dept: INTERNAL MEDICINE CLINIC | Age: 66
End: 2021-08-31

## 2021-08-31 DIAGNOSIS — K57.92 DIVERTICULITIS: ICD-10-CM

## 2021-08-31 DIAGNOSIS — N19 RENAL FAILURE, UNSPECIFIED CHRONICITY: ICD-10-CM

## 2021-08-31 DIAGNOSIS — K57.32 DIVERTICULITIS LARGE INTESTINE W/O PERFORATION OR ABSCESS W/O BLEEDING: Primary | ICD-10-CM

## 2021-08-31 LAB
ANION GAP SERPL CALC-SCNC: 9 MMOL/L (ref 5–15)
BACTERIA SPEC CULT: NORMAL
BACTERIA SPEC CULT: NORMAL
BASOPHILS # BLD: 0.1 K/UL (ref 0–0.1)
BASOPHILS NFR BLD: 1 % (ref 0–1)
BUN SERPL-MCNC: 5 MG/DL (ref 6–20)
BUN/CREAT SERPL: 16 (ref 12–20)
CALCIUM SERPL-MCNC: 8.7 MG/DL (ref 8.5–10.1)
CC UR VC: NORMAL
CC UR VC: NORMAL
CEA SERPL-MCNC: 0.7 NG/ML
CHLORIDE SERPL-SCNC: 106 MMOL/L (ref 97–108)
CO2 SERPL-SCNC: 24 MMOL/L (ref 21–32)
CREAT SERPL-MCNC: 0.32 MG/DL (ref 0.55–1.02)
DIFFERENTIAL METHOD BLD: ABNORMAL
EOSINOPHIL # BLD: 0.1 K/UL (ref 0–0.4)
EOSINOPHIL NFR BLD: 1 % (ref 0–7)
ERYTHROCYTE [DISTWIDTH] IN BLOOD BY AUTOMATED COUNT: 13.1 % (ref 11.5–14.5)
GLUCOSE SERPL-MCNC: 110 MG/DL (ref 65–100)
HCT VFR BLD AUTO: 36 % (ref 35–47)
HGB BLD-MCNC: 11.5 G/DL (ref 11.5–16)
IMM GRANULOCYTES # BLD AUTO: 0.2 K/UL (ref 0–0.04)
IMM GRANULOCYTES NFR BLD AUTO: 2 % (ref 0–0.5)
LYMPHOCYTES # BLD: 1 K/UL (ref 0.8–3.5)
LYMPHOCYTES NFR BLD: 11 % (ref 12–49)
MCH RBC QN AUTO: 29.2 PG (ref 26–34)
MCHC RBC AUTO-ENTMCNC: 31.9 G/DL (ref 30–36.5)
MCV RBC AUTO: 91.4 FL (ref 80–99)
MONOCYTES # BLD: 1.1 K/UL (ref 0–1)
MONOCYTES NFR BLD: 12 % (ref 5–13)
NEUTS SEG # BLD: 6.4 K/UL (ref 1.8–8)
NEUTS SEG NFR BLD: 73 % (ref 32–75)
NRBC # BLD: 0.02 K/UL (ref 0–0.01)
NRBC BLD-RTO: 0.2 PER 100 WBC
PLATELET # BLD AUTO: 268 K/UL (ref 150–400)
PMV BLD AUTO: 8.5 FL (ref 8.9–12.9)
POTASSIUM SERPL-SCNC: 3.9 MMOL/L (ref 3.5–5.1)
RBC # BLD AUTO: 3.94 M/UL (ref 3.8–5.2)
RBC MORPH BLD: ABNORMAL
SERVICE CMNT-IMP: NORMAL
SERVICE CMNT-IMP: NORMAL
SODIUM SERPL-SCNC: 139 MMOL/L (ref 136–145)
WBC # BLD AUTO: 8.9 K/UL (ref 3.6–11)

## 2021-08-31 PROCEDURE — 80048 BASIC METABOLIC PNL TOTAL CA: CPT

## 2021-08-31 PROCEDURE — 74011250636 HC RX REV CODE- 250/636: Performed by: HOSPITALIST

## 2021-08-31 PROCEDURE — 82378 CARCINOEMBRYONIC ANTIGEN: CPT

## 2021-08-31 PROCEDURE — 99232 SBSQ HOSP IP/OBS MODERATE 35: CPT | Performed by: INTERNAL MEDICINE

## 2021-08-31 PROCEDURE — 36573 INSJ PICC RS&I 5 YR+: CPT | Performed by: NURSE PRACTITIONER

## 2021-08-31 PROCEDURE — 74011000258 HC RX REV CODE- 258: Performed by: HOSPITALIST

## 2021-08-31 PROCEDURE — 65270000032 HC RM SEMIPRIVATE

## 2021-08-31 PROCEDURE — 99232 SBSQ HOSP IP/OBS MODERATE 35: CPT | Performed by: NURSE PRACTITIONER

## 2021-08-31 PROCEDURE — 74011250637 HC RX REV CODE- 250/637: Performed by: HOSPITALIST

## 2021-08-31 PROCEDURE — 36415 COLL VENOUS BLD VENIPUNCTURE: CPT

## 2021-08-31 PROCEDURE — 74011250637 HC RX REV CODE- 250/637: Performed by: NURSE PRACTITIONER

## 2021-08-31 PROCEDURE — 85025 COMPLETE CBC W/AUTO DIFF WBC: CPT

## 2021-08-31 RX ORDER — TRAZODONE HYDROCHLORIDE 50 MG/1
25 TABLET ORAL
Status: DISCONTINUED | OUTPATIENT
Start: 2021-08-31 | End: 2021-09-01

## 2021-08-31 RX ORDER — DEXTROMETHORPHAN/PSEUDOEPHED 2.5-7.5/.8
40 DROPS ORAL
Status: DISCONTINUED | OUTPATIENT
Start: 2021-08-31 | End: 2021-09-01 | Stop reason: HOSPADM

## 2021-08-31 RX ORDER — ZOLPIDEM TARTRATE 5 MG/1
5 TABLET ORAL
Status: DISCONTINUED | OUTPATIENT
Start: 2021-08-31 | End: 2021-09-01 | Stop reason: HOSPADM

## 2021-08-31 RX ADMIN — ZOLPIDEM TARTRATE 5 MG: 5 TABLET ORAL at 20:43

## 2021-08-31 RX ADMIN — MEROPENEM 500 MG: 500 INJECTION, POWDER, FOR SOLUTION INTRAVENOUS at 18:38

## 2021-08-31 RX ADMIN — MEROPENEM 500 MG: 500 INJECTION, POWDER, FOR SOLUTION INTRAVENOUS at 11:20

## 2021-08-31 RX ADMIN — MEROPENEM 500 MG: 500 INJECTION, POWDER, FOR SOLUTION INTRAVENOUS at 06:17

## 2021-08-31 RX ADMIN — Medication 10 ML: at 16:31

## 2021-08-31 RX ADMIN — Medication 10 ML: at 06:00

## 2021-08-31 RX ADMIN — Medication 1 CAPSULE: at 11:20

## 2021-08-31 NOTE — PROGRESS NOTES
[address] [facility] Adult  Hospitalist Group                                                                                          Hospitalist Progress Note  Gilberto Nurse, NP  Answering service: [phone] OR [phone] from in house phone        Date of Service:  2021  NAME:  Glenna Park  :  1955  MRN:  [id number]      Admission Summary:   77year old female presents to ED with abdominal pain and low grade fever. She was admitted for acute complicated diverticulitis from  to 2021 and treated with cefepime/flagyl as inpatient then transitioned to ceftin/flagyl at discharge to complete 14 days of antibiotics. She was readmitted from  to  for an acute allergic reaction to ceftin and treated with decadron/benadryl/pepcid and at that time was transitioned to meropenem to finish her 10 day course. She had a total of 11 days of antibiotics. On the day of admission she complained of abdominal pain in the left lower quadrant and epigastric region. She says this is similar to her previous pain. She also had a low grade temperature of 100.4 which prompted her to come to the hospital. She has been seen in consult by General Surgery with recommendations for conservative management with no plans for operative intervention at this time, but may require colectomy in future.      Interval history / Subjective:   *Abdominal pain improved  *Nausea resolved  *C/O low back muscular pain   *C/O insomnia  *having BMs - small, but formed, no melena/hematochezia     Assessment & Plan:     Acute complicated diverticulitis  -gen surg following with no plans for surgical intervention at this time - recommended consulting GI for flexible sigmoidoscopy in the setting of the perforation to determine if malignancy present,   -CEA 0.7  -continue merem, PICC ordered, ID following recommend outpatient merem x 2-4 weeks then repeat CT to track antibiotic response  -nausea and pain control  -advance to GI bland diet    Sepsis secondary to diverticulitis/UTI, present on admission  -afebrile, white count downtrending, continue merem    Urinary tract infection, present on admission  -urine culture with mixed ludwin, she has no dysuria, on merem     Hypokalemia, mild  -resolved    Hypovolemic hyponatremia, mild  -resolved, d/c maintenance IVF, advance diet to GI bland    Back pain  -prn tylenol    Insomnia  -didn't sleep well on ambien d/t being waked up all night, will change to trazodone for tonight    Code status: Full code  DVT prophylaxis: add 3 Andrey Cleveland discussed with: Patient/Family  Anticipated Disposition: Home w/Family  Anticipated Discharge: possible d/c  Home tomorrow     Hospital Problems  Never Reviewed        Codes Class Noted POA    Diverticulitis large intestine w/o perforation or abscess w/o bleeding ICD-10-CM: K57.32  ICD-9-CM: 562.11  8/30/2021 Unknown        Diverticulitis ICD-10-CM: F02.56  ICD-9-CM: 562.11  5/18/2021 Unknown                Review of Systems:   A comprehensive review of systems was negative except for that written in the HPI. Vital Signs:    Last 24hrs VS reviewed since prior progress note. Most recent are:  Visit Vitals  /78 (BP 1 Location: Left arm, BP Patient Position: At rest)   Pulse 71   Temp 99.2 °F (37.3 °C)   Resp 16   Ht 5' 2\" (1.575 m)   Wt 96.1 kg (211 lb 13.8 oz)   SpO2 96%   BMI 38.75 kg/m²       No intake or output data in the 24 hours ending 08/31/21 1241     Physical Examination:     I had a face to face encounter with this patient and independently examined them on 8/31/2021 as outlined below:          Constitutional:  No acute distress, cooperative, pleasant    ENT:  Oral mucosa moist, oropharynx benign. Resp:  CTA bilaterally. No wheezing/rhonchi/rales. No accessory muscle use   CV:  Regular rhythm, normal rate, no murmurs, gallops, rubs    GI:  Soft, non distended, nontenderness, no rebound or guarding.  normoactive bowel sounds, no hepatosplenomegaly     Musculoskeletal:  No edema, warm, 2+ pulses throughout    Neurologic:  Moves all extremities. AAOx3, CN II-XII reviewed            Data Review:    Review and/or order of clinical lab test   Review of imaging and consult notes. Labs:     Recent Labs     08/31/21  0344 08/29/21  1509   WBC 8.9 11.6*   HGB 11.5 12.9   HCT 36.0 39.0    305     Recent Labs     08/31/21  0344 08/29/21  1509    134*   K 3.9 3.6    100   CO2 24 26   BUN 5* 9   CREA 0.32* 0.52*   * 126*   CA 8.7 9.3     Recent Labs     08/29/21  1509   ALT 32   *   TBILI 0.7   TP 7.8   ALB 3.2*   GLOB 4.6*   LPSE 339     No results for input(s): INR, PTP, APTT, INREXT, INREXT in the last 72 hours. No results for input(s): FE, TIBC, PSAT, FERR in the last 72 hours. No results found for: FOL, RBCF   No results for input(s): PH, PCO2, PO2 in the last 72 hours.   Recent Labs     08/29/21  1751 08/29/21  1509   TROIQ <0.05 <0.05     No results found for: CHOL, CHOLX, CHLST, CHOLV, HDL, HDLP, LDL, LDLC, DLDLP, TGLX, TRIGL, TRIGP, CHHD, CHHDX  No results found for: [facility]  Lab Results   Component Value Date/Time    Color YELLOW/STRAW 08/29/2021 03:52 PM    Appearance CLOUDY (A) 08/29/2021 03:52 PM    Specific gravity 1.026 08/29/2021 03:52 PM    pH (UA) 5.5 08/29/2021 03:52 PM    Protein 30 (A) 08/29/2021 03:52 PM    Glucose Negative 08/29/2021 03:52 PM    Ketone 80 (A) 08/29/2021 03:52 PM    Bilirubin Negative 08/29/2021 03:52 PM    Urobilinogen 1.0 08/29/2021 03:52 PM    Nitrites Positive (A) 08/29/2021 03:52 PM    Leukocyte Esterase MODERATE (A) 08/29/2021 03:52 PM    Epithelial cells FEW 08/29/2021 03:52 PM    Bacteria 2+ (A) 08/29/2021 03:52 PM    WBC 20-50 08/29/2021 03:52 PM    RBC 0-5 08/29/2021 03:52 PM         Medications Reviewed:     Current Facility-Administered Medications   Medication Dose Route Frequency    traZODone (DESYREL) tablet 25 mg  25 mg Oral QHS PRN    heparin (porcine) injection 5,000 Units  5,000 Units SubCUTAneous Q8H    sodium chloride (NS) flush 5-40 mL  5-40 mL IntraVENous Q8H    sodium chloride (NS) flush 5-40 mL  5-40 mL IntraVENous PRN    acetaminophen (TYLENOL) tablet 650 mg  650 mg Oral Q6H PRN    Or    acetaminophen (TYLENOL) suppository 650 mg  650 mg Rectal Q6H PRN    ondansetron (ZOFRAN ODT) tablet 4 mg  4 mg Oral Q8H PRN    Or    ondansetron (ZOFRAN) injection 4 mg  4 mg IntraVENous Q6H PRN    traMADoL (ULTRAM) tablet 50 mg  50 mg Oral Q6H PRN    HYDROmorphone (DILAUDID) injection 1 mg  1 mg IntraVENous Q3H PRN    meropenem (MERREM) 500 mg in 0.9% sodium chloride (MBP/ADV) 50 mL MBP  0.5 g IntraVENous Q6H    L.acidophilus-paracasei-S.thermophil-bifidobacter (RISAQUAD) 8 billion cell capsule  1 Capsule Oral DAILY     ______________________________________________________________________  EXPECTED LENGTH OF STAY: 2-3 days  ACTUAL LENGTH OF STAY:          2                 Pj Massey NP

## 2021-08-31 NOTE — PROGRESS NOTES
TRANSITIONS OF CARE:  RUR:11%  DISPOSITION: HOME ON 9/1/21 WITH IVAB AND PICC LINE. CRM asked to see patient by her nurse. Per patient and nurse she will have a PICC line inserted this evening and will go home on 9/1/2021 once IVAB are set  Up. She lives with her spouse in [place] and has given her own IVAB  In the past She does not have a preference for companies at this time. I have asked CRM's on unit to follow up in am with patient. Her  Hallie España is available to pick her up on Wed at discharge once the  [address] are set  Up.

## 2021-08-31 NOTE — CONSULTS
[address] NOTE  [facility] office  [phone] NP in-hospital cell phone M-F until 4:30  After 5pm or on weekends, please call  for physician on call        NAME:  Héctor Malave   :   1955   MRN:   [id number]       Referring Physician: Aleks Fountain Date: 2021 11:55 AM    Chief Complaint: diverticulitis     History of Present Illness:  Patient is a 77 y.o. who is seen in consultation at the request of Ramiro Shelton for flexible sigmoidoscopy to determine perforated diverticulum vs malignancy. Medical history as listed below. She presented for diverticulitis with abscess in May, was treated with antibiotics. She reports mostly feeling well but has intermittent LLQ pain since May, she came back to the hospital when she had low grade temperatures. Noted to have unchanged diverticulitis with abscess. She is on antibiotics. She currently has resolution of pain, no nausea, tolerating diet. She reports normal bowel movements. Denies NSAID's, alcohol, or tobacco. No history of EGD or colonoscopy. Grandfather had colon cancer. I have reviewed the emergency room note, hospital admission note, notes by all other clinicians who have seen the patient during this hospitalization to date. I have reviewed the problem list and the reason for this hospitalization. I have reviewed the allergies and the medications the patient was taking at home prior to this hospitalization. PMH:  Past Medical History:   Diagnosis Date    Glaucoma     H/O seasonal allergies     [place] spotted fever     Sinus infection     UTI (urinary tract infection)        PSH:  No past surgical history on file. Allergies:   Allergies   Allergen Reactions    Ceftin [Cefuroxime Axetil] Hives    Codeine Rash    Levaquin [Levofloxacin] Other (comments)     Joint swelling    Pcn [Penicillins] Hives    Sulfa (Sulfonamide Antibiotics) Hives       Home Medications:  Prior to Admission Medications   Prescriptions Last Dose Informant Patient Reported? Taking?   zolpidem (AMBIEN) 5 mg tablet 8/23/2021 at Unknown time  Yes Yes   Sig: Take 5 mg by mouth nightly as needed for Sleep. Facility-Administered Medications: None       Hospital Medications:  Current Facility-Administered Medications   Medication Dose Route Frequency    heparin (porcine) injection 5,000 Units  5,000 Units SubCUTAneous Q8H    zolpidem (AMBIEN) tablet 5 mg  5 mg Oral QHS PRN    sodium chloride (NS) flush 5-40 mL  5-40 mL IntraVENous Q8H    sodium chloride (NS) flush 5-40 mL  5-40 mL IntraVENous PRN    acetaminophen (TYLENOL) tablet 650 mg  650 mg Oral Q6H PRN    Or    acetaminophen (TYLENOL) suppository 650 mg  650 mg Rectal Q6H PRN    ondansetron (ZOFRAN ODT) tablet 4 mg  4 mg Oral Q8H PRN    Or    ondansetron (ZOFRAN) injection 4 mg  4 mg IntraVENous Q6H PRN    traMADoL (ULTRAM) tablet 50 mg  50 mg Oral Q6H PRN    HYDROmorphone (DILAUDID) injection 1 mg  1 mg IntraVENous Q3H PRN    0.9% sodium chloride infusion  75 mL/hr IntraVENous CONTINUOUS    meropenem (MERREM) 500 mg in 0.9% sodium chloride (MBP/ADV) 50 mL MBP  0.5 g IntraVENous Q6H    L.acidophilus-paracasei-S.thermophil-bifidobacter (RISAQUAD) 8 billion cell capsule  1 Capsule Oral DAILY       Social History:  Social History     Tobacco Use    Smoking status: Never Smoker    Smokeless tobacco: Never Used   Substance Use Topics    Alcohol use: Not Currently       Family History:  No family history on file.     Review of Systems:  Constitutional: negative fever, negative chills, negative weight loss  Eyes:   negative visual changes  ENT:   negative sore throat, tongue or lip swelling  Respiratory:  negative cough, negative dyspnea  Cards:  negative for chest pain, palpitations, lower extremity edema  GI:   See HPI  :  negative for frequency, dysuria  Integument:  negative for rash and pruritus  Heme:  negative for easy bruising and gum/nose bleeding  Musculoskeletal:negative for myalgias, back pain and muscle weakness  Neuro:    negative for headaches, dizziness, vertigo  Psych: negative for feelings of anxiety, depression     Objective:     Patient Vitals for the past 8 hrs:   BP Temp Pulse Resp SpO2   08/31/21 0927 136/78 99.2 °F (37.3 °C) 71 16 96 %     No intake/output data recorded. No intake/output data recorded. EXAM:     CONST:  Pleasant lady sitting in bed, no acute distress   NEURO:  alert and oriented x 3   HEENT: EOMI, no scleral icterus   LUNGS: No increased WOB   CARD:   Regular rate   ABD:  soft, no tenderness, no rebound, no masses, non distended   EXT:  no edema, warm   PSYCH: full, not anxious     Data Review     Recent Labs     08/31/21  0344 08/29/21  1509   WBC 8.9 11.6*   HGB 11.5 12.9   HCT 36.0 39.0    305     Recent Labs     08/31/21  0344 08/29/21  1509    134*   K 3.9 3.6    100   CO2 24 26   BUN 5* 9   CREA 0.32* 0.52*   * 126*   CA 8.7 9.3     Recent Labs     08/29/21  1509   *   TP 7.8   ALB 3.2*   GLOB 4.6*   LPSE 339     No results for input(s): INR, PTP, APTT, INREXT in the last 72 hours. IMPRESSION  1. Sigmoid diverticulitis complicated by perisigmoid abscess and portal venous  gas in the liver as well as probable secondary inflammation of the superior  bladder wall.   2. Cholecystolithiasis  Assessment:     · Diverticulitis with perisigmoid abscess: CEA normal     Patient Active Problem List   Diagnosis Code    Diverticulitis K57.92    Allergic reaction caused by a drug T78.40XA    Diverticulitis large intestine w/o perforation or abscess w/o bleeding K57.32     Plan:       · Antibiotics  · Surgery and ID following  · Plan for GI follow-up then for colonoscopy in ~6 weeks  · Patient discussed with Dr. Gerhardt Lima  · Thank you for allowing me to participate in care of [address] [place] [address] Martha Dillon     Signed By: Babatunde Fuentes NP     8/31/2021  11:55 AM     Agree with above  Sigmoidoscopy is of limited value in this case; difficult to visualize well colon in presence of severe diverticulitis with risks of perforation  Needs follow up with our GI clinic in 2 to 4 weeks after d/c   Colonoscopy in 6 weeks if possible then surgery   Will follow as needed , please call for questions

## 2021-08-31 NOTE — PROGRESS NOTES
General Surgery Daily Progress Note    Admit Date: 2021  Post-Operative Day: * No surgery found * from * No surgery found *     Subjective:     Last 24 hrs: pt is doing well; no c/o pain; wants to eat. Having BMs and some formed       Objective:     Blood pressure 136/78, pulse 71, temperature 99.2 °F (37.3 °C), resp. rate 16, height 5' 2\" (1.575 m), weight 211 lb 13.8 oz (96.1 kg), SpO2 96 %. Temp (24hrs), Av.6 °F (37 °C), Min:97.8 °F (36.6 °C), Max:99.2 °F (37.3 °C)      _____________________  Physical Exam:     Alert and Oriented, x3, in no acute distress.   Cardiovascular: RRR, no peripheral edema  Abdomen: soft, NT, nl BS      Assessment:   Active Problems:    Diverticulitis (2021)      Diverticulitis large intestine w/o perforation or abscess w/o bleeding (2021)            Plan:     Cont IV merrem  Will need PICC line for 2-4 weeks abx per ID  Reg diet; GI bland  Likely home tomorrow    Data Review:    Recent Labs     21  0344 21  1509   WBC 8.9 11.6*   HGB 11.5 12.9   HCT 36.0 39.0    305     Recent Labs     21  0344 21  1509    134*   K 3.9 3.6    100   CO2 24 26   * 126*   BUN 5* 9   CREA 0.32* 0.52*   CA 8.7 9.3   ALB  --  3.2*   ALT  --  32     Recent Labs     21  1509   LPSE 339           ______________________  Medications:    Current Facility-Administered Medications   Medication Dose Route Frequency    heparin (porcine) injection 5,000 Units  5,000 Units SubCUTAneous Q8H    zolpidem (AMBIEN) tablet 5 mg  5 mg Oral QHS PRN    sodium chloride (NS) flush 5-40 mL  5-40 mL IntraVENous Q8H    sodium chloride (NS) flush 5-40 mL  5-40 mL IntraVENous PRN    acetaminophen (TYLENOL) tablet 650 mg  650 mg Oral Q6H PRN    Or    acetaminophen (TYLENOL) suppository 650 mg  650 mg Rectal Q6H PRN    ondansetron (ZOFRAN ODT) tablet 4 mg  4 mg Oral Q8H PRN    Or    ondansetron (ZOFRAN) injection 4 mg  4 mg IntraVENous Q6H PRN    traMADoL (ULTRAM) tablet 50 mg  50 mg Oral Q6H PRN    HYDROmorphone (DILAUDID) injection 1 mg  1 mg IntraVENous Q3H PRN    0.9% sodium chloride infusion  75 mL/hr IntraVENous CONTINUOUS    meropenem (MERREM) 500 mg in 0.9% sodium chloride (MBP/ADV) 50 mL MBP  0.5 g IntraVENous Q6H    L.acidophilus-paracasei-S.thermophil-bifidobacter (RISAQUAD) 8 billion cell capsule  1 Capsule Oral DAILY       Anny Ayoub NP  8/31/2021     patient seen and examined  Agree with above  Feeling much better today no abdominal pain  General alert no acute distress  Abdomen soft nontender nondistended  Plan  IV antibiotics per ID-once recovered from acute episode recommend colonoscopy then elective sigmoid resection. Will need to follow-up in the office to discuss.

## 2021-08-31 NOTE — TELEPHONE ENCOUNTER
Spoke w/pt and she states Dr. Jada Domingo said  something about getting a CT scan in two weeks after IV abx but I didn't see an order.   Advised pt I will send a msg to Dr. Gautam Giang and respond back to her asap   Pt is still in the hospital and had a CT done on 8/29/21 by another physician     lvm for pt to r/c to advise of   CT A/P wt contrast in 2 weeks as far as creatinine is normal   Needs to get CT scan done before appt w/ Dr. Jada Domingo

## 2021-08-31 NOTE — PROGRESS NOTES
Infectious Disease progress Note        HPI:   Ms Reggy Snellen seen  Says her abd pain and loose stools improved  Forming stools now   Overall much better  Wants to eat solids  Tolerating antibiotics       Physical Exam:    Vitals:   Patient Vitals for the past 24 hrs:   Temp Pulse Resp BP SpO2   08/31/21 0927 99.2 °F (37.3 °C) 71 16 136/78 96 %   08/31/21 0206 99 °F (37.2 °C) 72 18 (!) 149/62 98 %   08/30/21 2029 98.3 °F (36.8 °C) 78 16 (!) 148/80 96 %   08/30/21 1446 98.9 °F (37.2 °C) 74 16 (!) 146/80 96 %   08/30/21 1305 97.8 °F (36.6 °C) 75 16 128/88 98 %     · GEN: NAD  · HEENT: , no thrush no scleral icterus  ·  CV: S1, S2 heard regularly  · Lungs: Clear to auscultation bilaterally  · Abdomen: soft, non distended, non tender,   · Extremities: no edema  · Skin: no rash        Labs:   Recent Results (from the past 24 hour(s))   CEA    Collection Time: 08/30/21 12:27 PM   Result Value Ref Range    CEA 0.8 ng/mL   SAMPLES BEING HELD    Collection Time: 08/30/21 12:27 PM   Result Value Ref Range    SAMPLES BEING HELD 1BLU 1PST 1LAV     COMMENT        Add-on orders for these samples will be processed based on acceptable specimen integrity and analyte stability, which may vary by analyte. CBC WITH AUTOMATED DIFF    Collection Time: 08/31/21  3:44 AM   Result Value Ref Range    WBC 8.9 3.6 - 11.0 K/uL    RBC 3.94 3.80 - 5.20 M/uL    HGB 11.5 11.5 - 16.0 g/dL    HCT 36.0 35.0 - 47.0 %    MCV 91.4 80.0 - 99.0 FL    MCH 29.2 26.0 - 34.0 PG    MCHC 31.9 30.0 - 36.5 g/dL    RDW 13.1 11.5 - 14.5 %    PLATELET 860 456 - 662 K/uL    MPV 8.5 (L) 8.9 - 12.9 FL    NRBC 0.2 (H) 0  WBC    ABSOLUTE NRBC 0.02 (H) 0.00 - 0.01 K/uL    NEUTROPHILS 73 32 - 75 %    LYMPHOCYTES 11 (L) 12 - 49 %    MONOCYTES 12 5 - 13 %    EOSINOPHILS 1 0 - 7 %    BASOPHILS 1 0 - 1 %    IMMATURE GRANULOCYTES 2 (H) 0.0 - 0.5 %    ABS. NEUTROPHILS 6.4 1.8 - 8.0 K/UL    ABS. LYMPHOCYTES 1.0 0.8 - 3.5 K/UL    ABS. MONOCYTES 1.1 (H) 0.0 - 1.0 K/UL    ABS. EOSINOPHILS 0.1 0.0 - 0.4 K/UL    ABS. BASOPHILS 0.1 0.0 - 0.1 K/UL    ABS. IMM. GRANS. 0.2 (H) 0.00 - 0.04 K/UL    DF SMEAR SCANNED      RBC COMMENTS NORMOCYTIC, NORMOCHROMIC     METABOLIC PANEL, BASIC    Collection Time: 08/31/21  3:44 AM   Result Value Ref Range    Sodium 139 136 - 145 mmol/L    Potassium 3.9 3.5 - 5.1 mmol/L    Chloride 106 97 - 108 mmol/L    CO2 24 21 - 32 mmol/L    Anion gap 9 5 - 15 mmol/L    Glucose 110 (H) 65 - 100 mg/dL    BUN 5 (L) 6 - 20 MG/DL    Creatinine 0.32 (L) 0.55 - 1.02 MG/DL    BUN/Creatinine ratio 16 12 - 20      GFR est AA >60 >60 ml/min/1.73m2    GFR est non-AA >60 >60 ml/min/1.73m2    Calcium 8.7 8.5 - 10.1 MG/DL   CEA    Collection Time: 08/31/21  3:50 AM   Result Value Ref Range    CEA 0.7 ng/mL       Microbiology Data:        Blood:0 Result Notes   Ref Range & Units 8/29/21 1509   Special Requests:   NO SPECIAL REQUESTS P    Culture result:   NO GROWTH AFTER 12 HOURS P                      Urine:  Specimen Information: Clean catch; Urine         0 Result Notes   Ref Range & Units 8/29/21 1552   Special Requests:   NO SPECIAL REQUESTS P    B[place]ord Count   >100,000   COLONIES/mL  P      Culture result:   CULTURE IN PROGRESS,FURTHER UPDATES TO FOLLOW                  Imaging:   FINDINGS:   LOWER THORAX: No significant abnormality in the incidentally imaged lower chest.  LIVER: There is peripheral portal venous gas in the left lobe. BILIARY TREE: Multiple calcified stones within gallbladder lumen which otherwise  appears unremarkable. CBD is not dilated. SPLEEN: Unremarkable. PANCREAS: No mass or ductal dilatation. ADRENALS: Unremarkable. KIDNEYS: Punctate hypodensities which are too small to characterize but  statistically likely to be benign and for which no further follow-up is thought  to be required. No calculus, hydronephrosis, enhancing mass, or other  abnormality evident. STOMACH: Unremarkable. SMALL BOWEL: No dilatation or wall thickening.   COLON: Sigmoid inflammation with multiple diverticula and a perisigmoid gas and  fluid containing collection in the deep pelvis measuring 1.7 x 2.4 x 3.6 cm. Left colon diverticula are also noted. APPENDIX: Unremarkable. PERITONEUM: No ascites or pneumoperitoneum. RETROPERITONEUM: No lymphadenopathy or aortic aneurysm. REPRODUCTIVE ORGANS: Uterus is surgically absent. Right ovary is unremarkable. Left ovary is not seen and may be surgically absent. URINARY BLADDER: Partially distended with thickened appearing wall particularly  at the roof which underlies inflammatory changes associated with the sigmoid  colon. BONES: Degenerative spine change. No acute fracture or aggressive lesion. ABDOMINAL WALL: No mass or hernia. ADDITIONAL COMMENTS: N/A     IMPRESSION     1. Sigmoid diverticulitis complicated by perisigmoid abscess and portal venous  gas in the liver as well as probable secondary inflammation of the superior  bladder wall.     2. Cholecystolithiasis and additional incidentals as above. Assessment / Plan:     Sigmoid diverticulitis, with concern for abscess  History of diverticulitis in May 2021  Multiple antibiotic allergies   History of RMSF  Fever  Leukocytosis    She is tolerating meropenem IV and would continue for 2 week to 4 weeks and re assess wt CT to track antibiotic response   Surgical intervention per general surgery given the concern for abscess, recurrent diverticulitis  At risk for C. difficile and other antibiotic associated adverse effects  Even though urine cultures with 100,000 gram-negative ema, she denies having had patsy dysuria symptoms to me/or flank pain--regardless on antibiotics that would cover urinary organisms  picc per primary team and removal once IV therapy completed          Thank for the opportunity to participate in the care of this patient. Please contact with questions or concerns.        Negro Yuan DO  12:14 PM

## 2021-09-01 VITALS
BODY MASS INDEX: 38.99 KG/M2 | OXYGEN SATURATION: 94 % | HEART RATE: 73 BPM | RESPIRATION RATE: 18 BRPM | DIASTOLIC BLOOD PRESSURE: 80 MMHG | WEIGHT: 211.86 LBS | HEIGHT: 62 IN | TEMPERATURE: 99.3 F | SYSTOLIC BLOOD PRESSURE: 125 MMHG

## 2021-09-01 PROBLEM — T78.40XA ALLERGIC REACTION CAUSED BY A DRUG: Status: RESOLVED | Noted: 2021-05-26 | Resolved: 2021-09-01

## 2021-09-01 LAB
ATRIAL RATE: 78 BPM
CALCULATED P AXIS, ECG09: 36 DEGREES
CALCULATED R AXIS, ECG10: 1 DEGREES
CALCULATED T AXIS, ECG11: 10 DEGREES
DIAGNOSIS, 93000: NORMAL
GLUCOSE BLD STRIP.AUTO-MCNC: 163 MG/DL (ref 65–117)
P-R INTERVAL, ECG05: 154 MS
Q-T INTERVAL, ECG07: 388 MS
QRS DURATION, ECG06: 68 MS
QTC CALCULATION (BEZET), ECG08: 442 MS
SERVICE CMNT-IMP: ABNORMAL
TROPONIN I SERPL-MCNC: <0.05 NG/ML
VENTRICULAR RATE, ECG03: 78 BPM

## 2021-09-01 PROCEDURE — 76937 US GUIDE VASCULAR ACCESS: CPT

## 2021-09-01 PROCEDURE — 02HV33Z INSERTION OF INFUSION DEVICE INTO SUPERIOR VENA CAVA, PERCUTANEOUS APPROACH: ICD-10-PCS | Performed by: HOSPITALIST

## 2021-09-01 PROCEDURE — 74011000258 HC RX REV CODE- 258: Performed by: HOSPITALIST

## 2021-09-01 PROCEDURE — 36415 COLL VENOUS BLD VENIPUNCTURE: CPT

## 2021-09-01 PROCEDURE — C1751 CATH, INF, PER/CENT/MIDLINE: HCPCS

## 2021-09-01 PROCEDURE — 77030020847 HC STATLOK BARD -A

## 2021-09-01 PROCEDURE — 82962 GLUCOSE BLOOD TEST: CPT

## 2021-09-01 PROCEDURE — 84484 ASSAY OF TROPONIN QUANT: CPT

## 2021-09-01 PROCEDURE — C1769 GUIDE WIRE: HCPCS

## 2021-09-01 PROCEDURE — 93005 ELECTROCARDIOGRAM TRACING: CPT

## 2021-09-01 PROCEDURE — 77030020365 HC SOL INJ SOD CL 0.9% 50ML

## 2021-09-01 PROCEDURE — 74011250636 HC RX REV CODE- 250/636: Performed by: HOSPITALIST

## 2021-09-01 PROCEDURE — 74011250637 HC RX REV CODE- 250/637: Performed by: HOSPITALIST

## 2021-09-01 RX ORDER — ONDANSETRON 4 MG/1
4 TABLET, ORALLY DISINTEGRATING ORAL
Qty: 9 TABLET | Refills: 0 | Status: SHIPPED | OUTPATIENT
Start: 2021-09-01 | End: 2021-09-04

## 2021-09-01 RX ADMIN — MEROPENEM 500 MG: 500 INJECTION, POWDER, FOR SOLUTION INTRAVENOUS at 00:15

## 2021-09-01 RX ADMIN — MEROPENEM 500 MG: 500 INJECTION, POWDER, FOR SOLUTION INTRAVENOUS at 06:14

## 2021-09-01 RX ADMIN — ACETAMINOPHEN 650 MG: 325 TABLET ORAL at 11:30

## 2021-09-01 RX ADMIN — Medication 1 CAPSULE: at 11:29

## 2021-09-01 RX ADMIN — MEROPENEM 500 MG: 500 INJECTION, POWDER, FOR SOLUTION INTRAVENOUS at 11:41

## 2021-09-01 RX ADMIN — Medication 10 ML: at 14:13

## 2021-09-01 RX ADMIN — Medication 5 ML: at 06:14

## 2021-09-01 NOTE — DISCHARGE SUMMARY
Discharge Summary       PATIENT ID: Michelle Lang  MRN: [id number]   YOB: 1955    DATE OF ADMISSION: 8/29/2021  3:10 PM    DATE OF DISCHARGE: 9/1/2021  PRIMARY CARE PROVIDER: Donald Watson MD     ATTENDING PHYSICIAN: Angie Carmichael MD  DISCHARGING PROVIDER: Roseanne Ignacio NP    To contact this individual call [phone] and ask the  to page. If unavailable ask to be transferred the Adult Hospitalist Department. CONSULTATIONS: IP CONSULT TO GENERAL SURGERY  IP CONSULT TO HOSPITALIST  IP CONSULT TO INFECTIOUS DISEASES  IP CONSULT TO GASTROENTEROLOGY    PROCEDURES/SURGERIES: * No surgery found *    ADMITTING DIAGNOSES & HOSPITAL COURSE:   77year old female presents to ED with abdominal pain and low grade fever. She was admitted for acute complicated diverticulitis from 5/28 to 5/21/2021 and treated with cefepime/flagyl as inpatient then transitioned to ceftin/flagyl at discharge to complete 14 days of antibiotics. She was readmitted from 5/25 to 5/28 for an acute allergic reaction to ceftin and treated with decadron/benadryl/pepcid and at that time was transitioned to meropenem to finish her 10 day course. She had a total of 11 days of antibiotics.      On the day of admission she complained of abdominal pain in the left lower quadrant and epigastric region. She says this is similar to her previous pain. She also had a low grade temperature of 100.4 which prompted her to come to the hospital.     She was seen by Infectious Disease who recommended 2-4 week course of IV antibiotics after PICC placement. She was seen by General Surgery who recommended IV antibiotics and no operative intervention during this hospital stay, but sigmoid colectomy in the future after completion of antibiotics and colonoscopy. Gastroenterology saw in consult and recommend colonoscopy in 6 weeks. She will need a CT abdomen/pelvis in 2 weeks to guide length of antibiotic therapy. DISCHARGE DIAGNOSES / PLAN:      Acute complicated diverticulitis  -outpatient antibiotic therapy with meropenem for 2 - 4 weeks depending on what CT abdomen done at 2 weeks shows  -repeat CT abdomen/pelvis in 2 weeks  -outpatient GI follow up in 2 - 4 weeks; colonoscopy in 6 weeks  -surgical follow up for sigmoid colectomy after course of abx and colonoscopy     Chest pain, related to anxiety - rapidly resolved  -occurred while she was having PICC line inserted  -stat ECG - no dynamic change, negative troponin    Sepsis secondary to diverticulitis/UTI, present on admission  -respolved     Urinary tract infection, present on admission   -urine culture with mixed ludwin, she has no dysuria, on merem      Hypokalemia, mild  -resolved     Hypovolemic hyponatremia, mild  -resolved     Back pain  -prn tylenol     Insomnia  -continue ambien       PENDING TEST RESULTS:   At the time of discharge the following test results are still pending: None    FOLLOW UP APPOINTMENTS:    Follow-up Information     Follow up With Specialties Details Why Contact Info    Hui Salas MD General Surgery, Bariatrics In 4 weeks to  discuss possible colon surgery  [address] [facility]  [address]  [address]  [phone]      Sam Forbes MD Family Medicine Make appointment for one week Schedule CT of the abdomen/pelvis with contrast, set up weekly labs for CBC with differential, CMP and fax to Dr. Kate Ortiz [phone]                ADDITIONAL CARE RECOMMENDATIONS:  *[facility], Dr. Le Galeana, for GI follow up in 2 weeks after discharge, then in 6 weeks for colonoscopy. Call for appointment. Follow up with Dr. Azalia Emery, Infectious Disease in 3 weeks. Office address [address] [place] [address] , [address], [address]  Phone [phone]   Fax [phone]     *Take all discharge paperwork with your to your follow up appointments.     Outpatient antibiotic instructions:  Meropenem 1 gm Q 8 hours for 2-4 weeks   Antibiotic duration will depend on repeat CT in 2 weeks  Pharmacy to renally adjust antibiotics   Check weekly labs on mondays for cbc wt diff, cmp and fax to Dr Jessa Anne  Follow up with Dr Jessa nAne in 3 weeks  Remove picc line at end of completion of therapy         DIET: Regular Diet - low fiber  Oral Nutritional Supplements: No Oral Supplement prescribed    ACTIVITY: Activity as tolerated    WOUND CARE: N/A    EQUIPMENT needed: None      DISCHARGE MEDICATIONS:  Current Discharge Medication List      START taking these medications    Details   ondansetron (ZOFRAN ODT) 4 mg disintegrating tablet Take 1 Tablet by mouth every eight (8) hours as needed for Nausea or Vomiting for up to 3 days. Indications: nausea with diverticulitis  Qty: 9 Tablet, Refills: 0  Start date: 9/1/2021, End date: 9/4/2021      meropenem 500 mg IVPB 500 mg by IntraVENous route every eight (8) hours for 28 days. Qty: 84 Dose, Refills: 0  Start date: 9/1/2021, End date: 9/29/2021      L.acid,para-B. bifidum-S.therm (RISAQUAD) 8 billion cell cap cap Take 1 Capsule by mouth daily. Qty: 30 Capsule, Refills: 0  Start date: 9/2/2021         CONTINUE these medications which have NOT CHANGED    Details   zolpidem (AMBIEN) 5 mg tablet Take 5 mg by mouth nightly as needed for Sleep. NOTIFY YOUR PHYSICIAN FOR ANY OF THE FOLLOWING:   Fever over 101 degrees for 24 hours. Chest pain, shortness of breath, fever, chills, nausea, vomiting, diarrhea, change in mentation, falling, weakness, bleeding. Severe pain or pain not relieved by medications. Or, any other signs or symptoms that you may have questions about.     DISPOSITION:  x  Home With:   OT  PT x HH  RN       Long term SNF/Inpatient Rehab    Independent/assisted living    Hospice    Other:       PATIENT CONDITION AT DISCHARGE:     Functional status    Poor     Deconditioned    x Independent      Cognition    x Lucid Forgetful     Dementia      Catheters/lines (plus indication)    Zambrano     PICC     PEG    x None      Code status    x Full code     DNR      PHYSICAL EXAMINATION AT DISCHARGE:                                             Constitutional:  No acute distress, cooperative, pleasant    ENT:  Oral mucosa moist, oropharynx benign. Resp:  CTA bilaterally. No wheezing/rhonchi/rales. No accessory muscle use   CV:  Regular rhythm, normal rate, no murmurs, gallops, rubs    GI:  Soft, non distended, nontenderness, no rebound or guarding. normoactive bowel sounds, no hepatosplenomegaly     Musculoskeletal:  No edema, warm, 2+ pulses throughout    Neurologic:  Moves all extremities.   AAOx3, CN II-XII reviewed         CHRONIC MEDICAL DIAGNOSES:  Problem List as of 9/1/2021 Date Reviewed: 9/1/2021        Codes Class Noted - Resolved    Diverticulitis large intestine w/o perforation or abscess w/o bleeding ICD-10-CM: K57.32  ICD-9-CM: 562.11  8/30/2021 - Present        Diverticulitis ICD-10-CM: K57.92  ICD-9-CM: 562.11  5/18/2021 - Present        RESOLVED: Allergic reaction caused by a drug ICD-10-CM: T78.40XA  ICD-9-CM: 995.27  5/26/2021 - 9/1/2021              Greater than 30 minutes were spent with the patient on counseling and coordination of care    Signed:   Raymundo Pierre NP  9/1/2021  2:20 PM

## 2021-09-01 NOTE — PROGRESS NOTES
Hospitalist Night Cover     Name: Sania Murillo  YOB: 1955      Overnight update:        Contacted by RN regarding Ambien. Patient originally home dose Ambien last night, did not sleep well. Changed to Trazodone by attending team. Patient now requesting to change back to Ambien due to concern over taking new medication.  - Trazodone held, Ambien re-ordered.      Binh ZABALAP-C, PA-C  [phone] or Stefanie

## 2021-09-01 NOTE — PROCEDURES
PICC Placement Note    PRE-PROCEDURE VERIFICATION  Correct Procedure: yes  Correct Site:  yes  Temperature: Temp: 98.4 °F (36.9 °C), Temperature Source: Temp Source: Oral  Recent Labs     08/31/21  0344   BUN 5*   CREA 0.32*      WBC 8.9     Allergies: Ceftin [cefuroxime axetil], Codeine, Levaquin [levofloxacin], Pcn [penicillins], and Sulfa (sulfonamide antibiotics)  Education materials, including PICC Booklet, for PICC Care given to patient: yes. See Patient Education activity for further details. PROCEDURE DETAIL  A double lumen PICC line was started for Home IV Therapy. The following documentation is in addition to the PICC properties in the lines/airways flowsheet :  Lot #: [id number]  Was xylocaine 1% used intradermally:  yes  Catheter Length: 40 at 1 (cm)  Vein Selection for PICC:right basilic  Central Line Bundle followed yes  Complication Related to Insertion: Pt requested left am PICC. Unable  to advance catheter left basilic vein despite flushing and repositioning techniques. PICC removed. Dressing applied. Pt c/o of pain level 8 chest pressure at 9:50. RRT called. See RRT notes. Pt pain level went down to 2. Right PICC successfully placed. Denies chest pain at 11:00. The placement was verified by ECG/Sapiens technology: The  tip location is on the right side and the tip is in the  superior vena cava. See ECG results for PICC tip placement. Report given to nurse Kang Garcia. Line is okay to use.     Osmel Torres RN

## 2021-09-01 NOTE — PROGRESS NOTES
Problem: Discharge Planning  Goal: *Discharge to safe environment  Description: See CM's progress note  Outcome: Resolved/Met     Problem: Falls - Risk of  Goal: *Absence of Falls  Description: Document Cristina Garvey Fall Risk and appropriate interventions in the flowsheet.   Outcome: Resolved/Met     Problem: Patient Education: Go to Patient Education Activity  Goal: Patient/Family Education  Outcome: Resolved/Met

## 2021-09-01 NOTE — PROGRESS NOTES
Spiritual Care Assessment/Progress Note  [facility]      NAME: Minal Moya      MRN: [id number]  AGE: 77 y.o. SEX: female  Jehovah's witness Affiliation: No preference   Language: English     9/1/2021     Total Time (in minutes): 12     Spiritual Assessment begun in [facility] through conversation with:         []Patient        [] Family    [] Friend(s)        Reason for Consult: Rapid response team     Spiritual beliefs: (Please include comment if needed)     [] Identifies with a neptali tradition:         [] Supported by a neptali community:            [] Claims no spiritual orientation:           [] Seeking spiritual identity:                [] Adheres to an individual form of spirituality:           [x] Not able to assess:                           Identified resources for coping:      [] Prayer                               [] Music                  [] Guided Imagery     [] Family/friends                 [] Pet visits     [] Devotional reading                         [x] Unknown     [] Other:                                               Interventions offered during this visit: (See comments for more details)    Patient Interventions: Initial visit           Plan of Care:     [] Support spiritual and/or cultural needs    [] Support AMD and/or advance care planning process      [] Support grieving process   [] Coordinate Rites and/or Rituals    [] Coordination with community clergy   [] No spiritual needs identified at this time   [] Detailed Plan of Care below (See Comments)  [] Make referral to Music Therapy  [] Make referral to Pet Therapy     [] Make referral to Addiction services  [] Make referral to [facility]  [] Make referral to Spiritual Care Partner  [] No future visits requested        [x] Follow up visits as needed     Responded to a RRT. Pt was being attended to by medical staff and no family present.    Chaplain Guerrero MDiv, MS, [facility]  [phone])

## 2021-09-01 NOTE — PROGRESS NOTES
Problem: Falls - Risk of  Goal: *Absence of Falls  Description: Document Ernestina Nayeli Fall Risk and appropriate interventions in the flowsheet.   Outcome: Progressing Towards Goal  Note: Fall Risk Interventions:            Medication Interventions: Evaluate medications/consider consulting pharmacy

## 2021-09-01 NOTE — PROGRESS NOTES
HESHAM:  RUR: 11%    Disposition:  Home with IV abx today. CM sent referral to [facility] earlier. [facility] [address] [place] [address]  [phone]) liaison with [facility]. She confirms receipt of referral.     CM  Continuing to follow. Kg Nielsen, [address] [facility], CRM  [phone]    1:46pm  [facility]/HCP has approved patient for servicing. Mateo Akins will be here shortly to meet with patient for teaching.     2:09p  CM met with Fan Davis RN. She had met with patient who is familiar with home IV abx therapy where patient assisted  a family member. Patient desires to have her spouse participate in teaching. He will be here at 3pm. Today. [facility] will have RN availability on Saturday. Patient is in agreement with this plan. Fan Davis will check back with this CM after visit with patient/spouse.     Kg Nielsen, [address] [facility], [address]

## 2021-09-01 NOTE — PROGRESS NOTES
[facility] Adult  Hospitalist Group                                                                                          Hospitalist Progress Note  Pj Massey NP  Answering service: [phone] OR [phone] from in house phone        Date of Service:  2021  NAME:  Anatoly Mayes  :  1955  MRN:  [id number]      Admission Summary:   77year old female presents to ED with abdominal pain and low grade fever. She was admitted for acute complicated diverticulitis from  to 2021 and treated with cefepime/flagyl as inpatient then transitioned to ceftin/flagyl at discharge to complete 14 days of antibiotics. She was readmitted from  to  for an acute allergic reaction to ceftin and treated with decadron/benadryl/pepcid and at that time was transitioned to meropenem to finish her 10 day course. She had a total of 11 days of antibiotics. On the day of admission she complained of abdominal pain in the left lower quadrant and epigastric region. She says this is similar to her previous pain. She also had a low grade temperature of 100.4 which prompted her to come to the hospital. She has been seen in consult by General Surgery with recommendations for conservative management with no plans for operative intervention at this time, but may require colectomy in future.      Interval history / Subjective:   Abdominal pain resolved  Back pain improved  Did not sleep at all last night because her room mate was \"yelling all night\"  8263 - episode of chest pain, nonradiating with associated diaphoresis/lightheadedness during PICC insertion, said she had a similar episode during her CT scan, ECG done and shows no dynamic changes, troponin ordered and pending     Assessment & Plan:     Acute complicated diverticulitis  -gen surg following with no plans for surgical intervention at this time - recommended consulting GI for flexible sigmoidoscopy in the setting of the perforation to determine if malignancy present,   -CEA 0.7  -continue merem, PICC today, ID following recommend outpatient merem x 2-4 weeks then repeat CT to track antibiotic response  -outpatient GI follow up 2-4 weeks after d/c, colonoscopy in 6 weeks  -gen surg recommends elective sigmoid resection after course of IV abx and colonoscopy  -nausea and pain control  -tolerating GI bland diet    Chest pain, likely related to anxiety  -ECG - no dynamic change  -troponin pending - will follow    Sepsis secondary to diverticulitis/UTI, present on admission  -resp;jackie    Urinary tract infection, present on admission  -urine culture with mixed ludwin, she has no dysuria, on merem     Hypokalemia, mild  -resolved    Hypovolemic hyponatremia, mild  -resolved    Back pain  -prn tylenol    Insomnia  -continue ambien    Code status: Full code  DVT prophylaxis: add 3 Andrey Cleveland discussed with: Patient/Family  Anticipated Disposition: Home w/Family  Anticipated Discharge: today     Hospital Problems  Never Reviewed        Codes Class Noted POA    Diverticulitis large intestine w/o perforation or abscess w/o bleeding ICD-10-CM: K57.32  ICD-9-CM: 562.11  8/30/2021 Unknown        Diverticulitis ICD-10-CM: K04.41  ICD-9-CM: 562.11  5/18/2021 Unknown                Review of Systems:   A comprehensive review of systems was negative except for that written in the HPI. Vital Signs:    Last 24hrs VS reviewed since prior progress note. Most recent are:  Visit Vitals  BP (!) 144/73   Pulse 76   Temp 98.4 °F (36.9 °C)   Resp 18   Ht 5' 2\" (1.575 m)   Wt 96.1 kg (211 lb 13.8 oz)   SpO2 96%   BMI 38.75 kg/m²       No intake or output data in the 24 hours ending 09/01/21 1033     Physical Examination:     I had a face to face encounter with this patient and independently examined them on 9/1/2021 as outlined below:          Constitutional:  No acute distress, cooperative, pleasant    ENT:  Oral mucosa moist, oropharynx benign.     Resp:  CTA bilaterally. No wheezing/rhonchi/rales. No accessory muscle use   CV:  Regular rhythm, normal rate, no murmurs, gallops, rubs    GI:  Soft, non distended, nontenderness, no rebound or guarding. normoactive bowel sounds, no hepatosplenomegaly     Musculoskeletal:  No edema, warm, 2+ pulses throughout    Neurologic:  Moves all extremities. AAOx3, CN II-XII reviewed            Data Review:    Review and/or order of clinical lab test   Review of imaging and consult notes. Labs:     Recent Labs     08/31/21  0344 08/29/21  1509   WBC 8.9 11.6*   HGB 11.5 12.9   HCT 36.0 39.0    305     Recent Labs     08/31/21  0344 08/29/21  1509    134*   K 3.9 3.6    100   CO2 24 26   BUN 5* 9   CREA 0.32* 0.52*   * 126*   CA 8.7 9.3     Recent Labs     08/29/21  1509   ALT 32   *   TBILI 0.7   TP 7.8   ALB 3.2*   GLOB 4.6*   LPSE 339     No results for input(s): INR, PTP, APTT, INREXT, INREXT in the last 72 hours. No results for input(s): FE, TIBC, PSAT, FERR in the last 72 hours. No results found for: FOL, RBCF   No results for input(s): PH, PCO2, PO2 in the last 72 hours.   Recent Labs     08/29/21  1751 08/29/21  1509   TROIQ <0.05 <0.05     No results found for: CHOL, CHOLX, CHLST, CHOLV, HDL, HDLP, LDL, LDLC, DLDLP, TGLX, TRIGL, TRIGP, CHHD, CHHDX  Lab Results   Component Value Date/Time    Glucose (POC) 163 (H) 09/01/2021 09:56 AM     Lab Results   Component Value Date/Time    Color YELLOW/STRAW 08/29/2021 03:52 PM    Appearance CLOUDY (A) 08/29/2021 03:52 PM    Specific gravity 1.026 08/29/2021 03:52 PM    pH (UA) 5.5 08/29/2021 03:52 PM    Protein 30 (A) 08/29/2021 03:52 PM    Glucose Negative 08/29/2021 03:52 PM    Ketone 80 (A) 08/29/2021 03:52 PM    Bilirubin Negative 08/29/2021 03:52 PM    Urobilinogen 1.0 08/29/2021 03:52 PM    Nitrites Positive (A) 08/29/2021 03:52 PM    Leukocyte Esterase MODERATE (A) 08/29/2021 03:52 PM    Epithelial cells FEW 08/29/2021 03:52 PM    Bacteria 2+ (A) 08/29/2021 03:52 PM    WBC 20-50 08/29/2021 03:52 PM    RBC 0-5 08/29/2021 03:52 PM         Medications Reviewed:     Current Facility-Administered Medications   Medication Dose Route Frequency    simethicone (MYLICON) 99EF/9.8HS oral drops 40 mg  40 mg Oral QID PRN    zolpidem (AMBIEN) tablet 5 mg  5 mg Oral QHS PRN    heparin (porcine) injection 5,000 Units  5,000 Units SubCUTAneous Q8H    sodium chloride (NS) flush 5-40 mL  5-40 mL IntraVENous Q8H    sodium chloride (NS) flush 5-40 mL  5-40 mL IntraVENous PRN    acetaminophen (TYLENOL) tablet 650 mg  650 mg Oral Q6H PRN    Or    acetaminophen (TYLENOL) suppository 650 mg  650 mg Rectal Q6H PRN    ondansetron (ZOFRAN ODT) tablet 4 mg  4 mg Oral Q8H PRN    Or    ondansetron (ZOFRAN) injection 4 mg  4 mg IntraVENous Q6H PRN    traMADoL (ULTRAM) tablet 50 mg  50 mg Oral Q6H PRN    HYDROmorphone (DILAUDID) injection 1 mg  1 mg IntraVENous Q3H PRN    meropenem (MERREM) 500 mg in 0.9% sodium chloride (MBP/ADV) 50 mL MBP  0.5 g IntraVENous Q6H    L.acidophilus-paracasei-S.thermophil-bifidobacter (RISAQUAD) 8 billion cell capsule  1 Capsule Oral DAILY     ______________________________________________________________________  EXPECTED LENGTH OF STAY: 2-3 days  ACTUAL LENGTH OF STAY:          3                 Elin Batres NP

## 2021-09-01 NOTE — PROGRESS NOTES
2100 [facility] called RRT for CP/pressure. 2072 RT at bedside. Mateo Suarez 17, CCU director at bedside. Pt rating CP as \"8\". [facility] Franklin Stubbs [facility] of RRT. 1000 EKG completed. 26 Notified Dr. Kaitlin Natarajan of RRT.    [phone] RRT ended. 1004 Dr. Kaitlin Natarajan at bedside. 3851 [facility] Davis, NP at bedside.

## 2021-09-01 NOTE — TELEPHONE ENCOUNTER
Advised pt of CT scan that's needed 2 weeks from today per Dr. Susanna Cushing. around 9/15/21 before NOV on 9/22/21. Sent Via outpatient number  Through [employer] to schedule the CT  There was verbalizing understanding.

## 2021-09-01 NOTE — PROGRESS NOTES
Hospital follow-up PCP transitional care appointment has been scheduled with Dr. Jimi Nazario for Wednesday, 9/8/21 at 11:15 a.m. Pending patient discharge.   Jovanna Nicholas, Care Management Specialist.

## 2021-09-01 NOTE — PROGRESS NOTES
I have reviewed discharge instructions with the patient and spouse. Prescriptions given to patient. The patient and spouse verbalized understanding. Questions and concerns addressed. Peripheral IV access removed and PICC line left in place for home IV antibiotic treatment. Patient signature placed on chart for discharge paperwork d/t signature pad not working.

## 2021-09-01 NOTE — DISCHARGE INSTRUCTIONS
Discharge Instructions       PATIENT ID: Glenna Park  MRN: [id number]   YOB: 1955    DATE OF ADMISSION: 8/29/2021  3:10 PM    DATE OF DISCHARGE: 9/1/2021    PRIMARY CARE PROVIDER: Sky Calle MD     ATTENDING PHYSICIAN: Gerson Rojas MD  DISCHARGING PROVIDER: Gilberto Mayberry, NP    To contact this individual call [phone] and ask the  to page. If unavailable ask to be transferred the Adult Hospitalist Department. DISCHARGE DIAGNOSES   Acute complicated diverticulitis  Chest pain, related to anxiety - rapidly resolved  Sepsis secondary to diverticulitis/UTI, present on admission  Urinary tract infection, present on admission   Hypokalemia, mild  Hypovolemic hyponatremia, mild  Back pain  Insomnia      CONSULTATIONS: IP CONSULT TO GENERAL SURGERY  IP CONSULT TO HOSPITALIST  IP CONSULT TO INFECTIOUS DISEASES  IP CONSULT TO GASTROENTEROLOGY    PROCEDURES/SURGERIES: * No surgery found *    PENDING TEST RESULTS:   At the time of discharge the following test results are still pending: none    FOLLOW UP APPOINTMENTS:   Follow-up Information     Follow up With Specialties Details Why Contact Info    Humera Peter MD General Surgery, Bariatrics In 4 weeks to  discuss possible colon surgery  [address] [facility]  [address]  [address]  [phone]      Christianne Forbes MD Family Medicine Make appointment for one week Schedule CT of the abdomen/pelvis with contrast, set up weekly labs for CBC with differential, CMP and fax to Dr. Ravin Marie [phone]             ADDITIONAL CARE RECOMMENDATIONS:   *[facility], Dr. Lencho Man, for GI follow up in 2 weeks after discharge, then in 6 weeks for colonoscopy. Call for appointment.     *Follow up with Dr. Feli Hogan, Infectious Disease in 3 weeks.    Office address [address] [facility] [address] , [address] [facility], [facility] [address]  Phone [phone]   Fax [phone]     *Take all discharge paperwork with your to your follow up appointments.     Outpatient antibiotic instructions:  Meropenem 1 gm Q 8 hours for 2-4 weeks   Antibiotic duration will depend on repeat CT in 2 weeks  Pharmacy to renally adjust antibiotics   Check weekly labs on mondays for cbc wt diff, cmp and fax to Dr Jessa Anne  Follow up with Dr Jessa Anne in 3 weeks  Remove picc line at end of completion of therapy           DIET: Regular Diet - low fiber     ACTIVITY: Activity as tolerated    WOUND CARE: NA    EQUIPMENT needed: NA      DISCHARGE MEDICATIONS:   See Medication Reconciliation Form    · It is important that you take the medication exactly as they are prescribed. · Keep your medication in the bottles provided by the pharmacist and keep a list of the medication names, dosages, and times to be taken in your wallet. · Do not take other medications without consulting your doctor. NOTIFY YOUR PHYSICIAN FOR ANY OF THE FOLLOWING:   Fever over 101 degrees for 24 hours. Chest pain, shortness of breath, fever, chills, nausea, vomiting, diarrhea, change in mentation, falling, weakness, bleeding. Severe pain or pain not relieved by medications. Or, any other signs or symptoms that you may have questions about. DISPOSITION:  x  Home With:   OT  PT x HH  RN       SNF/Inpatient Rehab/LTAC    Independent/assisted living    Hospice    Other:     CDMP Checked:   Yes x     PROBLEM LIST Updated: Yes X        Signed:   Mary Kay Bledsoe NP  9/1/2021  2:19 PM    Patient Education        Learning About Diverticulosis and Diverticulitis  What are diverticulosis and diverticulitis? In diverticulosis and diverticulitis, pouches called diverticula form in the wall of the large intestine, or colon. · In diverticulosis, the pouches do not cause any pain or other symptoms. · In diverticulitis, the pouches get inflamed or infected and cause symptoms.   Doctors aren't sure what causes these pouches in the colon. But they think that a low-fiber diet may play a role. Without fiber to add bulk to the stool, the colon has to work harder than normal to push the stool forward. The pressure from this may cause pouches to form in weak spots along the colon. Some people with diverticulosis get diverticulitis. But experts don't know why this happens. What are the symptoms? · In diverticulosis, most people don't have symptoms. But pouches sometimes bleed. · In diverticulitis, symptoms may last from a few hours to a week or more. They include:  ? Belly pain. This is usually in the lower left side. It is sometimes worse when you move. This is the most common symptom. ? Fever and chills. ? Bloating and gas. ? Diarrhea or constipation. ? Nausea and sometimes vomiting.  ? Not feeling like eating. How can you prevent diverticulitis? You may be able to lower your chance of getting diverticulitis. You can do this by taking steps to prevent constipation. · Eat fruits, vegetables, beans, and whole grains every day. These foods are high in fiber. · Drink plenty of fluids. If you have kidney, heart, or liver disease and have to limit fluids, talk with your doctor before you increase the amount of fluids you drink. · Get at least 30 minutes of exercise on most days of the week. Walking is a good choice. You also may want to do other activities, such as running, swimming, cycling, or playing tennis or team sports. · Take a fiber supplement, such as Citrucel or Metamucil, every day if needed. Read and follow all instructions on the label. · Schedule time each day for a bowel movement. Having a daily routine may help. Take your time and do not strain when having a bowel movement. Some people avoid nuts, seeds, berries, and popcorn. They believe that these foods might get trapped in the diverticula and cause pain. But there is no proof that these foods cause diverticulitis or make it worse.   How are these problems treated? · The best way to treat diverticulosis is to avoid constipation. · Treatment for diverticulitis includes antibiotics. It often includes a change in your diet. You may need only liquids at first. Your doctor may suggest pain medicines for pain or belly cramps. In some cases, surgery may be needed. Follow-up care is a key part of your treatment and safety. Be sure to make and go to all appointments, and call your doctor if you are having problems. It's also a good idea to know your test results and keep a list of the medicines you take. Where can you learn more? Go to http://www.gray.com/  Enter T569 in the search box to learn more about \"Learning About Diverticulosis and Diverticulitis. \"  Current as of: April 15, 2020               Content Version: 12.8  © 6879-8478 Healthwise, Incorporated. Care instructions adapted under license by [employer] (which disclaims liability or warranty for this information). If you have questions about a medical condition or this instruction, always ask your healthcare professional. [address] any warranty or liability for your use of this information.

## 2021-09-03 LAB
BACTERIA SPEC CULT: NORMAL
SERVICE CMNT-IMP: NORMAL

## 2021-09-14 ENCOUNTER — HOSPITAL ENCOUNTER (OUTPATIENT)
Dept: CT IMAGING | Age: 66
Discharge: HOME | End: 2021-09-14
Attending: INTERNAL MEDICINE
Payer: COMMERCIAL

## 2021-09-14 DIAGNOSIS — N19 RENAL FAILURE, UNSPECIFIED CHRONICITY: ICD-10-CM

## 2021-09-14 DIAGNOSIS — K57.92 DIVERTICULITIS: ICD-10-CM

## 2021-09-14 DIAGNOSIS — K57.32 DIVERTICULITIS LARGE INTESTINE W/O PERFORATION OR ABSCESS W/O BLEEDING: ICD-10-CM

## 2021-09-14 PROCEDURE — 74011000636 HC RX REV CODE- 636: Performed by: INTERNAL MEDICINE

## 2021-09-14 PROCEDURE — 74177 CT ABD & PELVIS W/CONTRAST: CPT

## 2021-09-14 RX ORDER — BARIUM SULFATE 20 MG/ML
900 SUSPENSION ORAL ONCE
Status: DISCONTINUED | OUTPATIENT
Start: 2021-09-14 | End: 2021-09-14

## 2021-09-14 RX ADMIN — IOPAMIDOL 100 ML: 755 INJECTION, SOLUTION INTRAVENOUS at 09:48

## 2021-09-15 ENCOUNTER — TELEPHONE (OUTPATIENT)
Dept: INTERNAL MEDICINE CLINIC | Age: 66
End: 2021-09-15

## 2021-09-15 NOTE — TELEPHONE ENCOUNTER
Pt verified name and    Advised pt that   Dr. Loraine Bowers  forwarded her CT scan to her surgeon to find out what their plans are   For now continue IV antibiotics  There was verbalizing understanding.

## 2021-09-22 ENCOUNTER — OFFICE VISIT (OUTPATIENT)
Dept: INTERNAL MEDICINE CLINIC | Age: 66
End: 2021-09-22
Payer: COMMERCIAL

## 2021-09-22 ENCOUNTER — TELEPHONE (OUTPATIENT)
Dept: INTERNAL MEDICINE CLINIC | Age: 66
End: 2021-09-22

## 2021-09-22 VITALS
SYSTOLIC BLOOD PRESSURE: 142 MMHG | DIASTOLIC BLOOD PRESSURE: 70 MMHG | TEMPERATURE: 97.9 F | BODY MASS INDEX: 32.94 KG/M2 | HEART RATE: 65 BPM | RESPIRATION RATE: 18 BRPM | HEIGHT: 62 IN | OXYGEN SATURATION: 97 % | WEIGHT: 179 LBS

## 2021-09-22 DIAGNOSIS — K57.32 SIGMOID DIVERTICULITIS: Primary | ICD-10-CM

## 2021-09-22 PROCEDURE — G8417 CALC BMI ABV UP PARAM F/U: HCPCS | Performed by: INTERNAL MEDICINE

## 2021-09-22 PROCEDURE — 1111F DSCHRG MED/CURRENT MED MERGE: CPT | Performed by: INTERNAL MEDICINE

## 2021-09-22 PROCEDURE — 1101F PT FALLS ASSESS-DOCD LE1/YR: CPT | Performed by: INTERNAL MEDICINE

## 2021-09-22 PROCEDURE — 1090F PRES/ABSN URINE INCON ASSESS: CPT | Performed by: INTERNAL MEDICINE

## 2021-09-22 PROCEDURE — G8510 SCR DEP NEG, NO PLAN REQD: HCPCS | Performed by: INTERNAL MEDICINE

## 2021-09-22 PROCEDURE — 99213 OFFICE O/P EST LOW 20 MIN: CPT | Performed by: INTERNAL MEDICINE

## 2021-09-22 PROCEDURE — G9899 SCRN MAM PERF RSLTS DOC: HCPCS | Performed by: INTERNAL MEDICINE

## 2021-09-22 PROCEDURE — 3017F COLORECTAL CA SCREEN DOC REV: CPT | Performed by: INTERNAL MEDICINE

## 2021-09-22 PROCEDURE — G8400 PT W/DXA NO RESULTS DOC: HCPCS | Performed by: INTERNAL MEDICINE

## 2021-09-22 PROCEDURE — G8427 DOCREV CUR MEDS BY ELIG CLIN: HCPCS | Performed by: INTERNAL MEDICINE

## 2021-09-22 PROCEDURE — G8536 NO DOC ELDER MAL SCRN: HCPCS | Performed by: INTERNAL MEDICINE

## 2021-09-22 RX ORDER — TIMOLOL MALEATE 2.5 MG/ML
SOLUTION/ DROPS OPHTHALMIC
Status: ON HOLD | COMMUNITY
Start: 2021-06-22 | End: 2022-04-12 | Stop reason: CLARIF

## 2021-09-22 RX ORDER — CHOLECALCIFEROL (VITAMIN D3) 50 MCG
5000 CAPSULE ORAL DAILY
COMMUNITY

## 2021-09-22 RX ORDER — ONDANSETRON 4 MG/1
TABLET, ORALLY DISINTEGRATING ORAL
Status: ON HOLD | COMMUNITY
Start: 2021-09-08 | End: 2022-04-12 | Stop reason: CLARIF

## 2021-09-22 RX ORDER — BRIMONIDINE TARTRATE 2 MG/ML
SOLUTION/ DROPS OPHTHALMIC
COMMUNITY
Start: 2021-06-17

## 2021-09-22 NOTE — PROGRESS NOTES
Infectious Disease progress Note        HPI:   Ms Azael Roberts seen  Patient states that she feels much better and has no abdominal pain or diarrhea symptoms  Denies any fevers or chills  Says had a local reaction with some type tape use around PICC line site which is completely resolved      Physical Exam:    Vitals:   Visit Vitals  BP (!) 142/70 (BP 1 Location: Left upper arm, BP Patient Position: Sitting, BP Cuff Size: Adult)   Pulse 65   Temp 97.9 °F (36.6 °C) (Oral)   Resp 18   Ht 5' 2\" (1.575 m)   Wt 179 lb (81.2 kg)   SpO2 97%   BMI 32.74 kg/m²       · GEN: NAD  · HEENT: , no thrush no scleral icterus  ·  CV: S1, S2 heard regularly  · Lungs: Clear to auscultation bilaterally  · Abdomen: soft,  non tender,   · Extremities: no edema  · Skin: no rash        Labs:   No results found for this or any previous visit (from the past 24 hour(s)). Microbiology Data:        Blood:0 Result Notes   Ref Range & Units 8/29/21 1509   Special Requests:   NO SPECIAL REQUESTS P    Culture result:   NO GROWTH AFTER 12 HOURS P                      Urine:  Specimen Information: Clean catch; Urine         0 Result Notes   Ref Range & Units 8/29/21 1552   Special Requests:   NO SPECIAL REQUESTS P    Pennock Count   >100,000   COLONIES/mL  P      Culture result:   CULTURE IN PROGRESS,FURTHER UPDATES TO FOLLOW                  Imaging:     CT A/P 9/14/2020  FINDINGS:   LOWER THORAX: No significant abnormality in the incidentally imaged lower chest.  LIVER: No mass. BILIARY TREE: Gallstones are noted in the gallbladder. CBD is not dilated. SPLEEN: Calcified splenic granulomas are noted. PANCREAS: No mass or ductal dilatation. ADRENALS: Unremarkable. KIDNEYS: No mass, calculus, or hydronephrosis. STOMACH: Unremarkable. SMALL BOWEL: There is inflammation of some small bowel loops adjacent to the  diverticulitis in the pelvis, with extraluminal gas tract extending to this  loop.  Extra luminal gas tract also extends to the bladder dome.  COLON: Perforated sigmoid diverticulitis is again demonstrated. The amount of  adjacent inflammation has decreased. Furthermore, the amount of apparent bowel  wall thickening has also improved. The previously described perisigmoid abscess  is unchanged in size, but now only contains gas and no fluid. No new  complicating features are identified. APPENDIX: Normal  PERITONEUM: No free intraperitoneal air  RETROPERITONEUM: No lymphadenopathy or aortic aneurysm. REPRODUCTIVE ORGANS: Status post hysterectomy  URINARY BLADDER: Bladder dome is mildly thickened, and extraluminal gas tract  extends to the superior bladder wall. BONES: No destructive bone lesion. ABDOMINAL WALL: No mass or hernia. ADDITIONAL COMMENTS: N/A     IMPRESSION     1. Chronic perforated sigmoid diverticulitis. Degree of colonic inflammation and  pericolonic inflammation has mildly improved. Small perisigmoid abscess now  contains only gas and no fluid, but is unchanged in size. Extraluminal gas  tracks extend to thickened small bowel loops in the pelvis as well as thickened  superior bladder wall; this is concerning for developing fistula to the small  bowel and bladder. These findings are not significantly changed. 2. Cholelithiasis. 3. Normal imaging of the liver. No evidence of portal venous gas or liver  abscess. CT A/P 8/29/21   FINDINGS:   LOWER THORAX: No significant abnormality in the incidentally imaged lower chest.  LIVER: There is peripheral portal venous gas in the left lobe. BILIARY TREE: Multiple calcified stones within gallbladder lumen which otherwise  appears unremarkable. CBD is not dilated. SPLEEN: Unremarkable. PANCREAS: No mass or ductal dilatation. ADRENALS: Unremarkable. KIDNEYS: Punctate hypodensities which are too small to characterize but  statistically likely to be benign and for which no further follow-up is thought  to be required.  No calculus, hydronephrosis, enhancing mass, or other  abnormality evident. STOMACH: Unremarkable. SMALL BOWEL: No dilatation or wall thickening. COLON: Sigmoid inflammation with multiple diverticula and a perisigmoid gas and  fluid containing collection in the deep pelvis measuring 1.7 x 2.4 x 3.6 cm. Left colon diverticula are also noted. APPENDIX: Unremarkable. PERITONEUM: No ascites or pneumoperitoneum. RETROPERITONEUM: No lymphadenopathy or aortic aneurysm. REPRODUCTIVE ORGANS: Uterus is surgically absent. Right ovary is unremarkable. Left ovary is not seen and may be surgically absent. URINARY BLADDER: Partially distended with thickened appearing wall particularly  at the roof which underlies inflammatory changes associated with the sigmoid  colon. BONES: Degenerative spine change. No acute fracture or aggressive lesion. ABDOMINAL WALL: No mass or hernia. ADDITIONAL COMMENTS: N/A     IMPRESSION     1. Sigmoid diverticulitis complicated by perisigmoid abscess and portal venous  gas in the liver as well as probable secondary inflammation of the superior  bladder wall.     2. Cholecystolithiasis and additional incidentals as above. Assessment / Plan:     Sigmoid diverticulitis, with concern for abscess  History of diverticulitis in May 2021  Multiple antibiotic allergies   History of RMSF    Plan:   Currently on IV meropenem and tolerating well  Will request labs as we have not received any labs for review recently from [facility]  Latest CT done on 9/14/2020 \" Chronic perforated sigmoid diverticulitis. Degree of colonic inflammation and pericolonic inflammation has mildly improved. Small perisigmoid abscess now contains only gas and no fluid, but is unchanged in size. Extraluminal gas tracks extend to thickened small bowel loops in the pelvis as well as thickened superior bladder wall; this is concerning for developing fistula to the small bowel and bladder.  These findings are not significantly changed\"    Have reached out to surgery to discuss if she is having a sigmoid colectomy and when    Discussed with patient that she has been on IV antibiotics close to 4 weeks and further antibiotic recommendations to be determined based on surgical plans      At risk for C. difficile and other antibiotic associated adverse effects    PICC line care. Thank for the opportunity to participate in the care of this patient. Please contact with questions or concerns.        Torie Pelayo,   12:15 PM

## 2021-09-22 NOTE — TELEPHONE ENCOUNTER
Spoke w/ pt and received name of [facility] ( [facility] ) called and spoke w/ apolinar  She states recent labs will be faxed over. Fax # given. There was verbalizing understanding.

## 2021-09-22 NOTE — PROGRESS NOTES
Health Maintenance Due   Topic Date Due    Hepatitis C Screening  Never done    DTaP/Tdap/Td series (1 - Tdap) Never done    Lipid Screen  Never done    Colorectal Cancer Screening Combo  Never done    Shingrix Vaccine Age 50> (1 of 2) Never done    Bone Densitometry (Dexa) Screening  Never done    Pneumococcal 65+ years (1 of 1 - PPSV23) Never done    Flu Vaccine (1) Never done       Chief Complaint   Patient presents with   [facility] Follow Up     Sigmoid diverticulitis, with concern for abscess    Nausea       1. Have you been to the ER, urgent care clinic since your last visit? Hospitalized since your last visit? Yes, [facility], 8/29/21-9/1/21  2. Have you seen or consulted any other health care providers outside of the [address] [place] [address] since your last visit? Include any pap smears or colon screening. No    3) Do you have an Advance Directive on file? no    4) Are you interested in receiving information on Advance Directives? NO      Patient is accompanied by  I have received verbal consent from [address] [place] [address] to discuss any/all medical information while they are present in the room.

## 2021-09-23 ENCOUNTER — TELEPHONE (OUTPATIENT)
Dept: INTERNAL MEDICINE CLINIC | Age: 66
End: 2021-09-23

## 2021-09-23 NOTE — TELEPHONE ENCOUNTER
Advised pt that Dr. Magno Osorio reached out to  surgery and that she needs to call Dr Padmini Sánchez office to make an appt. # given.

## 2021-09-24 ENCOUNTER — TELEPHONE (OUTPATIENT)
Dept: INTERNAL MEDICINE CLINIC | Age: 66
End: 2021-09-24

## 2021-09-24 ENCOUNTER — DOCUMENTATION ONLY (OUTPATIENT)
Dept: INTERNAL MEDICINE CLINIC | Age: 66
End: 2021-09-24

## 2021-09-24 NOTE — TELEPHONE ENCOUNTER
Spoke w/ Felix Pinzon @ [facility] and advised him to remove piccline and stop IV abx asap due to pt feeling very nausea and etc per Dr. Elin Canchola

## 2021-09-24 NOTE — TELEPHONE ENCOUNTER
----- Message from [facility] sent at 9/24/2021 12:57 PM EDT -----  Regarding: José Luis Parada / Telephone  Patient return call    Caller's first and last name and relationship (if not the patient): N/A      Best contact number(s):[phone]      Whose call is being returned: Alpa       Details to clarify the request:      [facility]

## 2021-09-24 NOTE — TELEPHONE ENCOUNTER
Pt states  she hasn't called  Dr. Derrick Stanford ( General Surgeon ,per Dr. Daiana Huffman )office yet because she doesn't want to plan surgery right away because she wants to know the whole plan before jumping the gun, she states she would like a referral to GI and also that she doesn't understand why IV abx may have to be longer because she feels like 4 weeks is enough especially if it's suppose to end on 9/28/21  and she said she's thinking about a ID second opinion and doesn't want Dr. Becerril Must to feel like she's telling her what should be done. She states she just really wants to know the actual plan of everything and reasoning.   Advised pt I will call her back once talking with

## 2021-09-24 NOTE — TELEPHONE ENCOUNTER
Trudy Velez and spoke w/ [facility] to remove picline on Wednesday 9/29/21 because IV abx end on   9/28/21 Per Dr. Tomasz Green.  There was verbalizing understanding.

## 2021-09-24 NOTE — PROGRESS NOTES
Patient called stating she is very nauseous and cannot do the IV abx anymore  She has a follow up wt surgery on Tuesday in 3 days   She is allergic to levaquin, penicillin and therefore do not have oral options  Will stop meropenem as she is close to 4 weeks therapy  Home  Health to remove picc line     Beatriz Pelayo,   3:20 PM

## 2021-09-28 ENCOUNTER — OFFICE VISIT (OUTPATIENT)
Dept: SURGERY | Age: 66
End: 2021-09-28
Payer: COMMERCIAL

## 2021-09-28 VITALS
WEIGHT: 179.4 LBS | SYSTOLIC BLOOD PRESSURE: 136 MMHG | BODY MASS INDEX: 31.79 KG/M2 | RESPIRATION RATE: 18 BRPM | HEIGHT: 63 IN | HEART RATE: 77 BPM | TEMPERATURE: 98.2 F | OXYGEN SATURATION: 95 % | DIASTOLIC BLOOD PRESSURE: 77 MMHG

## 2021-09-28 DIAGNOSIS — K57.92 DIVERTICULITIS: Primary | ICD-10-CM

## 2021-09-28 PROCEDURE — 99204 OFFICE O/P NEW MOD 45 MIN: CPT | Performed by: SURGERY

## 2021-09-28 RX ORDER — CETIRIZINE HCL 10 MG
TABLET ORAL
Status: ON HOLD | COMMUNITY
End: 2022-04-12 | Stop reason: CLARIF

## 2021-09-28 RX ORDER — DEXTROMETHORPHAN/PSEUDOEPHED 2.5-7.5/.8
40 DROPS ORAL
Status: ON HOLD | COMMUNITY
End: 2022-04-12 | Stop reason: CLARIF

## 2021-09-28 NOTE — PROGRESS NOTES
[facility] at [facility] Surgery History and Physical    History of Present Illness:      Batsheva Stoddard is a 77 y.o. female who has been having recent issues with diverticulitis. She was hospitalized back in May for diverticulitis with microperforation. She improved with antibiotics. A few months later she had another attack of diverticulitis with a microperforation that was contained. She had had issues with antibiotics and was discharged home with a PICC line and IV antibiotics prescribed by infectious disease. She was on meropenem. She is currently off antibiotics. She is feeling much better now she is not having any abdominal pain and no abdominal issues currently. She has been having some diarrhea but currently does not have any. During her hospitalization she never needed a percutaneous drain or surgery. Past Medical History:   Diagnosis Date    Glaucoma     H/O seasonal allergies     [place] spotted fever     Sinus infection     UTI (urinary tract infection)      Past surgical history-none    Current Outpatient Medications:     cetirizine (ZYRTEC) 10 mg tablet, Take  by mouth nightly., Disp: , Rfl:     vit A/vit C/vit E/zinc/copper (PRESERVISION AREDS PO), Take  by mouth two (2) times a day., Disp: , Rfl:     simethicone (MYLICON) 40 WJ/6.8 mL drops, Take 40 mg by mouth four (4) times daily as needed. , Disp: , Rfl:     brimonidine (ALPHAGAN) 0.2 % ophthalmic solution, INSTILL 1 DROP INTO BOTH EYES TWICE DAILY, Disp: , Rfl:     timolol (TIMOPTIC) 0.25 % ophthalmic solution, INSTILL 1 DROP IN BOTH EYES TWICE DAILY, Disp: , Rfl:     cholecalciferol, vitamin D3, (Vitamin D3) 100 mcg (4,000 unit) cap, Take  by mouth daily. , Disp: , Rfl:     L.acid,para-B. bifidum-S.therm (RISAQUAD) 8 billion cell cap cap, Take 1 Capsule by mouth daily. , Disp: 30 Capsule, Rfl: 0    ondansetron (ZOFRAN ODT) 4 mg disintegrating tablet, , Disp: , Rfl:     meropenem 1 g IVPB, 1 g by IntraVENous route every eight (8) hours. (Patient not taking: Reported on 9/28/2021), Disp: 84 Dose, Rfl: 0    zolpidem (AMBIEN) 5 mg tablet, Take 5 mg by mouth nightly as needed for Sleep. (Patient not taking: Reported on 9/28/2021), Disp: , Rfl:     Allergies   Allergen Reactions    Ceftin [Cefuroxime Axetil] Hives    Codeine Rash    Levaquin [Levofloxacin] Other (comments)     Joint swelling    Pcn [Penicillins] Hives    Sulfa (Sulfonamide Antibiotics) Hives       Social History     Socioeconomic History    Marital status:      Spouse name: Not on file    Number of children: Not on file    Years of education: Not on file    Highest education level: Not on file   Occupational History    Not on file   Tobacco Use    Smoking status: Never Smoker    Smokeless tobacco: Never Used   Substance and Sexual Activity    Alcohol use: Not Currently    Drug use: Not Currently    Sexual activity: Not on file   Other Topics Concern    Not on file   Social History Narrative    Not on file     Social Determinants of Health     Financial Resource Strain:     Difficulty of Paying Living Expenses:    Food Insecurity:     Worried About Running Out of Food in the Last Year:     [address] in the Last Year:    Transportation Needs:     Lack of Transportation (Medical):  Lack of Transportation (Non-Medical):    Physical Activity:     Days of Exercise per Week:     Minutes of Exercise per Session:    Stress:     Feeling of Stress :    Social Connections:     Frequency of Communication with Friends and Family:     Frequency of Social Gatherings with Friends and Family:     Attends Mandaen Services:     Active Member of Clubs or Organizations:     Attends Club or Organization Meetings:     Marital Status:    Intimate Partner Violence:     Fear of Current or Ex-Partner:     Emotionally Abused:     Physically Abused:     Sexually Abused:        No family history on file.     ROS Constitutional: negative  Ears, Nose, Mouth, Throat, and Face: negative  Respiratory: negative  Cardiovascular: negative  Gastrointestinal: negative  Genitourinary:negative  Integument/Breast: negative  Hematologic/Lymphatic: negative  Behavioral/Psychiatric: negative  Allergic/Immunologic: negative      Physical Exam:     Visit Vitals  /77 (BP 1 Location: Left arm, BP Patient Position: Sitting, BP Cuff Size: Adult)   Pulse 77   Temp 98.2 °F (36.8 °C) (Oral)   Resp 18   Ht 5' 2.5\" (1.588 m)   Wt 179 lb 6.4 oz (81.4 kg)   SpO2 95%   BMI 32.29 kg/m²       General - alert and oriented, no apparent distress  HEENT - no jaundice, no hearing imparement  Pulm - CTAB, no C/W/R  CV - RRR, no M/R/G  Abd -soft, nondistended, bowel sounds present, nontender to palpation, no hernia  Ext - pulses intact in UE and LE bilaterally, no edema  Skin - supple, no rashes  Psychiatric - normal affect, good mood    Labs  Lab Results   Component Value Date/Time    Sodium 139 08/31/2021 03:44 AM    Potassium 3.9 08/31/2021 03:44 AM    Chloride 106 08/31/2021 03:44 AM    CO2 24 08/31/2021 03:44 AM    Anion gap 9 08/31/2021 03:44 AM    Glucose 110 (H) 08/31/2021 03:44 AM    BUN 5 (L) 08/31/2021 03:44 AM    Creatinine 0.32 (L) 08/31/2021 03:44 AM    BUN/Creatinine ratio 16 08/31/2021 03:44 AM    GFR est AA >60 08/31/2021 03:44 AM    GFR est non-AA >60 08/31/2021 03:44 AM    Calcium 8.7 08/31/2021 03:44 AM    Bilirubin, total 0.7 08/29/2021 03:09 PM    Alk.  phosphatase 172 (H) 08/29/2021 03:09 PM    Protein, total 7.8 08/29/2021 03:09 PM    Albumin 3.2 (L) 08/29/2021 03:09 PM    Globulin 4.6 (H) 08/29/2021 03:09 PM    A-G Ratio 0.7 (L) 08/29/2021 03:09 PM    ALT (SGPT) 32 08/29/2021 03:09 PM    AST (SGOT) 25 08/29/2021 03:09 PM     Lab Results   Component Value Date/Time    WBC 8.9 08/31/2021 03:44 AM    HGB 11.5 08/31/2021 03:44 AM    HCT 36.0 08/31/2021 03:44 AM    PLATELET 675 53/38/8042 03:44 AM    MCV 91.4 08/31/2021 03:44 AM Imaging  CT abdomen-FINDINGS:   LOWER THORAX: No significant abnormality in the incidentally imaged lower chest.  LIVER: No mass. BILIARY TREE: Gallstones are noted in the gallbladder. CBD is not dilated. SPLEEN: Calcified splenic granulomas are noted. PANCREAS: No mass or ductal dilatation. ADRENALS: Unremarkable. KIDNEYS: No mass, calculus, or hydronephrosis. STOMACH: Unremarkable. SMALL BOWEL: There is inflammation of some small bowel loops adjacent to the  diverticulitis in the pelvis, with extraluminal gas tract extending to this  loop. Extra luminal gas tract also extends to the bladder dome. COLON: Perforated sigmoid diverticulitis is again demonstrated. The amount of  adjacent inflammation has decreased. Furthermore, the amount of apparent bowel  wall thickening has also improved. The previously described perisigmoid abscess  is unchanged in size, but now only contains gas and no fluid. No new  complicating features are identified. APPENDIX: Normal  PERITONEUM: No free intraperitoneal air  RETROPERITONEUM: No lymphadenopathy or aortic aneurysm. REPRODUCTIVE ORGANS: Status post hysterectomy  URINARY BLADDER: Bladder dome is mildly thickened, and extraluminal gas tract  extends to the superior bladder wall. BONES: No destructive bone lesion. ABDOMINAL WALL: No mass or hernia. ADDITIONAL COMMENTS: N/A     IMPRESSION     1. Chronic perforated sigmoid diverticulitis. Degree of colonic inflammation and  pericolonic inflammation has mildly improved. Small perisigmoid abscess now  contains only gas and no fluid, but is unchanged in size. Extraluminal gas  tracks extend to thickened small bowel loops in the pelvis as well as thickened  superior bladder wall; this is concerning for developing fistula to the small  bowel and bladder. These findings are not significantly changed. 2. Cholelithiasis. 3. Normal imaging of the liver. No evidence of portal venous gas or liver  abscess.   I have reviewed and agree with all of the pertinent images    Assessment:     Luis E Flores is a 77 y.o. female with recurrent diverticulitis with microperforation    Recommendations:     1. The patient has had some recent episodes of diverticulitis with microperforation. The attacks have been within about 3 months of each other. Due to the chronic and recent nature of these perforations I do think that surgical resection will be indicated. At this point we need to move forward with a plan for colonoscopy. She has not had a colonoscopy in her life yet. We will need to get this done here in a few weeks. She should be able to get this 6 weeks after her inflammation is improved. I will also order a CT scan to evaluate the inflammation that was seen on a previous scan to make sure this is resolved. She will come back and see me in the office after the colonoscopy and CT scan are done and they will make further plans for scheduling surgery under [facility] protocol for laparoscopic sigmoid colon resection. She will follow-up with me in 1 to 2 months. Caron Arana MD    Greater than half of the time: 45 minutes was used in counciling the patient about diagnosis and treatment plan    Ms. Aniceto Cruz has a reminder for a \"due or due soon\" health maintenance. I have asked that she contact her primary care provider for follow-up on this health maintenance.

## 2021-09-28 NOTE — PROGRESS NOTES
1. Have you been to the ER, urgent care clinic since your last visit? Hospitalized since your last visit? No    2. Have you seen or consulted any other health care providers outside of the [address] [place] [address] since your last visit? Include any pap smears or colon screening.  No

## 2021-09-28 NOTE — LETTER
9/28/2021    Patient: Castillo Rhodes   YOB: 1955   Date of Visit: 9/28/2021     Fito Villegas MD  [address] [place] [id number]  Via Fax: [phone]    Dear Fito Villegas MD,      Thank you for referring Ms. Bertha Rodriguez to [facility] [place] [address] for evaluation. My notes for this consultation are attached. If you have questions, please do not hesitate to call me. I look forward to following your patient along with you.       Sincerely,    Ashwini Montes De Oca MD

## 2021-09-29 ENCOUNTER — TELEPHONE (OUTPATIENT)
Dept: INTERNAL MEDICINE CLINIC | Age: 66
End: 2021-09-29

## 2021-09-29 NOTE — TELEPHONE ENCOUNTER
Returned call to pt, pt states the  referred her to [address] and have colonoscopy and CT scan done . Then surgery the first part of November. She is feeling better and wanted to let  know. I advised I would let her know.

## 2021-10-04 ENCOUNTER — DOCUMENTATION ONLY (OUTPATIENT)
Dept: SURGERY | Age: 66
End: 2021-10-04

## 2021-10-04 NOTE — PROGRESS NOTES
Faxed referral to [facility] with confirmation. The office will call patient to schedule appointment.

## 2021-10-29 ENCOUNTER — HOSPITAL ENCOUNTER (OUTPATIENT)
Dept: CT IMAGING | Age: 66
Discharge: HOME | End: 2021-10-29
Attending: SURGERY

## 2021-10-29 ENCOUNTER — TELEPHONE (OUTPATIENT)
Dept: SURGERY | Age: 66
End: 2021-10-29

## 2021-10-29 RX ORDER — BARIUM SULFATE 20 MG/ML
900 SUSPENSION ORAL ONCE
Status: DISPENSED | OUTPATIENT
Start: 2021-10-29 | End: 2021-10-29

## 2021-10-29 NOTE — TELEPHONE ENCOUNTER
Patients  called back wanting to inform the medical team that the patient had a normal bowel movement and they have decided they will just treat the symptoms as a cold for now and monitor the symptoms for the next few days. If things worsen, he will take her to the ER and inform us.

## 2021-10-29 NOTE — TELEPHONE ENCOUNTER
Patients  called stating that his wife is having reoccurring symptoms of diverticulitis. She is having a low grade fever, as well difficulty in bowel movements, distinct odder, and etc. They informed Dr. Barbara Beltrán as well. Patient did see the GI doctor on 10/26/21 Dr. Antony Henderson who is concerned about do a colonoscopy because he wants the patient to have the CAT scan done first. The Cat scan was scheduled for this morning, but due to low grade fever, patient was unable to proceed.  would like advice on next step of care.

## 2021-10-29 NOTE — TELEPHONE ENCOUNTER
Returned call to Mr Jonathan Gusman verified all PHI. Reviewed notes patient last seen by Dr Shruthi Clark on 9/28/21 for the Diverticulitis. Dr Shruthi Clark recommended a Colonoscopy and Ct Scan was advised to follow up after the Ct Scan. Patient was unable to have the Ct Scan done today, due to low grade fever. Mr Jonathan Gusman states they have done everything done as advised by Dr Shruthi Clark. He states Dr Alejo Alarcon didn't do the colonoscopy due to weakness in her colon. Mr Jonathan Gusman states the fever will not break she has been through this before and has been admitted for the same issue in the past.     Mr Jonathan Gusman wants to know what the plan is. He don't want his wife to end up back in the ER again.      Message forwarded to Dr Shruthi Clark and Crystal Cleveland NP.

## 2021-10-29 NOTE — TELEPHONE ENCOUNTER
Spoke with patient's , Gavi Rosa. Has been states that they were unable to get colonoscopy due to needing CT scan done before. Patient states that she started congestion. She has been treated with doxycycline for possible sinus infection. She is running a low-grade fever today and was unable to get CT scan done due to low-grade fever. She complains of abdominal pain and constipation. These are similar symptoms that she has had when she has had diverticulitis flares. Advised  to take patient to the ER. Since these are similar symptoms to her previous diverticulitis admissions.   Patient in agreement

## 2021-10-31 ENCOUNTER — HOSPITAL ENCOUNTER (INPATIENT)
Age: 66
LOS: 4 days | Discharge: HOME HEALTH | DRG: 392 | End: 2021-11-04
Attending: EMERGENCY MEDICINE | Admitting: HOSPITALIST
Payer: MEDICARE

## 2021-10-31 ENCOUNTER — APPOINTMENT (OUTPATIENT)
Dept: CT IMAGING | Age: 66
DRG: 392 | End: 2021-10-31
Attending: EMERGENCY MEDICINE
Payer: MEDICARE

## 2021-10-31 DIAGNOSIS — K57.80 DIVERTICULITIS OF INTESTINE WITH ABSCESS WITHOUT BLEEDING, UNSPECIFIED PART OF INTESTINAL TRACT: Primary | ICD-10-CM

## 2021-10-31 DIAGNOSIS — N30.00 ACUTE CYSTITIS WITHOUT HEMATURIA: ICD-10-CM

## 2021-10-31 DIAGNOSIS — K57.92 DIVERTICULITIS: ICD-10-CM

## 2021-10-31 DIAGNOSIS — R50.9 FEVER, UNSPECIFIED FEVER CAUSE: ICD-10-CM

## 2021-10-31 PROBLEM — M54.9 BACK PAIN: Status: ACTIVE | Noted: 2021-10-31

## 2021-10-31 LAB
ALBUMIN SERPL-MCNC: 2.8 G/DL (ref 3.5–5)
ALBUMIN/GLOB SERPL: 0.6 {RATIO} (ref 1.1–2.2)
ALP SERPL-CCNC: 175 U/L (ref 45–117)
ALT SERPL-CCNC: 38 U/L (ref 12–78)
ANION GAP SERPL CALC-SCNC: 6 MMOL/L (ref 5–15)
APPEARANCE UR: ABNORMAL
AST SERPL-CCNC: 26 U/L (ref 15–37)
BACTERIA URNS QL MICRO: NEGATIVE /HPF
BASOPHILS # BLD: 0.1 K/UL (ref 0–0.1)
BASOPHILS NFR BLD: 0 % (ref 0–1)
BILIRUB SERPL-MCNC: 0.7 MG/DL (ref 0.2–1)
BILIRUB UR QL: NEGATIVE
BUN SERPL-MCNC: 10 MG/DL (ref 6–20)
BUN/CREAT SERPL: 20 (ref 12–20)
CALCIUM SERPL-MCNC: 9.4 MG/DL (ref 8.5–10.1)
CHLORIDE SERPL-SCNC: 100 MMOL/L (ref 97–108)
CO2 SERPL-SCNC: 27 MMOL/L (ref 21–32)
COLOR UR: ABNORMAL
COMMENT, HOLDF: NORMAL
CREAT SERPL-MCNC: 0.49 MG/DL (ref 0.55–1.02)
DIFFERENTIAL METHOD BLD: ABNORMAL
EOSINOPHIL # BLD: 0 K/UL (ref 0–0.4)
EOSINOPHIL NFR BLD: 0 % (ref 0–7)
EPITH CASTS URNS QL MICRO: ABNORMAL /LPF
ERYTHROCYTE [DISTWIDTH] IN BLOOD BY AUTOMATED COUNT: 13.3 % (ref 11.5–14.5)
GLOBULIN SER CALC-MCNC: 4.5 G/DL (ref 2–4)
GLUCOSE SERPL-MCNC: 139 MG/DL (ref 65–100)
GLUCOSE UR STRIP.AUTO-MCNC: NEGATIVE MG/DL
HCT VFR BLD AUTO: 34.3 % (ref 35–47)
HGB BLD-MCNC: 11 G/DL (ref 11.5–16)
HGB UR QL STRIP: ABNORMAL
HYALINE CASTS URNS QL MICRO: ABNORMAL /LPF (ref 0–5)
IMM GRANULOCYTES # BLD AUTO: 0.2 K/UL (ref 0–0.04)
IMM GRANULOCYTES NFR BLD AUTO: 1 % (ref 0–0.5)
KETONES UR QL STRIP.AUTO: 15 MG/DL
LEUKOCYTE ESTERASE UR QL STRIP.AUTO: ABNORMAL
LIPASE SERPL-CCNC: 219 U/L (ref 73–393)
LYMPHOCYTES # BLD: 0.9 K/UL (ref 0.8–3.5)
LYMPHOCYTES NFR BLD: 6 % (ref 12–49)
MCH RBC QN AUTO: 27.8 PG (ref 26–34)
MCHC RBC AUTO-ENTMCNC: 32.1 G/DL (ref 30–36.5)
MCV RBC AUTO: 86.8 FL (ref 80–99)
MONOCYTES # BLD: 1.2 K/UL (ref 0–1)
MONOCYTES NFR BLD: 9 % (ref 5–13)
NEUTS SEG # BLD: 11.8 K/UL (ref 1.8–8)
NEUTS SEG NFR BLD: 84 % (ref 32–75)
NITRITE UR QL STRIP.AUTO: NEGATIVE
NRBC # BLD: 0 K/UL (ref 0–0.01)
NRBC BLD-RTO: 0 PER 100 WBC
PH UR STRIP: 6 [PH] (ref 5–8)
PLATELET # BLD AUTO: 350 K/UL (ref 150–400)
PMV BLD AUTO: 8.5 FL (ref 8.9–12.9)
POTASSIUM SERPL-SCNC: 3.6 MMOL/L (ref 3.5–5.1)
PROCALCITONIN SERPL-MCNC: 0.16 NG/ML
PROT SERPL-MCNC: 7.3 G/DL (ref 6.4–8.2)
PROT UR STRIP-MCNC: 30 MG/DL
RBC # BLD AUTO: 3.95 M/UL (ref 3.8–5.2)
RBC #/AREA URNS HPF: >100 /HPF (ref 0–5)
SAMPLES BEING HELD,HOLD: NORMAL
SODIUM SERPL-SCNC: 133 MMOL/L (ref 136–145)
SP GR UR REFRACTOMETRY: 1.01 (ref 1–1.03)
UR CULT HOLD, URHOLD: NORMAL
UROBILINOGEN UR QL STRIP.AUTO: 1 EU/DL (ref 0.2–1)
WBC # BLD AUTO: 14.2 K/UL (ref 3.6–11)
WBC URNS QL MICRO: >100 /HPF (ref 0–4)

## 2021-10-31 PROCEDURE — 99284 EMERGENCY DEPT VISIT MOD MDM: CPT

## 2021-10-31 PROCEDURE — 74011250636 HC RX REV CODE- 250/636: Performed by: HOSPITALIST

## 2021-10-31 PROCEDURE — 85025 COMPLETE CBC W/AUTO DIFF WBC: CPT

## 2021-10-31 PROCEDURE — 87186 SC STD MICRODIL/AGAR DIL: CPT

## 2021-10-31 PROCEDURE — 96375 TX/PRO/DX INJ NEW DRUG ADDON: CPT

## 2021-10-31 PROCEDURE — 80053 COMPREHEN METABOLIC PANEL: CPT

## 2021-10-31 PROCEDURE — 74011000636 HC RX REV CODE- 636: Performed by: RADIOLOGY

## 2021-10-31 PROCEDURE — 74011000250 HC RX REV CODE- 250: Performed by: HOSPITALIST

## 2021-10-31 PROCEDURE — 65270000032 HC RM SEMIPRIVATE

## 2021-10-31 PROCEDURE — 74011250636 HC RX REV CODE- 250/636: Performed by: EMERGENCY MEDICINE

## 2021-10-31 PROCEDURE — 84145 PROCALCITONIN (PCT): CPT

## 2021-10-31 PROCEDURE — 74177 CT ABD & PELVIS W/CONTRAST: CPT

## 2021-10-31 PROCEDURE — 87086 URINE CULTURE/COLONY COUNT: CPT

## 2021-10-31 PROCEDURE — 74011250637 HC RX REV CODE- 250/637: Performed by: EMERGENCY MEDICINE

## 2021-10-31 PROCEDURE — 87040 BLOOD CULTURE FOR BACTERIA: CPT

## 2021-10-31 PROCEDURE — 81001 URINALYSIS AUTO W/SCOPE: CPT

## 2021-10-31 PROCEDURE — 83690 ASSAY OF LIPASE: CPT

## 2021-10-31 PROCEDURE — 36415 COLL VENOUS BLD VENIPUNCTURE: CPT

## 2021-10-31 PROCEDURE — 96365 THER/PROPH/DIAG IV INF INIT: CPT

## 2021-10-31 PROCEDURE — 87077 CULTURE AEROBIC IDENTIFY: CPT

## 2021-10-31 RX ORDER — ACETAMINOPHEN 325 MG/1
650 TABLET ORAL
Status: COMPLETED | OUTPATIENT
Start: 2021-10-31 | End: 2021-10-31

## 2021-10-31 RX ORDER — SODIUM CHLORIDE 9 MG/ML
100 INJECTION, SOLUTION INTRAVENOUS CONTINUOUS
Status: DISCONTINUED | OUTPATIENT
Start: 2021-10-31 | End: 2021-11-04 | Stop reason: HOSPADM

## 2021-10-31 RX ORDER — ACETAMINOPHEN 325 MG/1
650 TABLET ORAL
Status: DISCONTINUED | OUTPATIENT
Start: 2021-10-31 | End: 2021-11-04 | Stop reason: HOSPADM

## 2021-10-31 RX ORDER — HYDROMORPHONE HYDROCHLORIDE 1 MG/ML
1 INJECTION, SOLUTION INTRAMUSCULAR; INTRAVENOUS; SUBCUTANEOUS
Status: DISCONTINUED | OUTPATIENT
Start: 2021-10-31 | End: 2021-11-04 | Stop reason: HOSPADM

## 2021-10-31 RX ORDER — METRONIDAZOLE 500 MG/100ML
500 INJECTION, SOLUTION INTRAVENOUS
Status: COMPLETED | OUTPATIENT
Start: 2021-10-31 | End: 2021-10-31

## 2021-10-31 RX ORDER — HYDROMORPHONE HYDROCHLORIDE 1 MG/ML
1 INJECTION, SOLUTION INTRAMUSCULAR; INTRAVENOUS; SUBCUTANEOUS
Status: DISCONTINUED | OUTPATIENT
Start: 2021-10-31 | End: 2021-10-31

## 2021-10-31 RX ORDER — ENOXAPARIN SODIUM 100 MG/ML
40 INJECTION SUBCUTANEOUS EVERY 24 HOURS
Status: DISCONTINUED | OUTPATIENT
Start: 2021-10-31 | End: 2021-11-04 | Stop reason: HOSPADM

## 2021-10-31 RX ORDER — ONDANSETRON 2 MG/ML
4 INJECTION INTRAMUSCULAR; INTRAVENOUS
Status: COMPLETED | OUTPATIENT
Start: 2021-10-31 | End: 2021-10-31

## 2021-10-31 RX ORDER — ONDANSETRON 2 MG/ML
4 INJECTION INTRAMUSCULAR; INTRAVENOUS
Status: DISCONTINUED | OUTPATIENT
Start: 2021-10-31 | End: 2021-11-04 | Stop reason: HOSPADM

## 2021-10-31 RX ORDER — METRONIDAZOLE 500 MG/100ML
500 INJECTION, SOLUTION INTRAVENOUS EVERY 12 HOURS
Status: DISCONTINUED | OUTPATIENT
Start: 2021-11-01 | End: 2021-11-02

## 2021-10-31 RX ORDER — SODIUM CHLORIDE 9 MG/ML
150 INJECTION, SOLUTION INTRAVENOUS ONCE
Status: COMPLETED | OUTPATIENT
Start: 2021-10-31 | End: 2021-10-31

## 2021-10-31 RX ADMIN — SODIUM CHLORIDE 100 ML/HR: 9 INJECTION, SOLUTION INTRAVENOUS at 18:48

## 2021-10-31 RX ADMIN — MEROPENEM 500 MG: 500 INJECTION, POWDER, FOR SOLUTION INTRAVENOUS at 23:03

## 2021-10-31 RX ADMIN — SODIUM CHLORIDE 150 ML/HR: 9 INJECTION, SOLUTION INTRAVENOUS at 16:03

## 2021-10-31 RX ADMIN — ONDANSETRON 4 MG: 2 INJECTION INTRAMUSCULAR; INTRAVENOUS at 13:00

## 2021-10-31 RX ADMIN — SODIUM CHLORIDE 1000 ML: 9 INJECTION, SOLUTION INTRAVENOUS at 12:56

## 2021-10-31 RX ADMIN — MEROPENEM 500 MG: 500 INJECTION, POWDER, FOR SOLUTION INTRAVENOUS at 18:49

## 2021-10-31 RX ADMIN — METRONIDAZOLE 500 MG: 500 SOLUTION INTRAVENOUS at 16:00

## 2021-10-31 RX ADMIN — ACETAMINOPHEN 650 MG: 325 TABLET ORAL at 12:59

## 2021-10-31 RX ADMIN — IOPAMIDOL 100 ML: 755 INJECTION, SOLUTION INTRAVENOUS at 14:23

## 2021-10-31 NOTE — PROGRESS NOTES
Clinical Pharmacy Note: Metronidazole Dosing  Indication:  Intra-abdominal infection, r/o UTI  Current regimen:  500 mg IV every 8 hours  Abx regimen: metronidazole + meropenem  Recent Labs     10/31/21  1212   WBC 14.2*   CREA 0.49*   BUN 10     Est CrCl: 76.9 ml/min; UO: --- ml/kg/hr  Temp (24hrs), Av.6 °F (37.6 °C), Min:99.6 °F (37.6 °C), Max:99.6 °F (37.6 °C)    Cultures: 10/31 urine - pending    Plan: Change to 500 mg IV every 12 hours  per [facility]-approved protocol.      Jose Alberto Lima, EPIFANIOD

## 2021-10-31 NOTE — PROGRESS NOTES
Meropenum dose adjustment  Indication:  r/o UTI / abdominal infection  Current regimen:  1gm q8h  Abx regimen: metronidazole x 1  Recent Labs     10/31/21  1212   WBC 14.2*   CREA 0.49*   BUN 10     Est CrCl: 77 ml/min  Temp (24hrs), Av.6 °F (37.6 °C), Min:99.6 °F (37.6 °C), Max:99.6 °F (37.6 °C)    Cultures: pending    Plan: Change to 500mg q6h per [facility] protocol

## 2021-10-31 NOTE — ED PROVIDER NOTES
HPI patient is a 59-year-old female with past medical history significant for glaucoma, seasonal allergies, sinus infections, urinary tract infections and complicated perforated diverticulitis presents to the ED reporting increasing abdominal pain, nausea and an episode foamy urine incontinence. She has had multiple courses of antibiotics both IV and oral since May. She presently started doxycycline on Thursday for treatment of sinus infection. She states that she has had intermittent fever for the past 24 hours and has been taking Tylenol last dose at 3 AM. She is presently under the care of Dr. Iker Olmos (gastroenterology) and Dr. Gulshan Guillaume (general surgery). She has had her doxycycline and Zofran prior to arrival but no further Tylenol or pain medications. She has not had any solid food since yesterday. Old charts reviewed. Past Medical History:   Diagnosis Date    Glaucoma     H/O seasonal allergies     [place] Mountain spotted fever     Sinus infection     UTI (urinary tract infection)        History reviewed. No pertinent surgical history. History reviewed. No pertinent family history.     Social History     Socioeconomic History    Marital status:      Spouse name: Not on file    Number of children: Not on file    Years of education: Not on file    Highest education level: Not on file   Occupational History    Not on file   Tobacco Use    Smoking status: Never Smoker    Smokeless tobacco: Never Used   Substance and Sexual Activity    Alcohol use: Not Currently    Drug use: Not Currently    Sexual activity: Not on file   Other Topics Concern    Not on file   Social History Narrative    Not on file     Social Determinants of Health     Financial Resource Strain:     Difficulty of Paying Living Expenses:    Food Insecurity:     Worried About Running Out of Food in the Last Year:     [address] in the Last Year:    Transportation Needs:     Lack of Transportation (Medical):  Lack of Transportation (Non-Medical):    Physical Activity:     Days of Exercise per Week:     Minutes of Exercise per Session:    Stress:     Feeling of Stress :    Social Connections:     Frequency of Communication with Friends and Family:     Frequency of Social Gatherings with Friends and Family:     Attends Zoroastrianism Services:     Active Member of Clubs or Organizations:     Attends Club or Organization Meetings:     Marital Status:    Intimate Partner Violence:     Fear of Current or Ex-Partner:     Emotionally Abused:     Physically Abused:     Sexually Abused: ALLERGIES: Ceftin [cefuroxime axetil], Codeine, Levaquin [levofloxacin], Pcn [penicillins], and Sulfa (sulfonamide antibiotics)    Review of Systems   Constitutional: Positive for activity change, appetite change and fever. Negative for unexpected weight change. HENT: Negative for congestion, sore throat and trouble swallowing. Eyes: Negative for visual disturbance. Respiratory: Negative for cough and shortness of breath. Cardiovascular: Negative for chest pain, palpitations and leg swelling. Gastrointestinal: Positive for abdominal pain and nausea. Negative for constipation, diarrhea and vomiting. Genitourinary: Positive for dysuria and frequency. Musculoskeletal: Negative for arthralgias and gait problem. Neurological: Negative for dizziness, light-headedness and headaches. All other systems reviewed and are negative. Vitals:    10/31/21 1159   BP: (!) 165/92   Pulse: 87   Resp: 16   Temp: 99.6 °F (37.6 °C)   SpO2: 97%   Weight: 78.9 kg (174 lb)   Height: 5' 2\" (1.575 m)            Physical Exam  Vitals and nursing note reviewed. Constitutional:       General: She is not in acute distress. Appearance: She is well-developed. She is ill-appearing. She is not toxic-appearing or diaphoretic.       Comments: Female; ; non smoker; appears uncomfortable   HENT:      Head: Normocephalic. Cardiovascular:      Rate and Rhythm: Normal rate and regular rhythm. Pulmonary:      Effort: Pulmonary effort is normal.      Breath sounds: Normal breath sounds. Abdominal:      General: There is no distension. There are no signs of injury. Palpations: Abdomen is soft. Tenderness: There is abdominal tenderness in the right upper quadrant and periumbilical area. There is no guarding or rebound. Hernia: No hernia is present. Skin:     General: Skin is warm and dry. Findings: No rash. Neurological:      General: No focal deficit present. Mental Status: She is alert and oriented to person, place, and time. MDM       Procedures      CT ABD PELV W CONT    Result Date: 10/31/2021  . 1. Worsening acute on chronic sigmoid diverticulitis, with worsening pericolonic inflammation of fat and suspected peritonitis with increasing peritoneal fluid and possible loculated collections between the sigmoid colon and the urinary bladder. 2. Persistent bladder wall thickening at the bladder dome. Labs Reviewed   URINALYSIS W/MICROSCOPIC - Abnormal; Notable for the following components:       Result Value    Appearance TURBID (*)     Protein 30 (*)     Ketone 15 (*)     Blood LARGE (*)     Leukocyte Esterase LARGE (*)     WBC >100 (*)     RBC >100 (*)     All other components within normal limits   CBC WITH AUTOMATED DIFF - Abnormal; Notable for the following components:    WBC 14.2 (*)     HGB 11.0 (*)     HCT 34.3 (*)     MPV 8.5 (*)     NEUTROPHILS 84 (*)     LYMPHOCYTES 6 (*)     IMMATURE GRANULOCYTES 1 (*)     ABS. NEUTROPHILS 11.8 (*)     ABS. MONOCYTES 1.2 (*)     ABS. IMM. GRANS. 0.2 (*)     All other components within normal limits   METABOLIC PANEL, COMPREHENSIVE - Abnormal; Notable for the following components:    Sodium 133 (*)     Glucose 139 (*)     Creatinine 0.49 (*)     Alk.  phosphatase 175 (*)     Albumin 2.8 (*)     Globulin 4.5 (*)     A-G Ratio 0.6 (*) All other components within normal limits   URINE CULTURE HOLD SAMPLE   CULTURE, URINE   SAMPLES BEING HELD   LIPASE   PROCALCITONIN     Consult gastroenterology (Dr. Nneka Maravilla)   Consult general surgery (Dr. Bryan De La Rosa covering for Dr. Cabrera Ricardo)    Discussed plan of care with Dr. Ivonne Cervantes. Perfect Serve Consult for Admission  2:48 PM    ED Room Number: R38/R38  Patient Name and age:  Abeba Terrazas 77 y.o.  female  Working Diagnosis:   1. Diverticulitis of intestine with abscess without bleeding, unspecified part of intestinal tract    2. Acute cystitis without hematuria    3. Fever, unspecified fever cause        COVID-19 Suspicion:  no  Sepsis present:  no  Reassessment needed: yes  Code Status:  Full Code  Readmission: no  Isolation Requirements:  no  Recommended Level of Care:  med/surg  Department:[facility] Adult ED - 21   Other:      3:29 PM  Patient has been reexamined and is feeling slightly better after IV fluids and medications given. Patient's results and plan of care have been reviewed with the patient and her . Patient and/or family have verbally conveyed their understanding and agreement of the patient's signs, symptoms, diagnosis, treatment and prognosis and additionally agree to be admitted. Lázaro Romero NP

## 2021-10-31 NOTE — H&P
[address]  HISTORY AND PHYSICAL    Name:  Maryan Mc  MR#:  [id number]  :  1955  ACCOUNT #:  [de-identified]  ADMIT DATE:  10/31/2021      PRIMARY CARE PHYSICIAN:  Unknown. PRESENTING COMPLAINT:  Abdominal pain and fever. HISTORY OF PRESENT ILLNESS:      The patient is a 14-year-old  female who has been diagnosed with diverticulitis,admitted twice to this hospital.  Initial admission was in 2021 for diverticulitis with microperforation. She was treated with IV antibiotics and was discharged home on p.o. antibiotics. A few months later, she had another attack of diverticulitis with microperforation that was contained. Later on, she had a PICC line and received meropenem until 2021. She is currently off antibiotics except Doxycycline which was started for presumed sinusitis few days ago. She was seen by General Surgery, Dr. Hema Escoto, on 2021. At that time, he wanted her to get a colonoscopy. The patient was subsequently seen by Dr. Nabil Vizcaino, but colonoscopy so far has not been performed. The patient had low grade fever recently which she attributes to sinusitis and was started on doxycycline by her primary care physician. She has noticed some blood in her urine today. She has no bowel movement in the last few days. In the emergency room, she was found to have elevated white count and CT showed worsening of acute on chronic sigmoid diverticulitis with worsening pericolonic inflammation, suspected peritonitis with increasing peritoneal fluid and possible loculated collection with persistent bladder wall thickening, so we are asked to admit her. The patient denies having any other complaint at this time. PAST MEDICAL HISTORY:      Significant for glaucoma, history of UTI, history of sinus infection. FAMILY HISTORY:  Not significant. ALLERGIES:  INCLUDE CEFTIN, CODEINE, LEVAQUIN, PENICILLIN, AND SULFA.     REVIEW OF SYSTEMS: Negative except as mentioned in history of present illness. All systems were reviewed. No other positive finding was noticed. PHYSICAL EXAMINATION:    GENERAL:  The patient is a 66-year-old female not in any acute distress. VITAL SIGNS:  Reveal temperature 99.6, blood pressure 165/92, pulse 87, respiratory rate is 16, saturating 97%. HEENT:  Examination reveals pupils equally reacting to light and accommodation. NECK:  Supple. There is no lymphadenopathy or JVD. CHEST:  Clear. No wheezing or crackles. CARDIOVASCULAR SYSTEM:  S1 and S2 regular. No murmur. No S3.  ABDOMEN:  Examination reveals mild tenderness on the lower abdomen but no guarding, no rigidity. No signs of peritonitis. CNS:  Examination is nonfocal.    LABORATORY DATA:  White count is 14.2, hemoglobin of 11, hematocrit 34.3, platelet count is 345,519. She has 84% segs. Sodium 133, potassium 3.6, chloride is 100, bicarbonate is 27, gap of 6, glucose 139, BUN is 10, creatinine 0.49, bilirubin 0.7, albumin is 2.8, globulin is 4.5, ALT is 38, AST is 26, alkaline phosphatase 175, lipase 219, procalcitonin 0.16. Urinalysis revealed large amount of blood, large amount of leukocyte esterase, more than 100 wbc's, more than 100 rbc's. Imaging showed worsening acute on chronic sigmoid diverticulitis with worsening pericolonic inflammation of fat and suspected peritonitis with increasing peritoneal fluid and possible loculated collection between the sigmoid colon and the urinary bladder with persistent bladder wall thickening. ASSESSMENT AND PLAN:      The patient is a 66-year-old female who has been admitted twice for chronic perforated sigmoid diverticulitis, is being admitted again with:    1. Abdominal pain and fever due to worsening of sigmoid diverticulitis with pericolonic inflammation and suspected peritonitis. We will consult General Surgery may need involvement of  Colorectal Surgery.   The patient will also be seen by GI.  2.  The patient will be started on meropenem and Flagyl as she has allergy to different antibiotics. We will obtain appropriate cultures. 3.  Hematuria, not clear if there is a fistula causing hematuria. We will let  General Surgery evaluate. 4.  Pain control. 5. DVT prophylaxis.       Daisy Rivas MD      [id number]  D:  10/31/2021 15:56  T:  10/31/2021 17:00  JOB #:  [id number]

## 2021-10-31 NOTE — ED TRIAGE NOTES
Pt arrives from home c/o abdominal pain, lower back pain, fever, urinary probelms since May. Pt reports mucousy urine  This AM which made her come to the ER today. PMHx diverticulitis.

## 2021-11-01 LAB
ANION GAP SERPL CALC-SCNC: 8 MMOL/L (ref 5–15)
BUN SERPL-MCNC: 5 MG/DL (ref 6–20)
BUN/CREAT SERPL: 12 (ref 12–20)
CALCIUM SERPL-MCNC: 8.8 MG/DL (ref 8.5–10.1)
CHLORIDE SERPL-SCNC: 106 MMOL/L (ref 97–108)
CO2 SERPL-SCNC: 25 MMOL/L (ref 21–32)
CREAT SERPL-MCNC: 0.41 MG/DL (ref 0.55–1.02)
ERYTHROCYTE [DISTWIDTH] IN BLOOD BY AUTOMATED COUNT: 13.5 % (ref 11.5–14.5)
GLUCOSE SERPL-MCNC: 117 MG/DL (ref 65–100)
HCT VFR BLD AUTO: 29.6 % (ref 35–47)
HGB BLD-MCNC: 9.4 G/DL (ref 11.5–16)
MCH RBC QN AUTO: 27.6 PG (ref 26–34)
MCHC RBC AUTO-ENTMCNC: 31.8 G/DL (ref 30–36.5)
MCV RBC AUTO: 86.8 FL (ref 80–99)
NRBC # BLD: 0 K/UL (ref 0–0.01)
NRBC BLD-RTO: 0 PER 100 WBC
PLATELET # BLD AUTO: 300 K/UL (ref 150–400)
PMV BLD AUTO: 8.4 FL (ref 8.9–12.9)
POTASSIUM SERPL-SCNC: 3.2 MMOL/L (ref 3.5–5.1)
RBC # BLD AUTO: 3.41 M/UL (ref 3.8–5.2)
SODIUM SERPL-SCNC: 139 MMOL/L (ref 136–145)
WBC # BLD AUTO: 9.1 K/UL (ref 3.6–11)

## 2021-11-01 PROCEDURE — 65270000032 HC RM SEMIPRIVATE

## 2021-11-01 PROCEDURE — 85027 COMPLETE CBC AUTOMATED: CPT

## 2021-11-01 PROCEDURE — 99232 SBSQ HOSP IP/OBS MODERATE 35: CPT | Performed by: SURGERY

## 2021-11-01 PROCEDURE — 74011250636 HC RX REV CODE- 250/636: Performed by: HOSPITALIST

## 2021-11-01 PROCEDURE — 80048 BASIC METABOLIC PNL TOTAL CA: CPT

## 2021-11-01 PROCEDURE — 74011250637 HC RX REV CODE- 250/637: Performed by: SURGERY

## 2021-11-01 PROCEDURE — 74011250637 HC RX REV CODE- 250/637: Performed by: HOSPITALIST

## 2021-11-01 PROCEDURE — 74011000250 HC RX REV CODE- 250: Performed by: HOSPITALIST

## 2021-11-01 PROCEDURE — 36415 COLL VENOUS BLD VENIPUNCTURE: CPT

## 2021-11-01 PROCEDURE — 74011250637 HC RX REV CODE- 250/637: Performed by: FAMILY MEDICINE

## 2021-11-01 PROCEDURE — 99221 1ST HOSP IP/OBS SF/LOW 40: CPT | Performed by: SURGERY

## 2021-11-01 RX ORDER — POTASSIUM CHLORIDE 750 MG/1
20 TABLET, FILM COATED, EXTENDED RELEASE ORAL DAILY
Status: DISCONTINUED | OUTPATIENT
Start: 2021-11-01 | End: 2021-11-02

## 2021-11-01 RX ORDER — ZOLPIDEM TARTRATE 5 MG/1
5 TABLET ORAL
Status: DISCONTINUED | OUTPATIENT
Start: 2021-11-01 | End: 2021-11-04 | Stop reason: HOSPADM

## 2021-11-01 RX ADMIN — METRONIDAZOLE 500 MG: 500 INJECTION, SOLUTION INTRAVENOUS at 18:50

## 2021-11-01 RX ADMIN — MEROPENEM 500 MG: 500 INJECTION, POWDER, FOR SOLUTION INTRAVENOUS at 04:31

## 2021-11-01 RX ADMIN — POTASSIUM CHLORIDE 20 MEQ: 750 TABLET, FILM COATED, EXTENDED RELEASE ORAL at 14:28

## 2021-11-01 RX ADMIN — MEROPENEM 500 MG: 500 INJECTION, POWDER, FOR SOLUTION INTRAVENOUS at 18:45

## 2021-11-01 RX ADMIN — ZOLPIDEM TARTRATE 5 MG: 5 TABLET ORAL at 22:30

## 2021-11-01 RX ADMIN — MEROPENEM 500 MG: 500 INJECTION, POWDER, FOR SOLUTION INTRAVENOUS at 22:30

## 2021-11-01 RX ADMIN — ACETAMINOPHEN 650 MG: 325 TABLET ORAL at 14:28

## 2021-11-01 RX ADMIN — METRONIDAZOLE 500 MG: 500 INJECTION, SOLUTION INTRAVENOUS at 04:31

## 2021-11-01 RX ADMIN — MEROPENEM 500 MG: 500 INJECTION, POWDER, FOR SOLUTION INTRAVENOUS at 11:00

## 2021-11-01 NOTE — PROGRESS NOTES
Transition of Care Plan  RUR 18%    General Surgery following  GI following     Disposition  Home    Transportation       Medical follow up  PCP and Specialist    Contact    Jesus Dominguez  [phone]    Reason for Admission:   Abdominal pain-Acute -diverticulitis                    RUR Score: 18%                  PCP: First and Last name:   Deyanira Waldrop MD     Name of Practice:    Are you a current patient: Yes/No: yes   Approximate date of last visit: a month ago    Can you participate in a virtual visit if needed: yes    Do you (patient/family) have any concerns for transition/discharge? No               Plan for utilizing home health: Will arrange if ordered     Current Advanced Directive/Advance Care Plan:  Prior      Healthcare Decision Maker:   Click here to complete [employer] including selection of the Healthcare Decision Maker Relationship (ie \"Primary\")            Primary Decision Maker: Jerod Carrera - Spouse - [phone]    Transition of Care Plan:      Home with  and medical follow up    Prior IV ABX  infusion   [facility]/ HCP   8/2021  Home Health  SN-  [facility]/HCP    CM met with patient in her room to introduce self and explain role. Patient confirmed demographics, PCP and insurance . Patient lives with her  in their private residence 4 levels. She is independent without use of dme. Patient said her  works from home and assists her as needed. She was self care and independent prior to admission. She and  have 2 adult children. One is in [place] and one in [facility]. Patient plans to return home when discharged with surgery planned in a few weeks. She said if she requires home IV ABX again, she would  like to use [facility] again for infusion and SN for PICC line care and lab draws. Medicare pt has received, reviewed, and signed 1st  IM letter informing them of their right to appeal the discharge.   Signed copy has been placed on pt bedside chart. CM will follow and assist with any needs that arise. Care Management Interventions  PCP Verified by CM: Yes  Mode of Transport at Discharge:  (car)  Transition of Care Consult (CM Consult): Other  Discharge Durable Medical Equipment: No  Physical Therapy Consult: No  Occupational Therapy Consult: No  Speech Therapy Consult: No  Support Systems: Spouse/Significant Other, Child(dilma) (lives in a 4 level home with her  and support from children.   self care prior to admission   no AMD)  Confirm Follow Up Transport: Family (self)  Discharge Location  Discharge Placement: Home (will arrange IV abx infusion if ordered)

## 2021-11-01 NOTE — CONSULTS
Chief Complaint: Recurrent diverticulitis    HPI:  76 yo woman with hx recurrent diverticulitis who had seen Dr. Marilyn Ramos to discuss sigmoid colectomy. She was waiting for further workup including CT scan and colonoscopy by Dr. Teri Kan when she developed hematuria. Pt reported intermittent lower abdominal pain for past month but noticed hematuria a day ago which prompted her to come to ER. She reported some nausea but no vomiting. She had fever as high as 102F last night. She had been getting IV antibiotic as outpatient until late September. CT scan showed acute on chronic sigmoid diverticulitis and possible colovesical fistula. Past Medical History:   Diagnosis Date    Glaucoma     H/O seasonal allergies     [place] spotted fever     Sinus infection     UTI (urinary tract infection)        History reviewed. No pertinent surgical history. No current facility-administered medications on file prior to encounter. Current Outpatient Medications on File Prior to Encounter   Medication Sig Dispense Refill    cetirizine (ZYRTEC) 10 mg tablet Take  by mouth nightly.  vit A/vit C/vit E/zinc/copper (PRESERVISION AREDS PO) Take  by mouth two (2) times a day.  simethicone (MYLICON) 40 XQ/6.0 mL drops Take 40 mg by mouth four (4) times daily as needed.  brimonidine (ALPHAGAN) 0.2 % ophthalmic solution INSTILL 1 DROP INTO BOTH EYES TWICE DAILY      ondansetron (ZOFRAN ODT) 4 mg disintegrating tablet  (Patient not taking: Reported on 9/28/2021)      timolol (TIMOPTIC) 0.25 % ophthalmic solution INSTILL 1 DROP IN BOTH EYES TWICE DAILY      cholecalciferol, vitamin D3, (Vitamin D3) 100 mcg (4,000 unit) cap Take  by mouth daily.  L.acid,para-B. bifidum-S.therm (RISAQUAD) 8 billion cell cap cap Take 1 Capsule by mouth daily. 30 Capsule 0    meropenem 1 g IVPB 1 g by IntraVENous route every eight (8) hours.  (Patient not taking: Reported on 9/28/2021) 84 Dose 0    zolpidem (AMBIEN) 5 mg tablet Take 5 mg by mouth nightly as needed for Sleep. (Patient not taking: Reported on 9/28/2021)         Allergies   Allergen Reactions    Ceftin [Cefuroxime Axetil] Hives    Codeine Rash    Levaquin [Levofloxacin] Other (comments)     Joint swelling    Pcn [Penicillins] Hives    Sulfa (Sulfonamide Antibiotics) Hives       Review of Systems - General ROS: negative  Psychological ROS: negative  Respiratory ROS: negative  Cardiovascular ROS: negative  Gastrointestinal ROS: positive for - abdominal pain     Visit Vitals  BP (!) 147/77 (BP 1 Location: Left upper arm, BP Patient Position: At rest)   Pulse 80   Temp 98.8 °F (37.1 °C)   Resp 16   Ht 5' 2\" (1.575 m)   Wt 174 lb (78.9 kg)   SpO2 95%   BMI 31.83 kg/m²       Physical Exam:    Gen:  NAD  Pulm:  Unlabored  Abd:  S/ND/very mild TTP in LLQ and flank. No guarding or rebound    Recent Results (from the past 24 hour(s))   SAMPLES BEING HELD    Collection Time: 10/31/21 12:12 PM   Result Value Ref Range    SAMPLES BEING HELD 1 PST 1RED 1LAV     COMMENT        Add-on orders for these samples will be processed based on acceptable specimen integrity and analyte stability, which may vary by analyte. CBC WITH AUTOMATED DIFF    Collection Time: 10/31/21 12:12 PM   Result Value Ref Range    WBC 14.2 (H) 3.6 - 11.0 K/uL    RBC 3.95 3.80 - 5.20 M/uL    HGB 11.0 (L) 11.5 - 16.0 g/dL    HCT 34.3 (L) 35.0 - 47.0 %    MCV 86.8 80.0 - 99.0 FL    MCH 27.8 26.0 - 34.0 PG    MCHC 32.1 30.0 - 36.5 g/dL    RDW 13.3 11.5 - 14.5 %    PLATELET 622 054 - 167 K/uL    MPV 8.5 (L) 8.9 - 12.9 FL    NRBC 0.0 0  WBC    ABSOLUTE NRBC 0.00 0.00 - 0.01 K/uL    NEUTROPHILS 84 (H) 32 - 75 %    LYMPHOCYTES 6 (L) 12 - 49 %    MONOCYTES 9 5 - 13 %    EOSINOPHILS 0 0 - 7 %    BASOPHILS 0 0 - 1 %    IMMATURE GRANULOCYTES 1 (H) 0.0 - 0.5 %    ABS. NEUTROPHILS 11.8 (H) 1.8 - 8.0 K/UL    ABS. LYMPHOCYTES 0.9 0.8 - 3.5 K/UL    ABS. MONOCYTES 1.2 (H) 0.0 - 1.0 K/UL    ABS.  EOSINOPHILS 0.0 0.0 - 0.4 K/UL    ABS. BASOPHILS 0.1 0.0 - 0.1 K/UL    ABS. IMM. GRANS. 0.2 (H) 0.00 - 0.04 K/UL    DF AUTOMATED     METABOLIC PANEL, COMPREHENSIVE    Collection Time: 10/31/21 12:12 PM   Result Value Ref Range    Sodium 133 (L) 136 - 145 mmol/L    Potassium 3.6 3.5 - 5.1 mmol/L    Chloride 100 97 - 108 mmol/L    CO2 27 21 - 32 mmol/L    Anion gap 6 5 - 15 mmol/L    Glucose 139 (H) 65 - 100 mg/dL    BUN 10 6 - 20 MG/DL    Creatinine 0.49 (L) 0.55 - 1.02 MG/DL    BUN/Creatinine ratio 20 12 - 20      GFR est AA >60 >60 ml/min/1.73m2    GFR est non-AA >60 >60 ml/min/1.73m2    Calcium 9.4 8.5 - 10.1 MG/DL    Bilirubin, total 0.7 0.2 - 1.0 MG/DL    ALT (SGPT) 38 12 - 78 U/L    AST (SGOT) 26 15 - 37 U/L    Alk. phosphatase 175 (H) 45 - 117 U/L    Protein, total 7.3 6.4 - 8.2 g/dL    Albumin 2.8 (L) 3.5 - 5.0 g/dL    Globulin 4.5 (H) 2.0 - 4.0 g/dL    A-G Ratio 0.6 (L) 1.1 - 2.2     LIPASE    Collection Time: 10/31/21 12:12 PM   Result Value Ref Range    Lipase 219 73 - 393 U/L   PROCALCITONIN    Collection Time: 10/31/21 12:12 PM   Result Value Ref Range    Procalcitonin 0.16 ng/mL   URINALYSIS W/MICROSCOPIC    Collection Time: 10/31/21  1:51 PM   Result Value Ref Range    Color YELLOW/STRAW      Appearance TURBID (A) CLEAR      Specific gravity 1.014 1.003 - 1.030      pH (UA) 6.0 5.0 - 8.0      Protein 30 (A) NEG mg/dL    Glucose Negative NEG mg/dL    Ketone 15 (A) NEG mg/dL    Bilirubin Negative NEG      Blood LARGE (A) NEG      Urobilinogen 1.0 0.2 - 1.0 EU/dL    Nitrites Negative NEG      Leukocyte Esterase LARGE (A) NEG      WBC >100 (H) 0 - 4 /hpf    RBC >100 (H) 0 - 5 /hpf    Epithelial cells FEW FEW /lpf    Bacteria Negative NEG /hpf    Hyaline cast 2-5 0 - 5 /lpf   URINE CULTURE HOLD SAMPLE    Collection Time: 10/31/21  1:51 PM    Specimen: Serum; Urine   Result Value Ref Range    Urine culture hold        Urine on hold in Microbiology dept for 2 days.   If unpreserved urine is submitted, it cannot be used for addtional testing after 24 hours, recollection will be required. CULTURE, BLOOD, PAIRED    Collection Time: 10/31/21  4:49 PM    Specimen: Blood   Result Value Ref Range    Special Requests: NO SPECIAL REQUESTS      Culture result: NO GROWTH AFTER 11 HOURS     METABOLIC PANEL, BASIC    Collection Time: 11/01/21  4:26 AM   Result Value Ref Range    Sodium 139 136 - 145 mmol/L    Potassium 3.2 (L) 3.5 - 5.1 mmol/L    Chloride 106 97 - 108 mmol/L    CO2 25 21 - 32 mmol/L    Anion gap 8 5 - 15 mmol/L    Glucose 117 (H) 65 - 100 mg/dL    BUN 5 (L) 6 - 20 MG/DL    Creatinine 0.41 (L) 0.55 - 1.02 MG/DL    BUN/Creatinine ratio 12 12 - 20      GFR est AA >60 >60 ml/min/1.73m2    GFR est non-AA >60 >60 ml/min/1.73m2    Calcium 8.8 8.5 - 10.1 MG/DL   CBC W/O DIFF    Collection Time: 11/01/21  4:26 AM   Result Value Ref Range    WBC 9.1 3.6 - 11.0 K/uL    RBC 3.41 (L) 3.80 - 5.20 M/uL    HGB 9.4 (L) 11.5 - 16.0 g/dL    HCT 29.6 (L) 35.0 - 47.0 %    MCV 86.8 80.0 - 99.0 FL    MCH 27.6 26.0 - 34.0 PG    MCHC 31.8 30.0 - 36.5 g/dL    RDW 13.5 11.5 - 14.5 %    PLATELET 319 966 - 896 K/uL    MPV 8.4 (L) 8.9 - 12.9 FL    NRBC 0.0 0  WBC    ABSOLUTE NRBC 0.00 0.00 - 0.01 K/uL       AP:  76 yo woman with recurrent diverticulitis    - Recurrent diverticulitis:  No emergent indication for sigmoid colectomy. May be worthwhile to try short course of IV antibiotic to allow for enough healing to proceed with one stage colectomy.  Will defer timing of operation to Dr. Adelia Rodriguez  - Clear liquids and NPO after midnight  - Recommend GI consult

## 2021-11-01 NOTE — CONSULTS
[address] [place] [address] office  [phone] NP in-hospital cell phone M-F until 4:30  After 5pm or on weekends, please call  for physician on call        NAME:  Tulio Engle   :   1955   MRN:   [id number]       Referring Physician: Kaity Lang    Consult Date: 2021 11:57 AM    Chief Complaint: diverticulitis      History of Present Illness:  Patient is a 77 y.o. who is seen in consultation at the request of Dr. Kaity Lang for diverticulitis. Medical history as listed below. She presented for recurrent diverticulitis. She had her initial episode of diverticulitis with abscess in May 2021, was treated with IV then oral antibiotics. She had reoccurrence a couple months later and was treated with IV meropenem. She presented for hematuria and fevers. his is her 3rd admission for diverticulitis. She has been unable to get colonoscopy due to reoccurrence of diverticulitis. She has minimal LLQ pain. She's lost ~60 pounds over the past 6 months. Her  is at bedside and they voice a lot of frustration. Denies NSAID's, alcohol, or tobacco. No history of EGD or colonoscopy. Grandfather had colon cancer. I have reviewed the emergency room note, hospital admission note, notes by all other clinicians who have seen the patient during this hospitalization to date. I have reviewed the problem list and the reason for this hospitalization. I have reviewed the allergies and the medications the patient was taking at home prior to this hospitalization. PMH:  Past Medical History:   Diagnosis Date    Glaucoma     H/O seasonal allergies     [place] spotted fever     Sinus infection     UTI (urinary tract infection)        PSH:  History reviewed. No pertinent surgical history. Allergies:   Allergies   Allergen Reactions    Ceftin [Cefuroxime Axetil] Hives    Codeine Rash  Levaquin [Levofloxacin] Other (comments)     Joint swelling    Pcn [Penicillins] Hives    Sulfa (Sulfonamide Antibiotics) Hives       Home Medications:  Prior to Admission Medications   Prescriptions Last Dose Informant Patient Reported? Taking? L.acid,para-B. bifidum-S.therm (RISAQUAD) 8 billion cell cap cap   No No   Sig: Take 1 Capsule by mouth daily. brimonidine (ALPHAGAN) 0.2 % ophthalmic solution   Yes No   Sig: INSTILL 1 DROP INTO BOTH EYES TWICE DAILY   cetirizine (ZYRTEC) 10 mg tablet   Yes No   Sig: Take  by mouth nightly. cholecalciferol, vitamin D3, (Vitamin D3) 100 mcg (4,000 unit) cap   Yes No   Sig: Take  by mouth daily. meropenem 1 g IVPB   No No   Si g by IntraVENous route every eight (8) hours. Patient not taking: Reported on 2021   ondansetron (ZOFRAN ODT) 4 mg disintegrating tablet   Yes No   Patient not taking: Reported on 2021   simethicone (MYLICON) 40 UB/7.2 mL drops   Yes No   Sig: Take 40 mg by mouth four (4) times daily as needed. timolol (TIMOPTIC) 0.25 % ophthalmic solution   Yes No   Sig: INSTILL 1 DROP IN BOTH EYES TWICE DAILY   vit A/vit C/vit E/zinc/copper (PRESERVISION AREDS PO)   Yes No   Sig: Take  by mouth two (2) times a day. zolpidem (AMBIEN) 5 mg tablet   Yes No   Sig: Take 5 mg by mouth nightly as needed for Sleep.    Patient not taking: Reported on 2021      Facility-Administered Medications: None       Hospital Medications:  Current Facility-Administered Medications   Medication Dose Route Frequency    ondansetron (ZOFRAN) injection 4 mg  4 mg IntraVENous Q6H PRN    0.9% sodium chloride infusion  100 mL/hr IntraVENous CONTINUOUS    metroNIDAZOLE (FLAGYL) IVPB premix 500 mg  500 mg IntraVENous Q12H    meropenem (MERREM) 500 mg in sterile water (preservative free) 10 mL IV syringe  500 mg IntraVENous Q6H    acetaminophen (TYLENOL) tablet 650 mg  650 mg Oral Q6H PRN    enoxaparin (LOVENOX) injection 40 mg  40 mg SubCUTAneous Q24H    HYDROmorphone (DILAUDID) injection 1 mg  1 mg IntraVENous Q3H PRN       Social History:  Social History     Tobacco Use    Smoking status: Never Smoker    Smokeless tobacco: Never Used   Substance Use Topics    Alcohol use: Not Currently       Family History:  History reviewed. No pertinent family history. Review of Systems:  Constitutional: +fever, negative chills, +weight loss  Eyes:   negative visual changes  ENT:   negative sore throat, tongue or lip swelling  Respiratory:  negative cough, negative dyspnea  Cards:  negative for chest pain, palpitations, lower extremity edema  GI:   See HPI  :  negative for frequency, dysuria  Integument:  negative for rash and pruritus  Heme:  negative for easy bruising and gum/nose bleeding  Musculoskeletal:negative for myalgias, back pain and muscle weakness  Neuro:    negative for headaches, dizziness, vertigo  Psych: negative for feelings of anxiety, depression     Objective:     Patient Vitals for the past 8 hrs:   BP Temp Pulse Resp SpO2   11/01/21 0444 (!) 147/77 98.8 °F (37.1 °C) 80 16 95 %     No intake/output data recorded. 10/30 1901 - 11/01 0700  In: 1360 [I.V.:1360]  Out: -     EXAM:     CONST:  Pleasant lady lying in bed, no acute distress   NEURO:  alert and oriented x 3   HEENT: EOMI, no scleral icterus   LUNGS: No increased WOB   CARD:   Regular rate   ABD:  soft, minimal left flank tenderness, no rebound, no masses, non distended   EXT:  no edema, warm   PSYCH: full, not anxious     Data Review     Recent Labs     11/01/21  0426 10/31/21  1212   WBC 9.1 14.2*   HGB 9.4* 11.0*   HCT 29.6* 34.3*    350     Recent Labs     11/01/21  0426 10/31/21  1212    133*   K 3.2* 3.6    100   CO2 25 27   BUN 5* 10   CREA 0.41* 0.49*   * 139*   CA 8.8 9.4     Recent Labs     10/31/21  1212   *   TP 7.3   ALB 2.8*   GLOB 4.5*   LPSE 219     No results for input(s): INR, PTP, APTT, INREXT in the last 72 hours. IMPRESSION  1.  Worsening acute on chronic sigmoid diverticulitis, with worsening pericolonic  inflammation of fat and suspected peritonitis with increasing peritoneal fluid  and possible loculated collections between the sigmoid colon and the urinary  bladder. 2. Persistent bladder wall thickening at the bladder dome.      Assessment:     · Recurrent diverticulitis with abscess/peritonitis: initial in May then a few months later treated with IV meropenum   · Hematuria      Patient Active Problem List   Diagnosis Code    Diverticulitis K57.92    Diverticulitis large intestine w/o perforation or abscess w/o bleeding K57.32    Back pain M54.9     Plan:       · IV antibiotics  · CLD  · Surgery following  · Further plans per Dr. Amaya Nunez  · Thank you for allowing me to participate in care of [address] [place] [address] By: Samuel Castaneda NP     11/1/2021  11:57 AM

## 2021-11-01 NOTE — PROGRESS NOTES
[facility]  Hospitalist Group                                                                                          Hospitalist Progress Note  Natalia Lopez MD  Answering service: [phone] OR [phone] from in house phone              Progress Note    Patient: Raphael Akhtar MRN: [id number]  SSN: [id number]    YOB: 1955  Age: 77 y.o. Sex: female      Admit Date: 10/31/2021    LOS: 1 day     Subjective:     Patient presented with abdominal pain and found to have recurrent acute diverticulitis. Patient's abdominal pain is improving. Denies any nausea vomiting. Stool has some bloody mucus. Objective:     Vitals:    10/31/21 1159 10/31/21 1904 10/31/21 2216 11/01/21 0444   BP: (!) 165/92 138/76 138/86 (!) 147/77   Pulse: 87 81 83 80   Resp: 16 16 16 16   Temp: 99.6 °F (37.6 °C) 99 °F (37.2 °C) 98.8 °F (37.1 °C) 98.8 °F (37.1 °C)   SpO2: 97% 100% 100% 95%   Weight: 78.9 kg (174 lb)      Height: 5' 2\" (1.575 m)           Intake and Output:  Current Shift: No intake/output data recorded. Last three shifts: 10/30 1901 - 11/01 0700  In: 1360 [I.V.:1360]  Out: -     Physical Exam:   GENERAL: alert, cooperative, no distress, appears stated age  THROAT & NECK: normal and no erythema or exudates noted. LUNG: clear to auscultation bilaterally  HEART: regular rate and rhythm, S1, S2 normal, no murmur, click, rub or gallop  ABDOMEN: soft, tenderness left lower quadrant, bowel sounds normal. No masses,  no organomegaly  EXTREMITIES:  extremities normal, atraumatic, no cyanosis or edema  SKIN: no rash or abnormalities  NEUROLOGIC: AOx3. PSYCHIATRIC: non focal    Lab/Data Review: All lab results for the last 24 hours reviewed.      Recent Results (from the past 24 hour(s))   URINALYSIS W/MICROSCOPIC    Collection Time: 10/31/21  1:51 PM   Result Value Ref Range    Color YELLOW/STRAW      Appearance TURBID (A) CLEAR      Specific gravity 1.014 1.003 - 1.030      pH (UA) 6.0 5.0 - 8.0      Protein 30 (A) NEG mg/dL    Glucose Negative NEG mg/dL    Ketone 15 (A) NEG mg/dL    Bilirubin Negative NEG      Blood LARGE (A) NEG      Urobilinogen 1.0 0.2 - 1.0 EU/dL    Nitrites Negative NEG      Leukocyte Esterase LARGE (A) NEG      WBC >100 (H) 0 - 4 /hpf    RBC >100 (H) 0 - 5 /hpf    Epithelial cells FEW FEW /lpf    Bacteria Negative NEG /hpf    Hyaline cast 2-5 0 - 5 /lpf   URINE CULTURE HOLD SAMPLE    Collection Time: 10/31/21  1:51 PM    Specimen: Serum; Urine   Result Value Ref Range    Urine culture hold        Urine on hold in Microbiology dept for 2 days. If unpreserved urine is submitted, it cannot be used for addtional testing after 24 hours, recollection will be required.    CULTURE, BLOOD, PAIRED    Collection Time: 10/31/21  4:49 PM    Specimen: Blood   Result Value Ref Range    Special Requests: NO SPECIAL REQUESTS      Culture result: NO GROWTH AFTER 11 HOURS     METABOLIC PANEL, BASIC    Collection Time: 11/01/21  4:26 AM   Result Value Ref Range    Sodium 139 136 - 145 mmol/L    Potassium 3.2 (L) 3.5 - 5.1 mmol/L    Chloride 106 97 - 108 mmol/L    CO2 25 21 - 32 mmol/L    Anion gap 8 5 - 15 mmol/L    Glucose 117 (H) 65 - 100 mg/dL    BUN 5 (L) 6 - 20 MG/DL    Creatinine 0.41 (L) 0.55 - 1.02 MG/DL    BUN/Creatinine ratio 12 12 - 20      GFR est AA >60 >60 ml/min/1.73m2    GFR est non-AA >60 >60 ml/min/1.73m2    Calcium 8.8 8.5 - 10.1 MG/DL   CBC W/O DIFF    Collection Time: 11/01/21  4:26 AM   Result Value Ref Range    WBC 9.1 3.6 - 11.0 K/uL    RBC 3.41 (L) 3.80 - 5.20 M/uL    HGB 9.4 (L) 11.5 - 16.0 g/dL    HCT 29.6 (L) 35.0 - 47.0 %    MCV 86.8 80.0 - 99.0 FL    MCH 27.6 26.0 - 34.0 PG    MCHC 31.8 30.0 - 36.5 g/dL    RDW 13.5 11.5 - 14.5 %    PLATELET 256 857 - 393 K/uL    MPV 8.4 (L) 8.9 - 12.9 FL    NRBC 0.0 0  WBC    ABSOLUTE NRBC 0.00 0.00 - 0.01 K/uL        Imaging:    CT ABD PELV W CONT    Result Date: 10/31/2021  EXAM: CT ABD PELV W CONT INDICATION: concern for diverticulitis flare; possible bladder/colon fistula COMPARISON: 9/14/2021 CONTRAST: 100 mL of Isovue-370. TECHNIQUE: Following the uneventful intravenous administration of contrast, thin axial images were obtained through the abdomen and pelvis. Coronal and sagittal reconstructions were generated. Oral contrast was not administered. CT dose reduction was achieved through use of a standardized protocol tailored for this examination and automatic exposure control for dose modulation. FINDINGS: LOWER THORAX: No significant abnormality in the incidentally imaged lower chest. LIVER: No mass. BILIARY TREE: The gallbladder contains numerous intraluminal calculi stable. CBD is not dilated. SPLEEN: within normal limits. PANCREAS: No mass or ductal dilatation. ADRENALS: Unremarkable. KIDNEYS: No mass, calculus, or hydronephrosis. STOMACH: Unremarkable. SMALL BOWEL: Small bowel loops in the pelvis are nondilated but lie within wide rapid pelvic inflammation centered around sigmoid wall thickening and previously described acute on chronic diverticulitis. COLON: Slight worsening of sigmoid colonic wall thickening and pericolonic stranding of fat. APPENDIX: Normal PERITONEUM: Increasing fluid in the pelvic peritoneal space, with a question of peritoneal enhancement. There may be loculated small collections of peritoneal fluid with surrounding enhancement between the rectosigmoid colon and the urinary bladder. RETROPERITONEUM: No lymphadenopathy or aortic aneurysm. REPRODUCTIVE ORGANS: Stable URINARY BLADDER: The urinary bladder shows persistent thickening along the bladder dome, with increased amount of perivesical fluid since the prior study. BONES: No destructive bone lesion. ABDOMINAL WALL: No mass or hernia.  ADDITIONAL COMMENTS: N/A     . 1. Worsening acute on chronic sigmoid diverticulitis, with worsening pericolonic inflammation of fat and suspected peritonitis with increasing peritoneal fluid and possible loculated collections between the sigmoid colon and the urinary bladder. 2. Persistent bladder wall thickening at the bladder dome. Assessment and Plan:     Acute diverticulitis  -CT of the abdomen and pelvis shows acute on chronic sigmoid diverticulitis with pericolonic inflammation, increasing peritoneal fluid and possible loculated collections between the sigmoid: The urinary bladder  -Patient with recurrent acute diverticulitis, last admission in September  -General surgery and GI following  -On empiric meropenem and Flagyl  -Starting clear liquids per general surgery's recommendation, n.p.o. after midnight    Urinary tract infection  -Continue current antibiotics and follow urine and blood cultures    Hypokalemia  -Replace potassium  -Repeat level in a.m. Discharge disposition: Likely more than 48 hours, further plan per general surgery and GI.     Signed By: Anyi Reyna MD     November 1, 2021

## 2021-11-01 NOTE — PROGRESS NOTES
[facility] at [facility] Surgery      Subjective     Patient is feeling better today, she is n.p.o., less abdominal pain, no nausea vomiting, no fever    Objective     Patient Vitals for the past 24 hrs:   Temp Pulse Resp BP SpO2   11/01/21 0444 98.8 °F (37.1 °C) 80 16 (!) 147/77 95 %   10/31/21 2216 98.8 °F (37.1 °C) 83 16 138/86 100 %   10/31/21 1904 99 °F (37.2 °C) 81 16 138/76 100 %       Date 10/31/21 0700 - 11/01/21 0659 11/01/21 0700 - 11/02/21 0659   Shift 1737-9251 5731-1152 24 Hour Total 6862-9870 1604-1289 24 Hour Total   INTAKE   I.V.(mL/kg/hr) 1360(1.4)  1360(0.7)        Volume (sodium chloride 0.9 % bolus infusion 1,000 mL) 1000  1000        Volume (0.9% sodium chloride infusion) 260  260        Volume (metroNIDAZOLE (FLAGYL) IVPB premix 500 mg) 100  100      Shift Total(mL/kg) 3076(40.5)  1842(80.1)      OUTPUT   Shift Total(mL/kg)         NET 1360  1360      Weight (kg) 78.9 78.9 78.9 78.9 78.9 78.9       PE  Pulm - CTAB  CV - RRR  Abd -soft, nondistended, bowel sounds present, mild tenderness to palpation    Labs  Lab Results   Component Value Date/Time    Sodium 139 11/01/2021 04:26 AM    Potassium 3.2 (L) 11/01/2021 04:26 AM    Chloride 106 11/01/2021 04:26 AM    CO2 25 11/01/2021 04:26 AM    Anion gap 8 11/01/2021 04:26 AM    Glucose 117 (H) 11/01/2021 04:26 AM    BUN 5 (L) 11/01/2021 04:26 AM    Creatinine 0.41 (L) 11/01/2021 04:26 AM    BUN/Creatinine ratio 12 11/01/2021 04:26 AM    GFR est AA >60 11/01/2021 04:26 AM    GFR est non-AA >60 11/01/2021 04:26 AM    Calcium 8.8 11/01/2021 04:26 AM    Bilirubin, total 0.7 10/31/2021 12:12 PM    Alk.  phosphatase 175 (H) 10/31/2021 12:12 PM    Protein, total 7.3 10/31/2021 12:12 PM    Albumin 2.8 (L) 10/31/2021 12:12 PM    Globulin 4.5 (H) 10/31/2021 12:12 PM    A-G Ratio 0.6 (L) 10/31/2021 12:12 PM    ALT (SGPT) 38 10/31/2021 12:12 PM    AST (SGOT) 26 10/31/2021 12:12 PM     Lab Results   Component Value Date/Time    WBC 9.1 11/01/2021 04:26 AM    HGB 9.4 (L) 11/01/2021 04:26 AM    HCT 29.6 (L) 11/01/2021 04:26 AM    PLATELET 540 48/67/6102 04:26 AM    MCV 86.8 11/01/2021 04:26 AM         Assessment     Genevieve Key is a 77 y. o.yr old female recurrent diverticulitis    Plan     She appears to have a recurrent episode of diverticulitis. Is not quite clear if she has another perforation or if this is just inflammation of the colon. She certainly does not seem to have any peritonitis or need for urgent operation right now. Right now I think a short course of antibiotics and getting her to surgery here in the next 2 weeks or so will probably be the plan. Generally I would like to have a colonoscopy before surgery but I do not think we have time to get that done in this case. I will schedule her for a single stage colectomy here in the near future. I put in a consult for infectious disease to see her again for antibiotic therapy. I do not think a long course of IV antibiotics with be necessary since we will be doing surgery soon her. I would like to get the acute phase of diverticulitis resolved before doing surgery. She can advance to clear liquid diet. Also considering getting a CT scan possibly Wednesday with p.o. contrast to see if the contrast gets to the colon to see if there is any sign of any fistula to the bladder.     Vickie Kwon MD

## 2021-11-01 NOTE — ROUTINE PROCESS
TRANSFER - OUT REPORT:    Verbal report given to Jennifer (name) on Mirlande Mail  being transferred to  (unit) for routine progression of care       Report consisted of patients Situation, Background, Assessment and   Recommendations(SBAR). Information from the following report(s) SBAR, Kardex, ED Summary, Intake/Output, MAR and Recent Results was reviewed with the receiving nurse. Lines:   Peripheral IV 10/31/21 Right Antecubital (Active)   Site Assessment Clean, dry, & intact 10/31/21 1213   Phlebitis Assessment 0 10/31/21 1213   Infiltration Assessment 0 10/31/21 1213   Dressing Status Clean, dry, & intact 10/31/21 1213   Dressing Type Transparent 10/31/21 1213   Hub Color/Line Status Pink;Flushed;Patent 10/31/21 1213   Action Taken Blood drawn 10/31/21 1213   Alcohol Cap Used Yes 10/31/21 1213        Opportunity for questions and clarification was provided.       Patient transported with:   Registered Nurse

## 2021-11-02 LAB
ANION GAP SERPL CALC-SCNC: 5 MMOL/L (ref 5–15)
BACTERIA SPEC CULT: ABNORMAL
BASOPHILS # BLD: 0 K/UL (ref 0–0.1)
BASOPHILS NFR BLD: 0 % (ref 0–1)
BUN SERPL-MCNC: 5 MG/DL (ref 6–20)
BUN/CREAT SERPL: 12 (ref 12–20)
CALCIUM SERPL-MCNC: 9 MG/DL (ref 8.5–10.1)
CC UR VC: ABNORMAL
CHLORIDE SERPL-SCNC: 106 MMOL/L (ref 97–108)
CO2 SERPL-SCNC: 27 MMOL/L (ref 21–32)
CREAT SERPL-MCNC: 0.43 MG/DL (ref 0.55–1.02)
DIFFERENTIAL METHOD BLD: ABNORMAL
EOSINOPHIL # BLD: 0.1 K/UL (ref 0–0.4)
EOSINOPHIL NFR BLD: 2 % (ref 0–7)
ERYTHROCYTE [DISTWIDTH] IN BLOOD BY AUTOMATED COUNT: 13.5 % (ref 11.5–14.5)
GLUCOSE SERPL-MCNC: 106 MG/DL (ref 65–100)
HCT VFR BLD AUTO: 31 % (ref 35–47)
HGB BLD-MCNC: 9.8 G/DL (ref 11.5–16)
IMM GRANULOCYTES # BLD AUTO: 0 K/UL
IMM GRANULOCYTES NFR BLD AUTO: 0 %
LYMPHOCYTES # BLD: 1.4 K/UL (ref 0.8–3.5)
LYMPHOCYTES NFR BLD: 22 % (ref 12–49)
MCH RBC QN AUTO: 27.5 PG (ref 26–34)
MCHC RBC AUTO-ENTMCNC: 31.6 G/DL (ref 30–36.5)
MCV RBC AUTO: 87.1 FL (ref 80–99)
METAMYELOCYTES NFR BLD MANUAL: 2 %
MONOCYTES # BLD: 0.3 K/UL (ref 0–1)
MONOCYTES NFR BLD: 5 % (ref 5–13)
MYELOCYTES NFR BLD MANUAL: 1 %
NEUTS SEG # BLD: 4.3 K/UL (ref 1.8–8)
NEUTS SEG NFR BLD: 68 % (ref 32–75)
NRBC # BLD: 0 K/UL (ref 0–0.01)
NRBC BLD-RTO: 0 PER 100 WBC
PLATELET # BLD AUTO: 298 K/UL (ref 150–400)
PMV BLD AUTO: 8.1 FL (ref 8.9–12.9)
POTASSIUM SERPL-SCNC: 3.3 MMOL/L (ref 3.5–5.1)
RBC # BLD AUTO: 3.56 M/UL (ref 3.8–5.2)
RBC MORPH BLD: ABNORMAL
SERVICE CMNT-IMP: ABNORMAL
SODIUM SERPL-SCNC: 138 MMOL/L (ref 136–145)
WBC # BLD AUTO: 6.3 K/UL (ref 3.6–11)

## 2021-11-02 PROCEDURE — 85025 COMPLETE CBC W/AUTO DIFF WBC: CPT

## 2021-11-02 PROCEDURE — 36415 COLL VENOUS BLD VENIPUNCTURE: CPT

## 2021-11-02 PROCEDURE — 80048 BASIC METABOLIC PNL TOTAL CA: CPT

## 2021-11-02 PROCEDURE — 65270000032 HC RM SEMIPRIVATE

## 2021-11-02 PROCEDURE — 74011250637 HC RX REV CODE- 250/637: Performed by: SURGERY

## 2021-11-02 PROCEDURE — 74011000250 HC RX REV CODE- 250: Performed by: HOSPITALIST

## 2021-11-02 PROCEDURE — 36573 INSJ PICC RS&I 5 YR+: CPT | Performed by: INTERNAL MEDICINE

## 2021-11-02 PROCEDURE — 99255 IP/OBS CONSLTJ NEW/EST HI 80: CPT | Performed by: INTERNAL MEDICINE

## 2021-11-02 PROCEDURE — 74011250636 HC RX REV CODE- 250/636: Performed by: HOSPITALIST

## 2021-11-02 PROCEDURE — 99232 SBSQ HOSP IP/OBS MODERATE 35: CPT | Performed by: SURGERY

## 2021-11-02 PROCEDURE — 74011250637 HC RX REV CODE- 250/637: Performed by: FAMILY MEDICINE

## 2021-11-02 RX ORDER — POTASSIUM CHLORIDE 750 MG/1
20 TABLET, FILM COATED, EXTENDED RELEASE ORAL 2 TIMES DAILY
Status: COMPLETED | OUTPATIENT
Start: 2021-11-02 | End: 2021-11-03

## 2021-11-02 RX ADMIN — MEROPENEM 500 MG: 500 INJECTION, POWDER, FOR SOLUTION INTRAVENOUS at 18:48

## 2021-11-02 RX ADMIN — POTASSIUM CHLORIDE 20 MEQ: 750 TABLET, FILM COATED, EXTENDED RELEASE ORAL at 18:00

## 2021-11-02 RX ADMIN — MEROPENEM 500 MG: 500 INJECTION, POWDER, FOR SOLUTION INTRAVENOUS at 04:27

## 2021-11-02 RX ADMIN — MEROPENEM 500 MG: 500 INJECTION, POWDER, FOR SOLUTION INTRAVENOUS at 23:07

## 2021-11-02 RX ADMIN — ZOLPIDEM TARTRATE 5 MG: 5 TABLET ORAL at 20:45

## 2021-11-02 RX ADMIN — POTASSIUM CHLORIDE 20 MEQ: 750 TABLET, FILM COATED, EXTENDED RELEASE ORAL at 10:09

## 2021-11-02 RX ADMIN — MEROPENEM 500 MG: 500 INJECTION, POWDER, FOR SOLUTION INTRAVENOUS at 10:10

## 2021-11-02 RX ADMIN — METRONIDAZOLE 500 MG: 500 INJECTION, SOLUTION INTRAVENOUS at 03:32

## 2021-11-02 NOTE — CONSULTS
Infectious Disease Consult Note    Reason for Consult: Diverticulitis  Date of Consultation: November 2, 2021  Date of Admission: 10/31/2021  Referring Physician: Dr Mc Barrios      HPI:  Ms Nicky Yanez is a 22-year-old lady with a history of recurrent sigmoid diverticulitis, concern for abscess, RMSF, multiple antibiotic associated allergies who presented with complaints of fevers, mucousy discharge both from rectal and possibly urinary well per patient. She was seen in our office on 9/22/2021 at which time she was improving clinically and plan was to complete a course of IV antibiotics close to 4 weeks and be reassessed for surgical intervention. After patient left our office that day she called back saying she was very nauseous and wanted to stop antibiotics. She was nearing her 4-week course of meropenem and clinically much improved and therefore her antibiotics were stopped and PICC line was removed. We did not have much oral options given her antibiotic allergies to Levaquin penicillin and sulfa. She says she continued to do well and followed up with surgery. She was supposed to then obtain a colonoscopy which has not happened yet. She tells me a week ago she started getting congestion headache and was diagnosed with sinusitis and started on doxycycline. She presented to the ER on 10/31/2021 with increasing abdominal pain, nausea and discharge that she describes as being pinkish with mucus and thinks it was in her urine. She is not very certain if this was rectal discharge or vaginal discharge, urinary discharge. She says she felt like fluids were gushing out of her in her undergarments were covered with mucousy pinkish fluid. Patient states that she felt a lot better once antibiotics were started systemically. She says her stool is now formed. She denies abdominal pain currently fevers or chills. She has some mild congestion but denies any other upper respiratory symptoms.     From chart review she is currently afebrile. Her white count on presentation was 14.2 and subsequently normalized. She had a neutrophilic predominance. Urine analysis on 10/31/2021 had greater than 100 WBCs, negative bacteria, large blood. Her renal function LFTs and bilirubin are not elevated. Her procalcitonin was 0.16. Blood culture has no growth in 2 days. Urine culture from 10/31/2021 has gram-negative rods. She was started on IV meropenem and Flagyl. A CT scan of her abdomen pelvis with contrast on 10/31/2021 was read as \"worsening acute on chronic sigmoid diverticulitis with worsening pericolonic inflammation of fat and suspected peritonitis with increasing peritoneal fluid and possible loculated collections between the sigmoid colon and the urinary bladder. Persistent bladder wall thickening at the bladder dome. \"     Past Medical History:  Past Medical History:   Diagnosis Date    Glaucoma     H/O seasonal allergies     [place] spotted fever     Sinus infection     UTI (urinary tract infection)          Surgical History:  History reviewed. No pertinent surgical history. Family History:   History reviewed. No pertinent family history. Social History:     · Living Situation: At home with family  · Tobacco: Denied  · Alcohol: Denied  · Illicit Drugs: Denied  · Sexual History:wt     Allergies: Allergies   Allergen Reactions    Ceftin [Cefuroxime Axetil] Hives    Codeine Rash    Levaquin [Levofloxacin] Other (comments)     Joint swelling    Pcn [Penicillins] Hives    Sulfa (Sulfonamide Antibiotics) Hives         Review of Systems:    10 point review of systems per HPI  All others negative    Medications:  No current facility-administered medications on file prior to encounter. Current Outpatient Medications on File Prior to Encounter   Medication Sig Dispense Refill    cetirizine (ZYRTEC) 10 mg tablet Take  by mouth nightly.       vit A/vit C/vit E/zinc/copper (PRESERVISION AREDS PO) Take  by mouth two (2) times a day.  simethicone (MYLICON) 40 CK/9.9 mL drops Take 40 mg by mouth four (4) times daily as needed.  brimonidine (ALPHAGAN) 0.2 % ophthalmic solution INSTILL 1 DROP INTO BOTH EYES TWICE DAILY      ondansetron (ZOFRAN ODT) 4 mg disintegrating tablet  (Patient not taking: Reported on 9/28/2021)      timolol (TIMOPTIC) 0.25 % ophthalmic solution INSTILL 1 DROP IN BOTH EYES TWICE DAILY      cholecalciferol, vitamin D3, (Vitamin D3) 100 mcg (4,000 unit) cap Take  by mouth daily.  L.acid,para-B. bifidum-S.therm (RISAQUAD) 8 billion cell cap cap Take 1 Capsule by mouth daily. 30 Capsule 0    meropenem 1 g IVPB 1 g by IntraVENous route every eight (8) hours. (Patient not taking: Reported on 9/28/2021) 84 Dose 0    zolpidem (AMBIEN) 5 mg tablet Take 5 mg by mouth nightly as needed for Sleep.  (Patient not taking: Reported on 9/28/2021)             Physical Exam:    Vitals:   Patient Vitals for the past 24 hrs:   Temp Pulse Resp BP SpO2   11/02/21 0802 98.7 °F (37.1 °C) 69 17 118/80 95 %   11/02/21 0403 98.4 °F (36.9 °C) 65 16 (!) 153/75 98 %   11/02/21 0017 98 °F (36.7 °C) 65 16 (!) 147/71 98 %   11/01/21 1932  72 16 (!) 151/89 97 %   11/01/21 1500 97.9 °F (36.6 °C) 69 16 130/73 95 %   ·   · GEN: NAD  · HEENT: No scleral icterus, no thrush  · CV: S1, S2 heard regularly,  · Lungs: Clear to auscultation bilaterally  · Abdomen: soft, non distended, non tender, no CVA tenderness   · Extremities: no edema  · Neuro: Alert, oriented to time, place and situation, moves all extremities to commands, verbal   · Skin: no rash  · Psych: good affect, good eye contact, non tearful   · Musculoskeletal no erythema noted of the upper or lower extremity joints      Labs:   Recent Results (from the past 24 hour(s))   METABOLIC PANEL, BASIC    Collection Time: 11/02/21 12:37 AM   Result Value Ref Range    Sodium 138 136 - 145 mmol/L    Potassium 3.3 (L) 3.5 - 5.1 mmol/L    Chloride 106 97 - 108 mmol/L    CO2 27 21 - 32 mmol/L    Anion gap 5 5 - 15 mmol/L    Glucose 106 (H) 65 - 100 mg/dL    BUN 5 (L) 6 - 20 MG/DL    Creatinine 0.43 (L) 0.55 - 1.02 MG/DL    BUN/Creatinine ratio 12 12 - 20      GFR est AA >60 >60 ml/min/1.73m2    GFR est non-AA >60 >60 ml/min/1.73m2    Calcium 9.0 8.5 - 10.1 MG/DL   CBC WITH AUTOMATED DIFF    Collection Time: 11/02/21 12:37 AM   Result Value Ref Range    WBC 6.3 3.6 - 11.0 K/uL    RBC 3.56 (L) 3.80 - 5.20 M/uL    HGB 9.8 (L) 11.5 - 16.0 g/dL    HCT 31.0 (L) 35.0 - 47.0 %    MCV 87.1 80.0 - 99.0 FL    MCH 27.5 26.0 - 34.0 PG    MCHC 31.6 30.0 - 36.5 g/dL    RDW 13.5 11.5 - 14.5 %    PLATELET 654 748 - 902 K/uL    MPV 8.1 (L) 8.9 - 12.9 FL    NRBC 0.0 0  WBC    ABSOLUTE NRBC 0.00 0.00 - 0.01 K/uL    NEUTROPHILS 68 32 - 75 %    LYMPHOCYTES 22 12 - 49 %    MONOCYTES 5 5 - 13 %    EOSINOPHILS 2 0 - 7 %    BASOPHILS 0 0 - 1 %    METAMYELOCYTES 2 (H) 0 %    MYELOCYTES 1 (H) 0 %    IMMATURE GRANULOCYTES 0 %    ABS. NEUTROPHILS 4.3 1.8 - 8.0 K/UL    ABS. LYMPHOCYTES 1.4 0.8 - 3.5 K/UL    ABS. MONOCYTES 0.3 0.0 - 1.0 K/UL    ABS. EOSINOPHILS 0.1 0.0 - 0.4 K/UL    ABS. BASOPHILS 0.0 0.0 - 0.1 K/UL    ABS. IMM. GRANS. 0.0 K/UL    DF MANUAL      RBC COMMENTS NORMOCYTIC, NORMOCHROMIC         Microbiology Data:       Blood: Negative      Urine: Gram-negative rods             Imaging:  CT A/P 10/31/21     FINDINGS:   LOWER THORAX: No significant abnormality in the incidentally imaged lower chest.  LIVER: No mass. BILIARY TREE: The gallbladder contains numerous intraluminal calculi stable. CBD  is not dilated. SPLEEN: within normal limits. PANCREAS: No mass or ductal dilatation. ADRENALS: Unremarkable. KIDNEYS: No mass, calculus, or hydronephrosis. STOMACH: Unremarkable.   SMALL BOWEL: Small bowel loops in the pelvis are nondilated but lie within wide  rapid pelvic inflammation centered around sigmoid wall thickening and previously  described acute on chronic diverticulitis. COLON: Slight worsening of sigmoid colonic wall thickening and pericolonic  stranding of fat. APPENDIX: Normal  PERITONEUM: Increasing fluid in the pelvic peritoneal space, with a question of  peritoneal enhancement. There may be loculated small collections of peritoneal  fluid with surrounding enhancement between the rectosigmoid colon and the  urinary bladder. RETROPERITONEUM: No lymphadenopathy or aortic aneurysm. REPRODUCTIVE ORGANS: Stable  URINARY BLADDER: The urinary bladder shows persistent thickening along the  bladder dome, with increased amount of perivesical fluid since the prior study. BONES: No destructive bone lesion. ABDOMINAL WALL: No mass or hernia. ADDITIONAL COMMENTS: N/A     IMPRESSION  . 1. Worsening acute on chronic sigmoid diverticulitis, with worsening pericolonic  inflammation of fat and suspected peritonitis with increasing peritoneal fluid  and possible loculated collections between the sigmoid colon and the urinary  bladder. 2. Persistent bladder wall thickening at the bladder dome. Assessment / Plan:      Ms Cici Garvey is a 59-year-old lady with a history of recurrent diverticulitis  She was recently diagnosed with sinusitis per patient and prescribed doxycycline  She has multiple antibiotic allergies including penicillin, sulfa, both of which cause hives. She reports allergy to Levaquin causing joint symptoms and edema. She is reported allergy to Ceftin with hives and swelling. 1) recurrent diverticulitis  Discussed imaging as well as her clinical findings with Dr. Sil Bal  We will plan on IV meropenem in the interim. Multiple antibiotic allergies limit oral antibiotics and other less broad-spectrum IV antibiotics. Her surgery for sigmoid colectomy is planned for November 16, 2021  We will bridge with antibiotics in the interim  ?   If any concerns for potential fistula and noted plans for CT scan with delayed contrast images tomorrow  Stop Flagyl  Would be concerned for C. difficile given previous antibiotic use but she says that the stool is formed today which goes against C. Difficile infection  Discussed regarding the risk for C. difficile and other antibiotic associated adverse effects and toxicities  PICC line   A repeat CT scan scheduled for tomorrow    Thank for the opportunity to participate in the care of this patient. Please contact with questions or concerns.      Caren Sparks,   2:49 PM

## 2021-11-02 NOTE — PROGRESS NOTES
[facility] at [facility] Surgery        Subjective     Doing very well today, minimal to no pain, tolerating clears    Objective     Patient Vitals for the past 24 hrs:   Temp Pulse Resp BP SpO2   11/02/21 0802 98.7 °F (37.1 °C) 69 17 118/80 95 %   11/02/21 0403 98.4 °F (36.9 °C) 65 16 (!) 153/75 98 %   11/02/21 0017 98 °F (36.7 °C) 65 16 (!) 147/71 98 %   11/01/21 1932  72 16 (!) 151/89 97 %   11/01/21 1500 97.9 °F (36.6 °C) 69 16 130/73 95 %           PE  Pulm - CTAB  CV - RRR  Abd - soft, ND, BS present, NTTP    Labs  Recent Results (from the past 12 hour(s))   METABOLIC PANEL, BASIC    Collection Time: 11/02/21 12:37 AM   Result Value Ref Range    Sodium 138 136 - 145 mmol/L    Potassium 3.3 (L) 3.5 - 5.1 mmol/L    Chloride 106 97 - 108 mmol/L    CO2 27 21 - 32 mmol/L    Anion gap 5 5 - 15 mmol/L    Glucose 106 (H) 65 - 100 mg/dL    BUN 5 (L) 6 - 20 MG/DL    Creatinine 0.43 (L) 0.55 - 1.02 MG/DL    BUN/Creatinine ratio 12 12 - 20      GFR est AA >60 >60 ml/min/1.73m2    GFR est non-AA >60 >60 ml/min/1.73m2    Calcium 9.0 8.5 - 10.1 MG/DL   CBC WITH AUTOMATED DIFF    Collection Time: 11/02/21 12:37 AM   Result Value Ref Range    WBC 6.3 3.6 - 11.0 K/uL    RBC 3.56 (L) 3.80 - 5.20 M/uL    HGB 9.8 (L) 11.5 - 16.0 g/dL    HCT 31.0 (L) 35.0 - 47.0 %    MCV 87.1 80.0 - 99.0 FL    MCH 27.5 26.0 - 34.0 PG    MCHC 31.6 30.0 - 36.5 g/dL    RDW 13.5 11.5 - 14.5 %    PLATELET 795 706 - 497 K/uL    MPV 8.1 (L) 8.9 - 12.9 FL    NRBC 0.0 0  WBC    ABSOLUTE NRBC 0.00 0.00 - 0.01 K/uL    NEUTROPHILS 68 32 - 75 %    LYMPHOCYTES 22 12 - 49 %    MONOCYTES 5 5 - 13 %    EOSINOPHILS 2 0 - 7 %    BASOPHILS 0 0 - 1 %    METAMYELOCYTES 2 (H) 0 %    MYELOCYTES 1 (H) 0 %    IMMATURE GRANULOCYTES 0 %    ABS. NEUTROPHILS 4.3 1.8 - 8.0 K/UL    ABS. LYMPHOCYTES 1.4 0.8 - 3.5 K/UL    ABS. MONOCYTES 0.3 0.0 - 1.0 K/UL    ABS. EOSINOPHILS 0.1 0.0 - 0.4 K/UL    ABS. BASOPHILS 0.0 0.0 - 0.1 K/UL    ABS. IMM. GRANS. 0.0 K/UL    DF MANUAL      RBC COMMENTS NORMOCYTIC, NORMOCHROMIC           Assessment     Milvia Garcia is a 77 y. o.yr old female with recurrent diverticulitis    Plan     I have surgery scheduled for laparoscopic sigmoid colectomy 11/16  Advanced her diet to regular diet today, having no pain feeling well  ID consulted to see if any other needs for abx on DC, I feel like IV abx may not be needed potentially since we are going to operate soon but would like to get an official opinion  CT with delayed contrast images ordered for tomorrow to see if any signs of colovesicular fistula for tomorrow  Improved considerably  Could be DC home tomorrow    eJnn Lundy MD

## 2021-11-02 NOTE — PROGRESS NOTES
[facility]                                                                                          Hospitalist Progress Note  Jena Preston MD  Answering service: [phone] OR [phone] from in house phone              Progress Note    Patient: Abeba Terrazas MRN: [id number]  SSN: [id number]    YOB: 1955  Age: 77 y.o. Sex: female      Admit Date: 10/31/2021    LOS: 2 days     Subjective:     Patient presented with abdominal pain and found to have recurrent acute diverticulitis. Patient's abdominal pain is improving. Denies any nausea vomiting. Objective:     Vitals:    11/01/21 1932 11/02/21 0017 11/02/21 0403 11/02/21 0802   BP: (!) 151/89 (!) 147/71 (!) 153/75 118/80   Pulse: 72 65 65 69   Resp: 16 16 16 17   Temp:  98 °F (36.7 °C) 98.4 °F (36.9 °C) 98.7 °F (37.1 °C)   SpO2: 97% 98% 98% 95%   Weight:       Height:            Intake and Output:  Current Shift: No intake/output data recorded. Last three shifts: No intake/output data recorded. Physical Exam:   GENERAL: alert, cooperative, no distress, appears stated age  THROAT & NECK: normal and no erythema or exudates noted. LUNG: clear to auscultation bilaterally  HEART: regular rate and rhythm, S1, S2 normal, no murmur, click, rub or gallop  ABDOMEN: soft, no more tenderness of the left lower quadrant, bowel sounds normal. No masses,  no organomegaly  EXTREMITIES:  extremities normal, atraumatic, no cyanosis or edema  SKIN: no rash or abnormalities  NEUROLOGIC: AOx3. PSYCHIATRIC: non focal    Lab/Data Review: All lab results for the last 24 hours reviewed.      Recent Results (from the past 24 hour(s))   METABOLIC PANEL, BASIC    Collection Time: 11/02/21 12:37 AM   Result Value Ref Range    Sodium 138 136 - 145 mmol/L    Potassium 3.3 (L) 3.5 - 5.1 mmol/L    Chloride 106 97 - 108 mmol/L    CO2 27 21 - 32 mmol/L    Anion gap 5 5 - 15 mmol/L    Glucose 106 (H) 65 - 100 mg/dL    BUN 5 (L) 6 - 20 MG/DL    Creatinine 0.43 (L) 0.55 - 1.02 MG/DL    BUN/Creatinine ratio 12 12 - 20      GFR est AA >60 >60 ml/min/1.73m2    GFR est non-AA >60 >60 ml/min/1.73m2    Calcium 9.0 8.5 - 10.1 MG/DL   CBC WITH AUTOMATED DIFF    Collection Time: 11/02/21 12:37 AM   Result Value Ref Range    WBC 6.3 3.6 - 11.0 K/uL    RBC 3.56 (L) 3.80 - 5.20 M/uL    HGB 9.8 (L) 11.5 - 16.0 g/dL    HCT 31.0 (L) 35.0 - 47.0 %    MCV 87.1 80.0 - 99.0 FL    MCH 27.5 26.0 - 34.0 PG    MCHC 31.6 30.0 - 36.5 g/dL    RDW 13.5 11.5 - 14.5 %    PLATELET 827 670 - 858 K/uL    MPV 8.1 (L) 8.9 - 12.9 FL    NRBC 0.0 0  WBC    ABSOLUTE NRBC 0.00 0.00 - 0.01 K/uL    NEUTROPHILS 68 32 - 75 %    LYMPHOCYTES 22 12 - 49 %    MONOCYTES 5 5 - 13 %    EOSINOPHILS 2 0 - 7 %    BASOPHILS 0 0 - 1 %    METAMYELOCYTES 2 (H) 0 %    MYELOCYTES 1 (H) 0 %    IMMATURE GRANULOCYTES 0 %    ABS. NEUTROPHILS 4.3 1.8 - 8.0 K/UL    ABS. LYMPHOCYTES 1.4 0.8 - 3.5 K/UL    ABS. MONOCYTES 0.3 0.0 - 1.0 K/UL    ABS. EOSINOPHILS 0.1 0.0 - 0.4 K/UL    ABS. BASOPHILS 0.0 0.0 - 0.1 K/UL    ABS. IMM. GRANS. 0.0 K/UL    DF MANUAL      RBC COMMENTS NORMOCYTIC, NORMOCHROMIC          Imaging:    No results found.        Assessment and Plan:     Acute diverticulitis  -CT of the abdomen and pelvis shows acute on chronic sigmoid diverticulitis with pericolonic inflammation, increasing peritoneal fluid and possible loculated collections between the sigmoid: The urinary bladder  -Patient with recurrent acute diverticulitis, last admission in September  -General surgery and GI following  -On empiric meropenem and Flagyl, ID consult pending  -Patient's diet has been upgraded and tolerating the diet  -Plan for continuing IV antibiotics, repeat CT with contrast tomorrow  -Then plan for elective laparoscopic sigmoid colectomy 11/16    Urinary tract infection  -Continue current antibiotics and follow urine and blood cultures    Hypokalemia  -Replace potassium  -Repeat level in a.m.    Discharge disposition: Likely 24-48 hours, further plan per general surgery an ID.     Signed By: Raissa Hines MD     November 2, 2021

## 2021-11-03 ENCOUNTER — TELEPHONE (OUTPATIENT)
Dept: SURGERY | Age: 66
End: 2021-11-03

## 2021-11-03 ENCOUNTER — APPOINTMENT (OUTPATIENT)
Dept: CT IMAGING | Age: 66
DRG: 392 | End: 2021-11-03
Attending: SURGERY
Payer: MEDICARE

## 2021-11-03 LAB
ANION GAP SERPL CALC-SCNC: 6 MMOL/L (ref 5–15)
BASOPHILS # BLD: 0.1 K/UL (ref 0–0.1)
BASOPHILS NFR BLD: 1 % (ref 0–1)
BUN SERPL-MCNC: 5 MG/DL (ref 6–20)
BUN/CREAT SERPL: 11 (ref 12–20)
CALCIUM SERPL-MCNC: 8.9 MG/DL (ref 8.5–10.1)
CHLORIDE SERPL-SCNC: 105 MMOL/L (ref 97–108)
CO2 SERPL-SCNC: 26 MMOL/L (ref 21–32)
CREAT SERPL-MCNC: 0.46 MG/DL (ref 0.55–1.02)
DIFFERENTIAL METHOD BLD: ABNORMAL
EOSINOPHIL # BLD: 0.2 K/UL (ref 0–0.4)
EOSINOPHIL NFR BLD: 3 % (ref 0–7)
ERYTHROCYTE [DISTWIDTH] IN BLOOD BY AUTOMATED COUNT: 13.5 % (ref 11.5–14.5)
GLUCOSE SERPL-MCNC: 109 MG/DL (ref 65–100)
HCT VFR BLD AUTO: 33.3 % (ref 35–47)
HGB BLD-MCNC: 10.4 G/DL (ref 11.5–16)
IMM GRANULOCYTES # BLD AUTO: 0.3 K/UL (ref 0–0.04)
IMM GRANULOCYTES NFR BLD AUTO: 5 % (ref 0–0.5)
LYMPHOCYTES # BLD: 1.5 K/UL (ref 0.8–3.5)
LYMPHOCYTES NFR BLD: 22 % (ref 12–49)
MCH RBC QN AUTO: 27.4 PG (ref 26–34)
MCHC RBC AUTO-ENTMCNC: 31.2 G/DL (ref 30–36.5)
MCV RBC AUTO: 87.9 FL (ref 80–99)
MONOCYTES # BLD: 0.8 K/UL (ref 0–1)
MONOCYTES NFR BLD: 12 % (ref 5–13)
NEUTS SEG # BLD: 4 K/UL (ref 1.8–8)
NEUTS SEG NFR BLD: 57 % (ref 32–75)
NRBC # BLD: 0 K/UL (ref 0–0.01)
NRBC BLD-RTO: 0 PER 100 WBC
PLATELET # BLD AUTO: 373 K/UL (ref 150–400)
PMV BLD AUTO: 8.4 FL (ref 8.9–12.9)
POTASSIUM SERPL-SCNC: 3.4 MMOL/L (ref 3.5–5.1)
RBC # BLD AUTO: 3.79 M/UL (ref 3.8–5.2)
RBC MORPH BLD: ABNORMAL
SODIUM SERPL-SCNC: 137 MMOL/L (ref 136–145)
WBC # BLD AUTO: 6.9 K/UL (ref 3.6–11)

## 2021-11-03 PROCEDURE — 74011000250 HC RX REV CODE- 250: Performed by: HOSPITALIST

## 2021-11-03 PROCEDURE — 74011250637 HC RX REV CODE- 250/637: Performed by: HOSPITALIST

## 2021-11-03 PROCEDURE — 74011250637 HC RX REV CODE- 250/637: Performed by: FAMILY MEDICINE

## 2021-11-03 PROCEDURE — 99231 SBSQ HOSP IP/OBS SF/LOW 25: CPT | Performed by: INTERNAL MEDICINE

## 2021-11-03 PROCEDURE — 99232 SBSQ HOSP IP/OBS MODERATE 35: CPT | Performed by: SURGERY

## 2021-11-03 PROCEDURE — 74011000636 HC RX REV CODE- 636: Performed by: FAMILY MEDICINE

## 2021-11-03 PROCEDURE — 65270000032 HC RM SEMIPRIVATE

## 2021-11-03 PROCEDURE — 74011250636 HC RX REV CODE- 250/636: Performed by: HOSPITALIST

## 2021-11-03 PROCEDURE — 76937 US GUIDE VASCULAR ACCESS: CPT

## 2021-11-03 PROCEDURE — 74011250637 HC RX REV CODE- 250/637: Performed by: SURGERY

## 2021-11-03 PROCEDURE — 77030020365 HC SOL INJ SOD CL 0.9% 50ML

## 2021-11-03 PROCEDURE — 85025 COMPLETE CBC W/AUTO DIFF WBC: CPT

## 2021-11-03 PROCEDURE — 80048 BASIC METABOLIC PNL TOTAL CA: CPT

## 2021-11-03 PROCEDURE — 02HV33Z INSERTION OF INFUSION DEVICE INTO SUPERIOR VENA CAVA, PERCUTANEOUS APPROACH: ICD-10-PCS | Performed by: INTERNAL MEDICINE

## 2021-11-03 PROCEDURE — C1751 CATH, INF, PER/CENT/MIDLINE: HCPCS

## 2021-11-03 PROCEDURE — 36415 COLL VENOUS BLD VENIPUNCTURE: CPT

## 2021-11-03 PROCEDURE — 77030020847 HC STATLOK BARD -A

## 2021-11-03 PROCEDURE — 74177 CT ABD & PELVIS W/CONTRAST: CPT

## 2021-11-03 RX ADMIN — MEROPENEM 500 MG: 500 INJECTION, POWDER, FOR SOLUTION INTRAVENOUS at 22:16

## 2021-11-03 RX ADMIN — IOHEXOL 50 ML: 240 INJECTION, SOLUTION INTRATHECAL; INTRAVASCULAR; INTRAVENOUS; ORAL at 13:18

## 2021-11-03 RX ADMIN — MEROPENEM 500 MG: 500 INJECTION, POWDER, FOR SOLUTION INTRAVENOUS at 17:41

## 2021-11-03 RX ADMIN — ACETAMINOPHEN 650 MG: 325 TABLET ORAL at 17:48

## 2021-11-03 RX ADMIN — POTASSIUM CHLORIDE 20 MEQ: 750 TABLET, FILM COATED, EXTENDED RELEASE ORAL at 08:22

## 2021-11-03 RX ADMIN — MEROPENEM 500 MG: 500 INJECTION, POWDER, FOR SOLUTION INTRAVENOUS at 10:54

## 2021-11-03 RX ADMIN — POTASSIUM CHLORIDE 20 MEQ: 750 TABLET, FILM COATED, EXTENDED RELEASE ORAL at 17:41

## 2021-11-03 RX ADMIN — ZOLPIDEM TARTRATE 5 MG: 5 TABLET ORAL at 20:47

## 2021-11-03 RX ADMIN — IOPAMIDOL 100 ML: 755 INJECTION, SOLUTION INTRAVENOUS at 13:18

## 2021-11-03 RX ADMIN — MEROPENEM 500 MG: 500 INJECTION, POWDER, FOR SOLUTION INTRAVENOUS at 04:22

## 2021-11-03 NOTE — PROGRESS NOTES
General Surgery Daily Progress Note    Admit Date: 10/31/2021  Post-Operative Day: * No surgery found * from * No surgery found *     Subjective:     Last 24 hrs: Feels very well this AM. Slept well overnight. No abdominal pain, tolerated regular diet yesterday. No NV. +BM yesterday  Up & about in room. Voiding. PICC line placed this AM  WBC 6.9    Objective:     Blood pressure 123/71, pulse 63, temperature 98 °F (36.7 °C), resp. rate 18, height 5' 2\" (1.575 m), weight 174 lb (78.9 kg), SpO2 96 %. Temp (24hrs), Av.3 °F (36.8 °C), Min:98 °F (36.7 °C), Max:98.9 °F (37.2 °C)      _____________________  Physical Exam:     Alert and Oriented, cooperative, in no acute distress. Cardiovascular: RRR, no peripheral edema  Lungs:CTAB on RA  Abdomen: soft, NTND. +BS      Assessment:   Active Problems:    Diverticulitis (2021)      Back pain (10/31/2021)            Plan:     CT Abd, Pelvis w contrast @ 7849 today  Scheduled for Lap Sigmoid Colectomy w Dr. Hdz on 2021  IV Abx per ID  Rest of care per Hospitalist Team    Data Review:    Recent Labs     21  0257 21  0037 21  0426   WBC 6.9 6.3 9.1   HGB 10.4* 9.8* 9.4*   HCT 33.3* 31.0* 29.6*    298 300     Recent Labs     21  0257 21  0037 21  0426 10/31/21  1212 10/31/21  1212    138 139   < > 133*   K 3.4* 3.3* 3.2*   < > 3.6    106 106   < > 100   CO2 26 27 25   < > 27   * 106* 117*   < > 139*   BUN 5* 5* 5*   < > 10   CREA 0.46* 0.43* 0.41*   < > 0.49*   CA 8.9 9.0 8.8   < > 9.4   ALB  --   --   --   --  2.8*   ALT  --   --   --   --  38    < > = values in this interval not displayed.      Recent Labs     10/31/21  1212   LPSE 219           ______________________  Medications:    Current Facility-Administered Medications   Medication Dose Route Frequency    iopamidoL (ISOVUE-370) 76 % injection 100 mL  100 mL IntraVENous RAD ONCE    iohexoL (OMNIPAQUE) 240 mg iodine/mL solution 50 mL  50 mL Oral RAD ONCE    potassium chloride SR (KLOR-CON 10) tablet 20 mEq  20 mEq Oral BID    zolpidem (AMBIEN) tablet 5 mg  5 mg Oral QHS PRN    ondansetron (ZOFRAN) injection 4 mg  4 mg IntraVENous Q6H PRN    0.9% sodium chloride infusion  100 mL/hr IntraVENous CONTINUOUS    meropenem (MERREM) 500 mg in sterile water (preservative free) 10 mL IV syringe  500 mg IntraVENous Q6H    acetaminophen (TYLENOL) tablet 650 mg  650 mg Oral Q6H PRN    enoxaparin (LOVENOX) injection 40 mg  40 mg SubCUTAneous Q24H    HYDROmorphone (DILAUDID) injection 1 mg  1 mg IntraVENous Q3H PRN       TANNER Davis  11/3/2021

## 2021-11-03 NOTE — PROGRESS NOTES
Infectious Disease progress note          HPI:  Ms César Miller seen  Awaiting CT scan tolerating antibiotics   no new symptoms        Physical Exam:    Vitals:   Patient Vitals for the past 24 hrs:   Temp Pulse Resp BP SpO2   11/03/21 0812 98 °F (36.7 °C) 63 18 123/71 96 %   11/03/21 0236 98 °F (36.7 °C) 67 18 135/73 98 %   11/02/21 2030 98.9 °F (37.2 °C) 79 18 (!) 149/75 97 %     · GEN: NAD  · HEENT: No scleral icterus, no thrush  · CV: S1, S2 heard regularly,  · Lungs: Clear to auscultation bilaterally  · Abdomen: soft, non distended, non tender,  · Extremities: no edema  · Skin: no rash      Labs:   Recent Results (from the past 24 hour(s))   METABOLIC PANEL, BASIC    Collection Time: 11/03/21  2:57 AM   Result Value Ref Range    Sodium 137 136 - 145 mmol/L    Potassium 3.4 (L) 3.5 - 5.1 mmol/L    Chloride 105 97 - 108 mmol/L    CO2 26 21 - 32 mmol/L    Anion gap 6 5 - 15 mmol/L    Glucose 109 (H) 65 - 100 mg/dL    BUN 5 (L) 6 - 20 MG/DL    Creatinine 0.46 (L) 0.55 - 1.02 MG/DL    BUN/Creatinine ratio 11 (L) 12 - 20      GFR est AA >60 >60 ml/min/1.73m2    GFR est non-AA >60 >60 ml/min/1.73m2    Calcium 8.9 8.5 - 10.1 MG/DL   CBC WITH AUTOMATED DIFF    Collection Time: 11/03/21  2:57 AM   Result Value Ref Range    WBC 6.9 3.6 - 11.0 K/uL    RBC 3.79 (L) 3.80 - 5.20 M/uL    HGB 10.4 (L) 11.5 - 16.0 g/dL    HCT 33.3 (L) 35.0 - 47.0 %    MCV 87.9 80.0 - 99.0 FL    MCH 27.4 26.0 - 34.0 PG    MCHC 31.2 30.0 - 36.5 g/dL    RDW 13.5 11.5 - 14.5 %    PLATELET 168 825 - 341 K/uL    MPV 8.4 (L) 8.9 - 12.9 FL    NRBC 0.0 0  WBC    ABSOLUTE NRBC 0.00 0.00 - 0.01 K/uL    NEUTROPHILS 57 32 - 75 %    LYMPHOCYTES 22 12 - 49 %    MONOCYTES 12 5 - 13 %    EOSINOPHILS 3 0 - 7 %    BASOPHILS 1 0 - 1 %    IMMATURE GRANULOCYTES 5 (H) 0.0 - 0.5 %    ABS. NEUTROPHILS 4.0 1.8 - 8.0 K/UL    ABS. LYMPHOCYTES 1.5 0.8 - 3.5 K/UL    ABS. MONOCYTES 0.8 0.0 - 1.0 K/UL    ABS. EOSINOPHILS 0.2 0.0 - 0.4 K/UL    ABS.  BASOPHILS 0.1 0.0 - 0.1 K/UL    ABS. IMM. GRANS. 0.3 (H) 0.00 - 0.04 K/UL    DF SMEAR SCANNED      RBC COMMENTS NORMOCYTIC, NORMOCHROMIC         Microbiology Data:       Blood: Negative      Urine:  Escherichia coli    Antibiotic Interpretation Value  Method Comment   Amikacin ($) Susceptible <=2 ug/mL SIMÓN    Ampicillin ($) Susceptible <=2 ug/mL SIMÓN    Ampicillin/sulbactam ($) Susceptible <=2 ug/mL ISMÓN    Cefazolin ($) Susceptible <=4 ug/mL SIMÓN    Cefepime ($$) Susceptible <=1 ug/mL SIMÓN    Cefoxitin Susceptible <=4 ug/mL SIMÓN    Ceftazidime ($) Susceptible <=1 ug/mL SIMÓN    Ceftriaxone ($) Susceptible <=1 ug/mL SIMÓN    Ciprofloxacin ($) Resistant >=4 ug/mL SIMÓN    Gentamicin ($) Susceptible <=1 ug/mL SIMÓN    Levofloxacin ($) Resistant >=8 ug/mL SIMÓN    Meropenem ($$) Susceptible <=0.25 ug/mL SIMÓN    Nitrofurantoin Susceptible <=16 ug/mL SIMÓN    Piperacillin/Tazobac ($) Susceptible <=4 ug/mL SIMÓN    Tobramycin ($) Susceptible <=1 ug/mL SIMÓN    Trimeth/Sulfa Resistant >=320 ug/mL SIMÓN                   Imaging:  CT A/P 10/31/21     FINDINGS:   LOWER THORAX: No significant abnormality in the incidentally imaged lower chest.  LIVER: No mass. BILIARY TREE: The gallbladder contains numerous intraluminal calculi stable. CBD  is not dilated. SPLEEN: within normal limits. PANCREAS: No mass or ductal dilatation. ADRENALS: Unremarkable. KIDNEYS: No mass, calculus, or hydronephrosis. STOMACH: Unremarkable. SMALL BOWEL: Small bowel loops in the pelvis are nondilated but lie within wide  rapid pelvic inflammation centered around sigmoid wall thickening and previously  described acute on chronic diverticulitis. COLON: Slight worsening of sigmoid colonic wall thickening and pericolonic  stranding of fat. APPENDIX: Normal  PERITONEUM: Increasing fluid in the pelvic peritoneal space, with a question of  peritoneal enhancement.  There may be loculated small collections of peritoneal  fluid with surrounding enhancement between the rectosigmoid colon and the  urinary bladder. RETROPERITONEUM: No lymphadenopathy or aortic aneurysm. REPRODUCTIVE ORGANS: Stable  URINARY BLADDER: The urinary bladder shows persistent thickening along the  bladder dome, with increased amount of perivesical fluid since the prior study. BONES: No destructive bone lesion. ABDOMINAL WALL: No mass or hernia. ADDITIONAL COMMENTS: N/A     IMPRESSION  . 1. Worsening acute on chronic sigmoid diverticulitis, with worsening pericolonic  inflammation of fat and suspected peritonitis with increasing peritoneal fluid  and possible loculated collections between the sigmoid colon and the urinary  bladder. 2. Persistent bladder wall thickening at the bladder dome. Assessment / Plan:      Ms Norah Mora is a 70-year-old lady with a history of recurrent diverticulitis  She was recently diagnosed with sinusitis per patient and prescribed doxycycline  She has multiple antibiotic allergies including penicillin, sulfa, both of which cause hives. She reports allergy to Levaquin causing joint symptoms and edema. She is reported allergy to Ceftin with hives and swelling. recurrent diverticulitis  Awaiting CT today   Planned for IV Meropenem until surgery 11/16/21  picc care   Please check wt surgery to see if access needed ( PICC) perioperatively before removal   Weekly labs and iv abx orders to CM written    Thank for the opportunity to participate in the care of this patient. Please contact with questions or concerns.      Cecilia Slater DO  1:44 PM

## 2021-11-03 NOTE — TELEPHONE ENCOUNTER
Left voicemail for patient regarding surgical information :  NO DETAILS LEFT ; letter sent: PATIENT IS CURRENT ADMITTED  IN HOUSE, LETTER SENT VIA MY CHART ACCOUNT AND TO HOME ADDRESS ON FILE.

## 2021-11-03 NOTE — PROGRESS NOTES
[facility]                                                                                          Hospitalist Progress Note  Jena Preston MD  Answering service: [phone] OR [phone] from in house phone              Progress Note    Patient: Abeba Terrazas MRN: [id number]  SSN: [id number]    YOB: 1955  Age: 77 y.o. Sex: female      Admit Date: 10/31/2021    LOS: 3 days     Subjective:     Patient presented with abdominal pain and found to have recurrent acute diverticulitis. Patient's abdominal pain is improving. Denies any nausea vomiting. Objective:     Vitals:    11/02/21 0802 11/02/21 2030 11/03/21 0236 11/03/21 0812   BP: 118/80 (!) 149/75 135/73 123/71   Pulse: 69 79 67 63   Resp: 17 18 18 18   Temp: 98.7 °F (37.1 °C) 98.9 °F (37.2 °C) 98 °F (36.7 °C) 98 °F (36.7 °C)   SpO2: 95% 97% 98% 96%   Weight:       Height:            Intake and Output:  Current Shift: No intake/output data recorded. Last three shifts: No intake/output data recorded. Physical Exam:   GENERAL: alert, cooperative, no distress, appears stated age  THROAT & NECK: normal and no erythema or exudates noted. LUNG: clear to auscultation bilaterally  HEART: regular rate and rhythm, S1, S2 normal, no murmur, click, rub or gallop  ABDOMEN: soft, no more tenderness of the left lower quadrant, bowel sounds normal. No masses,  no organomegaly  EXTREMITIES:  extremities normal, atraumatic, no cyanosis or edema  SKIN: no rash or abnormalities  NEUROLOGIC: AOx3. PSYCHIATRIC: non focal    Lab/Data Review: All lab results for the last 24 hours reviewed.      Recent Results (from the past 24 hour(s))   METABOLIC PANEL, BASIC    Collection Time: 11/03/21  2:57 AM   Result Value Ref Range    Sodium 137 136 - 145 mmol/L    Potassium 3.4 (L) 3.5 - 5.1 mmol/L    Chloride 105 97 - 108 mmol/L    CO2 26 21 - 32 mmol/L    Anion gap 6 5 - 15 mmol/L    Glucose 109 (H) 65 - 100 mg/dL    BUN 5 (L) 6 - 20 MG/DL    Creatinine 0.46 (L) 0.55 - 1.02 MG/DL    BUN/Creatinine ratio 11 (L) 12 - 20      GFR est AA >60 >60 ml/min/1.73m2    GFR est non-AA >60 >60 ml/min/1.73m2    Calcium 8.9 8.5 - 10.1 MG/DL   CBC WITH AUTOMATED DIFF    Collection Time: 11/03/21  2:57 AM   Result Value Ref Range    WBC 6.9 3.6 - 11.0 K/uL    RBC 3.79 (L) 3.80 - 5.20 M/uL    HGB 10.4 (L) 11.5 - 16.0 g/dL    HCT 33.3 (L) 35.0 - 47.0 %    MCV 87.9 80.0 - 99.0 FL    MCH 27.4 26.0 - 34.0 PG    MCHC 31.2 30.0 - 36.5 g/dL    RDW 13.5 11.5 - 14.5 %    PLATELET 378 422 - 405 K/uL    MPV 8.4 (L) 8.9 - 12.9 FL    NRBC 0.0 0  WBC    ABSOLUTE NRBC 0.00 0.00 - 0.01 K/uL    NEUTROPHILS 57 32 - 75 %    LYMPHOCYTES 22 12 - 49 %    MONOCYTES 12 5 - 13 %    EOSINOPHILS 3 0 - 7 %    BASOPHILS 1 0 - 1 %    IMMATURE GRANULOCYTES 5 (H) 0.0 - 0.5 %    ABS. NEUTROPHILS 4.0 1.8 - 8.0 K/UL    ABS. LYMPHOCYTES 1.5 0.8 - 3.5 K/UL    ABS. MONOCYTES 0.8 0.0 - 1.0 K/UL    ABS. EOSINOPHILS 0.2 0.0 - 0.4 K/UL    ABS. BASOPHILS 0.1 0.0 - 0.1 K/UL    ABS. IMM. GRANS. 0.3 (H) 0.00 - 0.04 K/UL    DF SMEAR SCANNED      RBC COMMENTS NORMOCYTIC, NORMOCHROMIC          Imaging:    CT ABD PELV W CONT    Result Date: 11/3/2021  EXAM: CT ABD PELV W CONT INDICATION: Diverticulitis, possible colovesicular fistula COMPARISON: October 31, 2021 CONTRAST: 100 mL of Isovue-370. TECHNIQUE: Following the uneventful intravenous administration of contrast, thin axial images were obtained through the abdomen and pelvis. Coronal and sagittal reconstructions were generated. Oral contrast was administered. CT dose reduction was achieved through use of a standardized protocol tailored for this examination and automatic exposure control for dose modulation. FINDINGS: LOWER THORAX: No significant abnormality in the incidentally imaged lower chest. LIVER: No mass. BILIARY TREE: Stones in the gallbladder. No significant distention. CBD is not dilated. SPLEEN: within normal limits. PANCREAS: No mass or ductal dilatation. ADRENALS: Unremarkable. KIDNEYS: No mass, calculus, or hydronephrosis. STOMACH: Unremarkable. SMALL BOWEL: No dilatation or wall thickening. COLON: On the background of sigmoid colonic diverticulosis, there is circumferential wall thickening extending from the proximal sigmoid colon through to the rectum with resultant inflammatory fat stranding and development of fluid and gas collections interposed between loops of sigmoid colon. The localized perforation/loculated fluid collections within the sigmoid mesentery appear similar to slightly smaller compared to the prior study. The rim-enhancing fluid collection between the rectum and vagina is similar, measuring 3.0 x 2.9 cm (3:64). There is thickening at the dome of the bladder, without clear evidence of a fistula. No intravesicular air. APPENDIX: Normal PERITONEUM: No lymphadenopathy or ascites. RETROPERITONEUM: No lymphadenopathy or aortic aneurysm. REPRODUCTIVE ORGANS: Uterus is either atrophic or surgically absent. URINARY BLADDER: Thickening of the dome of the bladder without intravesicular air or clear evidence of a colovesical fistula. BONES: No destructive bone lesion. ABDOMINAL WALL: No mass or hernia. ADDITIONAL COMMENTS: N/A     Sigmoid colitis with microperforation resulting in pockets of fluid and gas in the sigmoid mesentery, similar to the prior CT from October 31, 2021. Reactive thickening of the dome of the bladder is a result of the adjacent inflammation, but no clear evidence of a colovesical fistula.          Assessment and Plan:     Acute diverticulitis  -CT of the abdomen and pelvis on admission shows acute on chronic sigmoid diverticulitis with pericolonic inflammation, increasing peritoneal fluid and possible loculated collections between the sigmoid: The urinary bladder  -Patient with recurrent acute diverticulitis, last admission in September  -Repeat CT abdomen and pelvis today shows sigmoid colitis with microperforation resulting in pockets of fluid and gas in the sigmoid mesentery, similar to prior CT 10/31  -General surgery and ID following  -On empiric meropenem, Flagyl was discontinued  -Patient's diet has been upgraded and tolerating the diet  -Plan to continue IV meropenem upon discharge to home, PICC line placed today  -Plan for follow-up with general surgery for elective colectomy 11/16  -Case management working on home IV antibiotics    Urinary tract infection  -Urine culture grew E. Coli  -Antibiotics as above    Hypokalemia  -Replace potassium  -Repeat level in a.m. Discharge disposition: Likely 24 hours if general surgery clears for discharge and home IV antibiotics arranged.     Signed By: Natalia Lopez MD     November 3, 2021

## 2021-11-03 NOTE — PROGRESS NOTES
[facility] at [facility] Surgery        Subjective     Feeling well, pain minimal    Objective     Patient Vitals for the past 24 hrs:   Temp Pulse Resp BP SpO2   11/03/21 0812 98 °F (36.7 °C) 63 18 123/71 96 %   11/03/21 0236 98 °F (36.7 °C) 67 18 135/73 98 %   11/02/21 2030 98.9 °F (37.2 °C) 79 18 (!) 149/75 97 %           PE  Pulm - CTAB  CV - RRR  Abd - soft, ND, BS present, NTTP    Labs  No results found for this or any previous visit (from the past 12 hour(s)). Franca Ortiz is a 77 y. o.yr old female with recurrent diverticulitis    Plan     OK for DC home with IV abx  I will see her in the office on Wednesday next week for ERAS protocol info  OR on 11/16      Yoan Lacey MD

## 2021-11-03 NOTE — PROCEDURES
PICC Placement Note    PRE-PROCEDURE VERIFICATION  Correct Procedure: yes  Correct Site:  yes  Temperature: Temp: 98 °F (36.7 °C), Temperature Source: Temp Source: Oral  Recent Labs     11/03/21  0257   BUN 5*   CREA 0.46*      WBC 6.9     Allergies: Ceftin [cefuroxime axetil], Codeine, Levaquin [levofloxacin], Pcn [penicillins], and Sulfa (sulfonamide antibiotics)  Education materials, including PICC Booklet, for PICC Care given to patient: yes. See Patient Education activity for further details. PROCEDURE DETAIL  A double lumen PICC line was started for Home IV Therapy. The following documentation is in addition to the PICC properties in the lines/airways flowsheet :  Lot #: [id number]  Was xylocaine 1% used intradermally:  yes  Catheter Length: 40 (cm)  Vein Selection for PICC:right basilic  Central Line Bundle followed yes  Complication Related to Insertion: none    The placement was verified by ECG/Sapiens technology: The  tip location is on the right side and the tip is in the  superior vena cava. See ECG results for PICC tip placement. Report given to nurse Neves. Line is okay to use.     Ezequiel Huang RN

## 2021-11-03 NOTE — PROGRESS NOTES
Transition of Care Plan   RUR- Low 17%    DISPOSITION: The disposition plan is home with family assistance and Home infusion   o HOME ABX referral sent to [facility]; accepted   - Services will start @1pm on 11/4   - [facility] Nursing; [facility]. Services to start on 11/5; Order will be uploaded 11/4    F/U with PCP/Specialist     Transport: Spouse       Transition of Care Plan:     The Plan for Transition of Care is related to the following treatment goals: HOME IV ABX     The Patient was provided with a choice of provider and agrees  with the discharge plan. Yes [x] No []    A Freedom of choice list was provided with basic dialogue that supports the patient's individualized plan of care/goals and shares the quality data associated with the providers.      Yes [x] No []    CM: [address] [place],   [phone]

## 2021-11-04 VITALS
HEIGHT: 62 IN | BODY MASS INDEX: 32.02 KG/M2 | TEMPERATURE: 97.5 F | SYSTOLIC BLOOD PRESSURE: 137 MMHG | RESPIRATION RATE: 16 BRPM | DIASTOLIC BLOOD PRESSURE: 87 MMHG | WEIGHT: 174 LBS | HEART RATE: 79 BPM | OXYGEN SATURATION: 96 %

## 2021-11-04 LAB
ANION GAP SERPL CALC-SCNC: 7 MMOL/L (ref 5–15)
BASOPHILS # BLD: 0.1 K/UL (ref 0–0.1)
BASOPHILS NFR BLD: 1 % (ref 0–1)
BUN SERPL-MCNC: 8 MG/DL (ref 6–20)
BUN/CREAT SERPL: 20 (ref 12–20)
CALCIUM SERPL-MCNC: 9.2 MG/DL (ref 8.5–10.1)
CHLORIDE SERPL-SCNC: 103 MMOL/L (ref 97–108)
CO2 SERPL-SCNC: 25 MMOL/L (ref 21–32)
CREAT SERPL-MCNC: 0.4 MG/DL (ref 0.55–1.02)
DIFFERENTIAL METHOD BLD: ABNORMAL
EOSINOPHIL # BLD: 0.2 K/UL (ref 0–0.4)
EOSINOPHIL NFR BLD: 3 % (ref 0–7)
ERYTHROCYTE [DISTWIDTH] IN BLOOD BY AUTOMATED COUNT: 13.8 % (ref 11.5–14.5)
GLUCOSE SERPL-MCNC: 104 MG/DL (ref 65–100)
HCT VFR BLD AUTO: 33.4 % (ref 35–47)
HGB BLD-MCNC: 10.6 G/DL (ref 11.5–16)
IMM GRANULOCYTES # BLD AUTO: 0 K/UL
IMM GRANULOCYTES NFR BLD AUTO: 0 %
LYMPHOCYTES # BLD: 1.5 K/UL (ref 0.8–3.5)
LYMPHOCYTES NFR BLD: 24 % (ref 12–49)
MCH RBC QN AUTO: 27.7 PG (ref 26–34)
MCHC RBC AUTO-ENTMCNC: 31.7 G/DL (ref 30–36.5)
MCV RBC AUTO: 87.4 FL (ref 80–99)
MONOCYTES # BLD: 0.6 K/UL (ref 0–1)
MONOCYTES NFR BLD: 9 % (ref 5–13)
MYELOCYTES NFR BLD MANUAL: 1 %
NEUTS BAND NFR BLD MANUAL: 3 % (ref 0–6)
NEUTS SEG # BLD: 4 K/UL (ref 1.8–8)
NEUTS SEG NFR BLD: 59 % (ref 32–75)
NRBC # BLD: 0 K/UL (ref 0–0.01)
NRBC BLD-RTO: 0 PER 100 WBC
PLATELET # BLD AUTO: 352 K/UL (ref 150–400)
PMV BLD AUTO: 8.4 FL (ref 8.9–12.9)
POTASSIUM SERPL-SCNC: 3.9 MMOL/L (ref 3.5–5.1)
RBC # BLD AUTO: 3.82 M/UL (ref 3.8–5.2)
RBC MORPH BLD: ABNORMAL
SODIUM SERPL-SCNC: 135 MMOL/L (ref 136–145)
WBC # BLD AUTO: 6.4 K/UL (ref 3.6–11)
WBC MORPH BLD: ABNORMAL

## 2021-11-04 PROCEDURE — 36415 COLL VENOUS BLD VENIPUNCTURE: CPT

## 2021-11-04 PROCEDURE — 99231 SBSQ HOSP IP/OBS SF/LOW 25: CPT | Performed by: INTERNAL MEDICINE

## 2021-11-04 PROCEDURE — 74011250636 HC RX REV CODE- 250/636: Performed by: HOSPITALIST

## 2021-11-04 PROCEDURE — 80048 BASIC METABOLIC PNL TOTAL CA: CPT

## 2021-11-04 PROCEDURE — 74011000250 HC RX REV CODE- 250: Performed by: HOSPITALIST

## 2021-11-04 PROCEDURE — 85025 COMPLETE CBC W/AUTO DIFF WBC: CPT

## 2021-11-04 RX ADMIN — MEROPENEM 500 MG: 500 INJECTION, POWDER, FOR SOLUTION INTRAVENOUS at 11:00

## 2021-11-04 RX ADMIN — MEROPENEM 500 MG: 500 INJECTION, POWDER, FOR SOLUTION INTRAVENOUS at 04:47

## 2021-11-04 NOTE — PROGRESS NOTES
Infectious Disease progress note          HPI:  Ms Melba Choi seen  Awaiting discharge   Doing better   No symptoms reported         Physical Exam:    Vitals:   Patient Vitals for the past 24 hrs:   Temp Pulse Resp BP SpO2   11/04/21 0821 97.5 °F (36.4 °C) 79 16 137/87 96 %   11/04/21 0446 98.4 °F (36.9 °C) 72 16 120/79 96 %   11/03/21 2046 98.5 °F (36.9 °C) 80 16 132/79 96 %     · GEN: NAD  · HEENT: No scleral icterus, no thrush  · CV: S1, S2 heard regularly,  · Lungs: Clear to auscultation bilaterally  · Abdomen: soft, non distended, non tender,  · Extremities: no edema  · Skin: no rash      Labs:   Recent Results (from the past 24 hour(s))   METABOLIC PANEL, BASIC    Collection Time: 11/04/21  4:42 AM   Result Value Ref Range    Sodium 135 (L) 136 - 145 mmol/L    Potassium 3.9 3.5 - 5.1 mmol/L    Chloride 103 97 - 108 mmol/L    CO2 25 21 - 32 mmol/L    Anion gap 7 5 - 15 mmol/L    Glucose 104 (H) 65 - 100 mg/dL    BUN 8 6 - 20 MG/DL    Creatinine 0.40 (L) 0.55 - 1.02 MG/DL    BUN/Creatinine ratio 20 12 - 20      GFR est AA >60 >60 ml/min/1.73m2    GFR est non-AA >60 >60 ml/min/1.73m2    Calcium 9.2 8.5 - 10.1 MG/DL   CBC WITH AUTOMATED DIFF    Collection Time: 11/04/21  4:42 AM   Result Value Ref Range    WBC 6.4 3.6 - 11.0 K/uL    RBC 3.82 3.80 - 5.20 M/uL    HGB 10.6 (L) 11.5 - 16.0 g/dL    HCT 33.4 (L) 35.0 - 47.0 %    MCV 87.4 80.0 - 99.0 FL    MCH 27.7 26.0 - 34.0 PG    MCHC 31.7 30.0 - 36.5 g/dL    RDW 13.8 11.5 - 14.5 %    PLATELET 645 063 - 050 K/uL    MPV 8.4 (L) 8.9 - 12.9 FL    NRBC 0.0 0  WBC    ABSOLUTE NRBC 0.00 0.00 - 0.01 K/uL    NEUTROPHILS 59 32 - 75 %    BAND NEUTROPHILS 3 0 - 6 %    LYMPHOCYTES 24 12 - 49 %    MONOCYTES 9 5 - 13 %    EOSINOPHILS 3 0 - 7 %    BASOPHILS 1 0 - 1 %    MYELOCYTES 1 (H) 0 %    IMMATURE GRANULOCYTES 0 %    ABS. NEUTROPHILS 4.0 1.8 - 8.0 K/UL    ABS. LYMPHOCYTES 1.5 0.8 - 3.5 K/UL    ABS. MONOCYTES 0.6 0.0 - 1.0 K/UL    ABS. EOSINOPHILS 0.2 0.0 - 0.4 K/UL    ABS. BASOPHILS 0.1 0.0 - 0.1 K/UL    ABS. IMM. GRANS. 0.0 K/UL    DF MANUAL      RBC COMMENTS NORMOCYTIC, NORMOCHROMIC      WBC COMMENTS REACTIVE LYMPHS         Microbiology Data:       Blood: Negative      Urine:  Escherichia coli    Antibiotic Interpretation Value  Method Comment   Amikacin ($) Susceptible <=2 ug/mL SIMÓN    Ampicillin ($) Susceptible <=2 ug/mL SIMÓN    Ampicillin/sulbactam ($) Susceptible <=2 ug/mL SIMÓN    Cefazolin ($) Susceptible <=4 ug/mL SIMÓN    Cefepime ($$) Susceptible <=1 ug/mL SIMÓN    Cefoxitin Susceptible <=4 ug/mL SIMÓN    Ceftazidime ($) Susceptible <=1 ug/mL SIMÓN    Ceftriaxone ($) Susceptible <=1 ug/mL SIMÓN    Ciprofloxacin ($) Resistant >=4 ug/mL SIMÓN    Gentamicin ($) Susceptible <=1 ug/mL SIMÓN    Levofloxacin ($) Resistant >=8 ug/mL SIMÓN    Meropenem ($$) Susceptible <=0.25 ug/mL SIMÓN    Nitrofurantoin Susceptible <=16 ug/mL SIMÓN    Piperacillin/Tazobac ($) Susceptible <=4 ug/mL SIMÓN    Tobramycin ($) Susceptible <=1 ug/mL SIMÓN    Trimeth/Sulfa Resistant >=320 ug/mL SIMÓN                   Imaging:  CT A/P 10/31/21     FINDINGS:   LOWER THORAX: No significant abnormality in the incidentally imaged lower chest.  LIVER: No mass. BILIARY TREE: The gallbladder contains numerous intraluminal calculi stable. CBD  is not dilated. SPLEEN: within normal limits. PANCREAS: No mass or ductal dilatation. ADRENALS: Unremarkable. KIDNEYS: No mass, calculus, or hydronephrosis. STOMACH: Unremarkable. SMALL BOWEL: Small bowel loops in the pelvis are nondilated but lie within wide  rapid pelvic inflammation centered around sigmoid wall thickening and previously  described acute on chronic diverticulitis. COLON: Slight worsening of sigmoid colonic wall thickening and pericolonic  stranding of fat. APPENDIX: Normal  PERITONEUM: Increasing fluid in the pelvic peritoneal space, with a question of  peritoneal enhancement.  There may be loculated small collections of peritoneal  fluid with surrounding enhancement between the rectosigmoid colon and the  urinary bladder. RETROPERITONEUM: No lymphadenopathy or aortic aneurysm. REPRODUCTIVE ORGANS: Stable  URINARY BLADDER: The urinary bladder shows persistent thickening along the  bladder dome, with increased amount of perivesical fluid since the prior study. BONES: No destructive bone lesion. ABDOMINAL WALL: No mass or hernia. ADDITIONAL COMMENTS: N/A     IMPRESSION  . 1. Worsening acute on chronic sigmoid diverticulitis, with worsening pericolonic  inflammation of fat and suspected peritonitis with increasing peritoneal fluid  and possible loculated collections between the sigmoid colon and the urinary  bladder. 2. Persistent bladder wall thickening at the bladder dome. CT A/P W contrast 11/3/21    FINDINGS:   LOWER THORAX: No significant abnormality in the incidentally imaged lower chest.  LIVER: No mass. BILIARY TREE: Stones in the gallbladder. No significant distention. CBD is not  dilated. SPLEEN: within normal limits. PANCREAS: No mass or ductal dilatation. ADRENALS: Unremarkable. KIDNEYS: No mass, calculus, or hydronephrosis. STOMACH: Unremarkable. SMALL BOWEL: No dilatation or wall thickening. COLON: On the background of sigmoid colonic diverticulosis, there is  circumferential wall thickening extending from the proximal sigmoid colon  through to the rectum with resultant inflammatory fat stranding and development  of fluid and gas collections interposed between loops of sigmoid colon. The  localized perforation/loculated fluid collections within the sigmoid mesentery  appear similar to slightly smaller compared to the prior study. The  rim-enhancing fluid collection between the rectum and vagina is similar,  measuring 3.0 x 2.9 cm (3:64). There is thickening at the dome of the bladder,  without clear evidence of a fistula. No intravesicular air. APPENDIX: Normal  PERITONEUM: No lymphadenopathy or ascites.   RETROPERITONEUM: No lymphadenopathy or aortic aneurysm. REPRODUCTIVE ORGANS: Uterus is either atrophic or surgically absent. URINARY BLADDER: Thickening of the dome of the bladder without intravesicular  air or clear evidence of a colovesical fistula. BONES: No destructive bone lesion. ABDOMINAL WALL: No mass or hernia. ADDITIONAL COMMENTS: N/A     IMPRESSION  Sigmoid colitis with microperforation resulting in pockets of fluid and gas in  the sigmoid mesentery, similar to the prior CT from October 31, 2021. Reactive thickening of the dome of the bladder is a result of the adjacent  inflammation, but no clear evidence of a colovesical fistula. Assessment / Plan:      Ms April Key is a 80-year-old lady with a history of recurrent diverticulitis  She was recently diagnosed with sinusitis per patient and prescribed doxycycline  She has multiple antibiotic allergies including penicillin, sulfa, both of which cause hives. She reports allergy to Levaquin causing joint symptoms and edema. She is reported allergy to Ceftin with hives and swelling. recurrent diverticulitis  Planned for IV Meropenem until surgery 11/16/21  picc care   Please check wt surgery to see if access needed ( PICC) perioperatively before removal   Weekly labs and iv abx orders to CM written    Thank for the opportunity to participate in the care of this patient. Please contact with questions or concerns.      Libby Conner,   11:46 AM

## 2021-11-04 NOTE — PROGRESS NOTES
Transition of Care Plan   RUR- Low 16%    DISPOSITION: The disposition plan is home with family assistance and Door Bhavik Joaquinalinda Phillip Lawsonangelcaty Lesterfred 30 Infusions    F/U with PCP/Specialist     Transport: Family    Medicare pt has received, reviewed, and signed 2nd IM letter informing them of their right to appeal the discharge. Signed copy has been placed on pt bedside chart.  Per conversation with the pt: she reported that she is in need of an extension for her PICC line. She reported that she informed her bedside nurse. This cm informed her that he would f/u with the nurse.      CM: [address]. MSW,   [phone]

## 2021-11-04 NOTE — DISCHARGE INSTRUCTIONS
Discharge Instructions       PATIENT ID: Mariola Kate  MRN: [id number]   YOB: 1955    DATE OF ADMISSION: 10/31/2021 12:14 PM    DATE OF DISCHARGE: 11/4/2021    PRIMARY CARE PROVIDER: Roberto Carlos Carreno MD     ATTENDING PHYSICIAN: Jason Gregg DO  DISCHARGING PROVIDER: Kimmie Aragon DO    To contact this individual call [phone] and ask the  to page. If unavailable ask to be transferred the Adult Hospitalist Department. DISCHARGE DIAGNOSES     Diverticulitis     CONSULTATIONS: IP CONSULT TO GENERAL SURGERY  IP CONSULT TO GENERAL SURGERY  IP CONSULT TO GASTROENTEROLOGY  IP CONSULT TO GASTROENTEROLOGY  IP CONSULT TO INFECTIOUS DISEASES  IP CONSULT TO HOSPITALIST    PROCEDURES/SURGERIES: * No surgery found *    PENDING TEST RESULTS:   At the time of discharge the following test results are still pending: ***    FOLLOW UP APPOINTMENTS:   Follow-up Information     Follow up With Specialties Details Why Contact Info    [address]  This agency will provide Picc [address] [facility]    [address]  Call The agency will provide Home infusion   [address] [facility] [address],   [facility], [address]  [phone]    Keturah Forbes MD Family Medicine   [address] [facility] [address] [place] [address]      Lamar Paez MD General Surgery, Bariatrics Go on 11/10/2021 2:45 pm [address]  [address]  [address] [facility] [address] [facility]  [phone]             ADDITIONAL CARE RECOMMENDATIONS:     Resume home medications. DISCHARGE MEDICATIONS:   See Medication Reconciliation Form    · It is important that you take the medication exactly as they are prescribed. · Keep your medication in the bottles provided by the pharmacist and keep a list of the medication names, dosages, and times to be taken in your wallet.    · Do not take other medications without consulting your doctor. NOTIFY YOUR PHYSICIAN FOR ANY OF THE FOLLOWING:   Fever over 101 degrees for 24 hours. Chest pain, shortness of breath, fever, chills, nausea, vomiting, diarrhea, change in mentation, falling, weakness, bleeding. Severe pain or pain not relieved by medications. Or, any other signs or symptoms that you may have questions about.         Signed:   oRsendo Ceja DO  11/4/2021  11:41 AM

## 2021-11-04 NOTE — PROGRESS NOTES
Problem: Falls - Risk of  Goal: *Absence of Falls  Description: Document [place] Fall Risk and appropriate interventions in the flowsheet.   Outcome: Resolved/Met     Problem: Patient Education: Go to Patient Education Activity  Goal: Patient/Family Education  Outcome: Resolved/Met     Problem: Discharge Planning  Goal: *Discharge to safe environment  Outcome: Resolved/Met

## 2021-11-04 NOTE — PROGRESS NOTES
Pt ok for DC home. She will follow up with me next Wed at 245pm for follow up appt. Continue IV abx. Surgery scheduled for 11/16.     Adarsh Henning

## 2021-11-04 NOTE — DISCHARGE SUMMARY
Discharge Summary       PATIENT ID: Laron Parra  MRN: [id number]   YOB: 1955    DATE OF ADMISSION: 10/31/2021 12:14 PM    DATE OF DISCHARGE: 11/4/2021  PRIMARY CARE PROVIDER: Lainey Harman MD     ATTENDING PHYSICIAN: Prashanth Marin DO   DISCHARGING PROVIDER: Prashanth Marin DO    To contact this individual call [phone] and ask the  to page. If unavailable ask to be transferred the Adult Hospitalist Department. CONSULTATIONS: IP CONSULT TO GASTROENTEROLOGY  IP CONSULT TO GASTROENTEROLOGY  IP CONSULT TO GENERAL SURGERY    PROCEDURES/SURGERIES: * No surgery found *    ADMITTING DIAGNOSES & HOSPITAL COURSE:   Admission History:  \"77year-old  female who has been diagnosed with diverticulitis,admitted twice to this hospital.  Initial admission was in 05/2021 for diverticulitis with microperforation. She was treated with IV antibiotics and was discharged home on p.o. antibiotics. A few months later, she had another attack of diverticulitis with microperforation that was contained. Later on, she had a PICC line and received meropenem until 09/04/2021. She is currently off antibiotics except Doxycycline which was started for presumed sinusitis few days ago. She was seen by General Surgery, Dr. Alma Rosa Friend, on 09/28/2021. At that time, he wanted her to get a colonoscopy. The patient was subsequently seen by Dr. Caitlyn Coronel, but colonoscopy so far has not been performed.       The patient had low grade fever recently which she attributes to sinusitis and was started on doxycycline by her primary care physician. She has noticed some blood in her urine today. She has no bowel movement in the last few days.   In the emergency room, she was found to have elevated white count and CT showed worsening of acute on chronic sigmoid diverticulitis with worsening pericolonic inflammation, suspected peritonitis with increasing peritoneal fluid and possible loculated collection with persistent bladder wall thickening, so we are asked to admit her. The patient denies having any other complaint at this time. \"           DISCHARGE DIAGNOSES / PLAN:      Acute diverticulitis  -CT of the abdomen and pelvis on admission shows acute on chronic sigmoid diverticulitis with pericolonic inflammation, increasing peritoneal fluid and possible loculated collections between the sigmoid: The urinary bladder  -Patient with recurrent acute diverticulitis, last admission in September  -Repeat CT abdomen and pelvis today shows sigmoid colitis with microperforation resulting in pockets of fluid and gas in the sigmoid mesentery, similar to prior CT 10/31  -General surgery and ID following  -On empiric meropenem, Flagyl was discontinued. Has multiple drug allergies   -Patient's diet has been upgraded and tolerating the diet  -Plan to continue IV meropenem upon discharge to home until 11/16, PICC line placed  -Plan for follow-up with general surgery for elective colectomy 11/16     Urinary tract infection  -Urine culture grew E. Coli  -Antibiotics as above     Hypokalemia  -Replace potassium  -Repeat level normal      ADDITIONAL CARE RECOMMENDATIONS:   Resume home medications.        PENDING TEST RESULTS:   At the time of discharge the following test results are still pending: none    FOLLOW UP APPOINTMENTS:    Follow-up Information     Follow up With Specialties Details Why [address] [facility] [address]  This agency will provide Picc [address] [facility], [address] [facility] [address] [facility] [address]    [address] [facility] [address]  Call The agency will provide Home infusion   [address] [facility] [address],   [place], [address]  [phone]    Mariana Forbes MD Family Medicine   [address]      Nasrin Saab MD General Surgery, Bariatrics Go on 11/10/2021 2:45 pm [address] [facility] [address] [address]  [phone]             DISCHARGE MEDICATIONS:  Discharge Medication List as of 11/4/2021 11:43 AM      CONTINUE these medications which have NOT CHANGED    Details   cetirizine (ZYRTEC) 10 mg tablet Take  by mouth nightly., Historical Med      vit A/vit C/vit E/zinc/copper (PRESERVISION AREDS PO) Take  by mouth two (2) times a day., Historical Med      simethicone (MYLICON) 40 FL/6.9 mL drops Take 40 mg by mouth four (4) times daily as needed., Historical Med      brimonidine (ALPHAGAN) 0.2 % ophthalmic solution INSTILL 1 DROP INTO BOTH EYES TWICE DAILY, Historical Med      ondansetron (ZOFRAN ODT) 4 mg disintegrating tablet Historical Med      timolol (TIMOPTIC) 0.25 % ophthalmic solution INSTILL 1 DROP IN BOTH EYES TWICE DAILY, Historical Med      cholecalciferol, vitamin D3, (Vitamin D3) 100 mcg (4,000 unit) cap Take  by mouth daily. , Historical Med      L.acid,para-B. bifidum-S.therm (RISAQUAD) 8 billion cell cap cap Take 1 Capsule by mouth daily. , Print, Disp-30 Capsule, R-0      meropenem 1 g IVPB 1 g by IntraVENous route every eight (8) hours. , No Print, Disp-84 Dose, R-0      zolpidem (AMBIEN) 5 mg tablet Take 5 mg by mouth nightly as needed for Sleep., Historical Med               NOTIFY YOUR PHYSICIAN FOR ANY OF THE FOLLOWING:   Fever over 101 degrees for 24 hours. Chest pain, shortness of breath, fever, chills, nausea, vomiting, diarrhea, change in mentation, falling, weakness, bleeding. Severe pain or pain not relieved by medications. Or, any other signs or symptoms that you may have questions about.     DISPOSITION:   x Home With:   OT  PT  HH  RN       Long term SNF/Inpatient Rehab    Independent/assisted living    Hospice    Other:       PATIENT CONDITION AT DISCHARGE:     Functional status    Poor     Deconditioned    x Independent      Cognition   x  Lucid     Forgetful     Dementia      Catheters/lines (plus indication)    Zambrano     PICC     PEG    x None      Code status    x Full code DNR      PHYSICAL EXAMINATION AT DISCHARGE:  General:          Alert, cooperative, no distress, appears stated age. Lungs:             Clear to auscultation bilaterally. No Wheezing or Rhonchi. No rales. Heart:              Regular  rhythm,  No  murmur   No edema  Abdomen:        Soft, non-tender. Not distended.   Bowel sounds normal  Neurologic:      Alert, moves all extremities, answers questions appropriately and responds to commands       [address]:  Problem List as of 11/4/2021 Date Reviewed: 9/28/2021          Codes Class Noted - Resolved    Back pain ICD-10-CM: M54.9  ICD-9-CM: 724.5  10/31/2021 - Present        Diverticulitis large intestine w/o perforation or abscess w/o bleeding ICD-10-CM: K57.32  ICD-9-CM: 562.11  8/30/2021 - Present        Diverticulitis ICD-10-CM: K57.92  ICD-9-CM: 562.11  5/18/2021 - Present        RESOLVED: Allergic reaction caused by a drug ICD-10-CM: T78.40XA  ICD-9-CM: 995.27  5/26/2021 - 9/1/2021              Greater than 30 minutes were spent with the patient on counseling and coordination of care    Signed:   Armen April, DO 11/4/2021  6:17 PM

## 2021-11-05 ENCOUNTER — TRANSCRIBE ORDER (OUTPATIENT)
Dept: REGISTRATION | Age: 66
End: 2021-11-05

## 2021-11-05 DIAGNOSIS — Z01.812 PRE-PROCEDURE LAB EXAM: Primary | ICD-10-CM

## 2021-11-05 LAB
BACTERIA SPEC CULT: NORMAL
SERVICE CMNT-IMP: NORMAL

## 2021-11-10 ENCOUNTER — OFFICE VISIT (OUTPATIENT)
Dept: SURGERY | Age: 66
End: 2021-11-10
Payer: COMMERCIAL

## 2021-11-10 VITALS
WEIGHT: 171.2 LBS | BODY MASS INDEX: 31.5 KG/M2 | TEMPERATURE: 98.1 F | HEART RATE: 75 BPM | SYSTOLIC BLOOD PRESSURE: 138 MMHG | HEIGHT: 62 IN | OXYGEN SATURATION: 96 % | RESPIRATION RATE: 18 BRPM | DIASTOLIC BLOOD PRESSURE: 83 MMHG

## 2021-11-10 DIAGNOSIS — K57.92 DIVERTICULITIS: Primary | ICD-10-CM

## 2021-11-10 PROCEDURE — 99214 OFFICE O/P EST MOD 30 MIN: CPT | Performed by: SURGERY

## 2021-11-10 RX ORDER — METRONIDAZOLE 500 MG/1
500 TABLET ORAL 3 TIMES DAILY
Qty: 3 TABLET | Refills: 0 | Status: SHIPPED | OUTPATIENT
Start: 2021-11-10 | End: 2021-11-11

## 2021-11-10 RX ORDER — METOCLOPRAMIDE 10 MG/1
10 TABLET ORAL 3 TIMES DAILY
Qty: 3 TABLET | Refills: 0 | Status: SHIPPED | OUTPATIENT
Start: 2021-11-10 | End: 2021-11-11

## 2021-11-10 RX ORDER — NEOMYCIN SULFATE 500 MG/1
1000 TABLET ORAL 3 TIMES DAILY
Qty: 6 TABLET | Refills: 0 | Status: SHIPPED | OUTPATIENT
Start: 2021-11-10 | End: 2021-11-11

## 2021-11-10 NOTE — PROGRESS NOTES
1. Have you been to the ER, urgent care clinic since your last visit? Hospitalized since your last visit? Seen at [facility] ER for Diverticulitis. 2. Have you seen or consulted any other health care providers outside of the [address] [place] [address] since your last visit? Include any pap smears or colon screening. No       Patient received the ERAS Booklet. I informed patient PAT will call her to discuss.

## 2021-11-10 NOTE — LETTER
11/11/2021    Patient: Gudelia Finnegan   YOB: 1955   Date of Visit: 11/10/2021     Daniel Giles MD  [address] [place], [place] [id number]  Via Fax: [phone]    Dear Daniel Giles MD,      Thank you for referring Ms. Michelle Castillo to [address] [facility] for evaluation. My notes for this consultation are attached. If you have questions, please do not hesitate to call me. I look forward to following your patient along with you.       Sincerely,    Guanako Mao MD

## 2021-11-11 ENCOUNTER — HOSPITAL ENCOUNTER (OUTPATIENT)
Dept: PREADMISSION TESTING | Age: 66
Discharge: HOME | End: 2021-11-11
Payer: COMMERCIAL

## 2021-11-11 DIAGNOSIS — Z01.812 PRE-PROCEDURE LAB EXAM: ICD-10-CM

## 2021-11-11 PROCEDURE — U0003 INFECTIOUS AGENT DETECTION BY NUCLEIC ACID (DNA OR RNA); SEVERE ACUTE RESPIRATORY SYNDROME CORONAVIRUS 2 (SARS-COV-2) (CORONAVIRUS DISEASE [COVID-19]), AMPLIFIED PROBE TECHNIQUE, MAKING USE OF HIGH THROUGHPUT TECHNOLOGIES AS DESCRIBED BY CMS-2020-01-R: HCPCS

## 2021-11-11 NOTE — PROGRESS NOTES
[facility] at [address] [place] [address] Surgery History and Physical    History of Present Illness:      Luanne Delgadillo is a 77 y.o. female who has been scheduled for laparoscopic sigmoid colectomy due to chronic recurrent diverticulitis with abscess. She is scheduled for surgery on 1116. She is doing well currently on IV antibiotics and not having any abdominal pain. She is here for [employer] education prior to surgery. Past Medical History:   Diagnosis Date    Glaucoma     H/O seasonal allergies     [place] spotted fever     Sinus infection     UTI (urinary tract infection)        No past surgical history on file. Current Outpatient Medications:     metoclopramide HCl (REGLAN) 10 mg tablet, Take 1 Tablet by mouth three (3) times daily for 3 doses. , Disp: 3 Tablet, Rfl: 0    metroNIDAZOLE (FLAGYL) 500 mg tablet, Take 1 Tablet by mouth three (3) times daily for 3 doses. , Disp: 3 Tablet, Rfl: 0    neomycin (MYCIFRADIN) 500 mg tablet, Take 2 Tablets by mouth three (3) times daily for 3 doses. , Disp: 6 Tablet, Rfl: 0    cetirizine (ZYRTEC) 10 mg tablet, Take  by mouth nightly., Disp: , Rfl:     vit A/vit C/vit E/zinc/copper (PRESERVISION AREDS PO), Take  by mouth two (2) times a day., Disp: , Rfl:     simethicone (MYLICON) 40 RO/6.0 mL drops, Take 40 mg by mouth four (4) times daily as needed. , Disp: , Rfl:     brimonidine (ALPHAGAN) 0.2 % ophthalmic solution, INSTILL 1 DROP INTO BOTH EYES TWICE DAILY, Disp: , Rfl:     timolol (TIMOPTIC) 0.25 % ophthalmic solution, INSTILL 1 DROP IN BOTH EYES TWICE DAILY, Disp: , Rfl:     cholecalciferol, vitamin D3, (Vitamin D3) 100 mcg (4,000 unit) cap, Take  by mouth daily. , Disp: , Rfl:     L.acid,para-B. bifidum-S.therm (RISAQUAD) 8 billion cell cap cap, Take 1 Capsule by mouth daily. , Disp: 30 Capsule, Rfl: 0    meropenem 1 g IVPB, 1 g by IntraVENous route every eight (8) hours. , Disp: 84 Dose, Rfl: 0    ondansetron (ZOFRAN ODT) 4 mg disintegrating tablet, , Disp: , Rfl:     zolpidem (AMBIEN) 5 mg tablet, Take 5 mg by mouth nightly as needed for Sleep. (Patient not taking: Reported on 9/28/2021), Disp: , Rfl:     Allergies   Allergen Reactions    Ceftin [Cefuroxime Axetil] Hives    Codeine Rash    Levaquin [Levofloxacin] Other (comments)     Joint swelling    Pcn [Penicillins] Hives    Sulfa (Sulfonamide Antibiotics) Hives       Social History     Socioeconomic History    Marital status:      Spouse name: Not on file    Number of children: Not on file    Years of education: Not on file    Highest education level: Not on file   Occupational History    Not on file   Tobacco Use    Smoking status: Never Smoker    Smokeless tobacco: Never Used   Substance and Sexual Activity    Alcohol use: Not Currently    Drug use: Not Currently    Sexual activity: Not on file   Other Topics Concern    Not on file   Social History Narrative    Not on file     Social Determinants of Health     Financial Resource Strain:     Difficulty of Paying Living Expenses: Not on file   Food Insecurity:     Worried About Running Out of Food in the Last Year: Not on file    Ct of Food in the Last Year: Not on file   Transportation Needs:     Lack of Transportation (Medical): Not on file    Lack of Transportation (Non-Medical):  Not on file   Physical Activity:     Days of Exercise per Week: Not on file    Minutes of Exercise per Session: Not on file   Stress:     Feeling of Stress : Not on file   Social Connections:     Frequency of Communication with Friends and Family: Not on file    Frequency of Social Gatherings with Friends and Family: Not on file    Attends Yazidism Services: Not on file    Active Member of Clubs or Organizations: Not on file    Attends Club or Organization Meetings: Not on file    Marital Status: Not on file   Intimate Partner Violence:     Fear of Current or Ex-Partner: Not on file    Emotionally Abused: Not on file    Physically Abused: Not on file    Sexually Abused: Not on file   Housing Stability:     Unable to Pay for Housing in the Last Year: Not on file    Number of Places Lived in the Last Year: Not on file    Unstable Housing in the Last Year: Not on file       No family history on file. ROS   Constitutional: negative  Ears, Nose, Mouth, Throat, and Face: negative  Respiratory: negative  Cardiovascular: negative  Gastrointestinal: negative  Genitourinary:negative  Integument/Breast: negative  Hematologic/Lymphatic: negative  Behavioral/Psychiatric: negative  Allergic/Immunologic: negative      Physical Exam:     Visit Vitals  /83 (BP 1 Location: Left arm, BP Patient Position: Sitting, BP Cuff Size: Adult)   Pulse 75   Temp 98.1 °F (36.7 °C) (Oral)   Resp 18   Ht 5' 2\" (1.575 m)   Wt 171 lb 3.2 oz (77.7 kg)   SpO2 96%   BMI 31.31 kg/m²       General - alert and oriented, no apparent distress  HEENT - no jaundice, no hearing imparement  Pulm - CTAB, no C/W/R  CV - RRR, no M/R/G  Abd -soft, nondistended, bowel sounds present, nontender to palpation  Ext - pulses intact in UE and LE bilaterally, no edema  Skin - supple, no rashes  Psychiatric - normal affect, good mood    Labs  Lab Results   Component Value Date/Time    Sodium 135 (L) 11/04/2021 04:42 AM    Potassium 3.9 11/04/2021 04:42 AM    Chloride 103 11/04/2021 04:42 AM    CO2 25 11/04/2021 04:42 AM    Anion gap 7 11/04/2021 04:42 AM    Glucose 104 (H) 11/04/2021 04:42 AM    BUN 8 11/04/2021 04:42 AM    Creatinine 0.40 (L) 11/04/2021 04:42 AM    BUN/Creatinine ratio 20 11/04/2021 04:42 AM    GFR est AA >60 11/04/2021 04:42 AM    GFR est non-AA >60 11/04/2021 04:42 AM    Calcium 9.2 11/04/2021 04:42 AM    Bilirubin, total 0.7 10/31/2021 12:12 PM    Alk.  phosphatase 175 (H) 10/31/2021 12:12 PM    Protein, total 7.3 10/31/2021 12:12 PM    Albumin 2.8 (L) 10/31/2021 12:12 PM    Globulin 4.5 (H) 10/31/2021 12:12 PM    A-G Ratio 0.6 (L) 10/31/2021 12:12 PM    ALT (SGPT) 38 10/31/2021 12:12 PM    AST (SGOT) 26 10/31/2021 12:12 PM     Lab Results   Component Value Date/Time    WBC 6.4 11/04/2021 04:42 AM    HGB 10.6 (L) 11/04/2021 04:42 AM    HCT 33.4 (L) 11/04/2021 04:42 AM    PLATELET 471 40/32/3074 04:42 AM    MCV 87.4 11/04/2021 04:42 AM         Imaging  CT scan abdomen pelvis-FINDINGS:   LOWER THORAX: No significant abnormality in the incidentally imaged lower chest.  LIVER: No mass. BILIARY TREE: Stones in the gallbladder. No significant distention. CBD is not  dilated. SPLEEN: within normal limits. PANCREAS: No mass or ductal dilatation. ADRENALS: Unremarkable. KIDNEYS: No mass, calculus, or hydronephrosis. STOMACH: Unremarkable. SMALL BOWEL: No dilatation or wall thickening. COLON: On the background of sigmoid colonic diverticulosis, there is  circumferential wall thickening extending from the proximal sigmoid colon  through to the rectum with resultant inflammatory fat stranding and development  of fluid and gas collections interposed between loops of sigmoid colon. The  localized perforation/loculated fluid collections within the sigmoid mesentery  appear similar to slightly smaller compared to the prior study. The  rim-enhancing fluid collection between the rectum and vagina is similar,  measuring 3.0 x 2.9 cm (3:64). There is thickening at the dome of the bladder,  without clear evidence of a fistula. No intravesicular air. APPENDIX: Normal  PERITONEUM: No lymphadenopathy or ascites. RETROPERITONEUM: No lymphadenopathy or aortic aneurysm. REPRODUCTIVE ORGANS: Uterus is either atrophic or surgically absent. URINARY BLADDER: Thickening of the dome of the bladder without intravesicular  air or clear evidence of a colovesical fistula. BONES: No destructive bone lesion. ABDOMINAL WALL: No mass or hernia.   ADDITIONAL COMMENTS: N/A     IMPRESSION  Sigmoid colitis with microperforation resulting in pockets of fluid and gas in  the sigmoid mesentery, similar to the prior CT from October 31, 2021. Reactive thickening of the dome of the bladder is a result of the adjacent  inflammation, but no clear evidence of a colovesical fistula. I have reviewed and agree with all of the pertinent images    Assessment:     Maicol Newton is a 77 y.o. female with recurrent chronic diverticulitis with abscess    Recommendations:     1. She is on the schedule for surgery on 11/16/2021. She is otherwise doing well. She is going to do her bowel prep and ERAS protocol. I have having urology placed stents prior to surgery. She will continue her IV antibiotics until the day of surgery. I have discussed the above procedure with the patient in detail. We reviewed the benefits and possible complications of the surgery which include bleeding, infection, damage to adjacent organs, venous thromboembolism, need for repeat surgery, death and other unforseen complications. The patient agreed to proceed with the surgery. Tamara Dickinson MD    Greater than half of the time: 30 minutes was used in counciling the patient about diagnosis and treatment plan    Ms. Annette Marie has a reminder for a \"due or due soon\" health maintenance. I have asked that she contact her primary care provider for follow-up on this health maintenance.

## 2021-11-11 NOTE — H&P (VIEW-ONLY)
[address] [place] [address] [facility] Specialists at [facility] Surgery History and Physical    History of Present Illness:      Donna Julien is a 77 y.o. female who has been scheduled for laparoscopic sigmoid colectomy due to chronic recurrent diverticulitis with abscess. She is scheduled for surgery on 1116. She is doing well currently on IV antibiotics and not having any abdominal pain. She is here for [employer] education prior to surgery. Past Medical History:   Diagnosis Date    Glaucoma     H/O seasonal allergies     [place] spotted fever     Sinus infection     UTI (urinary tract infection)        No past surgical history on file. Current Outpatient Medications:     metoclopramide HCl (REGLAN) 10 mg tablet, Take 1 Tablet by mouth three (3) times daily for 3 doses. , Disp: 3 Tablet, Rfl: 0    metroNIDAZOLE (FLAGYL) 500 mg tablet, Take 1 Tablet by mouth three (3) times daily for 3 doses. , Disp: 3 Tablet, Rfl: 0    neomycin (MYCIFRADIN) 500 mg tablet, Take 2 Tablets by mouth three (3) times daily for 3 doses. , Disp: 6 Tablet, Rfl: 0    cetirizine (ZYRTEC) 10 mg tablet, Take  by mouth nightly., Disp: , Rfl:     vit A/vit C/vit E/zinc/copper (PRESERVISION AREDS PO), Take  by mouth two (2) times a day., Disp: , Rfl:     simethicone (MYLICON) 40 HY/8.1 mL drops, Take 40 mg by mouth four (4) times daily as needed. , Disp: , Rfl:     brimonidine (ALPHAGAN) 0.2 % ophthalmic solution, INSTILL 1 DROP INTO BOTH EYES TWICE DAILY, Disp: , Rfl:     timolol (TIMOPTIC) 0.25 % ophthalmic solution, INSTILL 1 DROP IN BOTH EYES TWICE DAILY, Disp: , Rfl:     cholecalciferol, vitamin D3, (Vitamin D3) 100 mcg (4,000 unit) cap, Take  by mouth daily. , Disp: , Rfl:     L.acid,para-B. bifidum-S.therm (RISAQUAD) 8 billion cell cap cap, Take 1 Capsule by mouth daily. , Disp: 30 Capsule, Rfl: 0    meropenem 1 g IVPB, 1 g by IntraVENous route every eight (8) hours. , Disp: 84 Dose, Rfl: 0    ondansetron (ZOFRAN ODT) 4 mg disintegrating tablet, , Disp: , Rfl:     zolpidem (AMBIEN) 5 mg tablet, Take 5 mg by mouth nightly as needed for Sleep. (Patient not taking: Reported on 9/28/2021), Disp: , Rfl:     Allergies   Allergen Reactions    Ceftin [Cefuroxime Axetil] Hives    Codeine Rash    Levaquin [Levofloxacin] Other (comments)     Joint swelling    Pcn [Penicillins] Hives    Sulfa (Sulfonamide Antibiotics) Hives       Social History     Socioeconomic History    Marital status:      Spouse name: Not on file    Number of children: Not on file    Years of education: Not on file    Highest education level: Not on file   Occupational History    Not on file   Tobacco Use    Smoking status: Never Smoker    Smokeless tobacco: Never Used   Substance and Sexual Activity    Alcohol use: Not Currently    Drug use: Not Currently    Sexual activity: Not on file   Other Topics Concern    Not on file   Social History Narrative    Not on file     Social Determinants of Health     Financial Resource Strain:     Difficulty of Paying Living Expenses: Not on file   Food Insecurity:     Worried About Running Out of Food in the Last Year: Not on file    Ct of Food in the Last Year: Not on file   Transportation Needs:     Lack of Transportation (Medical): Not on file    Lack of Transportation (Non-Medical):  Not on file   Physical Activity:     Days of Exercise per Week: Not on file    Minutes of Exercise per Session: Not on file   Stress:     Feeling of Stress : Not on file   Social Connections:     Frequency of Communication with Friends and Family: Not on file    Frequency of Social Gatherings with Friends and Family: Not on file    Attends Religion Services: Not on file    Active Member of Clubs or Organizations: Not on file    Attends Club or Organization Meetings: Not on file    Marital Status: Not on file   Intimate Partner Violence:     Fear of Current or Ex-Partner: Not on file    Emotionally Abused: Not on file    Physically Abused: Not on file    Sexually Abused: Not on file   Housing Stability:     Unable to Pay for Housing in the Last Year: Not on file    Number of Places Lived in the Last Year: Not on file    Unstable Housing in the Last Year: Not on file       No family history on file. ROS   Constitutional: negative  Ears, Nose, Mouth, Throat, and Face: negative  Respiratory: negative  Cardiovascular: negative  Gastrointestinal: negative  Genitourinary:negative  Integument/Breast: negative  Hematologic/Lymphatic: negative  Behavioral/Psychiatric: negative  Allergic/Immunologic: negative      Physical Exam:     Visit Vitals  /83 (BP 1 Location: Left arm, BP Patient Position: Sitting, BP Cuff Size: Adult)   Pulse 75   Temp 98.1 °F (36.7 °C) (Oral)   Resp 18   Ht 5' 2\" (1.575 m)   Wt 171 lb 3.2 oz (77.7 kg)   SpO2 96%   BMI 31.31 kg/m²       General - alert and oriented, no apparent distress  HEENT - no jaundice, no hearing imparement  Pulm - CTAB, no C/W/R  CV - RRR, no M/R/G  Abd -soft, nondistended, bowel sounds present, nontender to palpation  Ext - pulses intact in UE and LE bilaterally, no edema  Skin - supple, no rashes  Psychiatric - normal affect, good mood    Labs  Lab Results   Component Value Date/Time    Sodium 135 (L) 11/04/2021 04:42 AM    Potassium 3.9 11/04/2021 04:42 AM    Chloride 103 11/04/2021 04:42 AM    CO2 25 11/04/2021 04:42 AM    Anion gap 7 11/04/2021 04:42 AM    Glucose 104 (H) 11/04/2021 04:42 AM    BUN 8 11/04/2021 04:42 AM    Creatinine 0.40 (L) 11/04/2021 04:42 AM    BUN/Creatinine ratio 20 11/04/2021 04:42 AM    GFR est AA >60 11/04/2021 04:42 AM    GFR est non-AA >60 11/04/2021 04:42 AM    Calcium 9.2 11/04/2021 04:42 AM    Bilirubin, total 0.7 10/31/2021 12:12 PM    Alk.  phosphatase 175 (H) 10/31/2021 12:12 PM    Protein, total 7.3 10/31/2021 12:12 PM    Albumin 2.8 (L) 10/31/2021 12:12 PM    Globulin 4.5 (H) 10/31/2021 12:12 PM    A-G Ratio 0.6 (L) 10/31/2021 12:12 PM    ALT (SGPT) 38 10/31/2021 12:12 PM    AST (SGOT) 26 10/31/2021 12:12 PM     Lab Results   Component Value Date/Time    WBC 6.4 11/04/2021 04:42 AM    HGB 10.6 (L) 11/04/2021 04:42 AM    HCT 33.4 (L) 11/04/2021 04:42 AM    PLATELET 651 46/33/7602 04:42 AM    MCV 87.4 11/04/2021 04:42 AM         Imaging  CT scan abdomen pelvis-FINDINGS:   LOWER THORAX: No significant abnormality in the incidentally imaged lower chest.  LIVER: No mass. BILIARY TREE: Stones in the gallbladder. No significant distention. CBD is not  dilated. SPLEEN: within normal limits. PANCREAS: No mass or ductal dilatation. ADRENALS: Unremarkable. KIDNEYS: No mass, calculus, or hydronephrosis. STOMACH: Unremarkable. SMALL BOWEL: No dilatation or wall thickening. COLON: On the background of sigmoid colonic diverticulosis, there is  circumferential wall thickening extending from the proximal sigmoid colon  through to the rectum with resultant inflammatory fat stranding and development  of fluid and gas collections interposed between loops of sigmoid colon. The  localized perforation/loculated fluid collections within the sigmoid mesentery  appear similar to slightly smaller compared to the prior study. The  rim-enhancing fluid collection between the rectum and vagina is similar,  measuring 3.0 x 2.9 cm (3:64). There is thickening at the dome of the bladder,  without clear evidence of a fistula. No intravesicular air. APPENDIX: Normal  PERITONEUM: No lymphadenopathy or ascites. RETROPERITONEUM: No lymphadenopathy or aortic aneurysm. REPRODUCTIVE ORGANS: Uterus is either atrophic or surgically absent. URINARY BLADDER: Thickening of the dome of the bladder without intravesicular  air or clear evidence of a colovesical fistula. BONES: No destructive bone lesion. ABDOMINAL WALL: No mass or hernia.   ADDITIONAL COMMENTS: N/A     IMPRESSION  Sigmoid colitis with microperforation resulting in pockets of fluid and gas in  the sigmoid mesentery, similar to the prior CT from October 31, 2021. Reactive thickening of the dome of the bladder is a result of the adjacent  inflammation, but no clear evidence of a colovesical fistula. I have reviewed and agree with all of the pertinent images    Assessment:     Dorian Fernandez is a 77 y.o. female with recurrent chronic diverticulitis with abscess    Recommendations:     1. She is on the schedule for surgery on 11/16/2021. She is otherwise doing well. She is going to do her bowel prep and ERAS protocol. I have having urology placed stents prior to surgery. She will continue her IV antibiotics until the day of surgery. I have discussed the above procedure with the patient in detail. We reviewed the benefits and possible complications of the surgery which include bleeding, infection, damage to adjacent organs, venous thromboembolism, need for repeat surgery, death and other unforseen complications. The patient agreed to proceed with the surgery. Adarsh Henning MD    Greater than half of the time: 30 minutes was used in counciling the patient about diagnosis and treatment plan    Ms. Eva Spencer has a reminder for a \"due or due soon\" health maintenance. I have asked that she contact her primary care provider for follow-up on this health maintenance.

## 2021-11-12 LAB
SARS-COV-2, XPLCVT: NOT DETECTED
SOURCE, COVRS: NORMAL

## 2021-11-16 ENCOUNTER — ANESTHESIA (OUTPATIENT)
Dept: SURGERY | Age: 66
DRG: 330 | End: 2021-11-16
Payer: COMMERCIAL

## 2021-11-16 ENCOUNTER — ANESTHESIA EVENT (OUTPATIENT)
Dept: SURGERY | Age: 66
DRG: 330 | End: 2021-11-16
Payer: COMMERCIAL

## 2021-11-16 ENCOUNTER — HOSPITAL ENCOUNTER (INPATIENT)
Age: 66
LOS: 8 days | Discharge: HOME | DRG: 330 | End: 2021-11-24
Attending: SURGERY | Admitting: SURGERY
Payer: COMMERCIAL

## 2021-11-16 DIAGNOSIS — K57.92 DIVERTICULITIS: Primary | ICD-10-CM

## 2021-11-16 DIAGNOSIS — K57.32 DIVERTICULITIS LARGE INTESTINE W/O PERFORATION OR ABSCESS W/O BLEEDING: ICD-10-CM

## 2021-11-16 LAB
ABO + RH BLD: NORMAL
BLOOD GROUP ANTIBODIES SERPL: NORMAL
SPECIMEN EXP DATE BLD: NORMAL

## 2021-11-16 PROCEDURE — 77030026438 HC STYL ET INTUB CARD -A: Performed by: ANESTHESIOLOGY

## 2021-11-16 PROCEDURE — 77030040361 HC SLV COMPR DVT MDII -B: Performed by: SURGERY

## 2021-11-16 PROCEDURE — 74011250637 HC RX REV CODE- 250/637: Performed by: NURSE ANESTHETIST, CERTIFIED REGISTERED

## 2021-11-16 PROCEDURE — 77030034667 HC ACC PLATFRM ENDO GELPNT AMR -E: Performed by: SURGERY

## 2021-11-16 PROCEDURE — 77030016154: Performed by: SURGERY

## 2021-11-16 PROCEDURE — 76060000041 HC ANESTHESIA 5 TO 5.5 HR: Performed by: SURGERY

## 2021-11-16 PROCEDURE — 77030013079 HC BLNKT BAIR HGGR 3M -A: Performed by: ANESTHESIOLOGY

## 2021-11-16 PROCEDURE — 77030038552 HC DRN WND MDII -A: Performed by: SURGERY

## 2021-11-16 PROCEDURE — 77030040830 HC CATH URETH FOL MDII -A: Performed by: SURGERY

## 2021-11-16 PROCEDURE — 0DTN0ZZ RESECTION OF SIGMOID COLON, OPEN APPROACH: ICD-10-PCS | Performed by: SURGERY

## 2021-11-16 PROCEDURE — 77030031139 HC SUT VCRL2 J&J -A: Performed by: SURGERY

## 2021-11-16 PROCEDURE — 74011000250 HC RX REV CODE- 250: Performed by: NURSE ANESTHETIST, CERTIFIED REGISTERED

## 2021-11-16 PROCEDURE — 77030038157 HC DEV PWR CNTR DISP SIGNIA COVD -C: Performed by: SURGERY

## 2021-11-16 PROCEDURE — 77030002986 HC SUT PROL J&J -A: Performed by: SURGERY

## 2021-11-16 PROCEDURE — P9045 ALBUMIN (HUMAN), 5%, 250 ML: HCPCS | Performed by: NURSE ANESTHETIST, CERTIFIED REGISTERED

## 2021-11-16 PROCEDURE — 77030037032 HC INSRT SCIS CLICKLLINE DISP STOR -B: Performed by: SURGERY

## 2021-11-16 PROCEDURE — 65270000029 HC RM PRIVATE

## 2021-11-16 PROCEDURE — 44143 PARTIAL REMOVAL OF COLON: CPT | Performed by: SURGERY

## 2021-11-16 PROCEDURE — 77030019927 HC TBNG IRR CYSTO BAXT -A: Performed by: SURGERY

## 2021-11-16 PROCEDURE — 74011250636 HC RX REV CODE- 250/636: Performed by: SURGERY

## 2021-11-16 PROCEDURE — C1758 CATHETER, URETERAL: HCPCS | Performed by: SURGERY

## 2021-11-16 PROCEDURE — 87086 URINE CULTURE/COLONY COUNT: CPT

## 2021-11-16 PROCEDURE — 86901 BLOOD TYPING SEROLOGIC RH(D): CPT

## 2021-11-16 PROCEDURE — 77030013076 HC PCH OST BAG COLO -A: Performed by: SURGERY

## 2021-11-16 PROCEDURE — 77030008756 HC TU IRR SUC STRY -B: Performed by: SURGERY

## 2021-11-16 PROCEDURE — 74011250636 HC RX REV CODE- 250/636: Performed by: NURSE ANESTHETIST, CERTIFIED REGISTERED

## 2021-11-16 PROCEDURE — 77030041238 HC TBNG INSUF MDII -A: Performed by: SURGERY

## 2021-11-16 PROCEDURE — 77030008608 HC TRCR ENDOSC SMTH AMR -B: Performed by: SURGERY

## 2021-11-16 PROCEDURE — 77030034628 HC LIGASURE LAP SEAL DIV MD COVD -F: Performed by: SURGERY

## 2021-11-16 PROCEDURE — 74011250636 HC RX REV CODE- 250/636: Performed by: ANESTHESIOLOGY

## 2021-11-16 PROCEDURE — 36415 COLL VENOUS BLD VENIPUNCTURE: CPT

## 2021-11-16 PROCEDURE — 85018 HEMOGLOBIN: CPT

## 2021-11-16 PROCEDURE — 74011250637 HC RX REV CODE- 250/637: Performed by: SURGERY

## 2021-11-16 PROCEDURE — 2709999900 HC NON-CHARGEABLE SUPPLY: Performed by: SURGERY

## 2021-11-16 PROCEDURE — 77030008684 HC TU ET CUF COVD -B: Performed by: ANESTHESIOLOGY

## 2021-11-16 PROCEDURE — 77030010507 HC ADH SKN DERMBND J&J -B: Performed by: SURGERY

## 2021-11-16 PROCEDURE — 77030012770 HC TRCR OPT FX AMR -B: Performed by: SURGERY

## 2021-11-16 PROCEDURE — 76010000137 HC OR TIME 5 TO 5.5 HR: Performed by: SURGERY

## 2021-11-16 PROCEDURE — 74011000250 HC RX REV CODE- 250: Performed by: ANESTHESIOLOGY

## 2021-11-16 PROCEDURE — 77030032060 HC PWDR HEMSTAT ARISTA ASRB 3GM BARD -C: Performed by: SURGERY

## 2021-11-16 PROCEDURE — 77030002895 HC DEV VASC CLOSR COVD -B: Performed by: SURGERY

## 2021-11-16 PROCEDURE — C1765 ADHESION BARRIER: HCPCS | Performed by: SURGERY

## 2021-11-16 PROCEDURE — 88307 TISSUE EXAM BY PATHOLOGIST: CPT

## 2021-11-16 PROCEDURE — 77030020829: Performed by: SURGERY

## 2021-11-16 PROCEDURE — 77030002966 HC SUT PDS J&J -A: Performed by: SURGERY

## 2021-11-16 PROCEDURE — 77030016151 HC PROTCTR LNS DFOG COVD -B: Performed by: SURGERY

## 2021-11-16 PROCEDURE — 76210000016 HC OR PH I REC 1 TO 1.5 HR: Performed by: SURGERY

## 2021-11-16 PROCEDURE — 74011000250 HC RX REV CODE- 250: Performed by: SURGERY

## 2021-11-16 PROCEDURE — 0T788DZ DILATION OF BILATERAL URETERS WITH INTRALUMINAL DEVICE, VIA NATURAL OR ARTIFICIAL OPENING ENDOSCOPIC: ICD-10-PCS | Performed by: UROLOGY

## 2021-11-16 PROCEDURE — 77030009965 HC RELD STPLR ENDOS COVD -D: Performed by: SURGERY

## 2021-11-16 PROCEDURE — 77030040922 HC BLNKT HYPOTHRM STRY -A

## 2021-11-16 PROCEDURE — 74011000636 HC RX REV CODE- 636: Performed by: SURGERY

## 2021-11-16 PROCEDURE — 0D1M0Z4 BYPASS DESCENDING COLON TO CUTANEOUS, OPEN APPROACH: ICD-10-PCS | Performed by: SURGERY

## 2021-11-16 PROCEDURE — 77030042556 HC PNCL CAUT -B: Performed by: SURGERY

## 2021-11-16 PROCEDURE — 77030036998 HC STPLR CRV CUT CNTOUR J&J -F: Performed by: SURGERY

## 2021-11-16 RX ORDER — ROCURONIUM BROMIDE 10 MG/ML
INJECTION, SOLUTION INTRAVENOUS AS NEEDED
Status: DISCONTINUED | OUTPATIENT
Start: 2021-11-16 | End: 2021-11-16 | Stop reason: HOSPADM

## 2021-11-16 RX ORDER — ACETAMINOPHEN 325 MG/1
650 TABLET ORAL ONCE
Status: DISCONTINUED | OUTPATIENT
Start: 2021-11-16 | End: 2021-11-16 | Stop reason: HOSPADM

## 2021-11-16 RX ORDER — HYDROMORPHONE HYDROCHLORIDE 2 MG/ML
INJECTION, SOLUTION INTRAMUSCULAR; INTRAVENOUS; SUBCUTANEOUS AS NEEDED
Status: DISCONTINUED | OUTPATIENT
Start: 2021-11-16 | End: 2021-11-16 | Stop reason: HOSPADM

## 2021-11-16 RX ORDER — GLYCOPYRROLATE 0.2 MG/ML
0.2 INJECTION INTRAMUSCULAR; INTRAVENOUS ONCE
Status: COMPLETED | OUTPATIENT
Start: 2021-11-16 | End: 2021-11-16

## 2021-11-16 RX ORDER — SODIUM CHLORIDE 0.9 % (FLUSH) 0.9 %
5-40 SYRINGE (ML) INJECTION AS NEEDED
Status: DISCONTINUED | OUTPATIENT
Start: 2021-11-16 | End: 2021-11-16 | Stop reason: HOSPADM

## 2021-11-16 RX ORDER — LIDOCAINE HYDROCHLORIDE 20 MG/ML
INJECTION, SOLUTION EPIDURAL; INFILTRATION; INTRACAUDAL; PERINEURAL AS NEEDED
Status: DISCONTINUED | OUTPATIENT
Start: 2021-11-16 | End: 2021-11-16 | Stop reason: HOSPADM

## 2021-11-16 RX ORDER — SODIUM CHLORIDE, SODIUM LACTATE, POTASSIUM CHLORIDE, CALCIUM CHLORIDE 600; 310; 30; 20 MG/100ML; MG/100ML; MG/100ML; MG/100ML
25 INJECTION, SOLUTION INTRAVENOUS CONTINUOUS
Status: DISCONTINUED | OUTPATIENT
Start: 2021-11-16 | End: 2021-11-16 | Stop reason: HOSPADM

## 2021-11-16 RX ORDER — SUCCINYLCHOLINE CHLORIDE 20 MG/ML
INJECTION INTRAMUSCULAR; INTRAVENOUS AS NEEDED
Status: DISCONTINUED | OUTPATIENT
Start: 2021-11-16 | End: 2021-11-16 | Stop reason: HOSPADM

## 2021-11-16 RX ORDER — KETAMINE HYDROCHLORIDE 10 MG/ML
INJECTION, SOLUTION INTRAMUSCULAR; INTRAVENOUS AS NEEDED
Status: DISCONTINUED | OUTPATIENT
Start: 2021-11-16 | End: 2021-11-16 | Stop reason: HOSPADM

## 2021-11-16 RX ORDER — SCOLOPAMINE TRANSDERMAL SYSTEM 1 MG/1
PATCH, EXTENDED RELEASE TRANSDERMAL AS NEEDED
Status: DISCONTINUED | OUTPATIENT
Start: 2021-11-16 | End: 2021-11-16 | Stop reason: HOSPADM

## 2021-11-16 RX ORDER — LIDOCAINE HYDROCHLORIDE ANHYDROUS AND DEXTROSE MONOHYDRATE .8; 5 G/100ML; G/100ML
1 INJECTION, SOLUTION INTRAVENOUS CONTINUOUS
Status: DISPENSED | OUTPATIENT
Start: 2021-11-16 | End: 2021-11-18

## 2021-11-16 RX ORDER — SODIUM CHLORIDE 9 MG/ML
25 INJECTION, SOLUTION INTRAVENOUS CONTINUOUS
Status: DISCONTINUED | OUTPATIENT
Start: 2021-11-16 | End: 2021-11-16 | Stop reason: HOSPADM

## 2021-11-16 RX ORDER — ENOXAPARIN SODIUM 100 MG/ML
40 INJECTION SUBCUTANEOUS DAILY
Status: DISCONTINUED | OUTPATIENT
Start: 2021-11-17 | End: 2021-11-24 | Stop reason: HOSPADM

## 2021-11-16 RX ORDER — TIMOLOL MALEATE 2.5 MG/ML
1 SOLUTION/ DROPS OPHTHALMIC 2 TIMES DAILY
Status: DISCONTINUED | OUTPATIENT
Start: 2021-11-16 | End: 2021-11-24 | Stop reason: HOSPADM

## 2021-11-16 RX ORDER — ONDANSETRON 2 MG/ML
INJECTION INTRAMUSCULAR; INTRAVENOUS AS NEEDED
Status: DISCONTINUED | OUTPATIENT
Start: 2021-11-16 | End: 2021-11-16 | Stop reason: HOSPADM

## 2021-11-16 RX ORDER — SODIUM CHLORIDE, SODIUM LACTATE, POTASSIUM CHLORIDE, CALCIUM CHLORIDE 600; 310; 30; 20 MG/100ML; MG/100ML; MG/100ML; MG/100ML
100 INJECTION, SOLUTION INTRAVENOUS CONTINUOUS
Status: DISCONTINUED | OUTPATIENT
Start: 2021-11-16 | End: 2021-11-16 | Stop reason: HOSPADM

## 2021-11-16 RX ORDER — ACETAMINOPHEN 500 MG
1000 TABLET ORAL ONCE
Status: COMPLETED | OUTPATIENT
Start: 2021-11-16 | End: 2021-11-16

## 2021-11-16 RX ORDER — NEOSTIGMINE METHYLSULFATE 1 MG/ML
INJECTION, SOLUTION INTRAVENOUS AS NEEDED
Status: DISCONTINUED | OUTPATIENT
Start: 2021-11-16 | End: 2021-11-16 | Stop reason: HOSPADM

## 2021-11-16 RX ORDER — ONDANSETRON 2 MG/ML
4 INJECTION INTRAMUSCULAR; INTRAVENOUS AS NEEDED
Status: DISCONTINUED | OUTPATIENT
Start: 2021-11-16 | End: 2021-11-16 | Stop reason: HOSPADM

## 2021-11-16 RX ORDER — BRIMONIDINE TARTRATE 2 MG/ML
1 SOLUTION/ DROPS OPHTHALMIC 2 TIMES DAILY
Status: DISCONTINUED | OUTPATIENT
Start: 2021-11-16 | End: 2021-11-24 | Stop reason: HOSPADM

## 2021-11-16 RX ORDER — PHENYLEPHRINE HCL IN 0.9% NACL 0.4MG/10ML
SYRINGE (ML) INTRAVENOUS AS NEEDED
Status: DISCONTINUED | OUTPATIENT
Start: 2021-11-16 | End: 2021-11-16 | Stop reason: HOSPADM

## 2021-11-16 RX ORDER — FENTANYL CITRATE 50 UG/ML
25 INJECTION, SOLUTION INTRAMUSCULAR; INTRAVENOUS
Status: DISCONTINUED | OUTPATIENT
Start: 2021-11-16 | End: 2021-11-16 | Stop reason: HOSPADM

## 2021-11-16 RX ORDER — SODIUM CHLORIDE 0.9 % (FLUSH) 0.9 %
5-40 SYRINGE (ML) INJECTION AS NEEDED
Status: DISCONTINUED | OUTPATIENT
Start: 2021-11-16 | End: 2021-11-24 | Stop reason: HOSPADM

## 2021-11-16 RX ORDER — LIDOCAINE HYDROCHLORIDE ANHYDROUS AND DEXTROSE MONOHYDRATE .8; 5 G/100ML; G/100ML
INJECTION, SOLUTION INTRAVENOUS
Status: DISCONTINUED | OUTPATIENT
Start: 2021-11-16 | End: 2021-11-16 | Stop reason: HOSPADM

## 2021-11-16 RX ORDER — FENTANYL CITRATE 50 UG/ML
50 INJECTION, SOLUTION INTRAMUSCULAR; INTRAVENOUS AS NEEDED
Status: DISCONTINUED | OUTPATIENT
Start: 2021-11-16 | End: 2021-11-16 | Stop reason: HOSPADM

## 2021-11-16 RX ORDER — EPHEDRINE SULFATE/0.9% NACL/PF 50 MG/5 ML
SYRINGE (ML) INTRAVENOUS AS NEEDED
Status: DISCONTINUED | OUTPATIENT
Start: 2021-11-16 | End: 2021-11-16 | Stop reason: HOSPADM

## 2021-11-16 RX ORDER — BRIMONIDINE TARTRATE 2 MG/ML
1 SOLUTION/ DROPS OPHTHALMIC EVERY 8 HOURS
Status: DISCONTINUED | OUTPATIENT
Start: 2021-11-16 | End: 2021-11-16 | Stop reason: DRUGHIGH

## 2021-11-16 RX ORDER — ROPIVACAINE HYDROCHLORIDE 5 MG/ML
30 INJECTION, SOLUTION EPIDURAL; INFILTRATION; PERINEURAL AS NEEDED
Status: DISCONTINUED | OUTPATIENT
Start: 2021-11-16 | End: 2021-11-16 | Stop reason: HOSPADM

## 2021-11-16 RX ORDER — KETOROLAC TROMETHAMINE 30 MG/ML
15 INJECTION, SOLUTION INTRAMUSCULAR; INTRAVENOUS EVERY 6 HOURS
Status: COMPLETED | OUTPATIENT
Start: 2021-11-16 | End: 2021-11-17

## 2021-11-16 RX ORDER — MIDAZOLAM HYDROCHLORIDE 1 MG/ML
INJECTION, SOLUTION INTRAMUSCULAR; INTRAVENOUS AS NEEDED
Status: DISCONTINUED | OUTPATIENT
Start: 2021-11-16 | End: 2021-11-16 | Stop reason: HOSPADM

## 2021-11-16 RX ORDER — GABAPENTIN 300 MG/1
600 CAPSULE ORAL ONCE
Status: DISCONTINUED | OUTPATIENT
Start: 2021-11-16 | End: 2021-11-16 | Stop reason: HOSPADM

## 2021-11-16 RX ORDER — SODIUM CHLORIDE 0.9 % (FLUSH) 0.9 %
5-40 SYRINGE (ML) INJECTION EVERY 8 HOURS
Status: DISCONTINUED | OUTPATIENT
Start: 2021-11-16 | End: 2021-11-16 | Stop reason: HOSPADM

## 2021-11-16 RX ORDER — METRONIDAZOLE 500 MG/100ML
500 INJECTION, SOLUTION INTRAVENOUS ONCE
Status: COMPLETED | OUTPATIENT
Start: 2021-11-16 | End: 2021-11-16

## 2021-11-16 RX ORDER — MEROPENEM 500 MG/1
500 INJECTION, POWDER, FOR SOLUTION INTRAVENOUS EVERY 6 HOURS
Status: DISCONTINUED | OUTPATIENT
Start: 2021-11-16 | End: 2021-11-16 | Stop reason: SDUPTHER

## 2021-11-16 RX ORDER — SODIUM CHLORIDE, SODIUM LACTATE, POTASSIUM CHLORIDE, CALCIUM CHLORIDE 600; 310; 30; 20 MG/100ML; MG/100ML; MG/100ML; MG/100ML
50 INJECTION, SOLUTION INTRAVENOUS CONTINUOUS
Status: DISPENSED | OUTPATIENT
Start: 2021-11-16 | End: 2021-11-17

## 2021-11-16 RX ORDER — PROPOFOL 10 MG/ML
INJECTION, EMULSION INTRAVENOUS AS NEEDED
Status: DISCONTINUED | OUTPATIENT
Start: 2021-11-16 | End: 2021-11-16 | Stop reason: HOSPADM

## 2021-11-16 RX ORDER — GLYCOPYRROLATE 0.2 MG/ML
INJECTION INTRAMUSCULAR; INTRAVENOUS AS NEEDED
Status: DISCONTINUED | OUTPATIENT
Start: 2021-11-16 | End: 2021-11-16 | Stop reason: HOSPADM

## 2021-11-16 RX ORDER — MIDAZOLAM HYDROCHLORIDE 1 MG/ML
0.5 INJECTION, SOLUTION INTRAMUSCULAR; INTRAVENOUS
Status: DISCONTINUED | OUTPATIENT
Start: 2021-11-16 | End: 2021-11-16 | Stop reason: HOSPADM

## 2021-11-16 RX ORDER — ALBUMIN HUMAN 50 G/1000ML
SOLUTION INTRAVENOUS AS NEEDED
Status: DISCONTINUED | OUTPATIENT
Start: 2021-11-16 | End: 2021-11-16 | Stop reason: HOSPADM

## 2021-11-16 RX ORDER — BUPIVACAINE HYDROCHLORIDE 5 MG/ML
INJECTION, SOLUTION EPIDURAL; INTRACAUDAL AS NEEDED
Status: DISCONTINUED | OUTPATIENT
Start: 2021-11-16 | End: 2021-11-16 | Stop reason: HOSPADM

## 2021-11-16 RX ORDER — HYDROMORPHONE HYDROCHLORIDE 1 MG/ML
1 INJECTION, SOLUTION INTRAMUSCULAR; INTRAVENOUS; SUBCUTANEOUS
Status: DISCONTINUED | OUTPATIENT
Start: 2021-11-16 | End: 2021-11-24 | Stop reason: HOSPADM

## 2021-11-16 RX ORDER — GENTAMICIN SULFATE 80 MG/100ML
INJECTION, SOLUTION INTRAVENOUS AS NEEDED
Status: DISCONTINUED | OUTPATIENT
Start: 2021-11-16 | End: 2021-11-16 | Stop reason: HOSPADM

## 2021-11-16 RX ORDER — MORPHINE SULFATE 2 MG/ML
2 INJECTION, SOLUTION INTRAMUSCULAR; INTRAVENOUS
Status: DISCONTINUED | OUTPATIENT
Start: 2021-11-16 | End: 2021-11-16 | Stop reason: HOSPADM

## 2021-11-16 RX ORDER — SODIUM CHLORIDE, SODIUM LACTATE, POTASSIUM CHLORIDE, CALCIUM CHLORIDE 600; 310; 30; 20 MG/100ML; MG/100ML; MG/100ML; MG/100ML
75 INJECTION, SOLUTION INTRAVENOUS CONTINUOUS
Status: DISCONTINUED | OUTPATIENT
Start: 2021-11-16 | End: 2021-11-16 | Stop reason: HOSPADM

## 2021-11-16 RX ORDER — DEXAMETHASONE SODIUM PHOSPHATE 4 MG/ML
INJECTION, SOLUTION INTRA-ARTICULAR; INTRALESIONAL; INTRAMUSCULAR; INTRAVENOUS; SOFT TISSUE AS NEEDED
Status: DISCONTINUED | OUTPATIENT
Start: 2021-11-16 | End: 2021-11-16 | Stop reason: HOSPADM

## 2021-11-16 RX ORDER — MIDAZOLAM HYDROCHLORIDE 1 MG/ML
1 INJECTION, SOLUTION INTRAMUSCULAR; INTRAVENOUS AS NEEDED
Status: DISCONTINUED | OUTPATIENT
Start: 2021-11-16 | End: 2021-11-16 | Stop reason: HOSPADM

## 2021-11-16 RX ORDER — OXYCODONE HYDROCHLORIDE 5 MG/1
5 TABLET ORAL
Status: DISCONTINUED | OUTPATIENT
Start: 2021-11-16 | End: 2021-11-24 | Stop reason: HOSPADM

## 2021-11-16 RX ORDER — ZOLPIDEM TARTRATE 5 MG/1
5 TABLET ORAL
Status: DISCONTINUED | OUTPATIENT
Start: 2021-11-16 | End: 2021-11-24 | Stop reason: HOSPADM

## 2021-11-16 RX ORDER — CELECOXIB 200 MG/1
200 CAPSULE ORAL ONCE
Status: COMPLETED | OUTPATIENT
Start: 2021-11-16 | End: 2021-11-16

## 2021-11-16 RX ORDER — SODIUM CHLORIDE 9 MG/ML
250 INJECTION, SOLUTION INTRAVENOUS AS NEEDED
Status: DISCONTINUED | OUTPATIENT
Start: 2021-11-16 | End: 2021-11-24 | Stop reason: HOSPADM

## 2021-11-16 RX ORDER — OXYCODONE HYDROCHLORIDE 5 MG/1
5 TABLET ORAL AS NEEDED
Status: DISCONTINUED | OUTPATIENT
Start: 2021-11-16 | End: 2021-11-16 | Stop reason: HOSPADM

## 2021-11-16 RX ORDER — DIPHENHYDRAMINE HYDROCHLORIDE 50 MG/ML
12.5 INJECTION, SOLUTION INTRAMUSCULAR; INTRAVENOUS AS NEEDED
Status: DISCONTINUED | OUTPATIENT
Start: 2021-11-16 | End: 2021-11-16 | Stop reason: HOSPADM

## 2021-11-16 RX ORDER — EPHEDRINE SULFATE/0.9% NACL/PF 50 MG/5 ML
SYRINGE (ML) INTRAVENOUS AS NEEDED
Status: DISCONTINUED | OUTPATIENT
Start: 2021-11-16 | End: 2021-11-16

## 2021-11-16 RX ORDER — ONDANSETRON 2 MG/ML
4 INJECTION INTRAMUSCULAR; INTRAVENOUS
Status: DISCONTINUED | OUTPATIENT
Start: 2021-11-16 | End: 2021-11-24 | Stop reason: HOSPADM

## 2021-11-16 RX ORDER — HYDROMORPHONE HYDROCHLORIDE 1 MG/ML
0.2 INJECTION, SOLUTION INTRAMUSCULAR; INTRAVENOUS; SUBCUTANEOUS
Status: DISCONTINUED | OUTPATIENT
Start: 2021-11-16 | End: 2021-11-16 | Stop reason: HOSPADM

## 2021-11-16 RX ORDER — FENTANYL CITRATE 50 UG/ML
INJECTION, SOLUTION INTRAMUSCULAR; INTRAVENOUS AS NEEDED
Status: DISCONTINUED | OUTPATIENT
Start: 2021-11-16 | End: 2021-11-16 | Stop reason: HOSPADM

## 2021-11-16 RX ORDER — LIDOCAINE HYDROCHLORIDE 10 MG/ML
0.1 INJECTION, SOLUTION EPIDURAL; INFILTRATION; INTRACAUDAL; PERINEURAL AS NEEDED
Status: DISCONTINUED | OUTPATIENT
Start: 2021-11-16 | End: 2021-11-16 | Stop reason: HOSPADM

## 2021-11-16 RX ORDER — ACETAMINOPHEN 500 MG
1000 TABLET ORAL EVERY 6 HOURS
Status: DISCONTINUED | OUTPATIENT
Start: 2021-11-16 | End: 2021-11-24 | Stop reason: HOSPADM

## 2021-11-16 RX ORDER — SODIUM CHLORIDE 0.9 % (FLUSH) 0.9 %
5-40 SYRINGE (ML) INJECTION EVERY 8 HOURS
Status: DISCONTINUED | OUTPATIENT
Start: 2021-11-16 | End: 2021-11-24 | Stop reason: HOSPADM

## 2021-11-16 RX ADMIN — PHENYLEPHRINE HYDROCHLORIDE 40 MCG/MIN: 10 INJECTION INTRAVENOUS at 10:24

## 2021-11-16 RX ADMIN — CELECOXIB 200 MG: 200 CAPSULE ORAL at 06:31

## 2021-11-16 RX ADMIN — SCOPALAMINE 1 PATCH: 1 PATCH, EXTENDED RELEASE TRANSDERMAL at 07:29

## 2021-11-16 RX ADMIN — SODIUM CHLORIDE, POTASSIUM CHLORIDE, SODIUM LACTATE AND CALCIUM CHLORIDE 75 ML/HR: 600; 310; 30; 20 INJECTION, SOLUTION INTRAVENOUS at 06:40

## 2021-11-16 RX ADMIN — NEOSTIGMINE METHYLSULFATE 3 MG: 1 INJECTION, SOLUTION INTRAVENOUS at 11:49

## 2021-11-16 RX ADMIN — ALBUMIN (HUMAN) 250 ML: 12.5 INJECTION, SOLUTION INTRAVENOUS at 10:52

## 2021-11-16 RX ADMIN — ONDANSETRON HYDROCHLORIDE 4 MG: 2 INJECTION, SOLUTION INTRAMUSCULAR; INTRAVENOUS at 11:02

## 2021-11-16 RX ADMIN — HYDROMORPHONE HYDROCHLORIDE 1 MG: 1 INJECTION, SOLUTION INTRAMUSCULAR; INTRAVENOUS; SUBCUTANEOUS at 19:04

## 2021-11-16 RX ADMIN — SUCCINYLCHOLINE CHLORIDE 140 MG: 20 INJECTION, SOLUTION INTRAMUSCULAR; INTRAVENOUS at 07:37

## 2021-11-16 RX ADMIN — METRONIDAZOLE 500 MG: 500 SOLUTION INTRAVENOUS at 07:53

## 2021-11-16 RX ADMIN — SODIUM CHLORIDE, POTASSIUM CHLORIDE, SODIUM LACTATE AND CALCIUM CHLORIDE 125 ML/HR: 600; 310; 30; 20 INJECTION, SOLUTION INTRAVENOUS at 13:06

## 2021-11-16 RX ADMIN — BRIMONIDINE TARTRATE 1 DROP: 2 SOLUTION OPHTHALMIC at 19:04

## 2021-11-16 RX ADMIN — Medication 10 ML: at 14:00

## 2021-11-16 RX ADMIN — Medication 80 MCG: at 08:08

## 2021-11-16 RX ADMIN — MEROPENEM 500 MG: 500 INJECTION, POWDER, FOR SOLUTION INTRAVENOUS at 22:00

## 2021-11-16 RX ADMIN — Medication 10 MG: at 07:52

## 2021-11-16 RX ADMIN — ROCURONIUM BROMIDE 10 MG: 10 SOLUTION INTRAVENOUS at 10:13

## 2021-11-16 RX ADMIN — ROCURONIUM BROMIDE 5 MG: 10 SOLUTION INTRAVENOUS at 07:37

## 2021-11-16 RX ADMIN — Medication 10 MG: at 09:13

## 2021-11-16 RX ADMIN — FENTANYL CITRATE 50 MCG: 50 INJECTION, SOLUTION INTRAMUSCULAR; INTRAVENOUS at 08:15

## 2021-11-16 RX ADMIN — SODIUM CHLORIDE, POTASSIUM CHLORIDE, SODIUM LACTATE AND CALCIUM CHLORIDE: 600; 310; 30; 20 INJECTION, SOLUTION INTRAVENOUS at 10:30

## 2021-11-16 RX ADMIN — GENTAMICIN SULFATE 250.4 MG: 80 INJECTION, SOLUTION INTRAVENOUS at 07:52

## 2021-11-16 RX ADMIN — HYDROMORPHONE HYDROCHLORIDE 0.5 MG: 2 INJECTION, SOLUTION INTRAMUSCULAR; INTRAVENOUS; SUBCUTANEOUS at 09:42

## 2021-11-16 RX ADMIN — NALOXEGOL OXALATE 25 MG: 25 TABLET, FILM COATED ORAL at 06:31

## 2021-11-16 RX ADMIN — LIDOCAINE HYDROCHLORIDE 1 MG/KG/HR: 8 INJECTION, SOLUTION INTRAVENOUS at 13:06

## 2021-11-16 RX ADMIN — MEROPENEM 500 MG: 500 INJECTION, POWDER, FOR SOLUTION INTRAVENOUS at 16:17

## 2021-11-16 RX ADMIN — HYDROMORPHONE HYDROCHLORIDE 0.5 MG: 2 INJECTION, SOLUTION INTRAMUSCULAR; INTRAVENOUS; SUBCUTANEOUS at 09:00

## 2021-11-16 RX ADMIN — KETAMINE HYDROCHLORIDE 15 MG: 10 INJECTION, SOLUTION INTRAMUSCULAR; INTRAVENOUS at 11:02

## 2021-11-16 RX ADMIN — ONDANSETRON HYDROCHLORIDE 4 MG: 2 INJECTION, SOLUTION INTRAMUSCULAR; INTRAVENOUS at 08:43

## 2021-11-16 RX ADMIN — ROCURONIUM BROMIDE 20 MG: 10 SOLUTION INTRAVENOUS at 09:19

## 2021-11-16 RX ADMIN — ALBUMIN (HUMAN) 250 G: 12.5 INJECTION, SOLUTION INTRAVENOUS at 09:41

## 2021-11-16 RX ADMIN — Medication 10 MG: at 10:10

## 2021-11-16 RX ADMIN — Medication 10 MG: at 08:05

## 2021-11-16 RX ADMIN — SODIUM CHLORIDE, POTASSIUM CHLORIDE, SODIUM LACTATE AND CALCIUM CHLORIDE 125 ML/HR: 600; 310; 30; 20 INJECTION, SOLUTION INTRAVENOUS at 19:31

## 2021-11-16 RX ADMIN — SODIUM CHLORIDE, POTASSIUM CHLORIDE, SODIUM LACTATE AND CALCIUM CHLORIDE: 600; 310; 30; 20 INJECTION, SOLUTION INTRAVENOUS at 07:48

## 2021-11-16 RX ADMIN — KETOROLAC TROMETHAMINE 15 MG: 30 INJECTION, SOLUTION INTRAMUSCULAR; INTRAVENOUS at 23:31

## 2021-11-16 RX ADMIN — FENTANYL CITRATE 25 MCG: 50 INJECTION, SOLUTION INTRAMUSCULAR; INTRAVENOUS at 07:37

## 2021-11-16 RX ADMIN — GLYCOPYRROLATE 0.2 MG: 0.2 INJECTION, SOLUTION INTRAMUSCULAR; INTRAVENOUS at 13:39

## 2021-11-16 RX ADMIN — FENTANYL CITRATE 25 MCG: 50 INJECTION, SOLUTION INTRAMUSCULAR; INTRAVENOUS at 07:29

## 2021-11-16 RX ADMIN — Medication 10 ML: at 22:00

## 2021-11-16 RX ADMIN — ACETAMINOPHEN 1000 MG: 500 TABLET ORAL at 22:03

## 2021-11-16 RX ADMIN — LIDOCAINE HYDROCHLORIDE 100 MG: 20 INJECTION, SOLUTION EPIDURAL; INFILTRATION; INTRACAUDAL; PERINEURAL at 07:37

## 2021-11-16 RX ADMIN — TIMOLOL MALEATE 1 DROP: 2.5 SOLUTION OPHTHALMIC at 19:04

## 2021-11-16 RX ADMIN — ROCURONIUM BROMIDE 20 MG: 10 SOLUTION INTRAVENOUS at 08:29

## 2021-11-16 RX ADMIN — DEXAMETHASONE SODIUM PHOSPHATE 8 MG: 4 INJECTION, SOLUTION INTRAMUSCULAR; INTRAVENOUS at 08:36

## 2021-11-16 RX ADMIN — MIDAZOLAM 2 MG: 1 INJECTION INTRAMUSCULAR; INTRAVENOUS at 07:29

## 2021-11-16 RX ADMIN — GLYCOPYRROLATE 0.4 MG: 0.2 INJECTION, SOLUTION INTRAMUSCULAR; INTRAVENOUS at 11:49

## 2021-11-16 RX ADMIN — KETOROLAC TROMETHAMINE 15 MG: 30 INJECTION, SOLUTION INTRAMUSCULAR; INTRAVENOUS at 16:17

## 2021-11-16 RX ADMIN — ACETAMINOPHEN 1000 MG: 500 TABLET ORAL at 15:05

## 2021-11-16 RX ADMIN — LIDOCAINE HYDROCHLORIDE 2 MG/KG/HR: 8 INJECTION, SOLUTION INTRAVENOUS at 08:01

## 2021-11-16 RX ADMIN — KETAMINE HYDROCHLORIDE 35 MG: 10 INJECTION, SOLUTION INTRAMUSCULAR; INTRAVENOUS at 07:54

## 2021-11-16 RX ADMIN — Medication 10 ML: at 15:00

## 2021-11-16 RX ADMIN — HYDROMORPHONE HYDROCHLORIDE 1 MG: 1 INJECTION, SOLUTION INTRAMUSCULAR; INTRAVENOUS; SUBCUTANEOUS at 15:05

## 2021-11-16 RX ADMIN — Medication 40 MCG: at 10:10

## 2021-11-16 RX ADMIN — SODIUM CHLORIDE, POTASSIUM CHLORIDE, SODIUM LACTATE AND CALCIUM CHLORIDE: 600; 310; 30; 20 INJECTION, SOLUTION INTRAVENOUS at 11:29

## 2021-11-16 RX ADMIN — PROPOFOL 150 MG: 10 INJECTION, EMULSION INTRAVENOUS at 07:37

## 2021-11-16 RX ADMIN — ACETAMINOPHEN 1000 MG: 500 TABLET ORAL at 06:31

## 2021-11-16 NOTE — PERIOP NOTES
Patient: Simeon Lopez MRN: [id number]  SSN: [id number]   YOB: 1955  Age: 77 y.o. Sex: female     Patient is status post Procedure(s):  LAPAROSCOPIC CONVERTED TO OPEN SIGMOID COLECTOMY WITH EXTENSIVE LYSIS OF ADHESIONS GREATER THAN 2 HOUR  CYSTOSCOPY BILATERAL URETERAL STENT INSERTION . Surgeon(s) and Role:  Panel 1:     * Rosy Solano MD - Primary     * John Weston MD - [address] [place] [address] Street, MD - Assisting  Panel 2:     * Willian Huitron MD - Primary    Local/Dose/Irrigation:  See [facility]            PICC Double Lumen 51/38/62 Right; Basilic (Active)          Peripheral IV 11/16/21 Right Arm (Active)          Tha-Virgen Drain 11/16/21 Right Abdomen (Active)   Site Assessment Clean, dry, & intact 11/16/21 1126   Dressing Status Clean, dry, & intact 11/16/21 1126   Status Patent;  Charged; Draining 11/16/21 1126   Drainage Color Serosanguinous 11/16/21 1126      Airway - Endotracheal Tube 11/16/21 Oral (Active)                   Dressing/Packing:  Incision 11/16/21 Abdomen-Dressing/Treatment: Other (Comment) (Aquacel, stapes, 2x2, tegaderm) (11/16/21 1131)    Splint/Cast:  ]    Other:

## 2021-11-16 NOTE — ANESTHESIA PREPROCEDURE EVALUATION
Relevant Problems   No relevant active problems       Anesthetic History     PONV          Review of Systems / Medical History  Patient summary reviewed, nursing notes reviewed and pertinent labs reviewed    Pulmonary  Within defined limits                 Neuro/Psych   Within defined limits           Cardiovascular  Within defined limits                Exercise tolerance: >4 METS     GI/Hepatic/Renal               Comments: Diverticulitis Endo/Other  Within defined limits           Other Findings              Physical Exam    Airway  Mallampati: II  TM Distance: 4 - 6 cm  Neck ROM: normal range of motion   Mouth opening: Normal     Cardiovascular  Regular rate and rhythm,  S1 and S2 normal,  no murmur, click, rub, or gallop             Dental  No notable dental hx       Pulmonary  Breath sounds clear to auscultation               Abdominal  GI exam deferred       Other Findings            Anesthetic Plan    ASA: 2  Anesthesia type: general          Induction: Intravenous  Anesthetic plan and risks discussed with: Patient      Possible postop TAP block

## 2021-11-16 NOTE — PROGRESS NOTES
Day 1 of meropenem (continued from home)  Indication:  diverticulitis  Current regimen:  1 gram Q8H  Abx regimen: meroepenm  No results for input(s): WBC, CREA, BUN in the last 72 hours.   Est CrCl: 76 ml/min; UO: --- ml/kg/hr  Temp (24hrs), Av.1 °F (36.7 °C), Min:97.5 °F (36.4 °C), Max:99.1 °F (37.3 °C)    Cultures:    urine, pending    Plan: Change to meropenem 500 mg Q6H per renal dosing protocol

## 2021-11-16 NOTE — OP NOTES
Pre op dx: sigmoid diverticulitis for colon resection  Post op dx: same with inflammatory changes in the bladder dome. Normal ureteral orifices. Procedure: cystoscopy and bilateral stent placement  Surgeon: Roney Carvalho MD  Assistant: OR staff  Anesthesia: general  Finding: normal urethra, bladder inflammation at the dome and right lateral wall. Complications: none  EBL: none  Specimens: urine for culture  Implants: bilateral ureteral stents.  Zambrano catheter

## 2021-11-16 NOTE — PERIOP NOTES
Stephanie placed on sterile field to be used by surgeon PRN. Lot: [id number]  Exp: 06/28/2026    Seprafilm placed on sterile field to be used by surgeon PRN.     Lot: [id number]  Exp: 01/28/2024

## 2021-11-16 NOTE — CONSULTS
[address]    Name:  Pranav Sue  MR#:  [id number]  :  1955  ACCOUNT #:  [de-identified]  DATE OF SERVICE:  2021    REQUESTING PHYSICIAN:  Law Truong MD    CHIEF COMPLAINT:  Requesting ureteral stents intraoperatively for sigmoid colon resection. HISTORY OF PRESENT ILLNESS:  The patient is a 66-year-old lady who was scheduled for a laparoscopic sigmoid colectomy due to chronic recurrent diverticulitis with an abscess. She was scheduled for surgery today and had been on antibiotics IV. She denies any abdominal pain or other symptoms at this time. This has been somewhat recurrent and she is scheduled for this surgical resection. We were asked to place the ureteral stents intraoperatively. She does have a history of recurrent urinary tract infections but no other urologic history and these have responded to antibiotics in the past.    PAST MEDICAL HISTORY:  Significant for  1. Glaucoma. 2.  Seasonal allergy. 3.  History of [facility] spotted fever. 4.  Recurrent urinary tract infections. MEDICATIONS:  Were reviewed including  1. Reglan. 2.  Flagyl. 3.  tylenol  4. Zyrtec. 5.  Mylicon. 6.  Alphagan. 7.  Timoptic. 8.  Zofran. 9.  Ambien. ALLERGIES:  SHE IS ALLERGIC TO CEFTIN, CODEINE, LEVAQUIN, AND PENICILLINS. SOCIAL HISTORY:  She is . No smoking. REVIEW OF SYSTEMS:  A 12-point review is reviewed by the chart, and by the patient, she is completely asymptomatic at this time. PHYSICAL EXAMINATION:  VITAL SIGNS:  She is afebrile, and her blood pressure is 138/83. HEENT:  Normal calvarium without evidence of trauma. Pupils are equal.  Sclerae are clear. Nose is normal.  NECK:  Without tenderness or adenopathy. CHEST:  No labored breathing. CARDIAC:  Regular. ABDOMEN:  Soft. No significant tenderness or distention. EXTREMITIES:  No edema. Visualized portions of the skin are dry and intact.   PSYCHIATRIC:  Affect is normal.  NEUROLOGIC:  Intact. LABORATORY AND DIAGNOSTIC DATA:  Her laboratory values are reviewed. Her creatinine is low normal at 0.4. White count normal at 6.4. Her CT scan is reviewed, and she does have normal kidneys and no other abnormalities. She does have some thickening of her bladder dome. No intravesical air or evidence of fistula was noted. Her urinalysis did show bacteria and pyuria on admission, and she has been on antibiotics. ASSESSMENT:  1. Diverticulitis with abscess of the sigmoid colon, for colectomy. 2.  Intraoperative ureteral stents for ureteral identification intraoperatively. Indications and risks of this were reviewed with her in detail. 3.  History of recurrent urinary tract infections with some bladder wall thickening. We will assess that at that time of her cystoscopy.       Brittany Sparks MD      [id number]  D:  11/16/2021 7:21  T:  11/16/2021 10:20  JOB #:  [id number]  CC:  Bryan Mckeon MD

## 2021-11-16 NOTE — INTERVAL H&P NOTE
Update History & Physical      The surgery was reviewed with the patient and I examined the patient. There was no change. The surgical site was confirmed by the patient and me. Plan:  The risk, benefits, expected outcome, and alternative to the recommended procedure have been discussed with the patient. Patient understands and wants to proceed with the procedure.     Electronically signed by Carito Toth MD on 11/16/2021 at 7:02 AM

## 2021-11-16 NOTE — OP NOTES
[address]   OPERATIVE REPORT    Name:  Dalton Coffey  MR#:  [id number]  :  1955  ACCOUNT #:  [de-identified]  DATE OF SERVICE:  2021    PREOPERATIVE DIAGNOSIS:  Sigmoid diverticulitis, for colon resection, intraoperative ureteral stent placement. POSTOPERATIVE DIAGNOSIS:  Sigmoid diverticulitis, for colon resection, intraoperative ureteral stent placement. PROCEDURE PERFORMED:  Cystoscopy, bilateral ureteral stent placement, exam under anesthesia. SURGEON:  Jean Paul Bales MD    ASSISTANT:  Operating room staff. ANESTHESIA:  General.    COMPLICATIONS:  None. SPECIMENS REMOVED:  Urine for culture. IMPLANTS:  Bilateral ureteral stents and Zambrano catheter. ESTIMATED BLOOD LOSS:  None. CLINICAL INDICATIONS:  The patient is a lady who has been followed and evaluated for colon resection by Dr. Miguel Nance. He had scheduled this procedure and requested intraoperative ureteral stent placement. Potential risks and complications were reviewed with her. She has a history of some bladder wall thickening on the CT scan, recurrent urinary tract infections and I reviewed indications, risks, and potential complications with her. PROCEDURE:  She was taken to the operating room and placed in the dorsal lithotomy position after general anesthesia was induced. She had been given parenteral antibiotics preoperatively. Her perineum was prepped and draped. A time-out was taken. The scope was introduced and I did send a small amount of urine for culture. Careful inspection of her bladder revealed some mucosal erythema, mild, consistent with inflammation on the right bladder wall near the dome. Ureteral orifices were normal.  No other abnormalities were noted. I then passed a 5-German angiographic catheter into the cystoscope and directed into right ureteral orifice.   We advanced a Bentson wire up through the catheter up into her ureter into the renal pelvis and advanced the angiographic catheter over the wire and then removed the wire. We repeated this on the left side until both stents were in place and I removed the cystoscope leaving the stents in place. We then placed a Zambrano catheter and inflated the balloon, seated it at the bladder neck, and secured the stents to the catheter with a silk suture. I then did genital exam.  She did have vaginal mucosal atrophy and some pelvic floor prolapse. No other abnormalities were noted. She tolerated this well with no intraoperative complications.       MD LIZETH Henley/[id number]  D:  11/16/2021 8:15  T:  11/16/2021 13:22  JOB #:  [id number]

## 2021-11-16 NOTE — PROGRESS NOTES
1405: TRANSFER - OUT REPORT:    Verbal report given to [facility] RN(name) on Abeba Terrazas  being transferred to [facility](unit) for routine post - op       Report consisted of patients Situation, Background, Assessment and   Recommendations(SBAR). Time Pre op antibiotic given:0753  Anesthesia Stop time: 5904  Zambrano Present on Transfer to floor:yes  Order for Zambrano on Chart:yes  Discharge Prescriptions with Chart:no    Information from the following report(s) SBAR, Kardex, OR Summary, Intake/Output, MAR, Recent Results and Cardiac Rhythm SB/SR was reviewed with the receiving nurse. Opportunity for questions and clarification was provided. Is the patient on 02? YES       L/Min 2      Is the patient on a monitor? NO    Is the nurse transporting with the patient? NO    Surgical Waiting Area notified of patient's transfer from PACU? YES      The following personal items collected during your admission accompanied patient upon transfer:   Dental Appliance: Dental Appliances: None  Vision:    Hearing Aid:    Jewelry:    Clothing: Clothing: With patient  Other Valuables:  Other Valuables: Eyeglasses, With patient  Valuables sent to safe:

## 2021-11-16 NOTE — BRIEF OP NOTE
Brief Postoperative Note    Patient: Mariola Kate  YOB: 1955  MRN: [id number]    Date of Procedure: 11/16/2021     Pre-Op Diagnosis: RECURRENT DIVERTICULITIS    Post-Op Diagnosis: Same as preoperative diagnosis. Procedure(s):  LAPAROSCOPIC CONVERTED TO OPEN SIGMOID COLECTOMY WITH END COLOSTOMY AND EXTENSIVE LYSIS OF ADHESIONS GREATER THAN 2 HOUR  CYSTOSCOPY BILATERAL URETERAL STENT INSERTION     Surgeon(s):  MD Selin Ramesh MD Patrina Collin, MD Randolph Math, MD    Surgical Assistant: None    Anesthesia: General     Estimated Blood Loss (mL): 971    Complications: None    Specimens:   ID Type Source Tests Collected by Time Destination   1 : SIGMOID COLON Fresh Sigmoid  Lamar Paez MD 11/16/2021 1049 Pathology   1 : URINE FOR CULTURE Urine Bladder AEROBIC/ANAEROBIC CULTURE Lamar Paez MD 11/16/2021 3462 Microbiology        Implants: * No implants in log *    Drains:   Tha-Virgen Drain 11/16/21 Right Abdomen (Active)   Site Assessment Clean, dry, & intact 11/16/21 1126   Dressing Status Clean, dry, & intact 11/16/21 1126   Status Patent;  Charged; Draining 11/16/21 1126   Drainage Color Serosanguinous 11/16/21 1126       Findings: sigmoid colon with extremily dense adhesions to the L side wall and bladder, no bladder fistula seen, no leak seen with methylene blue test, 2+ hours lysis of adhesions of colon out of the pelvis, end colostomy created, pt has short mesentery and thick abdominal wall, rectal stump with 2-0 prolene on corners of the rectum     Electronically Signed by Samantha Horton MD on 11/16/2021 at 12:21 PM

## 2021-11-16 NOTE — PROGRESS NOTES
Pt arrived to unit from pacu. Pt is A&O. Colostomy intact, no output noted. SUSAN drain intact, serosanguinous drainage noted. Pt complaining of incisional pain. PRN dilaudid given. Pts HR 64 bpm. Lidocaine gtt restarted.

## 2021-11-16 NOTE — ANESTHESIA POSTPROCEDURE EVALUATION
Post-Anesthesia Evaluation and Assessment    Patient: Mariola Kate MRN: [id number]  SSN: [id number]    YOB: 1955  Age: 77 y.o. Sex: female      I have evaluated the patient and they are stable and ready for discharge from the PACU. Cardiovascular Function/Vital Signs  Visit Vitals  BP (!) 148/77   Pulse 68   Temp 36.4 °C (97.5 °F)   Resp 20   Ht 5' 2\" (1.575 m)   Wt 77.7 kg (171 lb 4.8 oz)   SpO2 98%   BMI 31.33 kg/m²       Patient is status post General anesthesia for Procedure(s):  LAPAROSCOPIC CONVERTED TO OPEN SIGMOID COLECTOMY WITH END COLOSTOMY AND EXTENSIVE LYSIS OF ADHESIONS GREATER THAN 2 HOUR  CYSTOSCOPY BILATERAL URETERAL STENT INSERTION . Nausea/Vomiting: None    Postoperative hydration reviewed and adequate. Pain:  Pain Scale 1: Numeric (0 - 10) (11/16/21 1345)  Pain Intensity 1: 1 (11/16/21 1345)   Managed    Neurological Status:   Neuro (WDL): Exceptions to WDL (11/16/21 1345)  Neuro  Neurologic State: Alert; Eyes open to voice (11/16/21 1345)  LUE Motor Response: Purposeful (11/16/21 1345)  LLE Motor Response: Purposeful (11/16/21 1345)  RUE Motor Response: Purposeful (11/16/21 1345)  RLE Motor Response: Purposeful (11/16/21 1345)   At baseline    Mental Status, Level of Consciousness: Alert and  oriented to person, place, and time    Pulmonary Status:   O2 Device: Nasal cannula (11/16/21 1345)   Adequate oxygenation and airway patent    Complications related to anesthesia: None    Post-anesthesia assessment completed. No concerns    Signed By: Dale Isabel MD     November 16, 2021              Procedure(s):  LAPAROSCOPIC CONVERTED TO OPEN SIGMOID COLECTOMY WITH END COLOSTOMY AND EXTENSIVE LYSIS OF ADHESIONS GREATER THAN 2 HOUR  CYSTOSCOPY BILATERAL URETERAL STENT INSERTION .     general    <BSHSIANPOST>    INITIAL Post-op Vital signs:   Vitals Value Taken Time   /77 11/16/21 1400   Temp 36.4 °C (97.5 °F) 11/16/21 1400   Pulse 63 11/16/21 1408   Resp 16 11/16/21 1408   SpO2 97 % 11/16/21 1408   Vitals shown include unvalidated device data.

## 2021-11-17 LAB
ANION GAP SERPL CALC-SCNC: 6 MMOL/L (ref 5–15)
BACTERIA SPEC CULT: NORMAL
BASOPHILS # BLD: 0 K/UL (ref 0–0.1)
BASOPHILS NFR BLD: 0 % (ref 0–1)
BUN SERPL-MCNC: 10 MG/DL (ref 6–20)
BUN/CREAT SERPL: 27 (ref 12–20)
CALCIUM SERPL-MCNC: 9.1 MG/DL (ref 8.5–10.1)
CHLORIDE SERPL-SCNC: 102 MMOL/L (ref 97–108)
CO2 SERPL-SCNC: 27 MMOL/L (ref 21–32)
CREAT SERPL-MCNC: 0.37 MG/DL (ref 0.55–1.02)
DIFFERENTIAL METHOD BLD: ABNORMAL
EOSINOPHIL # BLD: 0 K/UL (ref 0–0.4)
EOSINOPHIL NFR BLD: 0 % (ref 0–7)
ERYTHROCYTE [DISTWIDTH] IN BLOOD BY AUTOMATED COUNT: 15 % (ref 11.5–14.5)
GLUCOSE SERPL-MCNC: 120 MG/DL (ref 65–100)
HCT VFR BLD AUTO: 29.5 % (ref 35–47)
HGB BLD-MCNC: 10.3 G/DL (ref 11.5–16)
HGB BLD-MCNC: 8.9 G/DL (ref 11.5–16)
HGB BLD-MCNC: 9.6 G/DL (ref 11.5–16)
IMM GRANULOCYTES # BLD AUTO: 0 K/UL (ref 0–0.04)
IMM GRANULOCYTES NFR BLD AUTO: 0 % (ref 0–0.5)
LYMPHOCYTES # BLD: 0.9 K/UL (ref 0.8–3.5)
LYMPHOCYTES NFR BLD: 12 % (ref 12–49)
MAGNESIUM SERPL-MCNC: 2.1 MG/DL (ref 1.6–2.4)
MCH RBC QN AUTO: 28.1 PG (ref 26–34)
MCHC RBC AUTO-ENTMCNC: 32.5 G/DL (ref 30–36.5)
MCV RBC AUTO: 86.3 FL (ref 80–99)
MONOCYTES # BLD: 1.1 K/UL (ref 0–1)
MONOCYTES NFR BLD: 14 % (ref 5–13)
NEUTS SEG # BLD: 5.5 K/UL (ref 1.8–8)
NEUTS SEG NFR BLD: 74 % (ref 32–75)
NRBC # BLD: 0 K/UL (ref 0–0.01)
NRBC BLD-RTO: 0 PER 100 WBC
PHOSPHATE SERPL-MCNC: 3 MG/DL (ref 2.6–4.7)
PLATELET # BLD AUTO: 217 K/UL (ref 150–400)
PMV BLD AUTO: 9.5 FL (ref 8.9–12.9)
POTASSIUM SERPL-SCNC: 4.4 MMOL/L (ref 3.5–5.1)
RBC # BLD AUTO: 3.42 M/UL (ref 3.8–5.2)
SERVICE CMNT-IMP: NORMAL
SODIUM SERPL-SCNC: 135 MMOL/L (ref 136–145)
WBC # BLD AUTO: 7.6 K/UL (ref 3.6–11)

## 2021-11-17 PROCEDURE — 83735 ASSAY OF MAGNESIUM: CPT

## 2021-11-17 PROCEDURE — 65270000029 HC RM PRIVATE

## 2021-11-17 PROCEDURE — 36415 COLL VENOUS BLD VENIPUNCTURE: CPT

## 2021-11-17 PROCEDURE — 74011000250 HC RX REV CODE- 250: Performed by: SURGERY

## 2021-11-17 PROCEDURE — 77010033678 HC OXYGEN DAILY

## 2021-11-17 PROCEDURE — 94760 N-INVAS EAR/PLS OXIMETRY 1: CPT

## 2021-11-17 PROCEDURE — 84100 ASSAY OF PHOSPHORUS: CPT

## 2021-11-17 PROCEDURE — 80048 BASIC METABOLIC PNL TOTAL CA: CPT

## 2021-11-17 PROCEDURE — 74011250637 HC RX REV CODE- 250/637: Performed by: SURGERY

## 2021-11-17 PROCEDURE — 74011250636 HC RX REV CODE- 250/636: Performed by: SURGERY

## 2021-11-17 PROCEDURE — 85025 COMPLETE CBC W/AUTO DIFF WBC: CPT

## 2021-11-17 RX ADMIN — HYDROMORPHONE HYDROCHLORIDE 1 MG: 1 INJECTION, SOLUTION INTRAMUSCULAR; INTRAVENOUS; SUBCUTANEOUS at 10:30

## 2021-11-17 RX ADMIN — TIMOLOL MALEATE 1 DROP: 2.5 SOLUTION OPHTHALMIC at 10:01

## 2021-11-17 RX ADMIN — KETOROLAC TROMETHAMINE 15 MG: 30 INJECTION, SOLUTION INTRAMUSCULAR; INTRAVENOUS at 06:26

## 2021-11-17 RX ADMIN — MEROPENEM 500 MG: 500 INJECTION, POWDER, FOR SOLUTION INTRAVENOUS at 22:23

## 2021-11-17 RX ADMIN — TIMOLOL MALEATE 1 DROP: 2.5 SOLUTION OPHTHALMIC at 18:04

## 2021-11-17 RX ADMIN — ONDANSETRON HYDROCHLORIDE 4 MG: 2 SOLUTION INTRAMUSCULAR; INTRAVENOUS at 18:04

## 2021-11-17 RX ADMIN — ACETAMINOPHEN 1000 MG: 500 TABLET ORAL at 10:00

## 2021-11-17 RX ADMIN — MEROPENEM 500 MG: 500 INJECTION, POWDER, FOR SOLUTION INTRAVENOUS at 15:37

## 2021-11-17 RX ADMIN — LIDOCAINE HYDROCHLORIDE 1 MG/KG/HR: 8 INJECTION, SOLUTION INTRAVENOUS at 15:36

## 2021-11-17 RX ADMIN — Medication 10 ML: at 06:00

## 2021-11-17 RX ADMIN — SODIUM CHLORIDE, POTASSIUM CHLORIDE, SODIUM LACTATE AND CALCIUM CHLORIDE 125 ML/HR: 600; 310; 30; 20 INJECTION, SOLUTION INTRAVENOUS at 03:35

## 2021-11-17 RX ADMIN — KETOROLAC TROMETHAMINE 15 MG: 30 INJECTION, SOLUTION INTRAMUSCULAR; INTRAVENOUS at 11:51

## 2021-11-17 RX ADMIN — SODIUM CHLORIDE, POTASSIUM CHLORIDE, SODIUM LACTATE AND CALCIUM CHLORIDE 50 ML/HR: 600; 310; 30; 20 INJECTION, SOLUTION INTRAVENOUS at 11:56

## 2021-11-17 RX ADMIN — MEROPENEM 500 MG: 500 INJECTION, POWDER, FOR SOLUTION INTRAVENOUS at 04:00

## 2021-11-17 RX ADMIN — ACETAMINOPHEN 1000 MG: 500 TABLET ORAL at 22:23

## 2021-11-17 RX ADMIN — BRIMONIDINE TARTRATE 1 DROP: 2 SOLUTION OPHTHALMIC at 18:04

## 2021-11-17 RX ADMIN — ACETAMINOPHEN 1000 MG: 500 TABLET ORAL at 03:35

## 2021-11-17 RX ADMIN — MEROPENEM 500 MG: 500 INJECTION, POWDER, FOR SOLUTION INTRAVENOUS at 10:01

## 2021-11-17 RX ADMIN — ENOXAPARIN SODIUM 40 MG: 100 INJECTION SUBCUTANEOUS at 10:00

## 2021-11-17 RX ADMIN — NALOXEGOL OXALATE 25 MG: 25 TABLET, FILM COATED ORAL at 06:30

## 2021-11-17 RX ADMIN — BRIMONIDINE TARTRATE 1 DROP: 2 SOLUTION OPHTHALMIC at 10:02

## 2021-11-17 RX ADMIN — ACETAMINOPHEN 1000 MG: 500 TABLET ORAL at 15:37

## 2021-11-17 NOTE — WOUND CARE
CWON Ostomy Progress Note:     1st postoperative visit. Type of Ostomy: end colostomy   Location: [facility]  Date of Surgery: 11/16/21    Surgeon: Dr. Cipriano Miller, Dr. Ovidio Perez, Dr. Reyes Quan  Surgery: Brenden Miller in patients room, assessed stoma. Post op pouch in place, will change tomoorw     Findings:  Current appliance: 1 piece post op pouch with window  Stoma size: will assess tomorrow 11/18/21  Stoma color: pink  Characteristics: moist  Peristomal skin: will assess tomorrow 11/18/21  Abdomen: soft, non-distended  Output: no output yet     Patient sitting up in chair. Alert but lethargic    Teaching/Subjects covered today:  Encouraged pt to review handout given to patient when feeling up to it and ask questions regarding material on next ostomy nurse visit. Briefly discussed  - General anatomy and expectations of output  - Normal stoma characteristics  - Demonstrated how to open and close pouch closure. Clean pouch left at bedside    Pt demonstrated understanding of above material     Pt states she feels comfortable with having an ostomy. She lives with her . Recommendations:  1. Empty appliance when 1/3 full and PRN. Encourage patient/family to notify nurse and  assist w/ pouch emptying to promote self-care. Change appliance twice weekly and PRN for leaking ASAP. DO NOT REINFORCE LEAKS to avoid skin breakdown. Transition of Care:  Ostomy nurse to return and continue teaching tomorrow 11/18/21. Patient will need home health care for further teaching after discharge. Home Health consult placed. Discussed with CM that patient had [facility] when she had a PICC line.  She liked the company and would like to use them again if possible    Pat Murrieta MSN, RN, [address] [facility], [address] [facility]  Certified Wound and Ostomy Nurse  office [phone]  Can be reached through [employer]  pager [phone] or call  to page

## 2021-11-17 NOTE — PROGRESS NOTES
[employer] [facility] at [facility]    POD #1    Subjective     Doing well, ordered full liquids, no nausea vomiting    Objective     Patient Vitals for the past 24 hrs:   Temp Pulse Resp BP SpO2   11/17/21 0753 98 °F (36.7 °C) 65 16 123/65 93 %   11/17/21 0021 97.5 °F (36.4 °C) 65 16 111/63 96 %   11/16/21 2131 98.6 °F (37 °C) (!) 59 16 115/74 97 %   11/16/21 1751 97.4 °F (36.3 °C) 64 16 130/88 97 %   11/16/21 1644 97.5 °F (36.4 °C) 67 16 128/71 99 %   11/16/21 1535 97.4 °F (36.3 °C) 61 16 (!) 144/71 97 %   11/16/21 1510 -- 64 -- -- --   11/16/21 1431 99.1 °F (37.3 °C) (!) 59 16 136/75 99 %   11/16/21 1405 -- 62 16 -- 97 %   11/16/21 1400 97.5 °F (36.4 °C) 68 20 (!) 148/77 98 %   11/16/21 1349 -- 63 16 -- 95 %   11/16/21 1347 -- 61 19 -- 100 %   11/16/21 1345 -- (!) 59 16 (!) 147/70 100 %   11/16/21 1340 -- (!) 53 20 -- 99 %   11/16/21 1335 -- (!) 49 17 -- 100 %   11/16/21 1330 -- (!) 51 12 (!) 154/66 100 %   11/16/21 1320 -- (!) 47 17 -- 100 %   11/16/21 1315 -- (!) 47 14 (!) 158/64 100 %   11/16/21 1300 -- (!) 47 16 (!) 145/64 100 %   11/16/21 1255 -- (!) 50 18 (!) 142/67 100 %   11/16/21 1250 -- (!) 48 18 (!) 149/62 100 %   11/16/21 1245 -- (!) 48 17 (!) 142/57 100 %   11/16/21 1241 97.6 °F (36.4 °C) (!) 49 17 (!) 151/66 100 %       Date 11/16/21 0700 - 11/17/21 0659 11/17/21 0700 - 11/18/21 0659   Shift 2829-0295 3340-3910 24 Hour Total 5258-5581 7048-9972 24 Hour Total   INTAKE   P.O. 30  30        P. O. 30  30      I. V.(mL/kg/hr) 1400(1.5)  1400(0.8)        Volume (lactated Ringers infusion) 1400  1400      Shift Total(mL/kg) 1430(18.4)  1430(18.4)      OUTPUT   Urine(mL/kg/hr) 370(0.4) 650(0.7) 1020(0.5)        Urine Output 20  20        Urine Output (mL) (Urinary Catheter 11/16/21 2- way;  Zambrano)       Drains 130 130 260        Output (ml) (Tha-Virgen Drain 11/16/21 Right Abdomen) 130 130 260      Stool 0 0 0        Output (ml) (Colostomy 11/16/21 Right  Abdomen) 0 0 0      Blood 250  250        Estimated Blood Loss 250  250      Shift Total(mL/kg) 750(9.7) 780(10) 1530(19.7)       -780 -100      Weight (kg) 77.7 77.7 77.7 77.7 77.7 77.7       PE  Pulm - CTAB  CV - RRR  Abd -soft, nondistended, bowel sounds present, ostomy pink and viable, no output yet, at skin level    Labs  Recent Results (from the past 12 hour(s))   METABOLIC PANEL, BASIC    Collection Time: 11/17/21  1:04 AM   Result Value Ref Range    Sodium 135 (L) 136 - 145 mmol/L    Potassium 4.4 3.5 - 5.1 mmol/L    Chloride 102 97 - 108 mmol/L    CO2 27 21 - 32 mmol/L    Anion gap 6 5 - 15 mmol/L    Glucose 120 (H) 65 - 100 mg/dL    BUN 10 6 - 20 MG/DL    Creatinine 0.37 (L) 0.55 - 1.02 MG/DL    BUN/Creatinine ratio 27 (H) 12 - 20      GFR est AA >60 >60 ml/min/1.73m2    GFR est non-AA >60 >60 ml/min/1.73m2    Calcium 9.1 8.5 - 10.1 MG/DL   MAGNESIUM    Collection Time: 11/17/21  1:04 AM   Result Value Ref Range    Magnesium 2.1 1.6 - 2.4 mg/dL   PHOSPHORUS    Collection Time: 11/17/21  1:04 AM   Result Value Ref Range    Phosphorus 3.0 2.6 - 4.7 MG/DL   CBC WITH AUTOMATED DIFF    Collection Time: 11/17/21  1:04 AM   Result Value Ref Range    WBC 7.6 3.6 - 11.0 K/uL    RBC 3.42 (L) 3.80 - 5.20 M/uL    HGB 9.6 (L) 11.5 - 16.0 g/dL    HCT 29.5 (L) 35.0 - 47.0 %    MCV 86.3 80.0 - 99.0 FL    MCH 28.1 26.0 - 34.0 PG    MCHC 32.5 30.0 - 36.5 g/dL    RDW 15.0 (H) 11.5 - 14.5 %    PLATELET 992 308 - 104 K/uL    MPV 9.5 8.9 - 12.9 FL    NRBC 0.0 0  WBC    ABSOLUTE NRBC 0.00 0.00 - 0.01 K/uL    NEUTROPHILS 74 32 - 75 %    LYMPHOCYTES 12 12 - 49 %    MONOCYTES 14 (H) 5 - 13 %    EOSINOPHILS 0 0 - 7 %    BASOPHILS 0 0 - 1 %    IMMATURE GRANULOCYTES 0 0.0 - 0.5 %    ABS. NEUTROPHILS 5.5 1.8 - 8.0 K/UL    ABS. LYMPHOCYTES 0.9 0.8 - 3.5 K/UL    ABS. MONOCYTES 1.1 (H) 0.0 - 1.0 K/UL    ABS. EOSINOPHILS 0.0 0.0 - 0.4 K/UL    ABS. BASOPHILS 0.0 0.0 - 0.1 K/UL    ABS. IMM. GRANS. 0.0 0.00 - 0.04 K/UL    DF AUTOMATED           Assessment     Ainsley Sears is a 77 y. o.yr old female status post open sigmoid colectomy with end colostomy    Plan     She is doing well today advance to regular diet as per ERAS protocol  Keeping Zambrano catheter in place due to difficult surgery and peeling the colon off of the bladder  Keeping SUSAN drain in place  Up out of bed more today  As needed pain medication  Wound care nursing to see patient for ostomy care  Ostomy is at skin level need to keep an eye on this since her mesentery was very short and bringing it up to the abdominal wall was relatively difficult    Bo Juarez MD

## 2021-11-17 NOTE — PROGRESS NOTES
Transition of Care Plan   RUR: 17%    Disposition: The disposition plan is home with home health for Skilled Nursing needs   Patient has chosen [facility]; referral pending acceptance    F/U with PCP/Specialist     Transport: Family  Reason for Readmission:     Diverticulitis          RUR Score/Risk Level:   17%      PCP: First and Last name:  Shannon Sanchez    Name of Practice:    Are you a current patient: Yes/No:    Approximate date of last visit:    Can you participate in a virtual visit with your PCP:     Is a Care Conference indicated:  No      Did you attend your follow up appointment (s): If not, why not: Patient see's her provider regularly          Resources/supports as identified by patient/family:          Top Challenges facing patient (as identified by patient/family and CM): None     Finances/Medication cost?  No issues     Transportation      Family  Support system or lack thereof? Family     Living arrangements? Lives with spouse   Self-care/ADLs/Cognition? Independent           Current Advanced Directive/Advance Care Plan:             Plan for utilizing home health:  Home with Home Health Skilled Nursing              Transition of Care Plan:    Based on readmission, the patient's previous Plan of Care   has been evaluated and/or modified. The current Transition of Care Plan is:       CRM spoke with patient, introduced self, explained role, verified demographics, and offered assistance. The patient lives in a home with her spouse with 4 steps to access. The patient is independent in her ADL's/IADL's and does not have any DME. The patient uses [facility] for her prescriptions. Patient has chosen [facility] for [address] [facility] needs. Referral sent, awaiting response.          Readmission Assessment  Number of days since last admission?: 8-30 days  Previous disposition: Home with Family  Who is being interviewed?: Patient  What was the patient's/caregiver's perception as to why they think they needed to return back to the hospital?: Other (Comment)  Did you visit your Primary Care Physician after you left the hospital, before you returned this time?: Yes  Did you see a specialist, such as Cardiac, Pulmonary, Orthopedic Physician, etc. after you left the hospital?: Yes  Who advised the patient to return to the hospital?: Self-referral  Does the patient report anything that got in the way of taking their medications?: No  In our efforts to provide the best possible care to you and others like you, can you think of anything that we could have done to help you after you left the hospital the first time, so that you might not have needed to return so soon?: Other (Comment)       Care Management Interventions  PCP Verified by CM: Yes  Palliative Care Criteria Met (RRAT>21 & CHF Dx)?: No  Mode of Transport at Discharge: Other (see comment) (spouse)  Transition of Care Consult (CM Consult): Discharge Planning  MyChart Signup: No  Discharge Durable Medical Equipment: No  Health Maintenance Reviewed: Yes  Physical Therapy Consult: No  Occupational Therapy Consult: No  Speech Therapy Consult: No  Support Systems: Spouse/Significant Other  Confirm Follow Up Transport: Family  The Patient and/or Patient Representative was Provided with a Choice of Provider and Agrees with the Discharge Plan?: Yes  Freedom of Choice List was Provided with Basic Dialogue that Supports the Patient's Individualized Plan of Care/Goals, Treatment Preferences and Shares the Quality Data Associated with the Providers?: Yes   Resource Information Provided?: No  Discharge Location  Discharge Placement: Home with home health    Transition of Care Plan: Skilled nursing needs    The Plan for Transition of Care is related to the following treatment goals: [id number] [facility]    The Patient was provided with a choice of provider and agrees  with the discharge plan.       Yes [x] No []    A Freedom of choice list was provided with basic dialogue that supports the patient's individualized plan of care/goals and shares the quality data associated with the providers.        Yes [x] No []    Gaby Buck, CRM

## 2021-11-17 NOTE — PROGRESS NOTES
Spiritual Care Partner Volunteer visited patient in [address] [place] [address] on 11/17/2021. Documented by:  Chaplain Berumen MDiv, [address] ([id number])

## 2021-11-17 NOTE — ROUTINE PROCESS
Bedside shift change report given to [facility] [place] (oncoming nurse) by [facility], RN (offgoing nurse). Report included the following information Kardex, ED Summary, OR Summary, Procedure Summary, Intake/Output, MAR and Recent Results.

## 2021-11-18 LAB
ANION GAP SERPL CALC-SCNC: 5 MMOL/L (ref 5–15)
BUN SERPL-MCNC: 9 MG/DL (ref 6–20)
BUN/CREAT SERPL: 24 (ref 12–20)
CALCIUM SERPL-MCNC: 8.7 MG/DL (ref 8.5–10.1)
CHLORIDE SERPL-SCNC: 101 MMOL/L (ref 97–108)
CO2 SERPL-SCNC: 29 MMOL/L (ref 21–32)
CREAT SERPL-MCNC: 0.37 MG/DL (ref 0.55–1.02)
ERYTHROCYTE [DISTWIDTH] IN BLOOD BY AUTOMATED COUNT: 15.2 % (ref 11.5–14.5)
GLUCOSE SERPL-MCNC: 119 MG/DL (ref 65–100)
HCT VFR BLD AUTO: 27.8 % (ref 35–47)
HGB BLD-MCNC: 8.9 G/DL (ref 11.5–16)
MCH RBC QN AUTO: 28.3 PG (ref 26–34)
MCHC RBC AUTO-ENTMCNC: 32 G/DL (ref 30–36.5)
MCV RBC AUTO: 88.3 FL (ref 80–99)
NRBC # BLD: 0 K/UL (ref 0–0.01)
NRBC BLD-RTO: 0 PER 100 WBC
PLATELET # BLD AUTO: 200 K/UL (ref 150–400)
PMV BLD AUTO: 10 FL (ref 8.9–12.9)
POTASSIUM SERPL-SCNC: 3.9 MMOL/L (ref 3.5–5.1)
RBC # BLD AUTO: 3.15 M/UL (ref 3.8–5.2)
SODIUM SERPL-SCNC: 135 MMOL/L (ref 136–145)
WBC # BLD AUTO: 7.2 K/UL (ref 3.6–11)

## 2021-11-18 PROCEDURE — 2709999900 HC NON-CHARGEABLE SUPPLY

## 2021-11-18 PROCEDURE — 97116 GAIT TRAINING THERAPY: CPT

## 2021-11-18 PROCEDURE — 80048 BASIC METABOLIC PNL TOTAL CA: CPT

## 2021-11-18 PROCEDURE — 77030040162

## 2021-11-18 PROCEDURE — 77030037255 HC PCH OST FLX SENSURA COLO -A

## 2021-11-18 PROCEDURE — 74011000250 HC RX REV CODE- 250: Performed by: SURGERY

## 2021-11-18 PROCEDURE — 74011250636 HC RX REV CODE- 250/636: Performed by: NURSE PRACTITIONER

## 2021-11-18 PROCEDURE — 97161 PT EVAL LOW COMPLEX 20 MIN: CPT

## 2021-11-18 PROCEDURE — 36415 COLL VENOUS BLD VENIPUNCTURE: CPT

## 2021-11-18 PROCEDURE — 74011250637 HC RX REV CODE- 250/637: Performed by: SURGERY

## 2021-11-18 PROCEDURE — 65270000029 HC RM PRIVATE

## 2021-11-18 PROCEDURE — 77010033678 HC OXYGEN DAILY

## 2021-11-18 PROCEDURE — 85027 COMPLETE CBC AUTOMATED: CPT

## 2021-11-18 PROCEDURE — 74011250636 HC RX REV CODE- 250/636: Performed by: SURGERY

## 2021-11-18 PROCEDURE — 94760 N-INVAS EAR/PLS OXIMETRY 1: CPT

## 2021-11-18 RX ORDER — ALPRAZOLAM 1 MG/1
0.5 TABLET ORAL
Status: DISCONTINUED | OUTPATIENT
Start: 2021-11-18 | End: 2021-11-24 | Stop reason: HOSPADM

## 2021-11-18 RX ADMIN — OXYCODONE 5 MG: 5 TABLET ORAL at 21:22

## 2021-11-18 RX ADMIN — BRIMONIDINE TARTRATE 1 DROP: 2 SOLUTION OPHTHALMIC at 17:47

## 2021-11-18 RX ADMIN — OXYCODONE 5 MG: 5 TABLET ORAL at 16:33

## 2021-11-18 RX ADMIN — ACETAMINOPHEN 1000 MG: 500 TABLET ORAL at 09:43

## 2021-11-18 RX ADMIN — TIMOLOL MALEATE 1 DROP: 2.5 SOLUTION OPHTHALMIC at 17:47

## 2021-11-18 RX ADMIN — MEROPENEM 500 MG: 500 INJECTION, POWDER, FOR SOLUTION INTRAVENOUS at 11:19

## 2021-11-18 RX ADMIN — MEROPENEM 500 MG: 500 INJECTION, POWDER, FOR SOLUTION INTRAVENOUS at 16:33

## 2021-11-18 RX ADMIN — ACETAMINOPHEN 1000 MG: 500 TABLET ORAL at 14:02

## 2021-11-18 RX ADMIN — Medication 10 ML: at 05:14

## 2021-11-18 RX ADMIN — ACETAMINOPHEN 1000 MG: 500 TABLET ORAL at 21:04

## 2021-11-18 RX ADMIN — TIMOLOL MALEATE 1 DROP: 2.5 SOLUTION OPHTHALMIC at 09:58

## 2021-11-18 RX ADMIN — NALOXEGOL OXALATE 25 MG: 25 TABLET, FILM COATED ORAL at 06:42

## 2021-11-18 RX ADMIN — ENOXAPARIN SODIUM 40 MG: 100 INJECTION SUBCUTANEOUS at 09:43

## 2021-11-18 RX ADMIN — BRIMONIDINE TARTRATE 1 DROP: 2 SOLUTION OPHTHALMIC at 09:58

## 2021-11-18 RX ADMIN — Medication 10 ML: at 21:17

## 2021-11-18 RX ADMIN — ACETAMINOPHEN 1000 MG: 500 TABLET ORAL at 05:14

## 2021-11-18 RX ADMIN — ONDANSETRON HYDROCHLORIDE 4 MG: 2 SOLUTION INTRAMUSCULAR; INTRAVENOUS at 09:08

## 2021-11-18 RX ADMIN — MEROPENEM 500 MG: 500 INJECTION, POWDER, FOR SOLUTION INTRAVENOUS at 05:14

## 2021-11-18 RX ADMIN — PROMETHAZINE HYDROCHLORIDE 25 MG: 25 INJECTION INTRAMUSCULAR; INTRAVENOUS at 11:59

## 2021-11-18 RX ADMIN — MEROPENEM 500 MG: 500 INJECTION, POWDER, FOR SOLUTION INTRAVENOUS at 21:05

## 2021-11-18 NOTE — PROGRESS NOTES
Bedside shift change report given to [place] (oncoming nurse) by Terrell Lu (offgoing nurse). Report included the following information SBAR, Kardex, Intake/Output, MAR and Recent Results.

## 2021-11-18 NOTE — WOUND CARE
CWON Ostomy Progress Note:       2nd postoperative visit. Type of Ostomy: end colostomy   Location: Q  Date of Surgery: 11/16/21    Surgeon: Dr. Bre Key, Dr. Carol Grimaldo, Dr. Nat Castellon  Surgery: Roxanne Smith     at bedside    Pouch removed, stoma and peristomal skin cleaned and assessed, new pouch prepared and applied. Findings:  Current appliance: applied 2 piece appliance with convexity and ostomy ring. Ostomy strip paste applied in creases around stoma  Stoma size: 28mm  Stoma color: red  Characteristics: retracted, sitting in abdominal crease, round, edematous   Peristomal skin: intact with no erythema  Abdomen: soft  Output: 150 ml brown liquid effluent    Image from 11/18/21      Teaching/Subjects covered today:  Encouraged pt to review handout given to patient/family and ask questions regarding material on next ostomy nurse visit. Patient nauseous and sleepy. Education provided to .  observed pouch change demonstration    - General anatomy and expectations of output  - Normal & abnormal stoma characteristics & changes over time  - Normal & abnormal peristomal characteristics   - When/how to clean stoma & peristomal skin  - When/how to empty pouch  - When/how to change appliance    Recommendations:  1. Empty appliance when 1/3 full and PRN. Encourage patient/family to notify nurse and  assist w/ pouch emptying to promote self-care. Change appliance twice weekly and PRN for leaking ASAP. DO NOT REINFORCE LEAKS to avoid skin breakdown. Transition of Care:  Ostomy nurse to return and continue teaching tomorrow, Friday 11/19/21. Patient will need home health care for further teaching after discharge.      Nara Cruz MSN, RN, [facility], [facility]  Certified Wound and Ostomy Nurse  office [phone]  Can be reached through [facility]  pager [phone] or call  to page

## 2021-11-18 NOTE — PROGRESS NOTES
[facility] at [facility]    POD #2    Subjective     Doing well, tolerating diet    Objective     Patient Vitals for the past 24 hrs:   Temp Pulse Resp BP SpO2   11/18/21 0803 98.1 °F (36.7 °C) 67 18 133/86 95 %   11/17/21 2324 98.1 °F (36.7 °C) 60 18 (!) 146/76 96 %   11/17/21 2002 98.1 °F (36.7 °C) 64 18 (!) 104/58 93 %   11/17/21 1738 98.3 °F (36.8 °C) 61 16 127/71 99 %   11/17/21 1517 99 °F (37.2 °C) 61 16 (!) 105/59 97 %       Date 11/17/21 0700 - 11/18/21 0659 11/18/21 0700 - 11/19/21 0659   Shift 3313-9793 9128-8884 24 Hour Total 0402-4107 6791-5559 24 Hour Total   INTAKE   Shift Total(mL/kg)         OUTPUT   Urine(mL/kg/hr) 500(0.5) 1800(1.8) 2300(1.1)        Urine Output (mL) (Urinary Catheter 11/16/21 2- way;  Zambrano) 500 1800 2300      Drains 120 50 170        Output (ml) (Tha-Virgen Drain 11/16/21 Right Abdomen) 120 50 170      Shift Total(mL/kg) 620(8) 1850(22) 0168(00.3)      NET -787 -5106 -9968      Weight (kg) 77.7 84.1 84.1 84.1 84.1 84.1       PE  Pulm - CTAB  CV - RRR  Abd - soft, ND, BSPresent, incisions c/d/i, ostomy viable at the skin    Labs  Recent Results (from the past 12 hour(s))   CBC W/O DIFF    Collection Time: 11/18/21  3:16 AM   Result Value Ref Range    WBC 7.2 3.6 - 11.0 K/uL    RBC 3.15 (L) 3.80 - 5.20 M/uL    HGB 8.9 (L) 11.5 - 16.0 g/dL    HCT 27.8 (L) 35.0 - 47.0 %    MCV 88.3 80.0 - 99.0 FL    MCH 28.3 26.0 - 34.0 PG    MCHC 32.0 30.0 - 36.5 g/dL    RDW 15.2 (H) 11.5 - 14.5 %    PLATELET 308 663 - 327 K/uL    MPV 10.0 8.9 - 12.9 FL    NRBC 0.0 0  WBC    ABSOLUTE NRBC 0.00 0.00 - 5.20 K/uL   METABOLIC PANEL, BASIC    Collection Time: 11/18/21  3:16 AM   Result Value Ref Range    Sodium 135 (L) 136 - 145 mmol/L    Potassium 3.9 3.5 - 5.1 mmol/L    Chloride 101 97 - 108 mmol/L    CO2 29 21 - 32 mmol/L    Anion gap 5 5 - 15 mmol/L    Glucose 119 (H) 65 - 100 mg/dL    BUN 9 6 - 20 MG/DL Creatinine 0.37 (L) 0.55 - 1.02 MG/DL    BUN/Creatinine ratio 24 (H) 12 - 20      GFR est AA >60 >60 ml/min/1.73m2    GFR est non-AA >60 >60 ml/min/1.73m2    Calcium 8.7 8.5 - 10.1 MG/DL         Assessment     Mariola Kate is a 77 y. o.yr old female s/p open sigmoid colecotmy and endo ostomy    Plan     Doing well  Continue regualr diet  DC leon cathter today  OOB more today, PT ordered  Due to void  Awaiting bowel function    Pradeep Dodson MD

## 2021-11-18 NOTE — PROGRESS NOTES
Problem: Mobility Impaired (Adult and Pediatric)  Goal: *Acute Goals and Plan of Care (Insert Text)  Description: FUNCTIONAL STATUS PRIOR TO ADMISSION: Patient was independent and active without use of DME.    HOME SUPPORT PRIOR TO ADMISSION: The patient lived with spouse but did not require assist.    Physical Therapy Goals  Initiated 11/18/2021  1. Patient will move from supine to sit and sit to supine  in bed with modified independence within 7 day(s). 2.  Patient will transfer from bed to chair and chair to bed with modified independence using the least restrictive device within 7 day(s). 3.  Patient will perform sit to stand with modified independence within 7 day(s). 4.  Patient will ambulate with modified independence for 300 feet with the least restrictive device within 7 day(s). 5.  Patient will ascend/descend 12 stairs with 1 handrail(s) with modified independence within 7 day(s). Outcome: Not Met       PHYSICAL THERAPY EVALUATION  Patient: Ainsley Sears (89 y.o. female)  Date: 11/18/2021  Primary Diagnosis: Diverticulitis [K57.92]  Procedure(s) (LRB):  LAPAROSCOPIC CONVERTED TO OPEN SIGMOID COLECTOMY WITH END COLOSTOMY AND EXTENSIVE LYSIS OF ADHESIONS GREATER THAN 2 HOUR (N/A)  CYSTOSCOPY BILATERAL URETERAL STENT INSERTION  (Bilateral) 2 Days Post-Op   Precautions: fall         ASSESSMENT  Based on the objective data described below, the patient presents with decreased positional change tolerance, decreased mobility, decreased strength, decreased activity tolerance. Pt received supine in bed with  present having just had folley taken down. Pt extremely cautious with movement and had a generally drowsy disposition throughout session. Pt complained of N/V during supine>sit and sit>stand despite BP stable in the 130's/70's throughout session (taken supine, seated, and standing). Pt only able to side step to [facility] 2/2 complaints of N/V.  Pt demonstraited difficulty going sit>supine and was unable to help adjust in bed requiring total Ax2 to position properly in bed. Session concluded with pt positioned for comfort, call bell within reach,  present, and provided with a emesis bag. Unable to fully assess pt's mobility needs due to feelings of n/v and drowsiness however given pt's PLOF anticipate should progress quickly. Current Level of Function Impacting Discharge (mobility/balance): Total A for bed mobility, Min to Mod A for transferes    Functional Outcome Measure: The patient scored 40/100 on the Barthel Index which is indicative of 60% impaired ability to care for basic self needs/dependency on others. Other factors to consider for discharge: good home support, PLOF     Patient will benefit from skilled therapy intervention to address the above noted impairments. PLAN :  Recommendations and Planned Interventions: bed mobility training, transfer training, gait training, therapeutic exercises, neuromuscular re-education, patient and family training/education, and therapeutic activities      Frequency/Duration: Patient will be followed by physical therapy:  4 times a week to address goals. Recommendation for discharge: (in order for the patient to meet his/her long term goals)  To be determined: HHPT vs none anticipated    This discharge recommendation:  Has not yet been discussed the attending provider and/or case management    IF patient discharges home will need the following DME: to be determined (TBD)         SUBJECTIVE:   Patient stated i'm feeling sick.     OBJECTIVE DATA SUMMARY:   HISTORY:    Past Medical History:   Diagnosis Date    Glaucoma     H/O seasonal allergies     Nausea & vomiting     [place] spotted fever     Sinus infection     UTI (urinary tract infection)      Past Surgical History:   Procedure Laterality Date    HX HEENT  1985    ethmoidectomy    HX HYSTERECTOMY  2003         Home Situation  Home Environment: Private residence  # Steps to Enter: 3  Rails to [employer]: Yes  One/Two Story Residence: Other (Comment) (4 story, bedroom on 2nd, no 1st floor set up)  # of Interior Steps: 12  Living Alone: No  Support Systems: Spouse/Significant Other  Patient Expects to be Discharged to[de-identified] House  Current DME Used/Available at Home: [address] chair (may have Std walker in attic but unsure)  Tub or Shower Type: Shower    EXAMINATION/PRESENTATION/DECISION MAKING:   Critical Behavior:  Neurologic State: Alert  Orientation Level: Oriented X4  Cognition: Follows commands       Functional Mobility:  Bed Mobility:  Rolling: Moderate assistance  Supine to Sit: Moderate assistance  Sit to Supine: Moderate assistance  Scooting: Total assistance  Transfers:  Sit to Stand: Minimum assistance  Stand to Sit: Supervision          Balance:   Sitting: Intact; Without support  Standing: Without support; Intact  Ambulation/Gait Training:  Distance (ft): 3 Feet (ft) (sidestep to [facility])  Assistive Device: Gait belt; Walker, rolling  Ambulation - Level of Assistance: Contact guard assistance        Gait Abnormalities: Decreased step clearance; Shuffling gait        Base of Support: Narrowed; Center of gravity altered     Speed/Chacha: Shuffled; Slow; Pace decreased (<100 feet/min)           Interventions: Safety awareness training; Verbal cues         Functional Measure:  Barthel Index:    Bathin  Bladder: 5  Bowels: 10  Groomin  Dressin  Feeding: 10  Mobility: 0  Stairs: 0  Toilet Use: 5  Transfer (Bed to Chair and Back): 5  Total: 40/100       The Barthel ADL Index: Guidelines  1. The index should be used as a record of what a patient does, not as a record of what a patient could do. 2. The main aim is to establish degree of independence from any help, physical or verbal, however minor and for whatever reason. 3. The need for supervision renders the patient not independent. 4. A patient's performance should be established using the best available evidence.  Asking the patient, friends/relatives and nurses are the usual sources, but direct observation and common sense are also important. However direct testing is not needed. 5. Usually the patient's performance over the preceding 24-48 hours is important, but occasionally longer periods will be relevant. 6. Middle categories imply that the patient supplies over 50 per cent of the effort. 7. Use of aids to be independent is allowed. Fabiano Mendoza., Barthel, DZacariasW. (2795). Functional evaluation: the Barthel Index. [address] [facility] (14)2. VIN Armendariz, Jose C Truong., Inocencia Mayes., [facility], [facility] (1999). Measuring the change indisability after inpatient rehabilitation; comparison of the responsiveness of the Barthel Index and Functional McGuffey Measure. Journal of Neurology, Neurosurgery, and Psychiatry, 66(4), 305-919. Weston Tellez, [place].HUMERA, HAO Barrett, & Romeo Cordero M.A. (2004.) Assessment of post-stroke quality of life in cost-effectiveness studies: The usefulness of the Barthel Index and the EuroQoL-5D.  Quality of Life Research, 15, 247-07            Physical Therapy Evaluation Charge Determination   History Examination Presentation Decision-Making   HIGH Complexity :3+ comorbidities / personal factors will impact the outcome/ POC  MEDIUM Complexity : 3 Standardized tests and measures addressing body structure, function, activity limitation and / or participation in recreation  LOW Complexity : Stable, uncomplicated  Other outcome measures Barthel  MEDIUM      Based on the above components, the patient evaluation is determined to be of the following complexity level: LOW     Pain Rating:  Pt did not quantify pain during session    Activity Tolerance:   Poor, SpO2 stable on RA, requires rest breaks, and feelings of N/V with OOB activity    After treatment patient left in no apparent distress:   Supine in bed, Call bell within reach, Caregiver / family present, and Side rails x 3    COMMUNICATION/EDUCATION:   The patients plan of care was discussed with: Registered nurse. Fall prevention education was provided and the patient/caregiver indicated understanding., Patient/family have participated as able in goal setting and plan of care. , and Patient/family agree to work toward stated goals and plan of care.     Thank you for this referral.  Kevin Allison, PT   Time Calculation: 30 mins

## 2021-11-18 NOTE — PROGRESS NOTES
Bedside and Verbal shift change report given to Libertad Yousif (oncoming nurse) by Angelica Darnell (offgoing nurse). Report included the following information SBAR, Kardex, Intake/Output, MAR and Recent Results.

## 2021-11-18 NOTE — PROGRESS NOTES
11/18/2021 -   TRANSITIONS OF CARE PLAN:   1. RUR: 18%; Moderate  2. DESTINATION: Own Home  3. TRANSPORT: Family  4. NEEDS FOR DISCHARGE: Patient was accepted by [address]: PCP, General Surgery  6. ONGOING INPATIENT NEEDS: Increased Ambulation, PT Eval, Needs to Void and have BM    Anticipated Discharge is: 24-48 Hours    CM notes that patient was accepted by [facility] on 11/17.   CM fax attached orders to referral.    CRM: Nelly Anderson, MPH, [address] [place] [address]; Z: [phone]

## 2021-11-19 PROCEDURE — 65270000029 HC RM PRIVATE

## 2021-11-19 PROCEDURE — 74011250636 HC RX REV CODE- 250/636: Performed by: SURGERY

## 2021-11-19 PROCEDURE — 99024 POSTOP FOLLOW-UP VISIT: CPT | Performed by: PHYSICIAN ASSISTANT

## 2021-11-19 PROCEDURE — 74011000250 HC RX REV CODE- 250: Performed by: SURGERY

## 2021-11-19 PROCEDURE — 74011250637 HC RX REV CODE- 250/637: Performed by: SURGERY

## 2021-11-19 RX ORDER — SODIUM CHLORIDE, SODIUM LACTATE, POTASSIUM CHLORIDE, CALCIUM CHLORIDE 600; 310; 30; 20 MG/100ML; MG/100ML; MG/100ML; MG/100ML
75 INJECTION, SOLUTION INTRAVENOUS CONTINUOUS
Status: DISCONTINUED | OUTPATIENT
Start: 2021-11-19 | End: 2021-11-22

## 2021-11-19 RX ADMIN — TIMOLOL MALEATE 1 DROP: 2.5 SOLUTION OPHTHALMIC at 17:10

## 2021-11-19 RX ADMIN — SODIUM CHLORIDE, POTASSIUM CHLORIDE, SODIUM LACTATE AND CALCIUM CHLORIDE 75 ML/HR: 600; 310; 30; 20 INJECTION, SOLUTION INTRAVENOUS at 23:48

## 2021-11-19 RX ADMIN — ONDANSETRON HYDROCHLORIDE 4 MG: 2 SOLUTION INTRAMUSCULAR; INTRAVENOUS at 09:24

## 2021-11-19 RX ADMIN — MEROPENEM 500 MG: 500 INJECTION, POWDER, FOR SOLUTION INTRAVENOUS at 17:10

## 2021-11-19 RX ADMIN — OXYCODONE 5 MG: 5 TABLET ORAL at 17:09

## 2021-11-19 RX ADMIN — OXYCODONE 5 MG: 5 TABLET ORAL at 09:10

## 2021-11-19 RX ADMIN — ACETAMINOPHEN 1000 MG: 500 TABLET ORAL at 10:28

## 2021-11-19 RX ADMIN — NALOXEGOL OXALATE 25 MG: 25 TABLET, FILM COATED ORAL at 07:20

## 2021-11-19 RX ADMIN — OXYCODONE 5 MG: 5 TABLET ORAL at 13:13

## 2021-11-19 RX ADMIN — ACETAMINOPHEN 1000 MG: 500 TABLET ORAL at 22:51

## 2021-11-19 RX ADMIN — ACETAMINOPHEN 1000 MG: 500 TABLET ORAL at 14:26

## 2021-11-19 RX ADMIN — MEROPENEM 500 MG: 500 INJECTION, POWDER, FOR SOLUTION INTRAVENOUS at 22:51

## 2021-11-19 RX ADMIN — Medication 10 ML: at 07:20

## 2021-11-19 RX ADMIN — OXYCODONE 5 MG: 5 TABLET ORAL at 21:16

## 2021-11-19 RX ADMIN — BRIMONIDINE TARTRATE 1 DROP: 2 SOLUTION OPHTHALMIC at 09:15

## 2021-11-19 RX ADMIN — TIMOLOL MALEATE 1 DROP: 2.5 SOLUTION OPHTHALMIC at 09:15

## 2021-11-19 RX ADMIN — SODIUM CHLORIDE, POTASSIUM CHLORIDE, SODIUM LACTATE AND CALCIUM CHLORIDE 75 ML/HR: 600; 310; 30; 20 INJECTION, SOLUTION INTRAVENOUS at 11:00

## 2021-11-19 RX ADMIN — MEROPENEM 500 MG: 500 INJECTION, POWDER, FOR SOLUTION INTRAVENOUS at 03:15

## 2021-11-19 RX ADMIN — BRIMONIDINE TARTRATE 1 DROP: 2 SOLUTION OPHTHALMIC at 17:10

## 2021-11-19 RX ADMIN — MEROPENEM 500 MG: 500 INJECTION, POWDER, FOR SOLUTION INTRAVENOUS at 10:36

## 2021-11-19 RX ADMIN — ENOXAPARIN SODIUM 40 MG: 100 INJECTION SUBCUTANEOUS at 09:29

## 2021-11-19 RX ADMIN — ACETAMINOPHEN 1000 MG: 500 TABLET ORAL at 03:24

## 2021-11-19 NOTE — PROGRESS NOTES
Physical Therapy 11/19/21    Chart reviewed and cleared by nursing - pt resting in bed - pt reported up to chair x 2 today and use of BSC x 3 - pt declined ambulation. Pt educated on importance of increasing activity - however pt continued to decline. RN notified and will attempt to ambulate later. For the weekend, recommend patient to complete as able in order to maintain strength, endurance and independence with nursing: OOB to chair 3x/day with RW and ambulating with RW in hallway. PT to follow-up with patient after the weekend.      Aspen Hendrix, PT

## 2021-11-19 NOTE — PROGRESS NOTES
General Surgery Daily Progress Note    Admit Date: 2021  Post-Operative Day: 3 Days Post-Op from Procedure(s):  LAPAROSCOPIC CONVERTED TO OPEN SIGMOID COLECTOMY WITH END COLOSTOMY AND EXTENSIVE LYSIS OF ADHESIONS GREATER THAN 2 HOUR  CYSTOSCOPY BILATERAL URETERAL STENT INSERTION      Subjective:     Last 24 hrs: Doing much better this AM  Pain well controlled  Denies NV, tolerating diet (eating [place]ian toast for breakfast)  Phenergan yesterday caused hallucinations. Not able to work much w PT  Passing gas into applicance    Objective:     Blood pressure (!) 155/73, pulse 69, temperature 97.5 °F (36.4 °C), resp. rate 18, height 5' 2\" (1.575 m), weight 185 lb 6.4 oz (84.1 kg), SpO2 92 %. Temp (24hrs), Av.7 °F (36.5 °C), Min:97.3 °F (36.3 °C), Max:98.3 °F (36.8 °C)      _____________________  Physical Exam:     Alert and Oriented, cooperaive, in no acute distress. Cardiovascular: RRR, trace peripheral edema  Lungs:CTAB on RA  Abdomen: soft, no acute TTP. Dressings c/d  SUSAN drain w 40ml/24 hrs clear pink fluid  Appliance w dark brown watery stool & gas in bas  +BS      Assessment:   Active Problems:    Diverticulitis (2021)            Plan:     Continue ERAS recovery  OOB, ambulation w assistance as tolerated  Continue diet  Daily PT  For ostomy teaching today  Hopefully d/c to home 1-2 days        Data Review:    Recent Labs     21  0316 21  010   WBC 7.2 7.6   HGB 8.9* 9.6*   HCT 27.8* 29.5*    217     Recent Labs     216 21  010   * 135*   K 3.9 4.4    102   CO2 29 27   * 120*   BUN 9 10   CREA 0.37* 0.37*   CA 8.7 9.1   MG  --  2.1   PHOS  --  3.0     No results for input(s): AML, LPSE in the last 72 hours.         ______________________  Medications:    Current Facility-Administered Medications   Medication Dose Route Frequency    promethazine (PHENERGAN) 25 mg in NS IVPB  25 mg IntraVENous Q8H PRN    ALPRAZolam (XANAX) tablet 0.5 mg  0.5 mg Oral Q8H PRN    timolol (TIMOPTIC) 0.25% ophthalmic solution  1 Drop Both Eyes BID    zolpidem (AMBIEN) tablet 5 mg  5 mg Oral QHS PRN    sodium chloride (NS) flush 5-40 mL  5-40 mL IntraVENous Q8H    sodium chloride (NS) flush 5-40 mL  5-40 mL IntraVENous PRN    acetaminophen (TYLENOL) tablet 1,000 mg  1,000 mg Oral Q6H    oxyCODONE IR (ROXICODONE) tablet 5 mg  5 mg Oral Q4H PRN    ondansetron (ZOFRAN) injection 4 mg  4 mg IntraVENous Q6H PRN    naloxegoL (MOVANTIK) tablet 25 mg  25 mg Oral ACB    HYDROmorphone (DILAUDID) injection 1 mg  1 mg IntraVENous Q3H PRN    enoxaparin (LOVENOX) injection 40 mg  40 mg SubCUTAneous DAILY    0.9% sodium chloride infusion 250 mL  250 mL IntraVENous PRN    meropenem (MERREM) 500 mg in sterile water (preservative free) 10 mL IV syringe  500 mg IntraVENous Q6H    brimonidine (ALPHAGAN) 0.2 % ophthalmic solution 1 Drop  1 Drop Both Eyes BID       Tish Horton  11/19/2021

## 2021-11-19 NOTE — WOUND CARE
CWON Ostomy Progress Note:         3rd postoperative visit. Type of Ostomy: end colostomy   Location: [facility]  Date of Surgery: 11/16/21    Surgeon: Dr. Mine Larios, Dr. Rodríguez Hobson, Dr. Jasmeet Jaime removed, stoma and peristomal skin cleaned and assessed, new pouch prepared and applied.     Findings:  Current appliance: applied 1 piece appliance with flat barrier with ostomy ring. Ostomy strip paste applied in creases around stoma  Stoma size: 2.1 x 2.8 cm  Stoma color: red  Characteristics: retracted, sitting in abdominal crease, oval, edematous   Peristomal skin: intact with no erythema  Abdomen: soft  Output: brown liquid effluent. Positive flatus    Teaching/Subjects covered today:  Encouraged pt to review handout given to patient/family and ask questions regarding material on next ostomy nurse visit. Demonstrated pouch change with explanation of each step     - General anatomy and expectations of output  - Normal & abnormal stoma characteristics & changes over time  - Normal & abnormal peristomal characteristics   - When/how to clean stoma & peristomal skin  - When/how to empty pouch  - When/how to change appliance  - When to notify nurse/physician  - Dietary guidelines    Recommendations:  1. Empty appliance when 1/3 full and PRN. Encourage patient/family to notify nurse and  assist w/ pouch emptying to promote self-care. Change appliance twice weekly and PRN for leaking ASAP. DO NOT REINFORCE LEAKS to avoid skin breakdown.     Transition of Care:  Ostomy nurse to return and continue teaching Monday 11/22/21.   Patient will need home health care for further teaching after discharge.      Ana Cristina Pineda MSN, RN, [address] [place] [address],[address], [facility]  Certified Wound and Ostomy Nurse  office [phone]  Can be reached through [facility]  pager [phone] or call  to page

## 2021-11-20 LAB
ANION GAP SERPL CALC-SCNC: 3 MMOL/L (ref 5–15)
BUN SERPL-MCNC: 5 MG/DL (ref 6–20)
BUN/CREAT SERPL: 13 (ref 12–20)
CALCIUM SERPL-MCNC: 9 MG/DL (ref 8.5–10.1)
CHLORIDE SERPL-SCNC: 102 MMOL/L (ref 97–108)
CO2 SERPL-SCNC: 34 MMOL/L (ref 21–32)
CREAT SERPL-MCNC: 0.38 MG/DL (ref 0.55–1.02)
ERYTHROCYTE [DISTWIDTH] IN BLOOD BY AUTOMATED COUNT: 15 % (ref 11.5–14.5)
GLUCOSE SERPL-MCNC: 107 MG/DL (ref 65–100)
HCT VFR BLD AUTO: 30.7 % (ref 35–47)
HGB BLD-MCNC: 9.5 G/DL (ref 11.5–16)
MCH RBC QN AUTO: 27.8 PG (ref 26–34)
MCHC RBC AUTO-ENTMCNC: 30.9 G/DL (ref 30–36.5)
MCV RBC AUTO: 89.8 FL (ref 80–99)
NRBC # BLD: 0 K/UL (ref 0–0.01)
NRBC BLD-RTO: 0 PER 100 WBC
PLATELET # BLD AUTO: 251 K/UL (ref 150–400)
PMV BLD AUTO: 9.1 FL (ref 8.9–12.9)
POTASSIUM SERPL-SCNC: 3.7 MMOL/L (ref 3.5–5.1)
RBC # BLD AUTO: 3.42 M/UL (ref 3.8–5.2)
SODIUM SERPL-SCNC: 139 MMOL/L (ref 136–145)
WBC # BLD AUTO: 5.8 K/UL (ref 3.6–11)

## 2021-11-20 PROCEDURE — 36415 COLL VENOUS BLD VENIPUNCTURE: CPT

## 2021-11-20 PROCEDURE — 74011250637 HC RX REV CODE- 250/637: Performed by: NURSE PRACTITIONER

## 2021-11-20 PROCEDURE — 99024 POSTOP FOLLOW-UP VISIT: CPT | Performed by: SURGERY

## 2021-11-20 PROCEDURE — 74011250636 HC RX REV CODE- 250/636: Performed by: SURGERY

## 2021-11-20 PROCEDURE — 74011000250 HC RX REV CODE- 250: Performed by: SURGERY

## 2021-11-20 PROCEDURE — 65270000029 HC RM PRIVATE

## 2021-11-20 PROCEDURE — 80048 BASIC METABOLIC PNL TOTAL CA: CPT

## 2021-11-20 PROCEDURE — 85027 COMPLETE CBC AUTOMATED: CPT

## 2021-11-20 PROCEDURE — 74011250637 HC RX REV CODE- 250/637: Performed by: SURGERY

## 2021-11-20 RX ADMIN — BRIMONIDINE TARTRATE 1 DROP: 2 SOLUTION OPHTHALMIC at 09:24

## 2021-11-20 RX ADMIN — MEROPENEM 500 MG: 500 INJECTION, POWDER, FOR SOLUTION INTRAVENOUS at 09:30

## 2021-11-20 RX ADMIN — ACETAMINOPHEN 1000 MG: 500 TABLET ORAL at 14:23

## 2021-11-20 RX ADMIN — ZOLPIDEM TARTRATE 5 MG: 5 TABLET, FILM COATED ORAL at 09:30

## 2021-11-20 RX ADMIN — Medication 5 ML: at 14:00

## 2021-11-20 RX ADMIN — TIMOLOL MALEATE 1 DROP: 2.5 SOLUTION OPHTHALMIC at 09:24

## 2021-11-20 RX ADMIN — ONDANSETRON HYDROCHLORIDE 4 MG: 2 SOLUTION INTRAMUSCULAR; INTRAVENOUS at 06:44

## 2021-11-20 RX ADMIN — ALPRAZOLAM 0.5 MG: 1 TABLET ORAL at 09:24

## 2021-11-20 RX ADMIN — ACETAMINOPHEN 1000 MG: 500 TABLET ORAL at 22:26

## 2021-11-20 RX ADMIN — OXYCODONE 5 MG: 5 TABLET ORAL at 22:31

## 2021-11-20 RX ADMIN — NALOXEGOL OXALATE 25 MG: 25 TABLET, FILM COATED ORAL at 06:40

## 2021-11-20 RX ADMIN — MEROPENEM 500 MG: 500 INJECTION, POWDER, FOR SOLUTION INTRAVENOUS at 22:39

## 2021-11-20 RX ADMIN — ACETAMINOPHEN 1000 MG: 500 TABLET ORAL at 04:05

## 2021-11-20 RX ADMIN — MEROPENEM 500 MG: 500 INJECTION, POWDER, FOR SOLUTION INTRAVENOUS at 04:05

## 2021-11-20 RX ADMIN — OXYCODONE 5 MG: 5 TABLET ORAL at 14:32

## 2021-11-20 RX ADMIN — TIMOLOL MALEATE 1 DROP: 2.5 SOLUTION OPHTHALMIC at 18:00

## 2021-11-20 RX ADMIN — BRIMONIDINE TARTRATE 1 DROP: 2 SOLUTION OPHTHALMIC at 18:00

## 2021-11-20 RX ADMIN — MEROPENEM 500 MG: 500 INJECTION, POWDER, FOR SOLUTION INTRAVENOUS at 16:00

## 2021-11-20 RX ADMIN — OXYCODONE 5 MG: 5 TABLET ORAL at 08:20

## 2021-11-20 RX ADMIN — ENOXAPARIN SODIUM 40 MG: 100 INJECTION SUBCUTANEOUS at 09:25

## 2021-11-20 NOTE — PROGRESS NOTES
0547-Per pt, passing flatus through ostomy bag overnight, no output in bag. Pt encouraged throughout shift to walk with assistance, pt declined throughout shift but stated she would walk in the morning. 0715-Pt ostomy leaking around site, bag changed and site care performed, pt instructed to keep checking bag for output. Output was brown soft stool. Pt encouraged throughout the shift to ambulate, pt refused following repeated education. Pt informed of risks of not ambulating, stated she understood and would walk in the AM, this AM, pt declined d/t nausea.

## 2021-11-20 NOTE — PROGRESS NOTES
Progress Note    Patient: Josh Swenson MRN: [id number]  SSN: [id number]    YOB: 1955  Age: 77 y.o. Sex: female      Admit Date: 2021    4 Days Post-Op    Procedure:  Procedure(s):  LAPAROSCOPIC CONVERTED TO OPEN SIGMOID COLECTOMY WITH END COLOSTOMY AND EXTENSIVE LYSIS OF ADHESIONS GREATER THAN 2 HOUR  CYSTOSCOPY BILATERAL URETERAL STENT INSERTION     Subjective:     Patient has been eating some. Still complains of nausea. Has been putting stool out. Complains of some left flank and back pain. Objective:     Visit Vitals  BP (!) 143/76   Pulse 86   Temp 97.8 °F (36.6 °C)   Resp 16   Ht 5' 2\" (1.575 m)   Wt 173 lb (78.5 kg)   SpO2 95%   BMI 31.64 kg/m²       Temp (24hrs), Av.7 °F (36.5 °C), Min:97.3 °F (36.3 °C), Max:98 °F (36.7 °C)      Physical Exam:    General alert and oriented no acute distress  Lungs clear bilaterally  Heart regular rate rhythm  Abdomen soft appropriately tender  Incision healing well  Ostomy functioning    Data Review: images and reports reviewed    Lab Review: All lab results for the last 24 hours reviewed.   Recent Results (from the past 24 hour(s))   METABOLIC PANEL, BASIC    Collection Time: 21  4:14 AM   Result Value Ref Range    Sodium 139 136 - 145 mmol/L    Potassium 3.7 3.5 - 5.1 mmol/L    Chloride 102 97 - 108 mmol/L    CO2 34 (H) 21 - 32 mmol/L    Anion gap 3 (L) 5 - 15 mmol/L    Glucose 107 (H) 65 - 100 mg/dL    BUN 5 (L) 6 - 20 MG/DL    Creatinine 0.38 (L) 0.55 - 1.02 MG/DL    BUN/Creatinine ratio 13 12 - 20      GFR est AA >60 >60 ml/min/1.73m2    GFR est non-AA >60 >60 ml/min/1.73m2    Calcium 9.0 8.5 - 10.1 MG/DL   CBC W/O DIFF    Collection Time: 21  4:14 AM   Result Value Ref Range    WBC 5.8 3.6 - 11.0 K/uL    RBC 3.42 (L) 3.80 - 5.20 M/uL    HGB 9.5 (L) 11.5 - 16.0 g/dL    HCT 30.7 (L) 35.0 - 47.0 %    MCV 89.8 80.0 - 99.0 FL    MCH 27.8 26.0 - 34.0 PG    MCHC 30.9 30.0 - 36.5 g/dL    RDW 15.0 (H) 11.5 - 14.5 %    PLATELET 251 150 - 400 K/uL    MPV 9.1 8.9 - 12.9 FL    NRBC 0.0 0  WBC    ABSOLUTE NRBC 0.00 0.00 - 0.01 K/uL       Assessment:     Hospital Problems  Date Reviewed: 11/16/2021          Codes Class Noted POA    Diverticulitis ICD-10-CM: X81.88  ICD-9-CM: 562.11  5/18/2021 Unknown              Plan/Recommendations/Medical Decision Making:   I suspect much of her back and left flank pain is due to to her laying in bed. Needs to be up and ambulating more. Continue diet as tolerated we will continue IV fluids until she is really tolerating it well.   Appreciate PT input

## 2021-11-20 NOTE — ROUTINE PROCESS
Bedside shift change report given to Corinne Flores RN (oncoming nurse) by Kranthi mSith RN (offgoing nurse). Report included the following information SBAR, Kardex, Intake/Output, MAR and Recent Results.

## 2021-11-20 NOTE — PROGRESS NOTES
Bedside shift change report given to Violeta Dotson (oncoming nurse) by Maddy Jimenez (offgoing nurse). Report included the following information SBAR, Kardex and MAR.

## 2021-11-21 LAB
ANION GAP SERPL CALC-SCNC: 5 MMOL/L (ref 5–15)
BUN SERPL-MCNC: 9 MG/DL (ref 6–20)
BUN/CREAT SERPL: 21 (ref 12–20)
CALCIUM SERPL-MCNC: 9.2 MG/DL (ref 8.5–10.1)
CHLORIDE SERPL-SCNC: 103 MMOL/L (ref 97–108)
CO2 SERPL-SCNC: 30 MMOL/L (ref 21–32)
CREAT SERPL-MCNC: 0.42 MG/DL (ref 0.55–1.02)
ERYTHROCYTE [DISTWIDTH] IN BLOOD BY AUTOMATED COUNT: 15.4 % (ref 11.5–14.5)
GLUCOSE SERPL-MCNC: 116 MG/DL (ref 65–100)
HCT VFR BLD AUTO: 30 % (ref 35–47)
HGB BLD-MCNC: 9.3 G/DL (ref 11.5–16)
MCH RBC QN AUTO: 28 PG (ref 26–34)
MCHC RBC AUTO-ENTMCNC: 31 G/DL (ref 30–36.5)
MCV RBC AUTO: 90.4 FL (ref 80–99)
NRBC # BLD: 0 K/UL (ref 0–0.01)
NRBC BLD-RTO: 0 PER 100 WBC
PLATELET # BLD AUTO: 272 K/UL (ref 150–400)
PMV BLD AUTO: 9.4 FL (ref 8.9–12.9)
POTASSIUM SERPL-SCNC: 3.7 MMOL/L (ref 3.5–5.1)
RBC # BLD AUTO: 3.32 M/UL (ref 3.8–5.2)
SODIUM SERPL-SCNC: 138 MMOL/L (ref 136–145)
WBC # BLD AUTO: 5.2 K/UL (ref 3.6–11)

## 2021-11-21 PROCEDURE — 74011250637 HC RX REV CODE- 250/637: Performed by: NURSE PRACTITIONER

## 2021-11-21 PROCEDURE — 36415 COLL VENOUS BLD VENIPUNCTURE: CPT

## 2021-11-21 PROCEDURE — 74011250637 HC RX REV CODE- 250/637: Performed by: SURGERY

## 2021-11-21 PROCEDURE — 74011000250 HC RX REV CODE- 250: Performed by: SURGERY

## 2021-11-21 PROCEDURE — 65270000029 HC RM PRIVATE

## 2021-11-21 PROCEDURE — 85027 COMPLETE CBC AUTOMATED: CPT

## 2021-11-21 PROCEDURE — 80048 BASIC METABOLIC PNL TOTAL CA: CPT

## 2021-11-21 PROCEDURE — 74011250636 HC RX REV CODE- 250/636: Performed by: SURGERY

## 2021-11-21 PROCEDURE — 99024 POSTOP FOLLOW-UP VISIT: CPT | Performed by: SURGERY

## 2021-11-21 RX ADMIN — TIMOLOL MALEATE 1 DROP: 2.5 SOLUTION OPHTHALMIC at 10:35

## 2021-11-21 RX ADMIN — TIMOLOL MALEATE 1 DROP: 2.5 SOLUTION OPHTHALMIC at 17:00

## 2021-11-21 RX ADMIN — NALOXEGOL OXALATE 25 MG: 25 TABLET, FILM COATED ORAL at 06:36

## 2021-11-21 RX ADMIN — ACETAMINOPHEN 1000 MG: 500 TABLET ORAL at 22:36

## 2021-11-21 RX ADMIN — MEROPENEM 500 MG: 500 INJECTION, POWDER, FOR SOLUTION INTRAVENOUS at 22:36

## 2021-11-21 RX ADMIN — BRIMONIDINE TARTRATE 1 DROP: 2 SOLUTION OPHTHALMIC at 17:00

## 2021-11-21 RX ADMIN — ACETAMINOPHEN 1000 MG: 500 TABLET ORAL at 04:47

## 2021-11-21 RX ADMIN — ALPRAZOLAM 0.5 MG: 1 TABLET ORAL at 10:35

## 2021-11-21 RX ADMIN — MEROPENEM 500 MG: 500 INJECTION, POWDER, FOR SOLUTION INTRAVENOUS at 04:47

## 2021-11-21 RX ADMIN — OXYCODONE 5 MG: 5 TABLET ORAL at 16:24

## 2021-11-21 RX ADMIN — ACETAMINOPHEN 1000 MG: 500 TABLET ORAL at 10:35

## 2021-11-21 RX ADMIN — ENOXAPARIN SODIUM 40 MG: 100 INJECTION SUBCUTANEOUS at 10:35

## 2021-11-21 RX ADMIN — SODIUM CHLORIDE, POTASSIUM CHLORIDE, SODIUM LACTATE AND CALCIUM CHLORIDE 75 ML/HR: 600; 310; 30; 20 INJECTION, SOLUTION INTRAVENOUS at 00:02

## 2021-11-21 RX ADMIN — BRIMONIDINE TARTRATE 1 DROP: 2 SOLUTION OPHTHALMIC at 10:35

## 2021-11-21 RX ADMIN — MEROPENEM 500 MG: 500 INJECTION, POWDER, FOR SOLUTION INTRAVENOUS at 16:56

## 2021-11-21 RX ADMIN — OXYCODONE 5 MG: 5 TABLET ORAL at 10:35

## 2021-11-21 RX ADMIN — Medication 5 ML: at 14:00

## 2021-11-21 RX ADMIN — MEROPENEM 500 MG: 500 INJECTION, POWDER, FOR SOLUTION INTRAVENOUS at 10:36

## 2021-11-21 RX ADMIN — ACETAMINOPHEN 1000 MG: 500 TABLET ORAL at 16:56

## 2021-11-21 NOTE — PROGRESS NOTES
Bedside shift change report given to Corinne Flores RN (oncoming nurse) by Kranthi Smith RN (offgoing nurse). Report included the following information SBAR, Kardex, Intake/Output, MAR and Recent Results.

## 2021-11-21 NOTE — PROGRESS NOTES
Progress Note    Patient: Zaid Vuong MRN: [id number]  SSN: [id number]    YOB: 1955  Age: 77 y.o. Sex: female      Admit Date: 2021    5 Days Post-Op    Procedure:  Procedure(s):  LAPAROSCOPIC CONVERTED TO OPEN SIGMOID COLECTOMY WITH END COLOSTOMY AND EXTENSIVE LYSIS OF ADHESIONS GREATER THAN 2 HOUR  CYSTOSCOPY BILATERAL URETERAL STENT INSERTION     Subjective:     Patient complains of left flank pain which is better when she is up walking and moving. The pain is intermittent. She thinks it is related to her needing to have a bowel movement. She has been eating some at every meal but does not have an appetite. Objective:     Visit Vitals  /74   Pulse 75   Temp 98.3 °F (36.8 °C)   Resp 16   Ht 5' 2\" (1.575 m)   Wt 174 lb (78.9 kg)   SpO2 93%   BMI 31.83 kg/m²       Temp (24hrs), Av °F (36.7 °C), Min:97.7 °F (36.5 °C), Max:98.5 °F (36.9 °C)      Physical Exam:    General alert in no acute distress  Lungs clear bilaterally  Heart regular rate and rhythm abdomen soft  Incisional tenderness incision healing well without evidence of infection mild tenderness left flank no erythema    Data Review: images and reports reviewed    Lab Review: All lab results for the last 24 hours reviewed.   Recent Results (from the past 24 hour(s))   METABOLIC PANEL, BASIC    Collection Time: 21  4:47 AM   Result Value Ref Range    Sodium 138 136 - 145 mmol/L    Potassium 3.7 3.5 - 5.1 mmol/L    Chloride 103 97 - 108 mmol/L    CO2 30 21 - 32 mmol/L    Anion gap 5 5 - 15 mmol/L    Glucose 116 (H) 65 - 100 mg/dL    BUN 9 6 - 20 MG/DL    Creatinine 0.42 (L) 0.55 - 1.02 MG/DL    BUN/Creatinine ratio 21 (H) 12 - 20      GFR est AA >60 >60 ml/min/1.73m2    GFR est non-AA >60 >60 ml/min/1.73m2    Calcium 9.2 8.5 - 10.1 MG/DL   CBC W/O DIFF    Collection Time: 21  4:47 AM   Result Value Ref Range    WBC 5.2 3.6 - 11.0 K/uL    RBC 3.32 (L) 3.80 - 5.20 M/uL    HGB 9.3 (L) 11.5 - 16.0 g/dL HCT 30.0 (L) 35.0 - 47.0 %    MCV 90.4 80.0 - 99.0 FL    MCH 28.0 26.0 - 34.0 PG    MCHC 31.0 30.0 - 36.5 g/dL    RDW 15.4 (H) 11.5 - 14.5 %    PLATELET 177 411 - 043 K/uL    MPV 9.4 8.9 - 12.9 FL    NRBC 0.0 0  WBC    ABSOLUTE NRBC 0.00 0.00 - 0.01 K/uL       Assessment:     Hospital Problems  Date Reviewed: 11/16/2021          Codes Class Noted POA    Diverticulitis ICD-10-CM: P47.39  ICD-9-CM: 562.11  5/18/2021 Unknown              Plan/Recommendations/Medical Decision Making:   Pain on left side seems more due to laying as opposed to an actual intra-abdominal process. This is evidenced by the fact that when she is up and walking she feels much better. At this point I do not think she will require a CT scan however if the pain does persist or become constant this may be necessary. Continue to encourage p.o. intake.

## 2021-11-22 LAB
ANION GAP SERPL CALC-SCNC: 3 MMOL/L (ref 5–15)
BUN SERPL-MCNC: 7 MG/DL (ref 6–20)
BUN/CREAT SERPL: 18 (ref 12–20)
CALCIUM SERPL-MCNC: 9.2 MG/DL (ref 8.5–10.1)
CHLORIDE SERPL-SCNC: 104 MMOL/L (ref 97–108)
CO2 SERPL-SCNC: 33 MMOL/L (ref 21–32)
CREAT SERPL-MCNC: 0.39 MG/DL (ref 0.55–1.02)
ERYTHROCYTE [DISTWIDTH] IN BLOOD BY AUTOMATED COUNT: 15.9 % (ref 11.5–14.5)
GLUCOSE SERPL-MCNC: 107 MG/DL (ref 65–100)
HCT VFR BLD AUTO: 29.5 % (ref 35–47)
HGB BLD-MCNC: 9.3 G/DL (ref 11.5–16)
MCH RBC QN AUTO: 28.3 PG (ref 26–34)
MCHC RBC AUTO-ENTMCNC: 31.5 G/DL (ref 30–36.5)
MCV RBC AUTO: 89.7 FL (ref 80–99)
NRBC # BLD: 0 K/UL (ref 0–0.01)
NRBC BLD-RTO: 0 PER 100 WBC
PLATELET # BLD AUTO: 250 K/UL (ref 150–400)
PMV BLD AUTO: 8.8 FL (ref 8.9–12.9)
POTASSIUM SERPL-SCNC: 3.8 MMOL/L (ref 3.5–5.1)
RBC # BLD AUTO: 3.29 M/UL (ref 3.8–5.2)
SODIUM SERPL-SCNC: 140 MMOL/L (ref 136–145)
WBC # BLD AUTO: 4.8 K/UL (ref 3.6–11)

## 2021-11-22 PROCEDURE — 74011250636 HC RX REV CODE- 250/636: Performed by: SURGERY

## 2021-11-22 PROCEDURE — 36415 COLL VENOUS BLD VENIPUNCTURE: CPT

## 2021-11-22 PROCEDURE — 65270000029 HC RM PRIVATE

## 2021-11-22 PROCEDURE — 85027 COMPLETE CBC AUTOMATED: CPT

## 2021-11-22 PROCEDURE — 74011250637 HC RX REV CODE- 250/637: Performed by: SURGERY

## 2021-11-22 PROCEDURE — 80048 BASIC METABOLIC PNL TOTAL CA: CPT

## 2021-11-22 PROCEDURE — 74011000250 HC RX REV CODE- 250: Performed by: SURGERY

## 2021-11-22 RX ADMIN — BRIMONIDINE TARTRATE 1 DROP: 2 SOLUTION OPHTHALMIC at 09:39

## 2021-11-22 RX ADMIN — ACETAMINOPHEN 1000 MG: 500 TABLET ORAL at 05:16

## 2021-11-22 RX ADMIN — ACETAMINOPHEN 1000 MG: 500 TABLET ORAL at 09:39

## 2021-11-22 RX ADMIN — MEROPENEM 500 MG: 500 INJECTION, POWDER, FOR SOLUTION INTRAVENOUS at 22:59

## 2021-11-22 RX ADMIN — ACETAMINOPHEN 1000 MG: 500 TABLET ORAL at 16:32

## 2021-11-22 RX ADMIN — TIMOLOL MALEATE 1 DROP: 2.5 SOLUTION OPHTHALMIC at 18:01

## 2021-11-22 RX ADMIN — TIMOLOL MALEATE 1 DROP: 2.5 SOLUTION OPHTHALMIC at 09:39

## 2021-11-22 RX ADMIN — BRIMONIDINE TARTRATE 1 DROP: 2 SOLUTION OPHTHALMIC at 18:01

## 2021-11-22 RX ADMIN — ENOXAPARIN SODIUM 40 MG: 100 INJECTION SUBCUTANEOUS at 09:40

## 2021-11-22 RX ADMIN — ACETAMINOPHEN 1000 MG: 500 TABLET ORAL at 23:00

## 2021-11-22 RX ADMIN — MEROPENEM 500 MG: 500 INJECTION, POWDER, FOR SOLUTION INTRAVENOUS at 05:16

## 2021-11-22 RX ADMIN — NALOXEGOL OXALATE 25 MG: 25 TABLET, FILM COATED ORAL at 06:54

## 2021-11-22 RX ADMIN — Medication 10 ML: at 22:00

## 2021-11-22 RX ADMIN — SODIUM CHLORIDE, POTASSIUM CHLORIDE, SODIUM LACTATE AND CALCIUM CHLORIDE 75 ML/HR: 600; 310; 30; 20 INJECTION, SOLUTION INTRAVENOUS at 00:46

## 2021-11-22 RX ADMIN — MEROPENEM 500 MG: 500 INJECTION, POWDER, FOR SOLUTION INTRAVENOUS at 16:32

## 2021-11-22 RX ADMIN — MEROPENEM 500 MG: 500 INJECTION, POWDER, FOR SOLUTION INTRAVENOUS at 12:16

## 2021-11-22 NOTE — PROGRESS NOTES
[facility] at [facility]    POD #6    Subjective     Doing well, feeling better, tolerating diet, BM from colostomy, pain improving    Objective     Patient Vitals for the past 24 hrs:   Temp Pulse Resp BP SpO2   11/22/21 0807 98 °F (36.7 °C) 70 16 (!) 144/76 95 %   11/22/21 0002 97.8 °F (36.6 °C) 64 16 125/74 97 %   11/21/21 2017 98 °F (36.7 °C) 67 16 121/75 98 %   11/21/21 1524 98.3 °F (36.8 °C) 75 16 120/74 93 %       Date 11/21/21 0700 - 11/22/21 0659 11/22/21 0700 - 11/23/21 0659   Shift 5228-3789 6755-3408 24 Hour Total 9074-2803 9983-9357 24 Hour Total   INTAKE   Shift Total(mL/kg)         OUTPUT   Urine(mL/kg/hr) 1(0)  1(0)        Urine Voided 1  1        Urine Occurrence(s)    1 x  1 x   Drains 35  35        Output (ml) (Tha-Virgen Drain 11/16/21 Right Abdomen) 35  35      Stool 400  400        Output (ml) (Colostomy 11/16/21 Right  Abdomen) 400  400      Shift Total(mL/kg) 436(5.5)  436(5.6)      NET -436  -436      Weight (kg) 78.9 77.8 77.8 77.8 77.8 77.8       PE  Pulm - CTAB  CV - RRR  Abd - soft, ND, BS preset, incisions c/d/i, ostomy at the skin but some skin puckering, ostomy wide open, viable    Labs  Recent Results (from the past 12 hour(s))   METABOLIC PANEL, BASIC    Collection Time: 11/22/21  5:18 AM   Result Value Ref Range    Sodium 140 136 - 145 mmol/L    Potassium 3.8 3.5 - 5.1 mmol/L    Chloride 104 97 - 108 mmol/L    CO2 33 (H) 21 - 32 mmol/L    Anion gap 3 (L) 5 - 15 mmol/L    Glucose 107 (H) 65 - 100 mg/dL    BUN 7 6 - 20 MG/DL    Creatinine 0.39 (L) 0.55 - 1.02 MG/DL    BUN/Creatinine ratio 18 12 - 20      GFR est AA >60 >60 ml/min/1.73m2    GFR est non-AA >60 >60 ml/min/1.73m2    Calcium 9.2 8.5 - 10.1 MG/DL   CBC W/O DIFF    Collection Time: 11/22/21  5:18 AM   Result Value Ref Range    WBC 4.8 3.6 - 11.0 K/uL    RBC 3.29 (L) 3.80 - 5.20 M/uL    HGB 9.3 (L) 11.5 - 16.0 g/dL    HCT 29.5 (L) 35.0 - 47.0 %    MCV 89.7 80.0 - 99.0 FL    MCH 28.3 26.0 - 34.0 PG    MCHC 31.5 30.0 - 36.5 g/dL    RDW 15.9 (H) 11.5 - 14.5 %    PLATELET 526 471 - 654 K/uL    MPV 8.8 (L) 8.9 - 12.9 FL    NRBC 0.0 0  WBC    ABSOLUTE NRBC 0.00 0.00 - 0.01 K/uL         Assessment     Claire Saunders Every is a 77 y. o.yr old female s/p open sigmoid colectomy with endcolostomy    Plan     Doing well post op  Getting ostomy bag changed, skin puckered from short colostomy but at the skin and not retracting, wide open and functioning  Wounds healing well  Continue regular diet  DC IVF  Keeping SUSAN for now  Continue IV abx  Plan for DC home tomorrow    Mc Barrios MD

## 2021-11-22 NOTE — PROGRESS NOTES
TRANSITIONS OF CARE PLAN:   1. RUR: 17%; Moderate  2. DESTINATION: Own Home  3. TRANSPORT: Family  4. NEEDS FOR DISCHARGE: Patient was accepted by [facility]       Patient was accepted by [facility] on 11/17.   Previous CM fax attached orders to referral.    Evie Damico MSA, RN,CRM

## 2021-11-22 NOTE — PROGRESS NOTES
Bedside shift change report given to Soniya Escalona RN (oncoming nurse) by Magaly Núñez RN (offgoing nurse). Report included the following information SBAR, Kardex, Intake/Output, MAR and Recent Results.

## 2021-11-22 NOTE — WOUND CARE
CWOCN Ostomy Progress Note:     Postoperative visit. Type of Ostomy: end colostomy  Location: [facility]  Date of Surgery: 11/16/21      Surgeon: Dr. Acacia Glover  History: recurrent diverticulitis    Treatments:  Pouch removed, stoma and peristomal skin cleaned and assessed, new pouch prepared and applied. Findings:  Current appliance: 1-piece flat with protective ring added  Stoma size: ~25 mm  Stoma color: red  Characteristics: puckered into fluffy abdomen with creases @ 3 & 9 o'clock when sitting  Peristomal skin: intact  Abdomen: soft  Output: watery brown  Other: midline incision well-approximated with staples - no redness; SUSAN in RUQ    Teaching/Subjects covered today:  She and her  were attentive learners. She was willing to watch the pouch change and he verbally returned instructions. Returned later this afternoon to check the seal and do more teaching.     - Normal & abnormal stoma characteristics & changes over time  - When/how to clean stoma & peristomal skin  - When/how to change appliance  - Dietary guidelines    Recommendations:  1. Empty appliance when 1/3 full and PRN. Encourage patient/family to notify nurse and  assist w/ pouch emptying to promote self-care. Change appliance twice weekly and PRN for leaking ASAP. DO NOT REINFORCE LEAKS to avoid skin breakdown. Transition of Care:  Ostomy nurse to return and continue teaching. Supplies left in room. Likely discharge tomorrow and will plan to practice one more pouch change.      Livia Mack, RN, BSN, [facility]  Certified Wound, Ostomy, Continence Nurse  office: [phone]  pager [phone] or [phone]

## 2021-11-22 NOTE — OP NOTES
[address]   OPERATIVE REPORT    Name:  Leandrew Apley  MR#:  [id number]  :  1955  ACCOUNT #:  [de-identified]  DATE OF SERVICE:  2021      PREOPERATIVE DIAGNOSIS:  Recurrent diverticulitis. POSTOPERATIVE DIAGNOSIS:  Recurrent diverticulitis. PROCEDURE PERFORMED:  Laparoscopic converted to open sigmoid colectomy with end colostomy and extensive lysis of adhesions greater than 2 hours. SURGEON:  Mariza Solis MD    ASSISTANT:  Josh Greenwood MD and Javan Wall. MD Andrew and Dr. Roney Carvalho from Urology for the bilateral ureteral stent insertions. ANESTHESIA:  General.    COMPLICATIONS:  None. SPECIMENS REMOVED:  Sigmoid colon. IMPLANTS:  None. ESTIMATED BLOOD LOSS:  250 mL. DRAINS:  23 [place] drain. FINDINGS:  Sigmoid colon with extremely dense adhesions to the left side of the abdominal wall and bladder. No fistula was seen. No leak was seen with the methylene blue test.  Greater than 2 hours lysis of adhesions of the colon out of the pelvis, end colostomy was created. The patient has a short, thick mesentery, making it difficult to get up to the abdominal wall. Rectal stump with 2-0 Prolene sutures in the corners of the rectal stump. INDICATIONS FOR OPERATION:  The patient is a 60-year-old female who has a history of diverticulitis that is recurrent in nature and not improving under medical therapy with recurrent episodes of diverticulitis. She is currently on IV antibiotics and needing sigmoid colectomy. She has also been bowel prepped. My assisting surgeons, Dr. Josh Greenwood and Dr. Leyva Monday, were necessary due to the extremely difficult nature of the operation. They were necessary for operation of the camera, retraction, intraoperative decision-making and definition of the anatomy. DESCRIPTION OF OPERATION:  The patient was met in the preop holding area. The H and P was updated. Consent was signed.   All risks and benefits were explained to the patient prior to the start of the operation. She was taken back to the operating room. She was lying in a supine position. Abdomen was prepped and draped in standard sterile fashion and then also was put up into a lithotomy position. The bilateral ureteral stents and cystoscopy were done by Dr. David Carrillo. The ureteral stents were placed and this went well. After that was done,  then she was placed back into lithotomy and the abdomen was prepped and draped in standard sterile fashion. Time out was called. Antibiotics were given. SCDs were on lower extremities. We started the operation by making a 5-mm incision into the right upper quadrant inserting VisiPort trocar in the intra-abdominal cavity, insufflating to 15 mmHg. I then placed a 12 mm trocar in the right lower quadrant, another 5-mm trocar just below the umbilicus and then another 5 mm trocar into the subxiphoid position. We started taking down some adhesions in the lower abdomen, finding that the sigmoid colon down into the area of the pelvis was extremely inflamed. It was stuck to the dome of the bladder. We could see the Zambrano catheter in the bladder. We started taking down adhesions for about 30 minutes without making really any major progress and we felt that we would need to convert to open, so we then converted to open making a lower midline laparotomy incision with a 10 blade dissecting through the subcutaneous tissue with Bovie cautery and incising the fascia releasing the pneumoperitoneum. We opened up the incision to just around the area of the umbilicus and all the way down to the lower pubis. Once we had done this, we placed a Bookwalter retraction device into the operative field with a wound protector device. We then could see the operative anatomy much better.   With a lot of blunt dissection, we were able to get some of the sigmoid colon taken off of the dome of the bladder taking great care to not injure it. We also used some sharp dissection as well. I called Dr. Venegas in to help with the dissection due to the very difficult nature of the operation and making sure we were staying well away from the ureters which we during the dissection. We freed up the sigmoid colon off of the lateral abdominal wall on the left side and going up into the area near the splenic flexure, but not needing to get all the way up to the splenic flexure. We continued our dissection down below going from proximal to distal and then we dissected a bit more of the pelvis with blunt and sharp dissection, identifying our ureter on the left side, freeing up the adhesions of the colon where it was adhesed to the bladder. We did not see any sign of any fistula, eventually we freed up the whole sigmoid colon off of the bladder and then dissected more out of the pelvis getting more length for stapling. There was no sign of any purulence. There was no sign of any enterotomy or colotomy or any other bowel injury. Once we had taken down most of the adhesions and freed it up down into the pelvis area, we felt due to the extreme nature of the adhesions and the inflammation there in that area with a relatively recent infection doing an end colostomy and sigmoid colectomy would be the best plan of action. The end of the staple line, the rectal staple line, would be very thickened and not be good for the anastomosis. So after we had freed the colon up as much as we could, we then divided it proximally at an area of normal colon in the very proximal sigmoid colon and then used a green load. We used a Green Contour stapler and dissected that around the bowel distally as far down as we could get. We fired the stapler and then removed the mesentery with the LigaSure all the way down to the very end, resecting the sigmoid colon. The sigmoid colon was passed off the field.   We then  placed 2-0 Prolene sutures on the end of the staple line of the rectal/sigmoid distal rectal stump. We then irrigated the abdominal cavity with saline irrigation and then did a bladder test to test for any bladder leak infusing the bladder with saline with methylene blue in it and we did not see any leakage from the bladder at all. We could see our ureteral stents which were all in place. We did place a SUSAN drain down into the lower pelvis area coming out from the right upper side. We then would make a colostomy site incision in the left lower quadrant finding an area where the sigmoid colon looked was close to the skin. We noted that the patient's sigmoid colon was relatively short with a very short mesentery and also the patient had a thicker abdominal wall in left lower quadrant, making it somewhat difficult to get it up to the abdominal wall, but we were able to get it at least through the abdominal wall up to the skin at the level of the skin. We made a circular incision in the left lower quadrant dissecting through the subcutaneous tissue opening up the fascia in a linear fashion with a cruciate type incision through the rectus muscle bringing up the colon up through the incision up to the skin. We were satisfied with our sigmoid colectomy and our operative anatomy. We washed out the lower belly with saline solution and then we opened up the closing tray, then closed the incision with a #1 single stranded PDS suture in a running fashion and then used staples on the skin at all of the trocar sites, removing all the trocars at the end of the case and keeping the drain down into the pelvis area. After we had stapled the skin at all the sites, we then matured the colostomy by opening up the staple line, removing that and then suturing the ostomy to the skin with multiple interrupted 3-0 Vicryl sutures around the entirety of the area and that was sutured into place and looked good.   The ostomy was viable and I could stick my finger down past the fascia without any difficulties. We then placed a colostomy bag over that area and then we had completed the operation and took out the ureteral stents at the end of the case, leaving the Zambrano catheter. I then completed the operation. Dr. Boyd Pozo was present and scrubbed during the entire operation. The counts were correct. Greater than 2 hours lysis of adhesions were necessary due to the extreme amount of adhesions down in the lower pelvis from the sigmoid colon and the colitis.       Марина Guerra MD      [id number]  D:  11/22/2021 12:33  T:  11/22/2021 16:09  JOB #:  [id number]

## 2021-11-23 PROCEDURE — 74011250637 HC RX REV CODE- 250/637: Performed by: SURGERY

## 2021-11-23 PROCEDURE — 65270000029 HC RM PRIVATE

## 2021-11-23 PROCEDURE — 74011250636 HC RX REV CODE- 250/636: Performed by: SURGERY

## 2021-11-23 PROCEDURE — 74011000250 HC RX REV CODE- 250: Performed by: SURGERY

## 2021-11-23 RX ADMIN — MEROPENEM 500 MG: 500 INJECTION, POWDER, FOR SOLUTION INTRAVENOUS at 05:30

## 2021-11-23 RX ADMIN — MEROPENEM 500 MG: 500 INJECTION, POWDER, FOR SOLUTION INTRAVENOUS at 10:00

## 2021-11-23 RX ADMIN — BRIMONIDINE TARTRATE 1 DROP: 2 SOLUTION OPHTHALMIC at 18:00

## 2021-11-23 RX ADMIN — ZOLPIDEM TARTRATE 5 MG: 5 TABLET, FILM COATED ORAL at 22:46

## 2021-11-23 RX ADMIN — ACETAMINOPHEN 1000 MG: 500 TABLET ORAL at 05:30

## 2021-11-23 RX ADMIN — ACETAMINOPHEN 1000 MG: 500 TABLET ORAL at 10:41

## 2021-11-23 RX ADMIN — ENOXAPARIN SODIUM 40 MG: 100 INJECTION SUBCUTANEOUS at 09:02

## 2021-11-23 RX ADMIN — ACETAMINOPHEN 1000 MG: 500 TABLET ORAL at 22:46

## 2021-11-23 RX ADMIN — MEROPENEM 500 MG: 500 INJECTION, POWDER, FOR SOLUTION INTRAVENOUS at 16:00

## 2021-11-23 RX ADMIN — TIMOLOL MALEATE 1 DROP: 2.5 SOLUTION OPHTHALMIC at 18:00

## 2021-11-23 RX ADMIN — NALOXEGOL OXALATE 25 MG: 25 TABLET, FILM COATED ORAL at 06:55

## 2021-11-23 RX ADMIN — Medication 10 ML: at 06:00

## 2021-11-23 RX ADMIN — BRIMONIDINE TARTRATE 1 DROP: 2 SOLUTION OPHTHALMIC at 09:04

## 2021-11-23 RX ADMIN — Medication 5 ML: at 14:00

## 2021-11-23 RX ADMIN — Medication 10 ML: at 22:46

## 2021-11-23 RX ADMIN — MEROPENEM 500 MG: 500 INJECTION, POWDER, FOR SOLUTION INTRAVENOUS at 22:46

## 2021-11-23 RX ADMIN — TIMOLOL MALEATE 1 DROP: 2.5 SOLUTION OPHTHALMIC at 09:04

## 2021-11-23 NOTE — ROUTINE PROCESS
Bedside shift change report given to Rona Ponce (oncoming nurse) by Linda Lafleur RN (offgoing nurse). Report included the following information SBAR and Kardex.

## 2021-11-23 NOTE — PROGRESS NOTES
Physical Therapy    Attempted to see patient this morning. Patient currently lying flat after having drain removed, needs to remain flat for longer duration. Will f/u later this afternoon as able and appropriate.     Alonso Messina, MS, PT

## 2021-11-23 NOTE — PROGRESS NOTES
[employer] [facility] at [facility]    POD #7    Subjective     Doing well, had some leakage from around the ostomy bag last night, tolerating diet, ostomy working well    Objective     Patient Vitals for the past 24 hrs:   Temp Pulse Resp BP SpO2   11/23/21 0737 98.2 °F (36.8 °C) 76 16 (!) 150/87 97 %   11/22/21 2350 97.6 °F (36.4 °C) (!) 56 16 (!) 159/83 98 %   11/22/21 1939 98.3 °F (36.8 °C) 65 16 130/72 94 %   11/22/21 1454 97.9 °F (36.6 °C) 67 16 (!) 140/76 99 %       Date 11/22/21 0700 - 11/23/21 0659 11/23/21 0700 - 11/24/21 0659   Shift 2299-9475 3083-7750 24 Hour Total 1984-9241 0363-7523 24 Hour Total   INTAKE   Shift Total(mL/kg)         OUTPUT   Urine(mL/kg/hr)           Urine Occurrence(s) 1 x 1 x 2 x      Drains    35  35     Output (ml) (Tha-Virgen Drain 11/16/21 Right Abdomen)    35  35   Stool    300  300     Output (ml) (Colostomy 11/16/21 Right  Abdomen)    300  300   Shift Total(mL/kg)    335(4.4)  335(4.4)   NET    -335  -335   Weight (kg) 77.8 77.8 77.8 75.9 75.9 75.9       PE  Pulm - CTAB  CV - RRR  Abd - soft, ND, BS Present, incisions c/d/i, SUSAN ss, ostomy viable with stool in the bag    Labs  No results found for this or any previous visit (from the past 12 hour(s)). Radha Anderson is a 77 y. o.yr old female s/p open sigmoid colectomy with end colostomy    Plan     Plan on keeping her inpt today  Trying to get her ostomy appliance to fit better  She has a tough time getting the appliance to seal well due to the short colon mesentery which makes the colon reach to the skin difficult  Continue regular diet  DC SUSAN drain today    Eliezer Zavala MD

## 2021-11-23 NOTE — PROGRESS NOTES
Bedside shift change report given to Elia Mckenzie (oncoming nurse) by Pietro Gamboa (offgoing nurse). Report included the following information SBAR, Kardex, Intake/Output, MAR and Recent Results.

## 2021-11-23 NOTE — WOUND CARE
[facility] Ostomy Progress Note:     Postoperative visit. Type of Ostomy: end colostomy  Location: [facility]  Date of Surgery: 11/16/21      Surgeon: Dr. Danielito Santiago  History: recurrent diverticulitis     Treatments:  Pouch changed this morning by RN for leak on lateral side. Findings:  Current appliance: 1-piece flat  Stoma color: pink  Characteristics: in crease  Abdomen: soft  Output: mushy brown  Other: jeremías; midline staples well-approximated with no redness    Teaching/Subjects covered today:    - General anatomy and expectations of output  - Normal & abnormal stoma characteristics & changes over time  - Normal & abnormal peristomal characteristics   - When/how to clean stoma & peristomal skin  - When/how to empty pouch  - When/how to change appliance  - When to notify nurse/physician  - Dietary guidelines  - Manipulated/practiced with clean pouch and pouch closure  - Reviewed ADL's (bathing, mattress cover, car pillow to protect from seatbelt, etc)    She emptied her pouch on bedside commode today - progress in acceptance of ostomy and participation in hands-on practice. She verbally reports she feels more capable of engaging in ostomy activities. Recommendations:  1. Empty appliance when 1/3 full and PRN. Encourage patient/family to notify nurse and  assist w/ pouch emptying to promote self-care. Change appliance twice weekly and PRN for leaking ASAP. DO NOT REINFORCE LEAKS to avoid skin breakdown. Transition of Care:  Plan to continue teaching tomorrow with a pouch change. Supplies left in room.      Priscilla Dominguez RN, BSN, [facility]  Certified Wound, Ostomy, Continence Nurse  office: [phone]  pager [phone] or [phone]

## 2021-11-24 VITALS
SYSTOLIC BLOOD PRESSURE: 154 MMHG | OXYGEN SATURATION: 95 % | TEMPERATURE: 98.1 F | HEART RATE: 76 BPM | DIASTOLIC BLOOD PRESSURE: 81 MMHG | RESPIRATION RATE: 18 BRPM | HEIGHT: 62 IN | WEIGHT: 166.1 LBS | BODY MASS INDEX: 30.57 KG/M2

## 2021-11-24 PROCEDURE — 74011250637 HC RX REV CODE- 250/637: Performed by: SURGERY

## 2021-11-24 PROCEDURE — 74011000250 HC RX REV CODE- 250: Performed by: SURGERY

## 2021-11-24 PROCEDURE — 74011000250 HC RX REV CODE- 250

## 2021-11-24 PROCEDURE — 74011250636 HC RX REV CODE- 250/636: Performed by: SURGERY

## 2021-11-24 PROCEDURE — 77030040162

## 2021-11-24 RX ORDER — BACITRACIN 500 UNIT/G
PACKET (EA) TOPICAL
Status: COMPLETED
Start: 2021-11-24 | End: 2021-11-24

## 2021-11-24 RX ORDER — ONDANSETRON 4 MG/1
4 TABLET, ORALLY DISINTEGRATING ORAL
Qty: 20 TABLET | Refills: 0 | Status: SHIPPED | OUTPATIENT
Start: 2021-11-24 | End: 2022-05-16

## 2021-11-24 RX ORDER — OXYCODONE AND ACETAMINOPHEN 5; 325 MG/1; MG/1
1 TABLET ORAL
Qty: 20 TABLET | Refills: 0 | Status: SHIPPED | OUTPATIENT
Start: 2021-11-24 | End: 2021-12-01

## 2021-11-24 RX ADMIN — ACETAMINOPHEN 1000 MG: 500 TABLET ORAL at 10:34

## 2021-11-24 RX ADMIN — ACETAMINOPHEN 1000 MG: 500 TABLET ORAL at 05:16

## 2021-11-24 RX ADMIN — BACITRACIN 1 PACKET: 500 OINTMENT TOPICAL at 10:38

## 2021-11-24 RX ADMIN — TIMOLOL MALEATE 1 DROP: 2.5 SOLUTION OPHTHALMIC at 10:35

## 2021-11-24 RX ADMIN — BRIMONIDINE TARTRATE 1 DROP: 2 SOLUTION OPHTHALMIC at 10:35

## 2021-11-24 RX ADMIN — Medication 10 ML: at 05:16

## 2021-11-24 RX ADMIN — MEROPENEM 500 MG: 500 INJECTION, POWDER, FOR SOLUTION INTRAVENOUS at 05:16

## 2021-11-24 NOTE — DISCHARGE INSTRUCTIONS
[id number] [facility] at [address] [place] [address] Operative Instructions      Please call your surgeons  office for a 2 week follow-up appointment with Dr Sil Bal . Office address is: [address]/ [address], [address] [place] [address]  [phone]      Discharge Instructions: You may resume your usual diet as well as your usual medications. You are not cleared to drive if you are taking narcotic pain medication. If you are only using tylenol for pain and are comfortable sitting in a car, you may drive. No heavy lifting. No strenuous exercise. You are cleared to go up and down stairs. You may shower but no baths or swimming. Your incisions dont need any special care. You can wash them gently with  warm soap and water daily and pat them dry. Your staples will be removed in your postop appointment in 2 weeks after surgery    Keep the drain site covered until the skin closes up, you may shower at home just keep the drain site covered and replaced the dressing afterwards    Change ostomy bag as needed for wound care nursing recommendations, MD bag as needed at home at least once to twice a day    Ask you doctor when you can return to work. Call our office at  [phone] to make a 2 week follow up appointment. Call our office with any problems, questions or concerns. Call our office if you have any     Fevers of 101 or higher   Worsening pain  Nausea/Vomiting  Difficulty eating or drinking  Incisions that are red, open, draining or tender.

## 2021-11-24 NOTE — PROGRESS NOTES
[facility] at [facility]    POD #8    Subjective     Doing well, no leakage from the colostomy, eating, pain well controlled, ostomy productive    Objective     Patient Vitals for the past 24 hrs:   Temp Pulse Resp BP SpO2   11/24/21 0727 98.1 °F (36.7 °C) 76 18 (!) 154/81 95 %   11/23/21 2315 97.6 °F (36.4 °C) 65 18 101/64 96 %   11/23/21 1933 98.2 °F (36.8 °C) 76 16 134/79 94 %   11/23/21 1454 98.2 °F (36.8 °C) 65 16 131/72 92 %       Date 11/23/21 0700 - 11/24/21 0659 11/24/21 0700 - 11/25/21 0659   Shift 7166-7019 2707-9471 24 Hour Total 1231-9794 3151-8055 24 Hour Total   INTAKE   Shift Total(mL/kg)         OUTPUT   Drains 35  35        Output (ml) ([REMOVED] Tha-Virgen Drain 11/16/21 Right Abdomen) 35  35      Stool 500  500        Output (ml) (Colostomy 11/16/21 Right  Abdomen) 500  500      Shift Total(mL/kg) 535(7)  535(7)      NET -535  -535      Weight (kg) 75.9 75.9 75.9 75.3 75.3 75.3       PE  Pulm - CTAB  CV - RRR  Abd -soft, nondistended, bowel sounds present incision clean dry intact ostomy productive and at skin level viable    Labs  No results found for this or any previous visit (from the past 12 hour(s)). Lida Weeks is a 77 y. o.yr old female status post laparoscopic converted to open sigmoid colectomy with end colostomy    Plan     Patient is doing well now and feeling much better  She is going to get some last-minute teaching today for ostomy care but otherwise she is feeling comfortable with things  Remove PICC line today  DC home today follow-up in 2 weeks    Vickie Kwon MD

## 2021-11-24 NOTE — DISCHARGE SUMMARY
Physician Discharge Summary     Patient ID:  Mariola Kate  [id number]  77 y.o.  1955    Admit Date: 11/16/2021    Discharge Date:     Admission Diagnoses: Diverticulitis [K57.92]    Discharge Diagnoses: Active Problems:    Diverticulitis (5/18/2021)         Admission Condition: Good    Discharge Condition: Good    Procedure(s):    11/16/2021 - Procedure(s):  LAPAROSCOPIC CONVERTED TO OPEN SIGMOID COLECTOMY WITH END COLOSTOMY AND EXTENSIVE LYSIS OF ADHESIONS GREATER THAN 2 HOUR  CYSTOSCOPY BILATERAL URETERAL STENT INSERTION       Hospital Course: The patient was admitted after surgery. Her surgery was difficult due to the chronic inflammation of the sigmoid colon. After surgery she was started on clear liquids and had slow return of bowel function but eventually bowel function returned after a few days was started on regular diet. She was having some issues with ill fitting ostomy and mild wound care issues around the area of the ostomy. She slowly improved and by postoperative day seven was ready to go home    Consults: None    Significant Diagnostic Studies: None    Disposition: home    Patient Instructions:   Current Discharge Medication List      START taking these medications    Details   oxyCODONE-acetaminophen (PERCOCET) 5-325 mg per tablet Take 1 Tablet by mouth every four (4) hours as needed for Pain for up to 7 days. Max Daily Amount: 6 Tablets. Qty: 20 Tablet, Refills: 0    Associated Diagnoses: Diverticulitis      !! ondansetron (ZOFRAN ODT) 4 mg disintegrating tablet Take 1 Tablet by mouth every eight (8) hours as needed for Nausea or Vomiting. Qty: 20 Tablet, Refills: 0       !! - Potential duplicate medications found. Please discuss with provider. CONTINUE these medications which have NOT CHANGED    Details   simethicone (MYLICON) 40 PH/9.1 mL drops Take 40 mg by mouth four (4) times daily as needed.       brimonidine (ALPHAGAN) 0.2 % ophthalmic solution INSTILL 1 DROP INTO BOTH EYES TWICE DAILY      timolol (TIMOPTIC) 0.25 % ophthalmic solution INSTILL 1 DROP IN BOTH EYES TWICE DAILY      meropenem 1 g IVPB 1 g by IntraVENous route every eight (8) hours. Qty: 84 Dose, Refills: 0      zolpidem (AMBIEN) 5 mg tablet Take 5 mg by mouth nightly as needed for Sleep. cetirizine (ZYRTEC) 10 mg tablet Take  by mouth nightly. vit A/vit C/vit E/zinc/copper (PRESERVISION AREDS PO) Take  by mouth two (2) times a day. !! ondansetron (ZOFRAN ODT) 4 mg disintegrating tablet       cholecalciferol, vitamin D3, (Vitamin D3) 100 mcg (4,000 unit) cap Take  by mouth daily. L.acid,para-B. bifidum-S.therm (RISAQUAD) 8 billion cell cap cap Take 1 Capsule by mouth daily. Qty: 30 Capsule, Refills: 0       !! - Potential duplicate medications found. Please discuss with provider. Activity: No heavy lifting for 2 weeks  Diet: Regular Diet  Wound Care: Keep wound clean and dry. ,  follow wound care instructions for ostomy care    Follow-up with Josefina Nelson MD in 2 week(s)  Follow-up tests/labs none I took her to the [facility] related they gave her some fluids today        Signed:  Josefina Nelson MD  11/24/2021  8:46 AM   .

## 2021-11-24 NOTE — WOUND CARE
CWOCN Ostomy Progress Note:     Postoperative visit. Type of Ostomy: end colostomy  Location: Q  Date of Surgery: 11/16/21      Surgeon: Dr. Shelly Richter  History: recurrent diverticulitis       Treatments:  Pouch removed, stoma and peristomal skin cleaned and assessed, new pouch prepared and applied by her . Findings:  Current appliance: 1-piece flat with protective ring added  Stoma size: ~25mm  Stoma color: red  Characteristics: at skin level in deep pucker of subcutaneous tissue  Peristomal skin: intact  Abdomen: soft  Output: mushy borwn / positive flatus  Other: midline staples well-approximated with no redness    Teaching/Subjects covered today:  Encouraged pt to review handout given to patient/family and ask questions regarding material on next ostomy nurse visit. - Normal & abnormal peristomal characteristics   - When/how to clean stoma & peristomal skin  - When/how to empty pouch  - When/how to change appliance  - Crusting technique  - Where/how to obtain supplies  - Manipulated/practiced with clean pouch and pouch closure    They were able to teach back previously covered information and  is confident in pouch change. Recommendations:  1. Empty appliance when 1/3 full and PRN. Encourage patient/family to notify nurse and  assist w/ pouch emptying to promote self-care. Change appliance twice weekly and PRN for leaking ASAP. DO NOT REINFORCE LEAKS to avoid skin breakdown. Transition of Care:  Home health to follow for ostomy support. Transitional Supplie given to patient along with ordering information.      Sy Bolanos RN, BSN, [facility]  Certified Wound, Ostomy, Continence Nurse  office: [phone]  pager [phone] or [phone]

## 2021-11-26 ENCOUNTER — TELEPHONE (OUTPATIENT)
Dept: SURGERY | Age: 66
End: 2021-11-26

## 2021-11-26 DIAGNOSIS — K57.92 DIVERTICULITIS: Primary | ICD-10-CM

## 2021-11-26 NOTE — TELEPHONE ENCOUNTER
Patient was discharged on Wednesday after having surgery with . Patients  called and is concerned because [facility] has not come to see the patient at home yet. Patients  said he called [facility] and they did not receive and order from us for this service. Please advise on next steps.

## 2021-11-26 NOTE — TELEPHONE ENCOUNTER
Spoke with patient's  regarding [address] order to be faxed to [facility]. [phone].

## 2021-11-30 ENCOUNTER — OFFICE VISIT (OUTPATIENT)
Dept: SURGERY | Age: 66
End: 2021-11-30
Payer: COMMERCIAL

## 2021-11-30 VITALS
DIASTOLIC BLOOD PRESSURE: 81 MMHG | WEIGHT: 167 LBS | TEMPERATURE: 97.8 F | HEIGHT: 62 IN | OXYGEN SATURATION: 96 % | BODY MASS INDEX: 30.73 KG/M2 | SYSTOLIC BLOOD PRESSURE: 125 MMHG | HEART RATE: 73 BPM

## 2021-11-30 DIAGNOSIS — K57.32 DIVERTICULITIS LARGE INTESTINE W/O PERFORATION OR ABSCESS W/O BLEEDING: Primary | ICD-10-CM

## 2021-11-30 PROCEDURE — 99024 POSTOP FOLLOW-UP VISIT: CPT | Performed by: SURGERY

## 2021-11-30 NOTE — PROGRESS NOTES
1. Have you been to the ER, urgent care clinic since your last visit? Hospitalized since your last visit? No    2. Have you seen or consulted any other health care providers outside of the [address] [place] [address] since your last visit? Include any pap smears or colon screening.  No

## 2021-11-30 NOTE — PROGRESS NOTES
Subjective:      Karen Tracy is a 77 y.o. female presents for postop care 2 wk(s) following laparoscopic converted to open sigmoid colectomy and colostomy. Overall she is doing very well somewhat sore still but pain is controlled. Her main issue seems to be keeping the seal around her colostomy. She appears to have a method for keeping it sealed currently. Her ostomy is not that flush to the skin so she has some skin breakdown around the edge of the skin. 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        Fibrous serosal adhesions        Two benign reactive lymph nodes        Margins of resection are viable     Objective:     Visit Vitals  /81 (BP 1 Location: Left upper arm, BP Patient Position: Sitting, BP Cuff Size: Large adult)   Pulse 73   Temp 97.8 °F (36.6 °C) (Oral)   Ht 5' 2\" (1.575 m)   Wt 167 lb (75.8 kg)   SpO2 96%   BMI 30.54 kg/m²       General:  alert, cooperative, no distress, appears stated age   Abdomen: soft, non-tender, ostomy is at or just slightly below the skin level, some excoriation of the skin inferiorly   Incision:   healing well, no drainage, no erythema, no hernia, no seroma, no swelling, no dehiscence, incision well approximated     Assessment:     1. Karen Tracy is a 77 y.o. female who is s/p open sigmoid colectomy with end colostomy      Plan:     1. Pt is to increase activities as tolerated. .  2.  The ostomy is still somewhat retracted but is at or near the skin level she does have some excoriation of the skin. The ostomy is functional without any major issues other than the skin excoriation which they are working on sealing the ostomy better.   Overall the ostomy is satisfactory but not optimal.  I will have her follow-up with me in 2 weeks to make sure things are not getting any worse  3. Follow-up in 2 week(s)    Ms. April Key has a reminder for a \"due or due soon\" health maintenance. I have asked that she contact her primary care provider for follow-up on this health maintenance.

## 2021-12-15 ENCOUNTER — OFFICE VISIT (OUTPATIENT)
Dept: SURGERY | Age: 66
End: 2021-12-15
Payer: COMMERCIAL

## 2021-12-15 VITALS
OXYGEN SATURATION: 97 % | BODY MASS INDEX: 30.18 KG/M2 | RESPIRATION RATE: 18 BRPM | SYSTOLIC BLOOD PRESSURE: 121 MMHG | TEMPERATURE: 97.9 F | HEIGHT: 62 IN | WEIGHT: 164 LBS | HEART RATE: 64 BPM | DIASTOLIC BLOOD PRESSURE: 75 MMHG

## 2021-12-15 DIAGNOSIS — K57.32 DIVERTICULITIS LARGE INTESTINE W/O PERFORATION OR ABSCESS W/O BLEEDING: Primary | ICD-10-CM

## 2021-12-15 PROCEDURE — 99024 POSTOP FOLLOW-UP VISIT: CPT | Performed by: SURGERY

## 2021-12-15 NOTE — PROGRESS NOTES
1. Have you been to the ER, urgent care clinic since your last visit? Hospitalized since your last visit? No    2. Have you seen or consulted any other health care providers outside of the [address] [place] [address] since your last visit? Include any pap smears or colon screening.  No

## 2021-12-15 NOTE — PROGRESS NOTES
Subjective:      Milvia Garcia is a 77 y.o. female presents for postop care 4 wk(s) following open sigmoid colectomy with end colostomy. She is doing much better with the ostomy care. She is eating and drinking and having more normal energy levels. She is feeling so much better than she did before. Her  is changing the ostomy bags and they have figured out a good system to minimize leakage. She is having much less skin irritation. Objective:     Visit Vitals  /75 (BP 1 Location: Right arm, BP Patient Position: Sitting, BP Cuff Size: Adult)   Pulse 64   Temp 97.9 °F (36.6 °C) (Oral)   Resp 18   Ht 5' 2\" (1.575 m)   Wt 164 lb (74.4 kg)   SpO2 97%   BMI 30.00 kg/m²       General:  alert, cooperative, no distress, appears stated age   Abdomen: soft, non-tender, colostomy viable with stool in the bag, no skin stricturing, mucosa at skin level, minimal skin ulceration at the very bottom portion just a few millimeters   Incision:   healing well, no drainage, no erythema, no hernia, no seroma, no swelling, no dehiscence, incision well approximated     Assessment:     1. Milvia Garcia is a 77 y.o. female who is s/p open sigmoid colectomy with end colostomy      Plan:     1. Pt is to increase activities as tolerated. .  2.  She is having much better success with the colostomy now. Her  is doing a good job of changing it. She is not having any major issues with the colostomy. 3. Follow-up in 1 month(s)    Ms. Gómez Benedict has a reminder for a \"due or due soon\" health maintenance. I have asked that she contact her primary care provider for follow-up on this health maintenance.

## 2021-12-15 NOTE — LETTER
12/15/2021    Patient: Simeon Lopez   YOB: 1955   Date of Visit: 12/15/2021     Cynthia Cary MD  [address] [place] [phone]  Via Fax: [phone]    Dear Cynthia Cary MD,      Thank you for referring Ms. Héctor Longoria to [facility] for evaluation. My notes for this consultation are attached. If you have questions, please do not hesitate to call me. I look forward to following your patient along with you.       Sincerely,    Alexei Paulino MD

## 2021-12-23 NOTE — ADT AUTH CERT NOTES
Bowel Surgery: Colectomy, Partial, with or without Ostomy - Care Day 6 (11/21/2021) by Nito Saucedo RN       Review Status Review Entered   Completed 11/23/2021 11:23      Criteria Review      Care Day: 6 Care Date: 11/21/2021 Level of Care: Inpatient Floor    Guideline Day 4    Level Of Care    (X) Floor    11/23/2021 11:23:17 EST by Neris Swanson      ip surgical floor    Clinical Status    (X) * No evidence of postoperative or surgical site infection    11/23/2021 11:23:17 EST by Comfort Khan      none noted    Activity    (X) Ambulatory    11/23/2021 11:23:17 EST by Neris Swanson      encourage ambulation    Routes    (X) * Oral hydration    11/23/2021 11:23:17 EST by Comfort Khan      adult reg diet with ensure supplements    (X) * Oral medications    11/23/2021 11:23:17 EST by Comfort Khan      Roxicodone 5mg po q4 prn x2  movantik 25mg po qday    (X) * Oral diet    (X) * Advance diet    Interventions    (X) Enterostomy therapy    (X) Hgb/Hct    11/23/2021 11:23:17 EST by Comfort Khan      HGB: 9.3 (L)  HCT: 30.0 (L)    Medications    (X) * Epidural analgesics and PCA absent    (X) Possible analgesics    11/23/2021 11:23:17 EST by Comfort Khan      Roxicodone 5mg po q4 prn x2    * Milestone   Additional Notes   11/21/2021   LOC IP SURGICAL FLOOR   VS 98.3 75 143/79 16 98% ROOM AIR   LABS     RBC: 3.32 (L)   HGB: 9.3 (L)   HCT: 30.0 (L)   Glucose: 116 (H)   BUN: 9   Creatinine: 0.42 (L)   BUN/Creatinine ratio: 21 (H)      MEDS   Tylenol 1000mg po q6   Xanax 0.5mg po q8 prn x1   lovenox 40mg sc qday   merrem 500mg iv q6   LR @ 75 cc/hr      ATTENDING NOTE   Procedure:  Procedure(s):   LAPAROSCOPIC CONVERTED TO OPEN SIGMOID COLECTOMY WITH END COLOSTOMY AND EXTENSIVE LYSIS OF ADHESIONS GREATER THAN 2 HOUR   CYSTOSCOPY BILATERAL URETERAL STENT INSERTION        Subjective:       Patient complains of left flank pain which is better when she is up walking and moving.  The pain is intermittent.  She thinks it is related to her needing to have a bowel movement.  She has been eating some at every meal but does not have an appetite.    Physical Exam:     General alert in no acute distress   Lungs clear bilaterally   Heart regular rate and rhythm abdomen soft   Incisional tenderness incision healing well without evidence of infection mild tenderness left flank no erythema   Plan/Recommendations/Medical Decision Making:   Pain on left side seems more due to laying as opposed to an actual intra-abdominal process.  This is evidenced by the fact that when she is up and walking she feels much better.  At this point I do not think she will require a CT scan however if the pain does persist or become constant this may be necessary.    Continue to encourage p.o. intake.               Bowel Surgery: Colectomy, Partial, with or without Ostomy - Care Day 5 (11/20/2021) by Keila Chin RN       Review Status Review Entered   Completed 11/23/2021 11:16      Criteria Review      Care Day: 5 Care Date: 11/20/2021 Level of Care: Inpatient Floor    Guideline Day 3    Level Of Care    (X) Floor    11/23/2021 11:16:40 EST by Jt Gordillo      LOC IP SURGICAL FLOOR    Clinical Status    (X) * No evidence of ileus or bowel obstruction    11/23/2021 11:16:40 EST by Comfort Khan      soft non tender abd putting stool out of ostomy    Activity    (X) * Ambulatory    11/23/2021 11:16:40 EST by Jt Gordillo      encourage ambulation    Routes    (X) * Liquid or usual diet, advance as tolerated    11/23/2021 11:16:40 EST by Comfort Khan      adult reg diet with ensure supplements    (X) IV medications    11/23/2021 11:16:40 EST by Comfort Khan      merrem 500mg iv q6    Interventions    (X) Enterostomy therapy    11/23/2021 11:16:40 EST by Comfort Khan      wound/ostomy nurse following    * Milestone   Additional Notes 11/20/2021   LOC IP SURGICAL FLOOR   VS 98 59 16 138/66 16 96% ROOM AIR   LABS    WBC: 5.8   NRBC: 0.0   RBC: 3.42 (L)   HGB: 9.5 (L)   HCT: 30.7 (L)   CO2: 34 (H)   Anion gap: 3 (L)   Glucose: 107 (H)   BUN: 5 (L)   Creatinine: 0.38 (L)      MEDS   Tylenol 1000mg po q6   Xanax 0.5mg po q8 prn x1   lovenox 40mg sc qday   Roxicodone 5mg po q4 prn x3   ambien 5mg po qhs prn x1   movantik 25mg po qday      ATTENDING NOTE   4 Days Post-Op       Procedure:  Procedure(s):   LAPAROSCOPIC CONVERTED TO OPEN SIGMOID COLECTOMY WITH END COLOSTOMY AND EXTENSIVE LYSIS OF ADHESIONS GREATER THAN 2 HOUR   CYSTOSCOPY BILATERAL URETERAL STENT INSERTION        Subjective:       Patient has been eating some.  Still complains of nausea.  Has been putting stool out.  Complains of some left flank and back pain.    Physical Exam:     General alert and oriented no acute distress   Lungs clear bilaterally   Heart regular rate rhythm   Abdomen soft appropriately tender   Incision healing well   Ostomy functioning   Plan/Recommendations/Medical Decision Making:   I suspect much of her back and left flank pain is due to to her laying in bed.  Needs to be up and ambulating more.     Continue diet as tolerated we will continue IV fluids until she is really tolerating it well.    Appreciate PT input           Bowel Surgery: Colectomy, Partial, with or without Ostomy - Care Day 3 (11/18/2021) by Simin Mustafa       Review Status Review Entered   Completed 11/18/2021 14:50      Criteria Review      Care Day: 3 Care Date: 11/18/2021 Level of Care: Inpatient Floor    Guideline Day 3    Level Of Care    (X) Floor    11/18/2021 14:50:02 EST by Simin Mustafa      surgical    Clinical Status    ( ) * No evidence of ileus or bowel obstruction    11/18/2021 14:50:02 EST by Simin Mustafa      Awaiting bowel function    Activity    (X) * Ambulatory    11/18/2021 14:50:02 EST by Simin Mustafa      Ambulation/Gait Training:  Distance (ft): 3 Feet (ft) (sidestep to [facility])  Assistive Device: Gait belt; Walker, rolling  Ambulation - Level of Assistance: Contact guard assistance    Routes    (X) * Liquid or usual diet, advance as tolerated    11/18/2021 14:50:02 EST by Man Sandhu      adult regular diet with ensure surgery nutritional supplement    (X) IV medications    11/18/2021 14:50:02 EST by Man Sandhu      IV meds    * Milestone   Additional Notes   Date of care: 11/18/2021      IP- LOC-Surgical      Gen surg PN: POD #2   Subjective    Doing well, tolerating diet   Pulm - CTAB   CV - RRR   Abd - soft, ND, BS Present, incisions c/d/i, ostomy viable at the skin   Assessment    77 y. o.yr old female s/p open sigmoid colecotmy and endo ostomy   Plan    Doing well   Continue regualr diet   DC leon cathter today   OOB more today, PT ordered   Due to void   Awaiting bowel function       Vitals: Temp 98.1, HR 67, /86, RR 18, 95% on 1LNC      Labs:   11/18/2021 03:16   WBC: 7.2   NRBC: 0.0   RBC: 3.15 (L)   HGB: 8.9 (L)   HCT: 27.8 (L)   RDW: 15.2 (H)   PLATELET: 237   MPV: 10.0   ABSOLUTE NRBC: 0.00   Sodium: 135 (L)   Potassium: 3.9   Anion gap: 5   Glucose: 119 (H)   BUN: 9   Creatinine: 0.37 (L)   BUN/Creatinine ratio: 24 (H)      Medications:   Acetaminophen 1,000mg PO q6   Lovenox 40mg SC daily   Meropenem 500mg IV q6   Naloxegol 25mg PO daily before breakfast   Zofran 4mg IV q6 PRN x1   Phenergan 25mg IV q8 PRN x1      Plan: adult regular diet with ensure surgery nutritional supplement, ambulate patient TID, compression stockings, SCDs, closed suction drain, cough deep breathe, leon catheter, incentive spirometry q1, I&Os q8, PT, ostomy instructions, OOB in chair all meals, oxygen cannula, turn and position, daily weights

## 2021-12-23 NOTE — ADT AUTH CERT NOTES
Please fax decision to ur contact Jay Jay Cardona @ [phone]- [address] [phone]!!         Bowel Surgery: Colectomy, Partial, with or without Ostomy - Care Day 6 (11/21/2021) by Bhavna Bar RN       Review Status Review Entered   Completed 11/23/2021 11:23      Criteria Review      Care Day: 6 Care Date: 11/21/2021 Level of Care: Inpatient Floor    Guideline Day 4    Level Of Care    (X) Floor    11/23/2021 11:23:17 EST by Momo Adan      ip surgical floor    Clinical Status    (X) * No evidence of postoperative or surgical site infection    11/23/2021 11:23:17 EST by Comfort Khan      none noted    Activity    (X) Ambulatory    11/23/2021 11:23:17 EST by Momo Adan      encourage ambulation    Routes    (X) * Oral hydration    11/23/2021 11:23:17 EST by Comfort Khan      adult reg diet with ensure supplements    (X) * Oral medications    11/23/2021 11:23:17 EST by Comfort Khan      Roxicodone 5mg po q4 prn x2  movantik 25mg po qday    (X) * Oral diet    (X) * Advance diet    Interventions    (X) Enterostomy therapy    (X) Hgb/Hct    11/23/2021 11:23:17 EST by Comfort Khan      HGB: 9.3 (L)  HCT: 30.0 (L)    Medications    (X) * Epidural analgesics and PCA absent    (X) Possible analgesics    11/23/2021 11:23:17 EST by Comfort Khan      Roxicodone 5mg po q4 prn x2    * Milestone   Additional Notes   11/21/2021   LOC IP SURGICAL FLOOR   VS 98.3 75 143/79 16 98% ROOM AIR   LABS     RBC: 3.32 (L)   HGB: 9.3 (L)   HCT: 30.0 (L)   Glucose: 116 (H)   BUN: 9   Creatinine: 0.42 (L)   BUN/Creatinine ratio: 21 (H)      MEDS   Tylenol 1000mg po q6   Xanax 0.5mg po q8 prn x1   lovenox 40mg sc qday   merrem 500mg iv q6   LR @ 75 cc/hr      ATTENDING NOTE   Procedure:  Procedure(s):   LAPAROSCOPIC CONVERTED TO OPEN SIGMOID COLECTOMY WITH END COLOSTOMY AND EXTENSIVE LYSIS OF ADHESIONS GREATER THAN 2 HOUR   CYSTOSCOPY BILATERAL URETERAL STENT INSERTION      Subjective:       Patient complains of left flank pain which is better when she is up walking and moving.  The pain is intermittent.  She thinks it is related to her needing to have a bowel movement.  She has been eating some at every meal but does not have an appetite.    Physical Exam:     General alert in no acute distress   Lungs clear bilaterally   Heart regular rate and rhythm abdomen soft   Incisional tenderness incision healing well without evidence of infection mild tenderness left flank no erythema   Plan/Recommendations/Medical Decision Making:   Pain on left side seems more due to laying as opposed to an actual intra-abdominal process.  This is evidenced by the fact that when she is up and walking she feels much better.  At this point I do not think she will require a CT scan however if the pain does persist or become constant this may be necessary.    Continue to encourage p.o. intake.               Bowel Surgery: Colectomy, Partial, with or without Ostomy - Care Day 5 (11/20/2021) by Mil Nava RN       Review Status Review Entered   Completed 11/23/2021 11:16      Criteria Review      Care Day: 5 Care Date: 11/20/2021 Level of Care: Inpatient Floor    Guideline Day 3    Level Of Care    (X) Floor    11/23/2021 11:16:40 EST by Nitza Chowdhury      LOC IP SURGICAL FLOOR    Clinical Status    (X) * No evidence of ileus or bowel obstruction    11/23/2021 11:16:40 EST by Comfort Khan      soft non tender abd putting stool out of ostomy    Activity    (X) * Ambulatory    11/23/2021 11:16:40 EST by Nitza Chowdhury      encourage ambulation    Routes    (X) * Liquid or usual diet, advance as tolerated    11/23/2021 11:16:40 EST by Comfort Khan      adult reg diet with ensure supplements    (X) IV medications    11/23/2021 11:16:40 EST by Comfort Khan      merrem 500mg iv q6    Interventions    (X) Enterostomy therapy    11/23/2021 11:16:40 EST by Bill Comfort      wound/ostomy nurse following    * Milestone   Additional Notes   11/20/2021   LOC IP SURGICAL FLOOR   VS 98 59 16 138/66 16 96% ROOM AIR   LABS    WBC: 5.8   NRBC: 0.0   RBC: 3.42 (L)   HGB: 9.5 (L)   HCT: 30.7 (L)   CO2: 34 (H)   Anion gap: 3 (L)   Glucose: 107 (H)   BUN: 5 (L)   Creatinine: 0.38 (L)      MEDS   Tylenol 1000mg po q6   Xanax 0.5mg po q8 prn x1   lovenox 40mg sc qday   Roxicodone 5mg po q4 prn x3   ambien 5mg po qhs prn x1   movantik 25mg po qday      ATTENDING NOTE   4 Days Post-Op       Procedure:  Procedure(s):   LAPAROSCOPIC CONVERTED TO OPEN SIGMOID COLECTOMY WITH END COLOSTOMY AND EXTENSIVE LYSIS OF ADHESIONS GREATER THAN 2 HOUR   CYSTOSCOPY BILATERAL URETERAL STENT INSERTION        Subjective:       Patient has been eating some.  Still complains of nausea.  Has been putting stool out.  Complains of some left flank and back pain.    Physical Exam:     General alert and oriented no acute distress   Lungs clear bilaterally   Heart regular rate rhythm   Abdomen soft appropriately tender   Incision healing well   Ostomy functioning   Plan/Recommendations/Medical Decision Making:   I suspect much of her back and left flank pain is due to to her laying in bed.  Needs to be up and ambulating more.     Continue diet as tolerated we will continue IV fluids until she is really tolerating it well.    Appreciate PT input           Bowel Surgery: Colectomy, Partial, with or without Ostomy - Care Day 3 (11/18/2021) by Rodri Fan       Review Status Review Entered   Completed 11/18/2021 14:50      Criteria Review      Care Day: 3 Care Date: 11/18/2021 Level of Care: Inpatient Floor    Guideline Day 3    Level Of Care    (X) Floor    11/18/2021 14:50:02 EST by Rodri Fan      surgical    Clinical Status    ( ) * No evidence of ileus or bowel obstruction    11/18/2021 14:50:02 EST by Rodri Fan      Awaiting bowel function    Activity    (X) * Ambulatory    11/18/2021 14:50:02 EST by Man Sandhu      Ambulation/Gait Training:  Distance (ft): 3 Feet (ft) (sidestep to [facility])  Assistive Device: Gait belt; Walker, rolling  Ambulation - Level of Assistance: Contact guard assistance    Routes    (X) * Liquid or usual diet, advance as tolerated    11/18/2021 14:50:02 EST by Gifford Gaucher adult regular diet with ensure surgery nutritional supplement    (X) IV medications    11/18/2021 14:50:02 EST by Man Sandhu      IV meds    * Milestone   Additional Notes   Date of care: 11/18/2021      IP- LOC-Surgical      Gen surg PN: POD #2   Subjective    Doing well, tolerating diet   Pulm - CTAB   CV - RRR   Abd - soft, ND, BS Present, incisions c/d/i, ostomy viable at the skin   Assessment    77 y. o.yr old female s/p open sigmoid colecotmy and endo ostomy   Plan    Doing well   Continue regualr diet   DC leon cathter today   OOB more today, PT ordered   Due to void   Awaiting bowel function       Vitals: Temp 98.1, HR 67, /86, RR 18, 95% on 1LNC      Labs:   11/18/2021 03:16   WBC: 7.2   NRBC: 0.0   RBC: 3.15 (L)   HGB: 8.9 (L)   HCT: 27.8 (L)   RDW: 15.2 (H)   PLATELET: 851   MPV: 10.0   ABSOLUTE NRBC: 0.00   Sodium: 135 (L)   Potassium: 3.9   Anion gap: 5   Glucose: 119 (H)   BUN: 9   Creatinine: 0.37 (L)   BUN/Creatinine ratio: 24 (H)      Medications:   Acetaminophen 1,000mg PO q6   Lovenox 40mg SC daily   Meropenem 500mg IV q6   Naloxegol 25mg PO daily before breakfast   Zofran 4mg IV q6 PRN x1   Phenergan 25mg IV q8 PRN x1      Plan: adult regular diet with ensure surgery nutritional supplement, ambulate patient TID, compression stockings, SCDs, closed suction drain, cough deep breathe, leon catheter, incentive spirometry q1, I&Os q8, PT, ostomy instructions, OOB in chair all meals, oxygen cannula, turn and position, daily weights           Patient discharge summary for 11/24/21    Patient Demographics    Patient Name   Everlean Mariya   [id number] Legal Sex   Female    1955 Address   [place] [address] Phone   [phone] [facility])   [phone] (Mobile) *Preferred*       Discharge Information    Discharge Provider Date/Time Disposition Destination   José Paris MD / [phone] 21 1426 Home or Self Care HOME   Comments   (none)     Discharge Summary Notes      Discharge Summary by José Paris MD at 21 0846  Version 1 of 1  Author: José Paris MD Service: General Surgery Author Type: Physician   Filed: 21 Date of Service: 21 Status: Signed   : José Paris MD (Physician)      Physician Discharge Summary      Patient ID:  Lewis Haddad  [id number]  77 y.o.  1955     Admit Date: 2021     Discharge Date:      Admission Diagnoses: Diverticulitis [K57.92]     Discharge Diagnoses: Active Problems:    Diverticulitis (2021)           Admission Condition: Good     Discharge Condition: Good     Procedure(s):               2021 - Procedure(s):  LAPAROSCOPIC CONVERTED TO OPEN SIGMOID COLECTOMY WITH END COLOSTOMY AND EXTENSIVE LYSIS OF ADHESIONS GREATER THAN 2 HOUR  CYSTOSCOPY BILATERAL URETERAL [address] Course: The patient was admitted after surgery. Her surgery was difficult due to the chronic inflammation of the sigmoid colon. After surgery she was started on clear liquids and had slow return of bowel function but eventually bowel function returned after a few days was started on regular diet. She was having some issues with ill fitting ostomy and mild wound care issues around the area of the ostomy.  She slowly improved and by postoperative day seven was ready to go home     Consults: None     Significant Diagnostic Studies: None     Disposition: home     Patient Instructions:        Current Discharge Medication List            START taking these medications     Details   oxyCODONE-acetaminophen (PERCOCET) 5-325 mg per tablet Take 1 Tablet by mouth every four (4) hours as needed for Pain for up to 7 days. Max Daily Amount: 6 Tablets. Qty: 20 Tablet, Refills: 0     Associated Diagnoses: Diverticulitis       !! ondansetron (ZOFRAN ODT) 4 mg disintegrating tablet Take 1 Tablet by mouth every eight (8) hours as needed for Nausea or Vomiting. Qty: 20 Tablet, Refills: 0        !! - Potential duplicate medications found. Please discuss with provider.           CONTINUE these medications which have NOT CHANGED     Details   simethicone (MYLICON) 40 OP/9.3 mL drops Take 40 mg by mouth four (4) times daily as needed.       brimonidine (ALPHAGAN) 0.2 % ophthalmic solution INSTILL 1 DROP INTO BOTH EYES TWICE DAILY       timolol (TIMOPTIC) 0.25 % ophthalmic solution INSTILL 1 DROP IN BOTH EYES TWICE DAILY       meropenem 1 g IVPB 1 g by IntraVENous route every eight (8) hours. Qty: 84 Dose, Refills: 0       zolpidem (AMBIEN) 5 mg tablet Take 5 mg by mouth nightly as needed for Sleep.       cetirizine (ZYRTEC) 10 mg tablet Take  by mouth nightly.       vit A/vit C/vit E/zinc/copper (PRESERVISION AREDS PO) Take  by mouth two (2) times a day.       !! ondansetron (ZOFRAN ODT) 4 mg disintegrating tablet         cholecalciferol, vitamin D3, (Vitamin D3) 100 mcg (4,000 unit) cap Take  by mouth daily.       L.acid,para-B. bifidum-S.therm (RISAQUAD) 8 billion cell cap cap Take 1 Capsule by mouth daily. Qty: 30 Capsule, Refills: 0        !! - Potential duplicate medications found. Please discuss with provider.             Activity: No heavy lifting for 2 weeks  Diet: Regular Diet  Wound Care: Keep wound clean and dry. ,  follow wound care instructions for ostomy care     Follow-up with Luz Elena Dunbar MD in 2 week(s)  Follow-up tests/labs none I took her to the [facility] related they gave her some fluids today           Signed:  Luz Elena Dunbar MD  11/24/2021  8:46 AM   .

## 2022-01-10 ENCOUNTER — TELEPHONE (OUTPATIENT)
Dept: SURGERY | Age: 67
End: 2022-01-10

## 2022-01-10 NOTE — TELEPHONE ENCOUNTER
Returned call to [address] [place] [address]. Reviewed notes patient is s/p  open sigmoid colectomy with end colostomy. Verbal order given for the pouches.

## 2022-01-10 NOTE — TELEPHONE ENCOUNTER
[facility] called wanting to speak to someone regarding medical necessity for pt's medical supply order.  Please advise

## 2022-01-17 ENCOUNTER — OFFICE VISIT (OUTPATIENT)
Dept: SURGERY | Age: 67
End: 2022-01-17
Payer: COMMERCIAL

## 2022-01-17 VITALS
HEIGHT: 62 IN | SYSTOLIC BLOOD PRESSURE: 143 MMHG | OXYGEN SATURATION: 96 % | DIASTOLIC BLOOD PRESSURE: 85 MMHG | WEIGHT: 164.4 LBS | HEART RATE: 75 BPM | TEMPERATURE: 97.8 F | BODY MASS INDEX: 30.25 KG/M2

## 2022-01-17 DIAGNOSIS — K57.32 DIVERTICULITIS LARGE INTESTINE W/O PERFORATION OR ABSCESS W/O BLEEDING: Primary | ICD-10-CM

## 2022-01-17 PROCEDURE — 99024 POSTOP FOLLOW-UP VISIT: CPT | Performed by: SURGERY

## 2022-01-17 NOTE — PROGRESS NOTES
Subjective:      Vicki Francis is a 77 y.o. female presents for postop care 2 months following laparoscopic converted to open sigmoid colectomy with end colostomy. She is doing overall well at home. She is complaining about some mucousy bowel movements periodically almost daily recently. She and her  are still having relatively decent success with the colostomy. Sometimes she has more irritation around the colostomy than others. From her 's picture and description there is no narrowing of the opening of the ostomy. Objective:     Visit Vitals  BP (!) 143/85 (BP 1 Location: Right arm, BP Patient Position: Sitting, BP Cuff Size: Adult) Comment: advised to f/u with pcp   Pulse 75   Temp 97.8 °F (36.6 °C) (Oral)   Ht 5' 2\" (1.575 m)   Wt 164 lb 6.4 oz (74.6 kg)   SpO2 96%   BMI 30.07 kg/m²       General:  alert, cooperative, no distress, appears stated age   Abdomen: soft, non-tender, ostomy viable still slightly retracted but mucosa is easily seen and no stricturing   Incision:   healing well, no drainage, no erythema, no hernia, no seroma, no swelling, no dehiscence, incision well approximated     Assessment:     1. Vicki Francis is a 77 y.o. female who is s/p laparoscopic converted to open sigmoid colectomy with end colostomy for chronic perforated diverticulitis      Plan:     1. Overall she is doing well with no major complaints. They are continuing to work with her ostomy and things seem to be doing well.  does very good wound care. .  2.  She will need a colonoscopy in the next month or 2. I will set her up with Dr. Rory Coles. I like to see her back in another month or 2 and see how she is doing. We will still plan on doing surgery 6 months from her previous surgery which would be an May to June. 3. Follow-up 1 to 2 months    Ms. Markos Marks has a reminder for a \"due or due soon\" health maintenance.  I have asked that she contact her primary care provider for follow-up on this health maintenance.

## 2022-01-17 NOTE — PROGRESS NOTES
1. Have you been to the ER, urgent care clinic since your last visit? Hospitalized since your last visit? No    2. Have you seen or consulted any other health care providers outside of the [address] [place] [address] since your last visit? Include any pap smears or colon screening.  No

## 2022-01-18 ENCOUNTER — DOCUMENTATION ONLY (OUTPATIENT)
Dept: SURGERY | Age: 67
End: 2022-01-18

## 2022-01-18 NOTE — PROGRESS NOTES
Faxed referral to [facility] with confirmation. The office will call to schedule appointment.

## 2022-01-19 NOTE — TELEPHONE ENCOUNTER
Conrad Hull from [facility] called about a prescription refill for patient. He did not specify which prescription the refill was for.       Fax number: [phone]

## 2022-01-19 NOTE — TELEPHONE ENCOUNTER
Returning call to [facility] with [facility] regarding refill I spoke with Monica Block. The call was disconnected. I called back was informed fax was sent requesting ostomy supplies yesterday. I verified the fax it was sent to gave correct fax advised to resend.

## 2022-01-21 ENCOUNTER — TELEPHONE (OUTPATIENT)
Dept: SURGERY | Age: 67
End: 2022-01-21

## 2022-01-21 NOTE — TELEPHONE ENCOUNTER
Returned call from [facility]. I informed we didn't receive the for the ostomy supplies. I gave a verbal order and advised to refax the order.

## 2022-02-01 ENCOUNTER — TELEPHONE (OUTPATIENT)
Dept: SURGERY | Age: 67
End: 2022-02-01

## 2022-02-01 NOTE — TELEPHONE ENCOUNTER
Returned call to Ms Jaxon Henderson verified all PHI. I informed patient the referral was faxed to [facility] on 1/18/22 with confirmation. She requested it be refax to [phone] ATTN: Poly Stout. Was faxed with confirmation.

## 2022-02-01 NOTE — TELEPHONE ENCOUNTER
Patient called in reference to a referral to Gastroenterology. Patient gave new fax number to the office below. Gastroenterology office is saying they never received the referral. Please advise.          F: [phone] [facility]

## 2022-02-04 ENCOUNTER — TELEPHONE (OUTPATIENT)
Dept: SURGERY | Age: 67
End: 2022-02-04

## 2022-02-04 NOTE — TELEPHONE ENCOUNTER
Reviewed Dr Pop Swanson notes from 1/17/22. He advised She will need a colonoscopy in the next month or 2. I will set her up with Dr. Blake Proctor. I like to see her back in another month or 2 and see how she is doing. We will still plan on doing surgery 6 months from her previous surgery which would be an May to June. Follow-up 1 to 2 months. Referral was refax on 2/1/22 as requested to [address] ATTN: Paolo Real. Was faxed with confirmation. I will call [facility] to see when appointment will be scheduled. Call made to [facility] I spoke with Joesph Sheth regarding scheduling patient to see Dr Blake Proctor. She informed me Dr Blake Proctor assistant sent him a message. She is waiting for the MD to respond back. I advised Joesph Sheth to please follow up with me on Monday regarding appointment. Call made to Ms Placido Hamm verified all [facility]. I informed patient I called [facility] informed of all. I will follow up with her on Monday.

## 2022-02-04 NOTE — TELEPHONE ENCOUNTER
Pt's  called and would like us to call Mathieu Abel back. He states he has been trying for a month to get her scheduled for a colonoscopy. First with Abiel Ashley. Nicholas Lainez MD and now with Arnav Chan. When they tried to schedule with Arnav Chan - he was told he wasn't taking new patients, but she was previously seen by Nicholas Lainez at the same office. They are frustrated with not being able to get the colonoscopy scheduled. They are requesting help getting this scheduled.

## 2022-02-07 ENCOUNTER — DOCUMENTATION ONLY (OUTPATIENT)
Dept: SURGERY | Age: 67
End: 2022-02-07

## 2022-02-07 NOTE — PROGRESS NOTES
Call made to [facility] spoke with Jorge Wise. I explained patient has a appointment with Dentist tomorrow. The appointment has been changed to 2/9/22 at 12:30 PM VV with Dr Sil Frances. Ms Anupama Powell made aware was very appreciative.

## 2022-02-07 NOTE — TELEPHONE ENCOUNTER
Received call from Lissa Lantigua from Dr [facility] office. The MD can see her either tomorrow at 11:15AM VV then schedule procedure or Wednesday. She can call if any issue with appointment time [phone]. Call made to Ms Yamilex Taveras made aware of all. She states she has a Dentist appointment tomorrow at 11 AM. I gave her [employer] phone number advised to call Lissa Lantigua back.

## 2022-02-23 ENCOUNTER — OFFICE VISIT (OUTPATIENT)
Dept: SURGERY | Age: 67
End: 2022-02-23
Payer: COMMERCIAL

## 2022-02-23 VITALS
BODY MASS INDEX: 28.2 KG/M2 | DIASTOLIC BLOOD PRESSURE: 83 MMHG | TEMPERATURE: 98 F | HEIGHT: 64 IN | HEART RATE: 63 BPM | WEIGHT: 165.2 LBS | SYSTOLIC BLOOD PRESSURE: 131 MMHG | RESPIRATION RATE: 18 BRPM | OXYGEN SATURATION: 96 %

## 2022-02-23 DIAGNOSIS — K57.32 DIVERTICULITIS LARGE INTESTINE W/O PERFORATION OR ABSCESS W/O BLEEDING: Primary | ICD-10-CM

## 2022-02-23 DIAGNOSIS — Z93.3 COLOSTOMY IN PLACE (HCC): ICD-10-CM

## 2022-02-23 PROCEDURE — 99214 OFFICE O/P EST MOD 30 MIN: CPT | Performed by: SURGERY

## 2022-02-23 NOTE — PROGRESS NOTES
[facility] at [facility] Surgery History and Physical    History of Present Illness:      Roney Garcia is a 77 y.o. female who is 3 months status post open sigmoid colectomy with end colostomy. She says she is feeling much better than she has in the past.  She is having much more energy and eating well. Her colostomy site seems to be working well and having less issues with the colostomy bag changes and not having as much leakage or skin breakdown from the colostomy. She is also seeing Dr. Alma Delia Serra and is scheduled for a colonoscopy in April. Past Medical History:   Diagnosis Date    Glaucoma     H/O seasonal allergies     Nausea & vomiting     [facility] spotted fever     Sinus infection     UTI (urinary tract infection)        Past Surgical History:   Procedure Laterality Date    HX HEENT  1985    ethmoidectomy    HX HYSTERECTOMY  2003         Current Outpatient Medications:     vit A/vit C/vit E/zinc/copper (PRESERVISION AREDS PO), Take  by mouth two (2) times a day., Disp: , Rfl:     simethicone (MYLICON) 40 PC/4.4 mL drops, Take 40 mg by mouth four (4) times daily as needed. , Disp: , Rfl:     brimonidine (ALPHAGAN) 0.2 % ophthalmic solution, INSTILL 1 DROP INTO BOTH EYES TWICE DAILY, Disp: , Rfl:     timolol (TIMOPTIC) 0.25 % ophthalmic solution, INSTILL 1 DROP IN BOTH EYES TWICE DAILY, Disp: , Rfl:     cholecalciferol, vitamin D3, (Vitamin D3) 100 mcg (4,000 unit) cap, Take  by mouth daily. , Disp: , Rfl:     L.acid,para-B. bifidum-S.therm (RISAQUAD) 8 billion cell cap cap, Take 1 Capsule by mouth daily. , Disp: 30 Capsule, Rfl: 0    meropenem 1 g IVPB, 1 g by IntraVENous route every eight (8) hours. , Disp: 84 Dose, Rfl: 0    zolpidem (AMBIEN) 5 mg tablet, Take 5 mg by mouth nightly as needed for Sleep., Disp: , Rfl:     ondansetron (ZOFRAN ODT) 4 mg disintegrating tablet, Take 1 Tablet by mouth every eight (8) hours as needed for Nausea or Vomiting. (Patient not taking: Reported on 11/30/2021), Disp: 20 Tablet, Rfl: 0    cetirizine (ZYRTEC) 10 mg tablet, Take  by mouth nightly. (Patient not taking: Reported on 2/23/2022), Disp: , Rfl:     ondansetron (ZOFRAN ODT) 4 mg disintegrating tablet, , Disp: , Rfl:     Allergies   Allergen Reactions    Ceftin [Cefuroxime Axetil] Hives    Codeine Rash    Levaquin [Levofloxacin] Other (comments)     Joint swelling    Pcn [Penicillins] Hives    Sulfa (Sulfonamide Antibiotics) Hives       Social History     Socioeconomic History    Marital status:      Spouse name: Not on file    Number of children: Not on file    Years of education: Not on file    Highest education level: Not on file   Occupational History    Not on file   Tobacco Use    Smoking status: Never Smoker    Smokeless tobacco: Never Used   Substance and Sexual Activity    Alcohol use: Not Currently    Drug use: Not Currently    Sexual activity: Not on file   Other Topics Concern    Not on file   Social History Narrative    Not on file     Social Determinants of Health     Financial Resource Strain:     Difficulty of Paying Living Expenses: Not on file   Food Insecurity:     Worried About Running Out of Food in the Last Year: Not on file    Ct of Food in the Last Year: Not on file   Transportation Needs:     Lack of Transportation (Medical): Not on file    Lack of Transportation (Non-Medical):  Not on file   Physical Activity:     Days of Exercise per Week: Not on file    Minutes of Exercise per Session: Not on file   Stress:     Feeling of Stress : Not on file   Social Connections:     Frequency of Communication with Friends and Family: Not on file    Frequency of Social Gatherings with Friends and Family: Not on file    Attends Yarsanism Services: Not on file    Active Member of Clubs or Organizations: Not on file    Attends Club or Organization Meetings: Not on file    Marital Status: Not on file   Intimate Partner Violence:     Fear of Current or Ex-Partner: Not on file    Emotionally Abused: Not on file    Physically Abused: Not on file    Sexually Abused: Not on file   Housing Stability:     Unable to Pay for Housing in the Last Year: Not on file    Number of Places Lived in the Last Year: Not on file    Unstable Housing in the Last Year: Not on file       No family history on file. ROS   Constitutional: negative  Ears, Nose, Mouth, Throat, and Face: negative  Respiratory: negative  Cardiovascular: negative  Gastrointestinal: negative  Genitourinary:negative  Integument/Breast: negative  Hematologic/Lymphatic: negative  Behavioral/Psychiatric: negative  Allergic/Immunologic: negative      Physical Exam:     Visit Vitals  /83 (BP 1 Location: Left arm, BP Patient Position: Sitting, BP Cuff Size: Adult)   Pulse 63   Temp 98 °F (36.7 °C) (Oral)   Resp 18   Ht 5' 4\" (1.626 m)   Wt 165 lb 3.2 oz (74.9 kg)   SpO2 96%   BMI 28.36 kg/m²       General - alert and oriented, no apparent distress  HEENT - no jaundice, no hearing imparement  Pulm - CTAB, no C/W/R  CV - RRR, no M/R/G  Abd -soft, nondistended, bowel sounds present, midline incision healed well with colostomy pink and looking good  Ext - pulses intact in UE and LE bilaterally, no edema  Skin - supple, no rashes  Psychiatric - normal affect, good mood    Labs  Lab Results   Component Value Date/Time    Sodium 140 11/22/2021 05:18 AM    Potassium 3.8 11/22/2021 05:18 AM    Chloride 104 11/22/2021 05:18 AM    CO2 33 (H) 11/22/2021 05:18 AM    Anion gap 3 (L) 11/22/2021 05:18 AM    Glucose 107 (H) 11/22/2021 05:18 AM    BUN 7 11/22/2021 05:18 AM    Creatinine 0.39 (L) 11/22/2021 05:18 AM    BUN/Creatinine ratio 18 11/22/2021 05:18 AM    GFR est AA >60 11/22/2021 05:18 AM    GFR est non-AA >60 11/22/2021 05:18 AM    Calcium 9.2 11/22/2021 05:18 AM    Bilirubin, total 0.7 10/31/2021 12:12 PM    Alk.  phosphatase 175 (H) 10/31/2021 12:12 PM    Protein, total 7.3 10/31/2021 12:12 PM    Albumin 2.8 (L) 10/31/2021 12:12 PM    Globulin 4.5 (H) 10/31/2021 12:12 PM    A-G Ratio 0.6 (L) 10/31/2021 12:12 PM    ALT (SGPT) 38 10/31/2021 12:12 PM    AST (SGOT) 26 10/31/2021 12:12 PM     Lab Results   Component Value Date/Time    WBC 4.8 11/22/2021 05:18 AM    Hemoglobin (POC) 8.9 (L) 11/16/2021 12:23 PM    HGB 9.3 (L) 11/22/2021 05:18 AM    HCT 29.5 (L) 11/22/2021 05:18 AM    PLATELET 772 95/63/5200 05:18 AM    MCV 89.7 11/22/2021 05:18 AM         Imaging  none  I have reviewed and agree with all of the pertinent images    Assessment:     Christian Espinosa is a 77 y.o. female with colostomy, status post open sigmoid colectomy with end colostomy, diverticulitis    Recommendations:     1. She is doing very well after her surgery. Her colostomy appliance is fitting well and not having any issues with that currently. She is scheduled for a colonoscopy with Dr. Suzanne Perez in 1 month. I will have her come back and see me after the colonoscopy is done and then at that point we can plan for colostomy takedown sometime in May which would be 6 months after her previous surgery. She will come back and see me in a little over a month and see how she is doing and plan for surgery then. Madisyn Tanner MD    Greater than half of the time: 30 minutes was used in counciling the patient about diagnosis and treatment plan    Ms. Dmitri Chrisotpher has a reminder for a \"due or due soon\" health maintenance. I have asked that she contact her primary care provider for follow-up on this health maintenance.

## 2022-02-23 NOTE — PROGRESS NOTES
1. Have you been to the ER, urgent care clinic since your last visit? Hospitalized since your last visit? No    2. Have you seen or consulted any other health care providers outside of the [address] [place] [address] since your last visit? Include any pap smears or colon screening.  No

## 2022-03-18 PROBLEM — M54.9 BACK PAIN: Status: ACTIVE | Noted: 2021-10-31

## 2022-03-18 PROBLEM — K57.92 DIVERTICULITIS: Status: ACTIVE | Noted: 2021-05-18

## 2022-03-19 PROBLEM — K57.32 DIVERTICULITIS LARGE INTESTINE W/O PERFORATION OR ABSCESS W/O BLEEDING: Status: ACTIVE | Noted: 2021-08-30

## 2022-04-12 ENCOUNTER — HOSPITAL ENCOUNTER (OUTPATIENT)
Age: 67
Setting detail: OUTPATIENT SURGERY
Discharge: HOME | End: 2022-04-12
Attending: INTERNAL MEDICINE | Admitting: INTERNAL MEDICINE
Payer: COMMERCIAL

## 2022-04-12 ENCOUNTER — ANESTHESIA EVENT (OUTPATIENT)
Dept: ENDOSCOPY | Age: 67
End: 2022-04-12
Payer: COMMERCIAL

## 2022-04-12 ENCOUNTER — ANESTHESIA (OUTPATIENT)
Dept: ENDOSCOPY | Age: 67
End: 2022-04-12
Payer: COMMERCIAL

## 2022-04-12 VITALS
TEMPERATURE: 97 F | BODY MASS INDEX: 29.64 KG/M2 | WEIGHT: 161.1 LBS | RESPIRATION RATE: 15 BRPM | SYSTOLIC BLOOD PRESSURE: 149 MMHG | HEART RATE: 55 BPM | DIASTOLIC BLOOD PRESSURE: 97 MMHG | OXYGEN SATURATION: 99 % | HEIGHT: 62 IN

## 2022-04-12 PROCEDURE — 74011000250 HC RX REV CODE- 250: Performed by: NURSE ANESTHETIST, CERTIFIED REGISTERED

## 2022-04-12 PROCEDURE — 74011250636 HC RX REV CODE- 250/636: Performed by: NURSE ANESTHETIST, CERTIFIED REGISTERED

## 2022-04-12 PROCEDURE — 76040000019: Performed by: INTERNAL MEDICINE

## 2022-04-12 PROCEDURE — 76060000031 HC ANESTHESIA FIRST 0.5 HR: Performed by: INTERNAL MEDICINE

## 2022-04-12 PROCEDURE — 74011250636 HC RX REV CODE- 250/636: Performed by: INTERNAL MEDICINE

## 2022-04-12 RX ORDER — FENTANYL CITRATE 50 UG/ML
25-200 INJECTION, SOLUTION INTRAMUSCULAR; INTRAVENOUS
Status: DISCONTINUED | OUTPATIENT
Start: 2022-04-12 | End: 2022-04-12 | Stop reason: HOSPADM

## 2022-04-12 RX ORDER — MIDAZOLAM HYDROCHLORIDE 1 MG/ML
.25-5 INJECTION, SOLUTION INTRAMUSCULAR; INTRAVENOUS
Status: DISCONTINUED | OUTPATIENT
Start: 2022-04-12 | End: 2022-04-12 | Stop reason: HOSPADM

## 2022-04-12 RX ORDER — TIMOLOL MALEATE 6.8 MG/ML
1 SOLUTION/ DROPS OPHTHALMIC DAILY
COMMUNITY

## 2022-04-12 RX ORDER — SODIUM CHLORIDE 9 MG/ML
50 INJECTION, SOLUTION INTRAVENOUS CONTINUOUS
Status: DISCONTINUED | OUTPATIENT
Start: 2022-04-12 | End: 2022-04-12 | Stop reason: HOSPADM

## 2022-04-12 RX ORDER — PROPOFOL 10 MG/ML
INJECTION, EMULSION INTRAVENOUS AS NEEDED
Status: DISCONTINUED | OUTPATIENT
Start: 2022-04-12 | End: 2022-04-12 | Stop reason: HOSPADM

## 2022-04-12 RX ORDER — NALOXONE HYDROCHLORIDE 0.4 MG/ML
0.4 INJECTION, SOLUTION INTRAMUSCULAR; INTRAVENOUS; SUBCUTANEOUS
Status: DISCONTINUED | OUTPATIENT
Start: 2022-04-12 | End: 2022-04-12 | Stop reason: HOSPADM

## 2022-04-12 RX ORDER — EPINEPHRINE 0.1 MG/ML
1 INJECTION INTRACARDIAC; INTRAVENOUS
Status: DISCONTINUED | OUTPATIENT
Start: 2022-04-12 | End: 2022-04-12 | Stop reason: HOSPADM

## 2022-04-12 RX ORDER — ATROPINE SULFATE 0.1 MG/ML
0.5 INJECTION INTRAVENOUS
Status: DISCONTINUED | OUTPATIENT
Start: 2022-04-12 | End: 2022-04-12 | Stop reason: HOSPADM

## 2022-04-12 RX ORDER — SODIUM CHLORIDE 0.9 % (FLUSH) 0.9 %
5-40 SYRINGE (ML) INJECTION AS NEEDED
Status: DISCONTINUED | OUTPATIENT
Start: 2022-04-12 | End: 2022-04-12 | Stop reason: HOSPADM

## 2022-04-12 RX ORDER — FLUMAZENIL 0.1 MG/ML
0.2 INJECTION INTRAVENOUS
Status: DISCONTINUED | OUTPATIENT
Start: 2022-04-12 | End: 2022-04-12 | Stop reason: HOSPADM

## 2022-04-12 RX ORDER — SODIUM CHLORIDE 0.9 % (FLUSH) 0.9 %
5-40 SYRINGE (ML) INJECTION EVERY 8 HOURS
Status: DISCONTINUED | OUTPATIENT
Start: 2022-04-12 | End: 2022-04-12 | Stop reason: HOSPADM

## 2022-04-12 RX ORDER — DEXTROMETHORPHAN/PSEUDOEPHED 2.5-7.5/.8
1.2 DROPS ORAL
Status: DISCONTINUED | OUTPATIENT
Start: 2022-04-12 | End: 2022-04-12 | Stop reason: HOSPADM

## 2022-04-12 RX ORDER — LIDOCAINE HYDROCHLORIDE 20 MG/ML
INJECTION, SOLUTION EPIDURAL; INFILTRATION; INTRACAUDAL; PERINEURAL AS NEEDED
Status: DISCONTINUED | OUTPATIENT
Start: 2022-04-12 | End: 2022-04-12 | Stop reason: HOSPADM

## 2022-04-12 RX ORDER — SODIUM CHLORIDE 9 MG/ML
INJECTION, SOLUTION INTRAVENOUS
Status: DISCONTINUED | OUTPATIENT
Start: 2022-04-12 | End: 2022-04-12 | Stop reason: HOSPADM

## 2022-04-12 RX ADMIN — PROPOFOL 20 MG: 10 INJECTION, EMULSION INTRAVENOUS at 14:05

## 2022-04-12 RX ADMIN — LIDOCAINE HYDROCHLORIDE 40 MG: 20 INJECTION, SOLUTION EPIDURAL; INFILTRATION; INTRACAUDAL; PERINEURAL at 13:46

## 2022-04-12 RX ADMIN — SODIUM CHLORIDE: 900 INJECTION, SOLUTION INTRAVENOUS at 13:30

## 2022-04-12 RX ADMIN — PROPOFOL 60 MG: 10 INJECTION, EMULSION INTRAVENOUS at 13:46

## 2022-04-12 RX ADMIN — PROPOFOL 30 MG: 10 INJECTION, EMULSION INTRAVENOUS at 13:59

## 2022-04-12 RX ADMIN — PROPOFOL 30 MG: 10 INJECTION, EMULSION INTRAVENOUS at 13:49

## 2022-04-12 RX ADMIN — PROPOFOL 30 MG: 10 INJECTION, EMULSION INTRAVENOUS at 13:52

## 2022-04-12 RX ADMIN — PROPOFOL 30 MG: 10 INJECTION, EMULSION INTRAVENOUS at 13:55

## 2022-04-12 NOTE — ANESTHESIA POSTPROCEDURE EVALUATION
Post-Anesthesia Evaluation and Assessment    Patient: Wilman Berger MRN: [id number]  SSN: [id number]    YOB: 1955  Age: 77 y.o. Sex: female      I have evaluated the patient and they are stable and ready for discharge from the PACU. Cardiovascular Function/Vital Signs  Visit Vitals  BP 95/60   Pulse (!) 55   Temp 36.6 °C (97.8 °F)   Resp 16   Ht 5' 2\" (1.575 m)   Wt 73.1 kg (161 lb 1.6 oz)   SpO2 100%   Breastfeeding No   BMI 29.47 kg/m²       Patient is status post MAC anesthesia for Procedure(s):  COLONOSCOPY. Nausea/Vomiting: None    Postoperative hydration reviewed and adequate. Pain:  Pain Scale 1: Numeric (0 - 10) (04/12/22 1211)  Pain Intensity 1: 0 (04/12/22 1211)   Managed    Neurological Status: At baseline    Mental Status, Level of Consciousness: Alert and  oriented to person, place, and time    Pulmonary Status:   O2 Device: CO2 nasal cannula (04/12/22 1419)   Adequate oxygenation and airway patent    Complications related to anesthesia: None    Post-anesthesia assessment completed.  No concerns    Signed By: Debby Lewis MD     April 12, 2022

## 2022-04-12 NOTE — H&P
[address]  [address]      History and Physical       NAME:  Josiane Zamarripa   :   1955   MRN:   [id number]             History of Present Illness:  Patient is a 77 y.o. who is seen for diverticulitis and pre-operative colonoscopy prior to colostomy take-down. PMH:  Past Medical History:   Diagnosis Date    Glaucoma     H/O seasonal allergies     Nausea & vomiting     [facility] spotted fever     Sinus infection     UTI (urinary tract infection)        PSH:  Past Surgical History:   Procedure Laterality Date    HX HEENT      ethmoidectomy    HX HYSTERECTOMY      DC ABDOMEN SURGERY PROC UNLISTED      colectomy with ostomy       Allergies: Allergies   Allergen Reactions    Keflex [Cephalexin] Diarrhea    Ceftin [Cefuroxime Axetil] Hives    Codeine Rash    Levaquin [Levofloxacin] Other (comments)     Joint swelling    Pcn [Penicillins] Hives    Sulfa (Sulfonamide Antibiotics) Hives    Erythromycin Hives       Home Medications:  Prior to Admission Medications   Prescriptions Last Dose Informant Patient Reported? Taking? L.acid,para-B. bifidum-S.therm (RISAQUAD) 8 billion cell cap cap 2022 at Unknown time  No Yes   Sig: Take 1 Capsule by mouth daily. azelastine HCl (AZELASTINE NA) 2022 at Unknown time  Yes Yes   Si.1 % by Nasal route. brimonidine (ALPHAGAN) 0.2 % ophthalmic solution 2022 at Unknown time  Yes Yes   Sig: INSTILL 1 DROP INTO BOTH EYES TWICE DAILY   cholecalciferol, vitamin D3, (Vitamin D3) 100 mcg (4,000 unit) cap 4/10/2022  Yes No   Sig: Take  by mouth daily. ondansetron (ZOFRAN ODT) 4 mg disintegrating tablet Not Taking at Unknown time  No No   Sig: Take 1 Tablet by mouth every eight (8) hours as needed for Nausea or Vomiting.    Patient not taking: Reported on 2021   timoloL maleate 0.5 % drpd ophthalmic solution 2022 at Unknown time  Yes Yes   Sig: Administer 1 Drop to both eyes daily. vit A/vit C/vit E/zinc/copper (PRESERVISION AREDS PO) 4/11/2022 at Unknown time  Yes Yes   Sig: Take  by mouth two (2) times a day. zolpidem (AMBIEN) 5 mg tablet 4/11/2022 at Unknown time  Yes Yes   Sig: Take 5 mg by mouth nightly as needed for Sleep. Facility-Administered Medications: None       Hospital Medications:  Current Facility-Administered Medications   Medication Dose Route Frequency    0.9% sodium chloride infusion  50 mL/hr IntraVENous CONTINUOUS    sodium chloride (NS) flush 5-40 mL  5-40 mL IntraVENous Q8H    sodium chloride (NS) flush 5-40 mL  5-40 mL IntraVENous PRN    midazolam (VERSED) injection 0.25-5 mg  0.25-5 mg IntraVENous Multiple    fentaNYL citrate (PF) injection  mcg   mcg IntraVENous Multiple    naloxone (NARCAN) injection 0.4 mg  0.4 mg IntraVENous Multiple    flumazeniL (ROMAZICON) 0.1 mg/mL injection 0.2 mg  0.2 mg IntraVENous Multiple    simethicone (MYLICON) 12VF/3.0HV oral drops 80 mg  1.2 mL Oral Multiple    atropine injection 0.5 mg  0.5 mg IntraVENous ONCE PRN    EPINEPHrine (ADRENALIN) 0.1 mg/mL syringe 1 mg  1 mg Endoscopically ONCE PRN       Social History:  Social History     Tobacco Use    Smoking status: Never Smoker    Smokeless tobacco: Never Used   Substance Use Topics    Alcohol use: Not Currently       Family History:  History reviewed. No pertinent family history.           Review of Systems:      Constitutional: negative fever, negative chills, negative weight loss  Eyes:   negative visual changes  ENT:   negative sore throat, tongue or lip swelling  Respiratory:  negative cough, negative dyspnea  Cards:  negative for chest pain, palpitations, lower extremity edema  GI:   See HPI  :  negative for frequency, dysuria  Integument:  negative for rash and pruritus  Heme:  negative for easy bruising and gum/nose bleeding  Musculoskel: negative for myalgias,  back pain and muscle weakness  Neuro: negative for headaches, dizziness, vertigo  Psych:  negative for feelings of anxiety, depression       Objective:     Patient Vitals for the past 8 hrs:   BP Temp Pulse Resp SpO2 Height Weight   04/12/22 1225 (!) 174/72 97.8 °F (36.6 °C) 69 15 100 % 5' 2\" (1.575 m) 73.1 kg (161 lb 1.6 oz)     No intake/output data recorded. No intake/output data recorded. EXAM:     NEURO-a&o   HEENT-wnl   LUNGS-clear    COR-regular rate and rhythym     ABD-soft , no tenderness, no rebound, good bs     EXT-no edema     Data Review     No results for input(s): WBC, HGB, HCT, PLT, HGBEXT, HCTEXT, PLTEXT in the last 72 hours. No results for input(s): NA, K, CL, CO2, BUN, CREA, GLU, PHOS, CA in the last 72 hours. No results for input(s): AP, TBIL, TP, ALB, GLOB, GGT, AML, LPSE in the last 72 hours. No lab exists for component: SGOT, GPT, AMYP, HLPSE  No results for input(s): INR, PTP, APTT, INREXT in the last 72 hours. Assessment:     · diverticulitis     Patient Active Problem List   Diagnosis Code    Diverticulitis K57.92    Diverticulitis large intestine w/o perforation or abscess w/o bleeding K57.32    Back pain M54.9     Plan:   · The patient was counseled at length about the risks of kirby Covid-19 in the brody-operative and post-operative states including the recovery window of their procedure. The patient was made aware that kirby Covid-19 after a surgical procedure may worsen their prognosis for recovering from the virus and lend to a higher morbidity and or mortality risk. The patient was given the options of postponing their procedure. All of the risks, benefits, and alternatives were discussed. The patient does wish to proceed with the procedure. · Endoscopic procedure with MAC     Signed By: John Vogel.  Liana Mcdowell MD     4/12/2022  1:35 PM

## 2022-04-12 NOTE — ANESTHESIA PREPROCEDURE EVALUATION
Relevant Problems   No relevant active problems       Anesthetic History     PONV          Review of Systems / Medical History  Patient summary reviewed, nursing notes reviewed and pertinent labs reviewed    Pulmonary  Within defined limits                 Neuro/Psych   Within defined limits           Cardiovascular  Within defined limits                Exercise tolerance: >4 METS     GI/Hepatic/Renal               Comments: Diverticulitis Endo/Other  Within defined limits           Other Findings              Physical Exam    Airway  Mallampati: II  TM Distance: 4 - 6 cm  Neck ROM: normal range of motion   Mouth opening: Normal     Cardiovascular  Regular rate and rhythm,  S1 and S2 normal,  no murmur, click, rub, or gallop             Dental  No notable dental hx       Pulmonary  Breath sounds clear to auscultation               Abdominal  GI exam deferred       Other Findings            Anesthetic Plan    ASA: 2  Anesthesia type: MAC          Induction: Intravenous  Anesthetic plan and risks discussed with: Patient

## 2022-04-12 NOTE — DISCHARGE INSTRUCTIONS
[address] [facility]    COLON DISCHARGE INSTRUCTIONS    Cheryle Noel  [id number]  1955    Discomfort:  Redness at IV site- apply warm compress to area; if redness or soreness persist- contact your physician  There may be a slight amount of blood passed from the rectum  Gaseous discomfort- walking, belching will help relieve any discomfort  You may not operate a vehicle for 12 hours  You may not engage in an occupation involving machinery or appliances for rest of today  You may not drink alcoholic beverages for at least 12 hours  Avoid making any critical decisions for at least 24 hour  DIET:  You may resume your regular diet - however -  remember your colon is empty and a heavy meal will produce gas. Avoid these foods:  vegetables, fried / greasy foods, carbonated drinks     ACTIVITY:  You may  resume your normal daily activities it is recommended that you spend the remainder of the day resting -  avoid any strenuous activity. CALL M.D. ANY SIGN OF:   Increasing pain, nausea, vomiting  Abdominal distension (swelling)  New increased bleeding (oral or rectal)  Fever (chills)  Pain in chest area  Bloody discharge from nose or mouth  Shortness of breath      Follow-up Instructions:   Call Dr. Mary Carson for any questions or problems at [address] [place] [address] St:   Your colonoscopy showed no polyps. Please follow up with Dr. Hilda Wisdom. Your next colonoscopy will be due in 10 years. Telephone # [phone]      Signed By: Fern Suazo.  Linda Sun MD     4/12/2022  2:28 PM       DISCHARGE SUMMARY from Nurse    The following personal items collected during your admission are returned to you:   Dental Appliance: Dental Appliances: None  Vision: Visual Aid: Glasses  Hearing Aid:    Jewelry:    Clothing:    Other Valuables:    Valuables sent to safe:      Learning About Coronavirus (COVID-19)  Coronavirus (COVID-19): Overview  What is coronavirus (COVID-19)? The coronavirus disease (COVID-19) is caused by a virus. It is an illness that was first found in [place], [place], in December 2019. It has since spread worldwide. The virus can cause fever, cough, and trouble breathing. In severe cases, it can cause pneumonia and make it hard to breathe without help. It can cause death. Coronaviruses are a large group of viruses. They cause the common cold. They also cause more serious illnesses like Middle East respiratory syndrome (MERS) and severe acute respiratory syndrome (SARS). COVID-19 is caused by a novel coronavirus. That means it's a new type that has not been seen in people before. This virus spreads person-to-person through droplets from coughing and sneezing. It can also spread when you are close to someone who is infected. And it can spread when you touch something that has the virus on it, such as a doorknob or a tabletop. What can you do to protect yourself from coronavirus (COVID-19)? The best way to protect yourself from getting sick is to:  · Avoid areas where there is an outbreak. · Avoid contact with people who may be infected. · Wash your hands often with soap or alcohol-based hand sanitizers. · Avoid crowds and try to stay at least 6 feet away from other people. · Wash your hands often, especially after you cough or sneeze. Use soap and water, and scrub for at least 20 seconds. If soap and water aren't available, use an alcohol-based hand . · Avoid touching your mouth, nose, and eyes. What can you do to avoid spreading the virus to others? To help avoid spreading the virus to others:  · Cover your mouth with a tissue when you cough or sneeze. Then throw the tissue in the trash. · Use a disinfectant to clean things that you touch often. · Stay home if you are sick or have been exposed to the virus. Don't go to school, work, or public areas. And don't use public transportation.   · If you are sick:  ? Leave your home only if you need to get medical care. But call the doctor's office first so they know you're coming. And wear a face mask, if you have one.  ? If you have a face mask, wear it whenever you're around other people. It can help stop the spread of the virus when you cough or sneeze. ? Clean and disinfect your home every day. Use household  and disinfectant wipes or sprays. Take special care to clean things that you grab with your hands. These include doorknobs, remote controls, phones, and handles on your refrigerator and microwave. And don't forget countertops, tabletops, bathrooms, and computer keyboards. When to call for help  Call 911 anytime you think you may need emergency care. For example, call if:  · You have severe trouble breathing. (You can't talk at all.)  · You have constant chest pain or pressure. · You are severely dizzy or lightheaded. · You are confused or can't think clearly. · Your face and lips have a blue color. · You pass out (lose consciousness) or are very hard to wake up. Call your doctor now if you develop symptoms such as:  · Shortness of breath. · Fever. · Cough. If you need to get care, call ahead to the doctor's office for instructions before you go. Make sure you wear a face mask, if you have one, to prevent exposing other people to the virus. Where can you get the latest information? The following health organizations are tracking and studying this virus. Their websites contain the most up-to-date information. Joann Cooper also learn what to do if you think you may have been exposed to the virus. · U.S. Centers for Disease Control and Prevention (CDC): The CDC provides updated news about the disease and travel advice. The website also tells you how to prevent the spread of infection. www.cdc.gov  · World Health Organization [facility]): WHO offers information about the virus outbreaks.  WHO also has travel advice. www.who.int  Current as of: April 1, 2020               Content Version: 12.4  © 9247-6892 [employer]. Care instructions adapted under license by your healthcare professional. If you have questions about a medical condition or this instruction, always ask your healthcare professional. [address] any warranty or liability for your use of this information.

## 2022-04-12 NOTE — PROCEDURES
[address], [address] [place] [address]      Colonoscopy Operative Report    Michela Caldwell  [id number]  1955      Procedure Type:   Colonoscopy through colostomy    Indications:  Diverticulitis and end colostomy following sigmoid resection        Pre-operative Diagnosis: see indication above    Post-operative Diagnosis:  See findings below    :  Sukhjinder Potter. Teresa Toribio MD    Staff: Endoscopy [facility] Baker: Dayton Roberts  Endoscopy RN-1: Aaron Olmos RN     Referring Provider: Diana Blanc MD, Libertad Whitman MD      Sedation:  MAC anesthesia Propofol      Procedure Details:  After informed consent was obtained with all risks and benefits of procedure explained and preoperative exam completed, the patient was taken to the endoscopy suite and placed in the supine position. Upon sequential sedation as per above, The Olympus  endoscope was inserted through the stoma and carefully advanced to the cecum, which was identified by the ileocecal valve and appendiceal orifice. A larger diameter scope could not be used due to the narrow diameter of the stoma. The quality of preparation was adequate. The scope was slowly withdrawn with careful evaluation between folds. Next, the  endoscope was used to inspect the rectum and rectosigmoid. Retroflexion in the rectum was completed . Findings:   Mild diverticulosis seen in the post-operative left colon. Mild diverticulosis seen in the rectosigmoid region. Small internal hemorrhoids      Specimen Removed: none    Complications: None. EBL:  None. Impression:    As above    Recommendations: Follow up with Dr. Deepa Mitchell  Repeat colonoscopy in 10 years    Signed By: Sukhjinder Potter.  Teresa Toribio MD     2022  2:30 PM

## 2022-04-27 ENCOUNTER — OFFICE VISIT (OUTPATIENT)
Dept: SURGERY | Age: 67
End: 2022-04-27
Payer: COMMERCIAL

## 2022-04-27 VITALS
DIASTOLIC BLOOD PRESSURE: 83 MMHG | OXYGEN SATURATION: 95 % | HEIGHT: 62 IN | TEMPERATURE: 98.1 F | BODY MASS INDEX: 30.47 KG/M2 | SYSTOLIC BLOOD PRESSURE: 136 MMHG | RESPIRATION RATE: 18 BRPM | WEIGHT: 165.6 LBS | HEART RATE: 66 BPM

## 2022-04-27 DIAGNOSIS — Z93.3 COLOSTOMY IN PLACE (HCC): ICD-10-CM

## 2022-04-27 DIAGNOSIS — K57.32 DIVERTICULITIS LARGE INTESTINE W/O PERFORATION OR ABSCESS W/O BLEEDING: Primary | ICD-10-CM

## 2022-04-27 PROCEDURE — 99214 OFFICE O/P EST MOD 30 MIN: CPT | Performed by: SURGERY

## 2022-04-27 RX ORDER — METOCLOPRAMIDE 10 MG/1
10 TABLET ORAL 3 TIMES DAILY
Qty: 3 TABLET | Refills: 0 | Status: SHIPPED | OUTPATIENT
Start: 2022-04-27 | End: 2022-04-28

## 2022-04-27 RX ORDER — SODIUM, POTASSIUM,MAG SULFATES 17.5-3.13G
177 SOLUTION, RECONSTITUTED, ORAL ORAL SEE ADMIN INSTRUCTIONS
Qty: 1 KIT | Refills: 0 | Status: SHIPPED | OUTPATIENT
Start: 2022-04-27 | End: 2022-09-13

## 2022-04-27 RX ORDER — NEOMYCIN SULFATE 500 MG/1
1000 TABLET ORAL 3 TIMES DAILY
Qty: 6 TABLET | Refills: 0 | Status: SHIPPED | OUTPATIENT
Start: 2022-04-27 | End: 2022-04-28

## 2022-04-27 RX ORDER — METRONIDAZOLE 500 MG/1
500 TABLET ORAL 3 TIMES DAILY
Qty: 3 TABLET | Refills: 0 | Status: SHIPPED | OUTPATIENT
Start: 2022-04-27 | End: 2022-04-28

## 2022-04-27 NOTE — PROGRESS NOTES
1. Have you been to the ER, urgent care clinic since your last visit? Hospitalized since your last visit? No    2. Have you seen or consulted any other health care providers outside of the [address] [place] [address] since your last visit? Include any pap smears or colon screening.  No    [facility] Teaching Received gave booklet PAT will call

## 2022-04-29 NOTE — PROGRESS NOTES
[employer] [facility] History and Physical    History of Present Illness:      Darcy Mahan is a 79 y.o. female who 6 months status post open sigmoid colectomy with end colostomy. She says she is feeling much better than she has in the past.  She is having much more energy and eating well. Her colostomy site seems to be working well and having less issues with the colostomy bag changes and not having as much leakage or skin breakdown from the colostomy. She is also had a colonoscopy. She is doing well. She is having some narrowing of the colostomy site but still functioning well. Past Medical History:   Diagnosis Date    Glaucoma     H/O seasonal allergies     Nausea & vomiting     [facility] spotted fever     Sinus infection     UTI (urinary tract infection)        Past Surgical History:   Procedure Laterality Date    COLONOSCOPY N/A 4/12/2022    COLONOSCOPY performed by Cipriano Mcdermott MD at [facility] ENDOSCOPY     [facility]    ethmoidectomy    HX HYSTERECTOMY  2003    [facility] [address] UNLISTED      colectomy with ostomy         Current Outpatient Medications:     sodium-potassium-mag sulfate (Suprep Bowel Prep Kit) 17.5-3.13-1.6 gram solr oral solution, Take 177 mL by mouth See Admin Instructions. , Disp: 1 Kit, Rfl: 0    timoloL maleate 0.5 % drpd ophthalmic solution, Administer 1 Drop to both eyes daily. , Disp: , Rfl:     azelastine HCl (AZELASTINE NA), 0.1 % by Nasal route., Disp: , Rfl:     vit A/vit C/vit E/zinc/copper (PRESERVISION AREDS PO), Take  by mouth two (2) times a day., Disp: , Rfl:     brimonidine (ALPHAGAN) 0.2 % ophthalmic solution, INSTILL 1 DROP INTO BOTH EYES TWICE DAILY, Disp: , Rfl:     cholecalciferol, vitamin D3, (Vitamin D3) 100 mcg (4,000 unit) cap, Take  by mouth daily. , Disp: , Rfl:     L.acid,para-B. bifidum-S.therm (RISAQUAD) 8 billion cell cap cap, Take 1 Capsule by mouth daily. , Disp: 30 Capsule, Rfl: 0   zolpidem (AMBIEN) 5 mg tablet, Take 5 mg by mouth nightly as needed for Sleep., Disp: , Rfl:     ondansetron (ZOFRAN ODT) 4 mg disintegrating tablet, Take 1 Tablet by mouth every eight (8) hours as needed for Nausea or Vomiting. (Patient not taking: Reported on 11/30/2021), Disp: 20 Tablet, Rfl: 0    Allergies   Allergen Reactions    Keflex [Cephalexin] Diarrhea    Ceftin [Cefuroxime Axetil] Hives    Codeine Rash    Levaquin [Levofloxacin] Other (comments)     Joint swelling    Pcn [Penicillins] Hives    Sulfa (Sulfonamide Antibiotics) Hives    Erythromycin Hives       Social History     Socioeconomic History    Marital status:      Spouse name: Not on file    Number of children: Not on file    Years of education: Not on file    Highest education level: Not on file   Occupational History    Not on file   Tobacco Use    Smoking status: Never Smoker    Smokeless tobacco: Never Used   Substance and Sexual Activity    Alcohol use: Not Currently    Drug use: Not Currently    Sexual activity: Not on file   Other Topics Concern    Not on file   Social History Narrative    Not on file     Social Determinants of Health     Financial Resource Strain:     Difficulty of Paying Living Expenses: Not on file   Food Insecurity:     Worried About Running Out of Food in the Last Year: Not on file    Ct of Food in the Last Year: Not on file   Transportation Needs:     Lack of Transportation (Medical): Not on file    Lack of Transportation (Non-Medical):  Not on file   Physical Activity:     Days of Exercise per Week: Not on file    Minutes of Exercise per Session: Not on file   Stress:     Feeling of Stress : Not on file   Social Connections:     Frequency of Communication with Friends and Family: Not on file    Frequency of Social Gatherings with Friends and Family: Not on file    Attends [employer]ist Services: Not on file    Active Member of Clubs or Organizations: Not on file    Attends [employer] or Organization Meetings: Not on file    Marital Status: Not on file   Intimate Partner Violence:     Fear of Current or Ex-Partner: Not on file    Emotionally Abused: Not on file    Physically Abused: Not on file    Sexually Abused: Not on file   Housing Stability:     Unable to Pay for Housing in the Last Year: Not on file    Number of [place] in the Last Year: Not on file    Unstable Housing in the Last Year: Not on file       No family history on file.     ROS   Constitutional: negative  Ears, Nose, Mouth, Throat, and Face: negative  Respiratory: negative  Cardiovascular: negative  Gastrointestinal: negative  Genitourinary:negative  Integument/Breast: negative  Hematologic/Lymphatic: negative  Behavioral/Psychiatric: negative  Allergic/Immunologic: negative      Physical Exam:     Visit Vitals  /83 (BP 1 Location: Left arm, BP Patient Position: Sitting, BP Cuff Size: Adult)   Pulse 66   Temp 98.1 °F (36.7 °C) (Oral)   Resp 18   Ht 5' 2\" (1.575 m)   Wt 165 lb 9.6 oz (75.1 kg)   SpO2 95%   BMI 30.29 kg/m²       General - alert and oriented, no apparent distress  HEENT - no jaundice, no hearing imparement  Pulm - CTAB, no C/W/R  CV - RRR, no M/R/G  Abd -soft, nondistended, bowel sounds present, some narrowing at the colostomy site but at least a centimeter and a half open  Ext - pulses intact in UE and LE bilaterally, no edema  Skin - supple, no rashes  Psychiatric - normal affect, good mood    Labs  Lab Results   Component Value Date/Time    Sodium 140 11/22/2021 05:18 AM    Potassium 3.8 11/22/2021 05:18 AM    Chloride 104 11/22/2021 05:18 AM    CO2 33 (H) 11/22/2021 05:18 AM    Anion gap 3 (L) 11/22/2021 05:18 AM    Glucose 107 (H) 11/22/2021 05:18 AM    BUN 7 11/22/2021 05:18 AM    Creatinine 0.39 (L) 11/22/2021 05:18 AM    BUN/Creatinine ratio 18 11/22/2021 05:18 AM    GFR est AA >60 11/22/2021 05:18 AM    GFR est non-AA >60 11/22/2021 05:18 AM    Calcium 9.2 11/22/2021 05:18 AM    Bilirubin, total 0.7 10/31/2021 12:12 PM    Alk. phosphatase 175 (H) 10/31/2021 12:12 PM    Protein, total 7.3 10/31/2021 12:12 PM    Albumin 2.8 (L) 10/31/2021 12:12 PM    Globulin 4.5 (H) 10/31/2021 12:12 PM    A-G Ratio 0.6 (L) 10/31/2021 12:12 PM    ALT (SGPT) 38 10/31/2021 12:12 PM    AST (SGOT) 26 10/31/2021 12:12 PM     Lab Results   Component Value Date/Time    WBC 4.8 2021 05:18 AM    Hemoglobin (POC) 8.9 (L) 2021 12:23 PM    HGB 9.3 (L) 2021 05:18 AM    HCT 29.5 (L) 2021 05:18 AM    PLATELET 462  05:18 AM    MCV 89.7 2021 05:18 AM         Imaging  none  I have reviewed and agree with all of the pertinent images    Colonoscopy report-  Procedure Details:  After informed consent was obtained with all risks and benefits of procedure explained and preoperative exam completed, the patient was taken to the endoscopy suite and placed in the supine position. Upon sequential sedation as per above, The Olympus  endoscope was inserted through the stoma and carefully advanced to the cecum, which was identified by the ileocecal valve and appendiceal orifice. A larger diameter scope could not be used due to the narrow diameter of the stoma. The quality of preparation was adequate. The scope was slowly withdrawn with careful evaluation between folds. Next, the  endoscope was used to inspect the rectum and rectosigmoid. Retroflexion in the rectum was completed .      Findings:   Mild diverticulosis seen in the post-operative left colon. Mild diverticulosis seen in the rectosigmoid region. Small internal hemorrhoids        Specimen Removed: none     Complications: None.      EBL:  None.     Impression:    As above     Recommendations: Follow up with Dr. Tiffani Emmanuel  Repeat colonoscopy in 10 years    Assessment:     Tracy Ferrell is a 79 y.o. female status post sigmoid colectomy with end colostomy for perforated diverticulitis    Recommendations:     1.    She is doing well now and she is ready for colostomy takedown here in the operating room. She is still having some narrowing at the colostomy site but it is open enough for her colonoscopy and bowel prep. She will be scheduled for laparoscopic possible open colostomy takedown here in the near future. I would likely have Dr. Sheron Reed assist me with surgery. She will undergo ERAS protocol. I will also have urology placed bilateral ureteral stents as well. James Miller MD    Greater than half of the time: 30 minutes was used in counciling the patient about diagnosis and treatment plan    Ms. Carmine Lambert has a reminder for a \"due or due soon\" health maintenance. I have asked that she contact her primary care provider for follow-up on this health maintenance.

## 2022-05-06 ENCOUNTER — TELEPHONE (OUTPATIENT)
Dept: SURGERY | Age: 67
End: 2022-05-06

## 2022-05-06 NOTE — TELEPHONE ENCOUNTER
Patient called and said she has surgery on 5/24 with . Patient called and wanted to discuss the pre-op process with  or his nurse.

## 2022-05-06 NOTE — TELEPHONE ENCOUNTER
Returned call to Ms Wolfgang Gamble verified all PHI. Patient is scheduled for laparoscopic possible open colostomy takedown on 5/24/22. Patient wants to know when she should start the [address]? I advised to start drinking the prep the evening prior to surgery and finish all.

## 2022-05-16 ENCOUNTER — HOSPITAL ENCOUNTER (OUTPATIENT)
Dept: PREADMISSION TESTING | Age: 67
Discharge: HOME | End: 2022-05-16
Payer: COMMERCIAL

## 2022-05-16 VITALS
WEIGHT: 167.11 LBS | SYSTOLIC BLOOD PRESSURE: 136 MMHG | HEART RATE: 60 BPM | RESPIRATION RATE: 18 BRPM | DIASTOLIC BLOOD PRESSURE: 83 MMHG | TEMPERATURE: 98.2 F | BODY MASS INDEX: 30.75 KG/M2 | HEIGHT: 62 IN

## 2022-05-16 LAB
ANION GAP SERPL CALC-SCNC: 4 MMOL/L (ref 5–15)
BASOPHILS # BLD: 0 K/UL (ref 0–0.1)
BASOPHILS NFR BLD: 1 % (ref 0–1)
BUN SERPL-MCNC: 15 MG/DL (ref 6–20)
BUN/CREAT SERPL: 24 (ref 12–20)
CALCIUM SERPL-MCNC: 9 MG/DL (ref 8.5–10.1)
CHLORIDE SERPL-SCNC: 104 MMOL/L (ref 97–108)
CO2 SERPL-SCNC: 29 MMOL/L (ref 21–32)
CREAT SERPL-MCNC: 0.62 MG/DL (ref 0.55–1.02)
DIFFERENTIAL METHOD BLD: NORMAL
EOSINOPHIL # BLD: 0.1 K/UL (ref 0–0.4)
EOSINOPHIL NFR BLD: 2 % (ref 0–7)
ERYTHROCYTE [DISTWIDTH] IN BLOOD BY AUTOMATED COUNT: 13.1 % (ref 11.5–14.5)
GLUCOSE SERPL-MCNC: 97 MG/DL (ref 65–100)
HCT VFR BLD AUTO: 42.1 % (ref 35–47)
HGB BLD-MCNC: 13.7 G/DL (ref 11.5–16)
IMM GRANULOCYTES # BLD AUTO: 0 K/UL (ref 0–0.04)
IMM GRANULOCYTES NFR BLD AUTO: 0 % (ref 0–0.5)
LYMPHOCYTES # BLD: 1.1 K/UL (ref 0.8–3.5)
LYMPHOCYTES NFR BLD: 23 % (ref 12–49)
MCH RBC QN AUTO: 29.5 PG (ref 26–34)
MCHC RBC AUTO-ENTMCNC: 32.5 G/DL (ref 30–36.5)
MCV RBC AUTO: 90.7 FL (ref 80–99)
MONOCYTES # BLD: 0.6 K/UL (ref 0–1)
MONOCYTES NFR BLD: 12 % (ref 5–13)
NEUTS SEG # BLD: 3 K/UL (ref 1.8–8)
NEUTS SEG NFR BLD: 62 % (ref 32–75)
NRBC # BLD: 0 K/UL (ref 0–0.01)
NRBC BLD-RTO: 0 PER 100 WBC
PLATELET # BLD AUTO: 199 K/UL (ref 150–400)
PMV BLD AUTO: 9.4 FL (ref 8.9–12.9)
POTASSIUM SERPL-SCNC: 4 MMOL/L (ref 3.5–5.1)
RBC # BLD AUTO: 4.64 M/UL (ref 3.8–5.2)
SODIUM SERPL-SCNC: 137 MMOL/L (ref 136–145)
WBC # BLD AUTO: 4.9 K/UL (ref 3.6–11)

## 2022-05-16 PROCEDURE — 80048 BASIC METABOLIC PNL TOTAL CA: CPT

## 2022-05-16 PROCEDURE — 85025 COMPLETE CBC W/AUTO DIFF WBC: CPT

## 2022-05-16 NOTE — PERIOP NOTES
ERAS PREOP AND MEDICATION INSTRUCTIONS PRINTED AND REVIEWED WITH PATIENT. TWO BOTTLES OF CHG SOAP GIVEN. INCENTIVE SPIROMETRY GIVEN WITH INSTRUCTIONS ON USE.    COPY OF COVID CARD ON CHART.     CONSENT SIGNED AND ON CHART FOR DR.NATHAN MILLER SURGICAL PROCEDURE

## 2022-05-16 NOTE — PERIOP NOTES
[address] INSTRUCTIONS  ERAS    Surgery Date:   05-    [facility] staff will call you between 4 PM- 8 PM the day before surgery with your arrival time. If your surgery is on a Monday, we will call you the preceding Friday. Please call [phone] after 8 PM if you did not receive your arrival time. 1. Please report to [facility] Patient Access/Admitting on the 1st floor. Bring your insurance card, photo identification, and any copayment ( if applicable). 2. If you are going home the same day of your surgery, you must have a responsible adult to drive you home. You need to have a responsible adult to stay with you the first 24 hours after surgery and you should not drive a car for 24 hours following your surgery. 3. If you are being admitted to the hospital, please leave personal belongings/luggage in your car until you have an assigned hospital room number. 4. Please refer to [facility] book for Pre-operative Nutrition Plan. 5. Do NOT drink alcohol or smoke 24 hours before surgery. STOP smoking for 14 days prior as it helps with breathing and healing after surgery. 6. Please wear comfortable clothes. Wear your glasses instead of contacts. We ask that all money, jewelry and valuables be left at home. Wear no make up, particularly mascara, the day of surgery. 7.  All body piercings, rings, and jewelry need to be removed and left at home. Please remove any nail polish or artificial nails from your fingernails. Please wear your hair loose or down. Please no pony-tails, buns, or any metal hair accessories. If you shower the morning of surgery, please do not apply any lotions or powders afterwards. You may wear deodorant. Do not shave any body area within 24 hours of your surgery. 8. Please follow all instructions to avoid any potential surgical cancellation. 9. Should your physical condition change, (i.e. fever, cold, flu, etc.) please notify your surgeon as soon as possible.   10. It is important to be on time. If a situation occurs where you may be delayed, please call:  [phone] / [phone] on the day of surgery. 11. The Preadmission Testing staff can be reached at [phone]. 12. Special instructions: NONE    Current Outpatient Medications   Medication Sig    OTHER 1 Caplet every morning. NERVIVE    OTHER 1 Tablet nightly. METHYL FOLATE    sodium-potassium-mag sulfate (Suprep Bowel Prep Kit) 17.5-3.13-1.6 gram solr oral solution Take 177 mL by mouth See Admin Instructions.  timoloL maleate 0.5 % drpd ophthalmic solution Administer 1 Drop to both eyes daily.  azelastine HCl (AZELASTINE NA) 0.1 % by Nasal route.  vit A/vit C/vit E/zinc/copper (PRESERVISION AREDS PO) Take  by mouth two (2) times a day.  brimonidine (ALPHAGAN) 0.2 % ophthalmic solution INSTILL 1 DROP INTO BOTH EYES TWICE DAILY    L.acid,para-B. bifidum-S.therm (RISAQUAD) 8 billion cell cap cap Take 1 Capsule by mouth daily.  zolpidem (AMBIEN) 5 mg tablet Take 5 mg by mouth nightly as needed for Sleep.  cholecalciferol, vitamin D3, (Vitamin D3) 100 mcg (4,000 unit) cap Take 5,000 Units by mouth daily. No current facility-administered medications for this encounter. 1. YOU MUST ONLY TAKE THESE MEDICATIONS THE MORNING OF SURGERY WITH A SIP OF WATER: NONE  2. MEDICATIONS TO TAKE THE MORNING OF SURGERY ONLY IF NEEDED: NONE  3. HOLD these prescription medications BEFORE surgery: NONE  4. Ask your surgeon/prescribing physician about when/if to STOP taking these medications: NONE  5. Stop all vitamins, herbal medicines and Aspirin containing products 7 days prior to surgery. Stop any non-steroidal anti-inflammatory drugs (i.e. Ibuprofen, Naproxen, Advil, Aleve) 3 days before surgery. You may take Tylenol. 6. If you are currently taking Aspirin, Plavix, Coumadin or any other blood-thinning/anticoagulant medication contact your prescribing physician for instructions.       Eating and Drinking Before Surgery     You may eat a regular dinner at the usual time on the day before your surgery.  Do NOT eat any solid foods after midnight unless your arrival time at the hospital is 3pm or later.  You may drink clear liquids only from 12 midnight until 1 hour prior to your arrival time at the hospital on the day of your surgery. Do NOT drink alcohol.  Clear liquids include:  o Water  o Fruit juices without pulp( i.e. apple juice)  o Carbonated beverages  o Black coffee (no cream/milk)  o Tea (no cream/milk)  o Gatorade   You may drink up to 12-16 ounces at one time every 4 hours between the hours of midnight and 1 hour before your arrival time at the hospital. Example- if your arrival time at the hospital is 6am, you may drink 12-16 ounces of clear liquids no later than 5am.   If your arrival time at the hospital is 3pm or later, you may eat a light breakfast before 8am.   A light breakfast includes:  o Toast or bagel (no butter)  o Black coffee (no cream/milk)  o Tea (no cream/milk)  o Fruit juices without pulp ( i.e. apple juice)  o Do NOT eat meat, eggs, vegetables or fruit   If you have any questions, please contact your surgeon's office. Incentive Spirometer        Using the incentive spirometer helps expand the small air sacs of your lungs, helps you breathe deeply, and helps improve your lung function. Use your incentive spirometer twice a day (10 breaths each time) prior to surgery. How to Use Your Incentive Spirometer:  1. Hold the incentive spirometer in an upright position. 2. Breathe out as usual.   3. Place the mouthpiece in your mouth and seal your lips tightly around it. 4. Take a deep breath. Breathe in slowly and as deeply as possible. Keep the blue flow rate guide between the arrows. 5. Hold your breath as long as possible. Then exhale slowly and allow the piston to fall to the bottom of the column.    6. Rest for a few seconds and repeat steps one through five at least 10 times. Opportunity given to ask and answer questions. Preventing Infections Before and After - Your Surgery    IMPORTANT INSTRUCTIONS    You play an important role in your health and preparation for surgery. To reduce the germs on your skin you will need to shower with CHG soap (Chorhexidine gluconate 4%) two times before surgery. CHG soap (Hibiclens, Hex-A-Clens or store brand)   CHG soap will be provided at your Preadmission Testing (PAT) appointment.  If you do not have a PAT appointment before surgery, you may arrange to  CHG soap from our office or purchase CHG soap at a pharmacy, grocery or department store.  You need to purchase TWO 4 ounce bottles to use for your 2 showers. Steps to follow:  1. Wash your hair with your normal shampoo and your body with regular soap and rinse well to remove shampoo and soap from your skin. 2. Wet a clean washcloth and turn off the shower. 3. Put CHG soap on washcloth and apply to your entire body from the neck down. Do not use on your head, face or private parts(genitals). Do not use CHG soap on open sores, wounds or areas of skin irritation. 4. Wash you body gently for 5 minutes. Do not wash your skin too hard. This soap does not create lather. Pay special attention to your underarms and from your belly button to your feet. 5. Turn the shower back on and rinse well to get CHG soap off your body. 6. Pat your skin dry with a clean, dry towel. Do not apply lotions or moisturizer. 7. Put on clean clothes and sleep on fresh bed sheets and do not allow pets to sleep with you. Shower with CHG soap 2 times before your surgery   The evening before your surgery   The morning of your surgery      Tips to help prevent infections after your surgery:  1. Protect your surgical wound from germs:  ? Hand washing is the most important thing you and your caregivers can do to prevent infections. ? Keep your bandage clean and dry!   ? Do not touch your surgical wound. 2. Use clean, freshly washed towels and washcloths every time you shower; do not share bath linens with others. 3. Until your surgical wound is healed, wear clothing and sleep on bed linens each day that are clean and freshly washed. 4. Do not allow pets to sleep in your bed with you or touch your surgical wound. 5. Do not smoke - smoking delays wound healing. This may be a good time to stop smoking. 6. If you have diabetes, it is important for you to manage your blood sugar levels properly before your surgery as well as after your surgery. Poorly managed blood sugar levels slow down wound healing and prevent you from healing completely. Patient Information Regarding COVID Restrictions    Day of Procedure     Please park in the parking deck or any designated visitor parking lot.  Enter the facility through the [facility] of the hospitals.   On the day of surgery, please provide the cell phone number of the person who will be waiting for you to the Patient Access representative at the time of registration.  Please wear a mask on the day of your procedure.  We are now allowing two designated visitors per stay. Pediatric patients may have 2 designated visitors. These two people may come in with you on the day of your procedure.  No visitors under the age of 13.  The designated visitor must also wear a mask.  Once your procedure and the immediate recovery period is completed, a nurse in the recovery area will contact your designated visitor to inform them of your room number or to otherwise review other pertinent information regarding your care.  Social distancing practices are to be adhered to in waiting areas and the cafeteria. The patient was contacted in person. She verbalized understanding of all instructions does not  need reinforcement.

## 2022-05-19 ENCOUNTER — TELEPHONE (OUTPATIENT)
Dept: SURGERY | Age: 67
End: 2022-05-19

## 2022-05-19 NOTE — TELEPHONE ENCOUNTER
Returned call Mrs Jelena Grimaldo verified all PHI. Patient is scheduled for laparoscopic possible open colostomy takedown on 5/24/22. Ms Jelena Grimaldo states the times on the bottles are different from what's on the blue sheet vs what is written on the RX bottles. I advised patient as per Dr Hilda Wisdom to follow the times on the blue sheet in the book start the bowel prep in the morning before and continue to take over the course of the day.

## 2022-05-19 NOTE — TELEPHONE ENCOUNTER
Pt called requesting to speak with a nurse.  Has a few questions about her medication before her upcoming surgery

## 2022-05-23 ENCOUNTER — ANESTHESIA EVENT (OUTPATIENT)
Dept: SURGERY | Age: 67
DRG: 331 | End: 2022-05-23
Payer: COMMERCIAL

## 2022-05-24 ENCOUNTER — HOSPITAL ENCOUNTER (INPATIENT)
Age: 67
LOS: 3 days | Discharge: HOME | DRG: 331 | End: 2022-05-27
Attending: SURGERY | Admitting: SURGERY
Payer: COMMERCIAL

## 2022-05-24 ENCOUNTER — ANESTHESIA (OUTPATIENT)
Dept: SURGERY | Age: 67
DRG: 331 | End: 2022-05-24
Payer: COMMERCIAL

## 2022-05-24 DIAGNOSIS — Z93.3 COLOSTOMY IN PLACE (HCC): ICD-10-CM

## 2022-05-24 DIAGNOSIS — K57.32 DIVERTICULITIS LARGE INTESTINE W/O PERFORATION OR ABSCESS W/O BLEEDING: ICD-10-CM

## 2022-05-24 PROCEDURE — 44213 LAP MOBIL SPLENIC FL ADD-ON: CPT | Performed by: SURGERY

## 2022-05-24 PROCEDURE — 88307 TISSUE EXAM BY PATHOLOGIST: CPT

## 2022-05-24 PROCEDURE — 77030038157 HC DEV PWR CNTR DISP SIGNIA COVD -C: Performed by: SURGERY

## 2022-05-24 PROCEDURE — 74011250637 HC RX REV CODE- 250/637: Performed by: SURGERY

## 2022-05-24 PROCEDURE — 65270000029 HC RM PRIVATE

## 2022-05-24 PROCEDURE — 77030008684 HC TU ET CUF COVD -B: Performed by: NURSE ANESTHETIST, CERTIFIED REGISTERED

## 2022-05-24 PROCEDURE — 88304 TISSUE EXAM BY PATHOLOGIST: CPT

## 2022-05-24 PROCEDURE — 74011000250 HC RX REV CODE- 250: Performed by: SURGERY

## 2022-05-24 PROCEDURE — 77030037032 HC INSRT SCIS CLICKLLINE DISP STOR -B: Performed by: SURGERY

## 2022-05-24 PROCEDURE — 77030012770 HC TRCR OPT FX AMR -B: Performed by: SURGERY

## 2022-05-24 PROCEDURE — 77030008756 HC TU IRR SUC STRY -B: Performed by: SURGERY

## 2022-05-24 PROCEDURE — 77030016151 HC PROTCTR LNS DFOG COVD -B: Performed by: SURGERY

## 2022-05-24 PROCEDURE — 77030019927 HC TBNG IRR CYSTO BAXT -A: Performed by: SURGERY

## 2022-05-24 PROCEDURE — 77030002895 HC DEV VASC CLOSR COVD -B: Performed by: SURGERY

## 2022-05-24 PROCEDURE — 0DNN4ZZ RELEASE SIGMOID COLON, PERCUTANEOUS ENDOSCOPIC APPROACH: ICD-10-PCS | Performed by: SURGERY

## 2022-05-24 PROCEDURE — 44227 LAP CLOSE ENTEROSTOMY: CPT | Performed by: SURGERY

## 2022-05-24 PROCEDURE — 0DBP8ZZ EXCISION OF RECTUM, VIA NATURAL OR ARTIFICIAL OPENING ENDOSCOPIC: ICD-10-PCS | Performed by: SURGERY

## 2022-05-24 PROCEDURE — 77030042556 HC PNCL CAUT -B: Performed by: SURGERY

## 2022-05-24 PROCEDURE — 74011250636 HC RX REV CODE- 250/636: Performed by: NURSE ANESTHETIST, CERTIFIED REGISTERED

## 2022-05-24 PROCEDURE — 44207 L COLECTOMY/COLOPROCTOSTOMY: CPT | Performed by: SURGERY

## 2022-05-24 PROCEDURE — 74011250636 HC RX REV CODE- 250/636: Performed by: ANESTHESIOLOGY

## 2022-05-24 PROCEDURE — 77030026438 HC STYL ET INTUB CARD -A: Performed by: NURSE ANESTHETIST, CERTIFIED REGISTERED

## 2022-05-24 PROCEDURE — 77030020829: Performed by: SURGERY

## 2022-05-24 PROCEDURE — 77030013079 HC BLNKT BAIR HGGR 3M -A: Performed by: NURSE ANESTHETIST, CERTIFIED REGISTERED

## 2022-05-24 PROCEDURE — 77030039895 HC SYST SMK EVAC LAP COVD -B: Performed by: SURGERY

## 2022-05-24 PROCEDURE — 76210000006 HC OR PH I REC 0.5 TO 1 HR: Performed by: SURGERY

## 2022-05-24 PROCEDURE — 77030002933 HC SUT MCRYL J&J -A: Performed by: SURGERY

## 2022-05-24 PROCEDURE — 0DBF4ZZ EXCISION OF RIGHT LARGE INTESTINE, PERCUTANEOUS ENDOSCOPIC APPROACH: ICD-10-PCS | Performed by: SURGERY

## 2022-05-24 PROCEDURE — 77030002966 HC SUT PDS J&J -A: Performed by: SURGERY

## 2022-05-24 PROCEDURE — 74011250636 HC RX REV CODE- 250/636: Performed by: SURGERY

## 2022-05-24 PROCEDURE — 77030040923 HC STPLR ENDOSC ECHELON J&J -E: Performed by: SURGERY

## 2022-05-24 PROCEDURE — 77030035029 HC NDL INSUF VERES DISP COVD -B: Performed by: SURGERY

## 2022-05-24 PROCEDURE — C1758 CATHETER, URETERAL: HCPCS | Performed by: SURGERY

## 2022-05-24 PROCEDURE — P9045 ALBUMIN (HUMAN), 5%, 250 ML: HCPCS | Performed by: NURSE ANESTHETIST, CERTIFIED REGISTERED

## 2022-05-24 PROCEDURE — 76010000139 HC OR TIME 5.5 TO 6 HR: Performed by: SURGERY

## 2022-05-24 PROCEDURE — 77030040361 HC SLV COMPR DVT MDII -B: Performed by: SURGERY

## 2022-05-24 PROCEDURE — 2709999900 HC NON-CHARGEABLE SUPPLY: Performed by: SURGERY

## 2022-05-24 PROCEDURE — 77030027502 HC TRCR ENDOSC BLNT COVD -B: Performed by: SURGERY

## 2022-05-24 PROCEDURE — 77030005513 HC CATH URETH FOL11 MDII -B: Performed by: SURGERY

## 2022-05-24 PROCEDURE — 77030031139 HC SUT VCRL2 J&J -A: Performed by: SURGERY

## 2022-05-24 PROCEDURE — 0DBG4ZZ EXCISION OF LEFT LARGE INTESTINE, PERCUTANEOUS ENDOSCOPIC APPROACH: ICD-10-PCS | Performed by: SURGERY

## 2022-05-24 PROCEDURE — 77030034628 HC LIGASURE LAP SEAL DIV MD COVD -F: Performed by: SURGERY

## 2022-05-24 PROCEDURE — 76060000042 HC ANESTHESIA 5.5 TO 6 HR: Performed by: SURGERY

## 2022-05-24 PROCEDURE — 77030008608 HC TRCR ENDOSC SMTH AMR -B: Performed by: SURGERY

## 2022-05-24 PROCEDURE — 88305 TISSUE EXAM BY PATHOLOGIST: CPT

## 2022-05-24 PROCEDURE — 74011000250 HC RX REV CODE- 250: Performed by: NURSE ANESTHETIST, CERTIFIED REGISTERED

## 2022-05-24 PROCEDURE — 77030007955 HC PCH ENDOSC SPEC J&J -B: Performed by: SURGERY

## 2022-05-24 PROCEDURE — 77030009965 HC RELD STPLR ENDOS COVD -D: Performed by: SURGERY

## 2022-05-24 PROCEDURE — 74011000258 HC RX REV CODE- 258: Performed by: SURGERY

## 2022-05-24 RX ORDER — ACETAMINOPHEN 500 MG
1000 TABLET ORAL ONCE
Status: COMPLETED | OUTPATIENT
Start: 2022-05-24 | End: 2022-05-24

## 2022-05-24 RX ORDER — SODIUM CHLORIDE 0.9 % (FLUSH) 0.9 %
5-40 SYRINGE (ML) INJECTION AS NEEDED
Status: DISCONTINUED | OUTPATIENT
Start: 2022-05-24 | End: 2022-05-24 | Stop reason: HOSPADM

## 2022-05-24 RX ORDER — SODIUM CHLORIDE, SODIUM LACTATE, POTASSIUM CHLORIDE, CALCIUM CHLORIDE 600; 310; 30; 20 MG/100ML; MG/100ML; MG/100ML; MG/100ML
75 INJECTION, SOLUTION INTRAVENOUS CONTINUOUS
Status: DISCONTINUED | OUTPATIENT
Start: 2022-05-24 | End: 2022-05-24 | Stop reason: HOSPADM

## 2022-05-24 RX ORDER — BUPIVACAINE HYDROCHLORIDE AND EPINEPHRINE 2.5; 5 MG/ML; UG/ML
INJECTION, SOLUTION EPIDURAL; INFILTRATION; INTRACAUDAL; PERINEURAL AS NEEDED
Status: DISCONTINUED | OUTPATIENT
Start: 2022-05-24 | End: 2022-05-24 | Stop reason: HOSPADM

## 2022-05-24 RX ORDER — ZOLPIDEM TARTRATE 5 MG/1
5 TABLET ORAL
Status: DISCONTINUED | OUTPATIENT
Start: 2022-05-24 | End: 2022-05-27 | Stop reason: HOSPADM

## 2022-05-24 RX ORDER — SODIUM CHLORIDE, SODIUM LACTATE, POTASSIUM CHLORIDE, CALCIUM CHLORIDE 600; 310; 30; 20 MG/100ML; MG/100ML; MG/100ML; MG/100ML
75 INJECTION, SOLUTION INTRAVENOUS CONTINUOUS
Status: DISPENSED | OUTPATIENT
Start: 2022-05-24 | End: 2022-05-25

## 2022-05-24 RX ORDER — GLYCOPYRROLATE 0.2 MG/ML
INJECTION INTRAMUSCULAR; INTRAVENOUS AS NEEDED
Status: DISCONTINUED | OUTPATIENT
Start: 2022-05-24 | End: 2022-05-24 | Stop reason: HOSPADM

## 2022-05-24 RX ORDER — ENOXAPARIN SODIUM 100 MG/ML
40 INJECTION SUBCUTANEOUS DAILY
Status: DISCONTINUED | OUTPATIENT
Start: 2022-05-25 | End: 2022-05-27 | Stop reason: HOSPADM

## 2022-05-24 RX ORDER — ONDANSETRON 2 MG/ML
INJECTION INTRAMUSCULAR; INTRAVENOUS AS NEEDED
Status: DISCONTINUED | OUTPATIENT
Start: 2022-05-24 | End: 2022-05-24 | Stop reason: HOSPADM

## 2022-05-24 RX ORDER — KETAMINE HYDROCHLORIDE 10 MG/ML
INJECTION, SOLUTION INTRAMUSCULAR; INTRAVENOUS AS NEEDED
Status: DISCONTINUED | OUTPATIENT
Start: 2022-05-24 | End: 2022-05-24 | Stop reason: HOSPADM

## 2022-05-24 RX ORDER — DEXAMETHASONE SODIUM PHOSPHATE 4 MG/ML
INJECTION, SOLUTION INTRA-ARTICULAR; INTRALESIONAL; INTRAMUSCULAR; INTRAVENOUS; SOFT TISSUE AS NEEDED
Status: DISCONTINUED | OUTPATIENT
Start: 2022-05-24 | End: 2022-05-24 | Stop reason: HOSPADM

## 2022-05-24 RX ORDER — MIDAZOLAM HYDROCHLORIDE 1 MG/ML
1 INJECTION, SOLUTION INTRAMUSCULAR; INTRAVENOUS AS NEEDED
Status: DISCONTINUED | OUTPATIENT
Start: 2022-05-24 | End: 2022-05-24 | Stop reason: HOSPADM

## 2022-05-24 RX ORDER — ACETAMINOPHEN 500 MG
1000 TABLET ORAL EVERY 6 HOURS
Status: DISCONTINUED | OUTPATIENT
Start: 2022-05-24 | End: 2022-05-27 | Stop reason: HOSPADM

## 2022-05-24 RX ORDER — LIDOCAINE HYDROCHLORIDE ANHYDROUS AND DEXTROSE MONOHYDRATE .8; 5 G/100ML; G/100ML
INJECTION, SOLUTION INTRAVENOUS
Status: DISCONTINUED | OUTPATIENT
Start: 2022-05-24 | End: 2022-05-24 | Stop reason: HOSPADM

## 2022-05-24 RX ORDER — KETOROLAC TROMETHAMINE 30 MG/ML
15 INJECTION, SOLUTION INTRAMUSCULAR; INTRAVENOUS EVERY 6 HOURS
Status: COMPLETED | OUTPATIENT
Start: 2022-05-24 | End: 2022-05-25

## 2022-05-24 RX ORDER — TIMOLOL MALEATE 5 MG/ML
1 SOLUTION/ DROPS OPHTHALMIC DAILY
Status: DISCONTINUED | OUTPATIENT
Start: 2022-05-25 | End: 2022-05-27 | Stop reason: HOSPADM

## 2022-05-24 RX ORDER — SUCCINYLCHOLINE CHLORIDE 20 MG/ML
INJECTION INTRAMUSCULAR; INTRAVENOUS AS NEEDED
Status: DISCONTINUED | OUTPATIENT
Start: 2022-05-24 | End: 2022-05-24 | Stop reason: HOSPADM

## 2022-05-24 RX ORDER — LIDOCAINE HYDROCHLORIDE ANHYDROUS AND DEXTROSE MONOHYDRATE .8; 5 G/100ML; G/100ML
1 INJECTION, SOLUTION INTRAVENOUS CONTINUOUS
Status: DISPENSED | OUTPATIENT
Start: 2022-05-24 | End: 2022-05-26

## 2022-05-24 RX ORDER — KETOROLAC TROMETHAMINE 30 MG/ML
INJECTION, SOLUTION INTRAMUSCULAR; INTRAVENOUS
Status: DISCONTINUED
Start: 2022-05-24 | End: 2022-05-24 | Stop reason: WASHOUT

## 2022-05-24 RX ORDER — ALBUMIN HUMAN 50 G/1000ML
SOLUTION INTRAVENOUS AS NEEDED
Status: DISCONTINUED | OUTPATIENT
Start: 2022-05-24 | End: 2022-05-24 | Stop reason: HOSPADM

## 2022-05-24 RX ORDER — ROCURONIUM BROMIDE 10 MG/ML
INJECTION, SOLUTION INTRAVENOUS AS NEEDED
Status: DISCONTINUED | OUTPATIENT
Start: 2022-05-24 | End: 2022-05-24 | Stop reason: HOSPADM

## 2022-05-24 RX ORDER — BRIMONIDINE TARTRATE 2 MG/ML
1 SOLUTION/ DROPS OPHTHALMIC 2 TIMES DAILY
Status: DISCONTINUED | OUTPATIENT
Start: 2022-05-24 | End: 2022-05-27 | Stop reason: HOSPADM

## 2022-05-24 RX ORDER — FENTANYL CITRATE 50 UG/ML
25 INJECTION, SOLUTION INTRAMUSCULAR; INTRAVENOUS
Status: COMPLETED | OUTPATIENT
Start: 2022-05-24 | End: 2022-05-24

## 2022-05-24 RX ORDER — MAGNESIUM SULFATE HEPTAHYDRATE 40 MG/ML
INJECTION, SOLUTION INTRAVENOUS AS NEEDED
Status: DISCONTINUED | OUTPATIENT
Start: 2022-05-24 | End: 2022-05-24 | Stop reason: HOSPADM

## 2022-05-24 RX ORDER — CELECOXIB 100 MG/1
100 CAPSULE ORAL ONCE
Status: COMPLETED | OUTPATIENT
Start: 2022-05-24 | End: 2022-05-24

## 2022-05-24 RX ORDER — ONDANSETRON 2 MG/ML
4 INJECTION INTRAMUSCULAR; INTRAVENOUS
Status: DISCONTINUED | OUTPATIENT
Start: 2022-05-24 | End: 2022-05-27 | Stop reason: HOSPADM

## 2022-05-24 RX ORDER — OXYCODONE HYDROCHLORIDE 5 MG/1
5 TABLET ORAL
Status: DISCONTINUED | OUTPATIENT
Start: 2022-05-24 | End: 2022-05-27 | Stop reason: HOSPADM

## 2022-05-24 RX ORDER — SODIUM CHLORIDE 0.9 % (FLUSH) 0.9 %
5-40 SYRINGE (ML) INJECTION EVERY 8 HOURS
Status: DISCONTINUED | OUTPATIENT
Start: 2022-05-24 | End: 2022-05-27 | Stop reason: HOSPADM

## 2022-05-24 RX ORDER — ONDANSETRON 2 MG/ML
4 INJECTION INTRAMUSCULAR; INTRAVENOUS AS NEEDED
Status: DISCONTINUED | OUTPATIENT
Start: 2022-05-24 | End: 2022-05-24 | Stop reason: HOSPADM

## 2022-05-24 RX ORDER — FENTANYL CITRATE 50 UG/ML
INJECTION, SOLUTION INTRAMUSCULAR; INTRAVENOUS AS NEEDED
Status: DISCONTINUED | OUTPATIENT
Start: 2022-05-24 | End: 2022-05-24 | Stop reason: HOSPADM

## 2022-05-24 RX ORDER — METRONIDAZOLE 500 MG/100ML
500 INJECTION, SOLUTION INTRAVENOUS ONCE
Status: COMPLETED | OUTPATIENT
Start: 2022-05-24 | End: 2022-05-24

## 2022-05-24 RX ORDER — PHENYLEPHRINE HCL IN 0.9% NACL 0.4MG/10ML
SYRINGE (ML) INTRAVENOUS
Status: DISCONTINUED | OUTPATIENT
Start: 2022-05-24 | End: 2022-05-24 | Stop reason: HOSPADM

## 2022-05-24 RX ORDER — PROPOFOL 10 MG/ML
INJECTION, EMULSION INTRAVENOUS AS NEEDED
Status: DISCONTINUED | OUTPATIENT
Start: 2022-05-24 | End: 2022-05-24 | Stop reason: HOSPADM

## 2022-05-24 RX ORDER — SODIUM CHLORIDE 0.9 % (FLUSH) 0.9 %
5-40 SYRINGE (ML) INJECTION EVERY 8 HOURS
Status: DISCONTINUED | OUTPATIENT
Start: 2022-05-24 | End: 2022-05-24 | Stop reason: HOSPADM

## 2022-05-24 RX ORDER — EPHEDRINE SULFATE/0.9% NACL/PF 50 MG/5 ML
SYRINGE (ML) INTRAVENOUS AS NEEDED
Status: DISCONTINUED | OUTPATIENT
Start: 2022-05-24 | End: 2022-05-24 | Stop reason: HOSPADM

## 2022-05-24 RX ORDER — SODIUM CHLORIDE 0.9 % (FLUSH) 0.9 %
5-40 SYRINGE (ML) INJECTION AS NEEDED
Status: DISCONTINUED | OUTPATIENT
Start: 2022-05-24 | End: 2022-05-27 | Stop reason: HOSPADM

## 2022-05-24 RX ORDER — MIDAZOLAM HYDROCHLORIDE 1 MG/ML
INJECTION, SOLUTION INTRAMUSCULAR; INTRAVENOUS AS NEEDED
Status: DISCONTINUED | OUTPATIENT
Start: 2022-05-24 | End: 2022-05-24 | Stop reason: HOSPADM

## 2022-05-24 RX ADMIN — KETOROLAC TROMETHAMINE 15 MG: 30 INJECTION, SOLUTION INTRAMUSCULAR; INTRAVENOUS at 16:07

## 2022-05-24 RX ADMIN — CELECOXIB 100 MG: 100 CAPSULE ORAL at 06:26

## 2022-05-24 RX ADMIN — FENTANYL CITRATE 50 MCG: 50 INJECTION, SOLUTION INTRAMUSCULAR; INTRAVENOUS at 08:26

## 2022-05-24 RX ADMIN — SODIUM CHLORIDE, PRESERVATIVE FREE 10 ML: 5 INJECTION INTRAVENOUS at 16:08

## 2022-05-24 RX ADMIN — Medication 25 MG: at 07:35

## 2022-05-24 RX ADMIN — ROCURONIUM BROMIDE 25 MG: 10 SOLUTION INTRAVENOUS at 07:50

## 2022-05-24 RX ADMIN — OXYCODONE 5 MG: 5 TABLET ORAL at 18:26

## 2022-05-24 RX ADMIN — ROCURONIUM BROMIDE 5 MG: 10 SOLUTION INTRAVENOUS at 07:35

## 2022-05-24 RX ADMIN — ALBUMIN (HUMAN) 250 ML: 12.5 INJECTION, SOLUTION INTRAVENOUS at 08:00

## 2022-05-24 RX ADMIN — ROCURONIUM BROMIDE 50 MG: 10 SOLUTION INTRAVENOUS at 08:33

## 2022-05-24 RX ADMIN — SODIUM CHLORIDE, POTASSIUM CHLORIDE, SODIUM LACTATE AND CALCIUM CHLORIDE 75 ML/HR: 600; 310; 30; 20 INJECTION, SOLUTION INTRAVENOUS at 14:13

## 2022-05-24 RX ADMIN — SODIUM CHLORIDE, POTASSIUM CHLORIDE, SODIUM LACTATE AND CALCIUM CHLORIDE: 600; 310; 30; 20 INJECTION, SOLUTION INTRAVENOUS at 12:40

## 2022-05-24 RX ADMIN — DEXAMETHASONE SODIUM PHOSPHATE 8 MG: 4 INJECTION, SOLUTION INTRAMUSCULAR; INTRAVENOUS at 08:10

## 2022-05-24 RX ADMIN — ONDANSETRON HYDROCHLORIDE 4 MG: 2 INJECTION, SOLUTION INTRAMUSCULAR; INTRAVENOUS at 12:41

## 2022-05-24 RX ADMIN — FENTANYL CITRATE 50 MCG: 50 INJECTION, SOLUTION INTRAMUSCULAR; INTRAVENOUS at 07:35

## 2022-05-24 RX ADMIN — Medication 40 MCG/MIN: at 08:01

## 2022-05-24 RX ADMIN — GLYCOPYRROLATE 0.2 MG: 0.2 INJECTION, SOLUTION INTRAMUSCULAR; INTRAVENOUS at 08:31

## 2022-05-24 RX ADMIN — SUCCINYLCHOLINE CHLORIDE 120 MG: 20 INJECTION, SOLUTION INTRAMUSCULAR; INTRAVENOUS at 07:36

## 2022-05-24 RX ADMIN — MIDAZOLAM 2 MG: 1 INJECTION INTRAMUSCULAR; INTRAVENOUS at 07:28

## 2022-05-24 RX ADMIN — SODIUM CHLORIDE, POTASSIUM CHLORIDE, SODIUM LACTATE AND CALCIUM CHLORIDE 75 ML/HR: 600; 310; 30; 20 INJECTION, SOLUTION INTRAVENOUS at 06:39

## 2022-05-24 RX ADMIN — ROCURONIUM BROMIDE 20 MG: 10 SOLUTION INTRAVENOUS at 10:52

## 2022-05-24 RX ADMIN — NALOXEGOL OXALATE 25 MG: 25 TABLET, FILM COATED ORAL at 06:26

## 2022-05-24 RX ADMIN — MAGNESIUM SULFATE 2 G: 2 INJECTION INTRAVENOUS at 07:45

## 2022-05-24 RX ADMIN — LIDOCAINE HYDROCHLORIDE 2 MG/KG/HR: 8 INJECTION, SOLUTION INTRAVENOUS at 07:35

## 2022-05-24 RX ADMIN — Medication 5 MG: at 08:01

## 2022-05-24 RX ADMIN — METRONIDAZOLE 500 MG: 500 SOLUTION INTRAVENOUS at 08:10

## 2022-05-24 RX ADMIN — ACETAMINOPHEN 1000 MG: 500 TABLET ORAL at 06:26

## 2022-05-24 RX ADMIN — Medication 10 MG: at 07:46

## 2022-05-24 RX ADMIN — PROPOFOL 150 MG: 10 INJECTION, EMULSION INTRAVENOUS at 07:35

## 2022-05-24 RX ADMIN — BRIMONIDINE TARTRATE 1 DROP: 2 SOLUTION/ DROPS OPHTHALMIC at 18:58

## 2022-05-24 RX ADMIN — FENTANYL CITRATE 25 MCG: 50 INJECTION, SOLUTION INTRAMUSCULAR; INTRAVENOUS at 13:52

## 2022-05-24 RX ADMIN — GENTAMICIN 250 MG: 10 INJECTION, SOLUTION INTRAMUSCULAR; INTRAVENOUS at 08:20

## 2022-05-24 RX ADMIN — GLYCOPYRROLATE 0.2 MG: 0.2 INJECTION, SOLUTION INTRAMUSCULAR; INTRAVENOUS at 12:40

## 2022-05-24 RX ADMIN — ACETAMINOPHEN 1000 MG: 500 TABLET ORAL at 14:05

## 2022-05-24 RX ADMIN — OXYCODONE 5 MG: 5 TABLET ORAL at 22:38

## 2022-05-24 RX ADMIN — ROCURONIUM BROMIDE 10 MG: 10 SOLUTION INTRAVENOUS at 11:56

## 2022-05-24 RX ADMIN — ACETAMINOPHEN 1000 MG: 500 TABLET ORAL at 19:00

## 2022-05-24 RX ADMIN — SUGAMMADEX 200 MG: 100 INJECTION, SOLUTION INTRAVENOUS at 13:00

## 2022-05-24 NOTE — ANESTHESIA POSTPROCEDURE EVALUATION
Procedure(s):  LAPAROSCOPIC COLOSTOMY TAKEDOWN WITH SPLENIC FLEXURE MOBILIZATION, LOW ANTERIOR RESECTION, COLONOSCOPY, LYSIS OF ADHESIONS GREATER THAN 2 HOURS, CYSTOSCOPY URETERAL CATHETER INSERTION  CYSTOSCOPY URETERAL CATHETER INSERTION. general    Anesthesia Post Evaluation        Patient location during evaluation: PACU  Patient participation: complete - patient participated  Level of consciousness: awake and alert  Pain management: adequate  Airway patency: patent  Anesthetic complications: no  Cardiovascular status: acceptable  Respiratory status: acceptable  Hydration status: acceptable  Comments: I have seen and evaluated the patient and is ready for discharge. Romana Sidles, MD    Post anesthesia nausea and vomiting:  none      INITIAL Post-op Vital signs:   Vitals Value Taken Time   /75 05/24/22 1325   Temp 36.4 °C (97.6 °F) 05/24/22 1315   Pulse 72 05/24/22 1329   Resp 12 05/24/22 1329   SpO2 98 % 05/24/22 1329   Vitals shown include unvalidated device data.

## 2022-05-24 NOTE — PERIOP NOTES
Call placed to , informed  that surgeons continue to work, surgery continues as expected and patient is doing well.

## 2022-05-24 NOTE — ANESTHESIA PREPROCEDURE EVALUATION
Relevant Problems   No relevant active problems       Anesthetic History     PONV          Review of Systems / Medical History  Patient summary reviewed, nursing notes reviewed and pertinent labs reviewed    Pulmonary  Within defined limits                 Neuro/Psych   Within defined limits           Cardiovascular  Within defined limits                Exercise tolerance: >4 METS     GI/Hepatic/Renal               Comments: Diverticulitis Endo/Other  Within defined limits           Other Findings              Physical Exam    Airway  Mallampati: II  TM Distance: 4 - 6 cm  Neck ROM: normal range of motion   Mouth opening: Normal     Cardiovascular  Regular rate and rhythm,  S1 and S2 normal,  no murmur, click, rub, or gallop             Dental  No notable dental hx       Pulmonary  Breath sounds clear to auscultation               Abdominal  GI exam deferred       Other Findings            Anesthetic Plan    ASA: 2  Anesthesia type: general          Induction: Intravenous  Anesthetic plan and risks discussed with: Patient

## 2022-05-24 NOTE — PERIOP NOTES
TRANSFER - OUT REPORT:    Verbal report given to [facility] on [address]  being transferred to [facility] for routine post - op       Report consisted of patients Situation, Background, Assessment and   Recommendations(SBAR). Time Pre op antibiotic given:0810 and 0820  Anesthesia Stop time: 5586  Zambrano Present on Transfer to floor:yes  Order for Zambrano on Chart:yes  Discharge Prescriptions with Chart:no    Information from the following report(s) SBAR, Procedure Summary, Intake/Output and MAR was reviewed with the receiving nurse. Opportunity for questions and clarification was provided. Is the patient on 02? NO       L/Min na       Other na    Is the patient on a monitor? NO    Is the nurse transporting with the patient? NO    Surgical Waiting Area notified of patient's transfer from PACU? YES      The following personal items collected during your admission accompanied patient upon transfer:   Dental Appliance: Dental Appliances: None  Vision:    Hearing Aid:    Jewelry: Jewelry: None  Clothing: Clothing: At bedside  Other Valuables: Other Valuables:  At bedside (belongings returned to pt in pacu)  Valuables sent to safe:

## 2022-05-24 NOTE — PERIOP NOTES
Call placed to Pt's , informed him that surgeons are still working, patient is stable and surgery is going as expected.

## 2022-05-24 NOTE — H&P
[employer] [facility] at [facility] Surgery History and Physical     History of Present Illness:      Holli Tolentino is a 79 y.o. female who 6 months status post open sigmoid colectomy with end colostomy.  She says she is feeling much better than she has in the past. Deborah Wall is having much more energy and eating well.  Her colostomy site seems to be working well and having less issues with the colostomy bag changes and not having as much leakage or skin breakdown from the colostomy. Deborah Wall is also had a colonoscopy. She is doing well. She is having some narrowing of the colostomy site but still functioning well. Past Medical History:   Diagnosis Date    Glaucoma      H/O seasonal allergies      Nausea & vomiting      [place] spotted fever      Sinus infection      UTI (urinary tract infection)                 Past Surgical History:   Procedure Laterality Date    COLONOSCOPY N/A 4/12/2022     COLONOSCOPY performed by Sheila Najera MD at [facility] ENDOSCOPY    HX HEENT   1985     ethmoidectomy    HX HYSTERECTOMY   2003    DE ABDOMEN SURGERY PROC UNLISTED         colectomy with ostomy            Current Outpatient Medications:     sodium-potassium-mag sulfate (Suprep Bowel Prep Kit) 17.5-3.13-1.6 gram solr oral solution, Take 177 mL by mouth See Admin Instructions. , Disp: 1 Kit, Rfl: 0    timoloL maleate 0.5 % drpd ophthalmic solution, Administer 1 Drop to both eyes daily. , Disp: , Rfl:     azelastine HCl (AZELASTINE NA), 0.1 % by Nasal route., Disp: , Rfl:     vit A/vit C/vit E/zinc/copper (PRESERVISION AREDS PO), Take  by mouth two (2) times a day., Disp: , Rfl:     brimonidine (ALPHAGAN) 0.2 % ophthalmic solution, INSTILL 1 DROP INTO BOTH EYES TWICE DAILY, Disp: , Rfl:     cholecalciferol, vitamin D3, (Vitamin D3) 100 mcg (4,000 unit) cap, Take  by mouth daily. , Disp: , Rfl:     L.acid,para-B. bifidum-S.therm (RISAQUAD) 8 billion cell cap cap, Take 1 Capsule by mouth daily. , Disp: 30 Capsule, Rfl: 0    zolpidem (AMBIEN) 5 mg tablet, Take 5 mg by mouth nightly as needed for Sleep., Disp: , Rfl:     ondansetron (ZOFRAN ODT) 4 mg disintegrating tablet, Take 1 Tablet by mouth every eight (8) hours as needed for Nausea or Vomiting. (Patient not taking: Reported on 11/30/2021), Disp: 20 Tablet, Rfl: 0           Allergies   Allergen Reactions    Keflex [Cephalexin] Diarrhea    Ceftin [Cefuroxime Axetil] Hives    Codeine Rash    Levaquin [Levofloxacin] Other (comments)       Joint swelling    Pcn [Penicillins] Hives    Sulfa (Sulfonamide Antibiotics) Hives    Erythromycin Hives         Social History            Socioeconomic History    Marital status:        Spouse name: Not on file    Number of children: Not on file    Years of education: Not on file    Highest education level: Not on file   Occupational History    Not on file   Tobacco Use    Smoking status: Never Smoker    Smokeless tobacco: Never Used   Substance and Sexual Activity    Alcohol use: Not Currently    Drug use: Not Currently    Sexual activity: Not on file   Other Topics Concern    Not on file   Social History Narrative    Not on file      Social Determinants of Health          Financial Resource Strain:     Difficulty of Paying Living Expenses: Not on file   Food Insecurity:     Worried About Running Out of Food in the Last Year: Not on file    Ct of Food in the Last Year: Not on file   Transportation Needs:     Lack of Transportation (Medical): Not on file    Lack of Transportation (Non-Medical):  Not on file   Physical Activity:     Days of Exercise per Week: Not on file    Minutes of Exercise per Session: Not on file   Stress:     Feeling of Stress : Not on file   Social Connections:     Frequency of Communication with Friends and Family: Not on file    Frequency of Social Gatherings with Friends and Family: Not on file    Attends Mandaen Services: Not on file    Active Member of Clubs or Organizations: Not on file    Attends Club or Organization Meetings: Not on file    Marital Status: Not on file   Intimate Partner Violence:     Fear of Current or Ex-Partner: Not on file    Emotionally Abused: Not on file    Physically Abused: Not on file    Sexually Abused: Not on file   Housing Stability:     Unable to Pay for Housing in the Last Year: Not on file    Number of [place] in the Last Year: Not on file    Unstable Housing in the Last Year: Not on file         No family history on file.     ROS   Constitutional: negative  Ears, Nose, Mouth, Throat, and Face: negative  Respiratory: negative  Cardiovascular: negative  Gastrointestinal: negative  Genitourinary:negative  Integument/Breast: negative  Hematologic/Lymphatic: negative  Behavioral/Psychiatric: negative  Allergic/Immunologic: negative        Physical Exam:      Visit Vitals  /83 (BP 1 Location: Left arm, BP Patient Position: Sitting, BP Cuff Size: Adult)   Pulse 66   Temp 98.1 °F (36.7 °C) (Oral)   Resp 18   Ht 5' 2\" (1.575 m)   Wt 165 lb 9.6 oz (75.1 kg)   SpO2 95%   BMI 30.29 kg/m²         General - alert and oriented, no apparent distress  HEENT - no jaundice, no hearing imparement  Pulm - CTAB, no C/W/R  CV - RRR, no M/R/G  Abd -soft, nondistended, bowel sounds present, some narrowing at the colostomy site but at least a centimeter and a half open  Ext - pulses intact in UE and LE bilaterally, no edema  Skin - supple, no rashes  Psychiatric - normal affect, good mood     Labs        Lab Results   Component Value Date/Time     Sodium 140 11/22/2021 05:18 AM     Potassium 3.8 11/22/2021 05:18 AM     Chloride 104 11/22/2021 05:18 AM     CO2 33 (H) 11/22/2021 05:18 AM     Anion gap 3 (L) 11/22/2021 05:18 AM     Glucose 107 (H) 11/22/2021 05:18 AM     BUN 7 11/22/2021 05:18 AM     Creatinine 0.39 (L) 11/22/2021 05:18 AM     BUN/Creatinine ratio 18 11/22/2021 05:18 AM     GFR est AA >60 11/22/2021 05:18 AM     GFR est non-AA >60 2021 05:18 AM     Calcium 9.2 2021 05:18 AM     Bilirubin, total 0.7 10/31/2021 12:12 PM     Alk. phosphatase 175 (H) 10/31/2021 12:12 PM     Protein, total 7.3 10/31/2021 12:12 PM     Albumin 2.8 (L) 10/31/2021 12:12 PM     Globulin 4.5 (H) 10/31/2021 12:12 PM     A-G Ratio 0.6 (L) 10/31/2021 12:12 PM     ALT (SGPT) 38 10/31/2021 12:12 PM     AST (SGOT) 26 10/31/2021 12:12 PM            Lab Results   Component Value Date/Time     WBC 4.8 2021 05:18 AM     Hemoglobin (POC) 8.9 (L) 2021 12:23 PM     HGB 9.3 (L) 2021 05:18 AM     HCT 29.5 (L) 2021 05:18 AM     PLATELET 874 10/95/9967 05:18 AM     MCV 89.7 2021 05:18 AM            Imaging  none  I have reviewed and agree with all of the pertinent images     Colonoscopy report-  Procedure Details:  After informed consent was obtained with all risks and benefits of procedure explained and preoperative exam completed, the patient was taken to the endoscopy suite and placed in the supine position.  Upon sequential sedation as per above, The Olympus  endoscope was inserted through the stoma and carefully advanced to the cecum, which was identified by the ileocecal valve and appendiceal orifice. A larger diameter scope could not be used due to the narrow diameter of the stoma.  The quality of preparation was adequate.  The scope was slowly withdrawn with careful evaluation between folds. Next, the  endoscope was used to inspect the rectum and rectosigmoid. Retroflexion in the rectum was completed .      Findings:   Mild diverticulosis seen in the post-operative left colon. Mild diverticulosis seen in the rectosigmoid region. Small internal hemorrhoids        Specimen Removed: none     Complications: None.      EBL:  None.     Impression:    As above     Recommendations:   Follow up with Dr. Leana Palacios  Repeat colonoscopy in 10 years     Assessment:      Maribell Estrada is a 79 y.o. female status post sigmoid colectomy with end colostomy for perforated diverticulitis     Recommendations:      1. She is doing well now and she is ready for colostomy takedown here in the operating room. She is still having some narrowing at the colostomy site but it is open enough for her colonoscopy and bowel prep. She will be scheduled for laparoscopic possible open colostomy takedown here in the near future. I would likely have Dr. Travis Zabala assist me with surgery. She will undergo ERAS protocol.   I will also have urology placed bilateral ureteral stents as well.     Lyn Vickers MD

## 2022-05-25 LAB
ANION GAP SERPL CALC-SCNC: 6 MMOL/L (ref 5–15)
BASOPHILS # BLD: 0 K/UL (ref 0–0.1)
BASOPHILS NFR BLD: 0 % (ref 0–1)
BUN SERPL-MCNC: 15 MG/DL (ref 6–20)
BUN/CREAT SERPL: 22 (ref 12–20)
CALCIUM SERPL-MCNC: 9.2 MG/DL (ref 8.5–10.1)
CHLORIDE SERPL-SCNC: 104 MMOL/L (ref 97–108)
CO2 SERPL-SCNC: 24 MMOL/L (ref 21–32)
CREAT SERPL-MCNC: 0.67 MG/DL (ref 0.55–1.02)
DIFFERENTIAL METHOD BLD: ABNORMAL
EOSINOPHIL # BLD: 0 K/UL (ref 0–0.4)
EOSINOPHIL NFR BLD: 0 % (ref 0–7)
ERYTHROCYTE [DISTWIDTH] IN BLOOD BY AUTOMATED COUNT: 12.9 % (ref 11.5–14.5)
GLUCOSE SERPL-MCNC: 122 MG/DL (ref 65–100)
HCT VFR BLD AUTO: 40.3 % (ref 35–47)
HGB BLD-MCNC: 13.1 G/DL (ref 11.5–16)
IMM GRANULOCYTES # BLD AUTO: 0 K/UL (ref 0–0.04)
IMM GRANULOCYTES NFR BLD AUTO: 0 % (ref 0–0.5)
LYMPHOCYTES # BLD: 1.1 K/UL (ref 0.8–3.5)
LYMPHOCYTES NFR BLD: 12 % (ref 12–49)
MAGNESIUM SERPL-MCNC: 2.1 MG/DL (ref 1.6–2.4)
MCH RBC QN AUTO: 29.1 PG (ref 26–34)
MCHC RBC AUTO-ENTMCNC: 32.5 G/DL (ref 30–36.5)
MCV RBC AUTO: 89.6 FL (ref 80–99)
MONOCYTES # BLD: 1.3 K/UL (ref 0–1)
MONOCYTES NFR BLD: 14 % (ref 5–13)
NEUTS SEG # BLD: 7.2 K/UL (ref 1.8–8)
NEUTS SEG NFR BLD: 74 % (ref 32–75)
NRBC # BLD: 0 K/UL (ref 0–0.01)
NRBC BLD-RTO: 0 PER 100 WBC
PHOSPHATE SERPL-MCNC: 3.8 MG/DL (ref 2.6–4.7)
PLATELET # BLD AUTO: 186 K/UL (ref 150–400)
PMV BLD AUTO: 9.3 FL (ref 8.9–12.9)
POTASSIUM SERPL-SCNC: 3.9 MMOL/L (ref 3.5–5.1)
RBC # BLD AUTO: 4.5 M/UL (ref 3.8–5.2)
SODIUM SERPL-SCNC: 134 MMOL/L (ref 136–145)
WBC # BLD AUTO: 9.7 K/UL (ref 3.6–11)

## 2022-05-25 PROCEDURE — 80048 BASIC METABOLIC PNL TOTAL CA: CPT

## 2022-05-25 PROCEDURE — 65270000029 HC RM PRIVATE

## 2022-05-25 PROCEDURE — 83735 ASSAY OF MAGNESIUM: CPT

## 2022-05-25 PROCEDURE — 51798 US URINE CAPACITY MEASURE: CPT

## 2022-05-25 PROCEDURE — 85025 COMPLETE CBC W/AUTO DIFF WBC: CPT

## 2022-05-25 PROCEDURE — 36415 COLL VENOUS BLD VENIPUNCTURE: CPT

## 2022-05-25 PROCEDURE — 74011250636 HC RX REV CODE- 250/636: Performed by: SURGERY

## 2022-05-25 PROCEDURE — 74011000250 HC RX REV CODE- 250: Performed by: SURGERY

## 2022-05-25 PROCEDURE — 74011250636 HC RX REV CODE- 250/636

## 2022-05-25 PROCEDURE — 74011250637 HC RX REV CODE- 250/637: Performed by: SURGERY

## 2022-05-25 PROCEDURE — 84100 ASSAY OF PHOSPHORUS: CPT

## 2022-05-25 RX ADMIN — OXYCODONE 5 MG: 5 TABLET ORAL at 02:48

## 2022-05-25 RX ADMIN — ENOXAPARIN SODIUM 40 MG: 100 INJECTION SUBCUTANEOUS at 08:57

## 2022-05-25 RX ADMIN — BRIMONIDINE TARTRATE 1 DROP: 2 SOLUTION/ DROPS OPHTHALMIC at 08:57

## 2022-05-25 RX ADMIN — KETOROLAC TROMETHAMINE 15 MG: 30 INJECTION, SOLUTION INTRAMUSCULAR; INTRAVENOUS at 06:43

## 2022-05-25 RX ADMIN — OXYCODONE 5 MG: 5 TABLET ORAL at 09:04

## 2022-05-25 RX ADMIN — ONDANSETRON HYDROCHLORIDE 4 MG: 2 SOLUTION INTRAMUSCULAR; INTRAVENOUS at 21:24

## 2022-05-25 RX ADMIN — KETOROLAC TROMETHAMINE 15 MG: 30 INJECTION, SOLUTION INTRAMUSCULAR; INTRAVENOUS at 12:24

## 2022-05-25 RX ADMIN — ONDANSETRON HYDROCHLORIDE 4 MG: 2 SOLUTION INTRAMUSCULAR; INTRAVENOUS at 12:40

## 2022-05-25 RX ADMIN — BRIMONIDINE TARTRATE 1 DROP: 2 SOLUTION/ DROPS OPHTHALMIC at 17:39

## 2022-05-25 RX ADMIN — KETOROLAC TROMETHAMINE 15 MG: 30 INJECTION, SOLUTION INTRAMUSCULAR; INTRAVENOUS at 00:37

## 2022-05-25 RX ADMIN — ACETAMINOPHEN 1000 MG: 500 TABLET ORAL at 07:26

## 2022-05-25 RX ADMIN — ACETAMINOPHEN 1000 MG: 500 TABLET ORAL at 02:48

## 2022-05-25 RX ADMIN — ZOLPIDEM TARTRATE 5 MG: 5 TABLET ORAL at 22:08

## 2022-05-25 RX ADMIN — ACETAMINOPHEN 1000 MG: 500 TABLET ORAL at 19:38

## 2022-05-25 RX ADMIN — NALOXEGOL OXALATE 25 MG: 12.5 TABLET, FILM COATED ORAL at 08:57

## 2022-05-25 RX ADMIN — TIMOLOL MALEATE 1 DROP: 5 SOLUTION OPHTHALMIC at 08:58

## 2022-05-25 RX ADMIN — SODIUM CHLORIDE, PRESERVATIVE FREE 10 ML: 5 INJECTION INTRAVENOUS at 06:44

## 2022-05-25 RX ADMIN — ACETAMINOPHEN 1000 MG: 500 TABLET ORAL at 15:19

## 2022-05-25 RX ADMIN — SODIUM CHLORIDE, PRESERVATIVE FREE 10 ML: 5 INJECTION INTRAVENOUS at 15:20

## 2022-05-25 NOTE — PROGRESS NOTES
Bedside and Verbal shift change report given to Lissa Kendall (oncoming nurse) by Vivian Apodaca (offgoing nurse). Report included the following information SBAR, Kardex, Procedure Summary, Intake/Output, MAR and Recent Results.

## 2022-05-25 NOTE — OP NOTES
[address]   OPERATIVE REPORT    Name:  Krystal Davis  MR#:  [id number]  :  1955  ACCOUNT #:  [de-identified]  DATE OF SERVICE:  2022      PREOPERATIVE DIAGNOSIS:  Diverticulitis. POSTOPERATIVE DIAGNOSIS:  Diverticulitis. PROCEDURES PERFORMED:  Laparoscopic colostomy takedown with splenic flexure mobilization, low anterior colonic resection, colonoscopy, and lysis of adhesions greater than 2 hours. SURGEON:  Juliet Colvin MD    ASSISTING SURGEON:  Mario Morales MD      ANESTHESIA:  General.    COMPLICATIONS:  None. SPECIMENS REMOVED:  Colostomy and colostomy site, low anterior resection, and distal and proximal anastomotic rings. IMPLANTS:  None. ESTIMATED BLOOD LOSS:  Less than 100 mL. DRAINS:  None. FINDINGS:  Low anterior resection needed to narrowing of the distal rectosigmoid junction, resected about 5-6 cm of the rectosigmoid stump. Colon anastomosis was performed at about 15 cm from the anal verge with a 29-mm EEA stapler. Splenic flexure was taken down for the length of the colon. Lysis of adhesions was greater than 2 hours for the case. INDICATIONS FOR THE OPERATION:  The patient is a 19-year-old female who has a history of an open sigmoid colectomy for acute-on-chronic perforated diverticulitis who is 7 months status post colectomy and is now ready for colostomy takedown in the operating room. Cystoscopy with ureteral stents placed by Dr. Tamie Guerrero prior to my portion of the operation. My assisting surgeon, Dr. Austen Waletr, was necessary for the operation due to the complex nature of the operation. He was necessary for operation of the camera, retraction, intraoperative decision-making, and firing of the stapler. DESCRIPTION OF THE OPERATION:  The patient was met in the preop holding area. The H and P was updated. Consent was signed. All risks and benefits were explained to the patient prior to the start of the operation. She was taken back to the operating room. She was lying in the supine position and into a lithotomy position. Dr. Tye Gaming did the ureteral stents prior to my portion of the operation. After he was done with that, we then prepped and draped the abdomen and anal area in standard sterile fashion. Time-out was called. Antibiotics were given. SCDs were on lower extremities. We started the operation by making a 5-mm incision into the right upper quadrant, inserting a VisiPort trocar into the intra-abdominal cavity, insufflating to 15 mmHg. We then placed a 12-mm trocar in the right lower quadrant, a 5-mm periumbilical trocar, 5-mm subxiphoid trocar, all the trocars were placed under direct visualization. There were no injuries to the underlying abdominal structures. There were few adhesions in the anterior abdominal wall which were taken down with the scissors all the way down into the pelvis area. There was small bowel stuck down into the pelvis and the sidewall. The right colon was mobilized off of the sidewall as was the appendix and the distal small bowel. The distal small bowel was dissected off of the rectal stump. We could see our stitches from the Prolene sutures from the operation previously. We then continued taking down these adhesions down into the pelvis, freeing up the rectal stump. After we got the small bowel and right colon out of the pelvis, the patient was in Trendelenburg positioning. We started to mobilize the rectal stump a bit more. We would need to do a total mesocolic mobilization, dissecting into the sacral prominence, dissecting into the TME plane, dissecting into that plane inferiorly down around pushing the posterior portion of the rectum getting into that avascular plane. We dissected into that avascular plane posteriorly and laterally on the right and left sides, getting down about 10 cm or so down from the sacral promontory, mobilizing more of the colon.   We did this anteriorly along the anterior portion of the colon. We mobilized the rectosigmoid junction a bit further. We then tried placing a 25-mm sizing anvil up through the anus and rectum up to the area of the staple line. We could not get it all the way up to the end of the staple line and needed to mobilize the colon a little bit more in a low anterior resection kind of fashion for the dissection. We were well away from ureters laterally and stayed out of the pelvic vessels and the pelvic sacral wall. We stayed off of the bladder as well during our mobilization anteriorly. We had about good 10+ cm mobilized into the operative field to aid in passing the sizer up and getting more length on our rectal stump. Again, we could not really get the sizer all the way to the end of the staple line due to either narrowing or stricturing. We did place the colonoscope in through the anus and up to the anastomosis to the staple line. We could see it appeared to be a little bit narrow in that location, so we would need to do a rectosigmoid resection of the distal portion of the rectal stump to allow for the stapler to come up for the anastomosis. We would do this a little bit later in the case after we had finished mobilizing as much as we could. We then took down some of the small-bowel adhesions in the left lower quadrant along the left lower quadrant sidewall. The small-bowel adhesions were taken down. We then would take down our colostomy from the subcutaneous tissue going back open, making elliptical incision around the ostomy site, dissected the subcutaneous tissue and dissecting the colon from the subcutaneous tissue all the way down to the fascia, incising the fascia and dissecting into the peritoneal cavity, taking down all the attachments circumferentially around the fascia and the colostomy  site. We made sure we preserved the mesentery of the colon.   Once that was all taken down and mobilized completely, we then opened up the end of our colostomy site and placed a 29-mm EEA anvil into the end of the colon about 4 cm away from the end of the colostomy. We brought that spike through the anterior wall of the antimesenteric part of the colon and then sutured it into place with a 2-0 Vicryl pursestring suture. We stapled off the end of the bowel with a tan load 60-mm Signia stapler. The anvil was in good position. We then dropped that back down into the abdominal cavity and then closed our fascia of our colostomy site with a #1 single-stranded PDS suture in a running fashion. The colostomy site fascia was closed. Everything looked good with the closure. We then would mobilize the splenic flexure, dissecting up along the lateral abdominal wall in the white line of Toldt, which had been partially taken down before. There was a lot of adhesions in this area. The adhesions taken down all the way up to the splenic flexure. We then started taking down the splenic flexure going from medial to lateral along the transverse colon, coming up along into the lesser sac, taking down the splenic flexure and splenic attachments of the omentum in that area. Once all the splenic flexure attachments were taken down, the colon was fully mobilized as much medially and inferiorly as it would go. We would test and see how far the anvil of the end of the sigmoid colon would come down for anastomosis. It looked like it would come down very easily. We then would do our colon resection, dissecting in the mesentery of the end of the rectosigmoid stump which we would resect. We cleared off that mesentery and then used a purple load 60-mm Signia stapler to remove about 5-6 cm of the rectosigmoid junction, completing our low anterior resection. Our low anterior resection was completed. The portion of the colon was passed off the field for pathology and it was labeled as low anterior resection.   We then would bring in our sizer and it came up to the staple line well. We then brought in the 29-mm EEA Ethicon stapler bringing it up to the end of the staple line from the anus and all the way up to the end of the staple line. We then brought the spike through the staple line and then mated the stapler anvil with the stapler, brought the two together very carefully and finding that there was no twisting of the mesentery. The colon looked good. We then brought the two together, fired the stapler all the way tight and then removed the stapler, passed it off the field. We looked at our donuts. Our donuts looked complete and good. There was no sign of any issues. We then performed colonoscopy. We placed the colonoscope up the anus and up to the anastomosis which the anastomosis was at about 15 cm. The anastomosis was widely patent. There was no leak during our leak test and there was no bleeding from the staple line. We decompressed the colon, removing our colonoscope and then we surveyed the abdominal cavity and washed it out thoroughly. There was no need for any drain. We then suctioned out the pelvis and then closed our 12-mm right lower quadrant port fascial defect with an Endo Close suture passing device in interrupted figure-of-eight fashion with 0 Vicryl suture. We then completed the laparoscopic portion of the operation and then we would use a closing tray to close our laparoscopic sites with 4-0 Monocryl and Dermabond and then we would close the colostomy site with a deep 3-0 Vicryl suture in the deep fatty layer and then closed the skin loosely with staples with skin david in between the staples and a dry sterile dressing over top. We then finished the operation. The ureteral stents were removed at the end of the case but the Zambrano stayed in place. We then completed the operation. Dr. Donnell Perez was present and scrubbed during the entire operation. The counts were correct.   The lysis of adhesions during the  case took greater than 2 hours.        MD JEREMIAH [id number]  D:  05/24/2022 14:04  T:  05/25/2022 1:04  JOB #:  [id number]

## 2022-05-25 NOTE — PROGRESS NOTES
Transition of Care: likely home with f/u with specialist/pcp    Transport Plan: likely in car with family    RUR: 10%    Main contact is : Beto Weiss- [phone]    DX: diverticulitis/scheduled procedure    Discharge pending:   -patient is POD#1 lapcolostomy  -pending diet advancement  -pending medical progress and care recommendations    1030: this CM met with pleasant patient at bedside; she is alert and oriented x 4; patient states she lives at stated address with her ; patient is normally independent in her ADLs; patient confirms her pcp; insurance, preferred pharmacy is the [address] [place]    Reason for Admission: diverticultis                       RUR Score:    10%                 Plan for utilizing home health:      Likely none needed    PCP: First and Last name:  Teresa Yeung MD     Name of Practice:    Are you a current patient: Yes/No: yes   Approximate date of last visit: 2022   Can you participate in a virtual visit with your PCP: unknown                    Current Advanced Directive/Advance Care Plan: Prior  (full)    Healthcare Decision Maker:   Click here to complete [address] including selection of the Healthcare Decision Maker Relationship (ie \"Primary\")             Primary Decision Maker: Jerod Carrera - Spouse - [phone]                  Transition of Care Plan:     Likely home with f/u with specialist/pcp; transport in car with family      Care Management Interventions  Mode of Transport at Discharge:  Other (see comment) (likely in car with family)  Physical Therapy Consult: No  Occupational Therapy Consult: No  Speech Therapy Consult: No  Support Systems: Spouse/Significant Other  Discharge Location  Patient Expects to be Discharged to[de-identified] Home with family assistance (and f/u with specialist/pcp)     CM following  Rollen Cooks, RN, CRM

## 2022-05-25 NOTE — PROGRESS NOTES
[employer] [facility] at [facility]    POD #1    Subjective     Doing well, minimal pain, passed flatus, no N/V    Objective     Patient Vitals for the past 24 hrs:   Temp Pulse Resp BP SpO2   05/25/22 0130 98.9 °F (37.2 °C) (!) 58 18 (!) 105/55 95 %   05/24/22 2206 99.2 °F (37.3 °C) 67 18 107/69 95 %   05/24/22 1617 98 °F (36.7 °C) 70 18 (!) 155/83 98 %   05/24/22 1511 97.5 °F (36.4 °C) 67 18 (!) 162/91 97 %   05/24/22 1445     100 %   05/24/22 1440 97.9 °F (36.6 °C)       05/24/22 1430  69 17 (!) 153/82 97 %   05/24/22 1415  68 15 (!) 146/76 98 %   05/24/22 1408  69 17  100 %   05/24/22 1400  68 12 (!) 149/82 100 %   05/24/22 1350  68 9 (!) 151/78 100 %   05/24/22 1345  70 13 (!) 149/81 100 %   05/24/22 1335  69 12 136/79 98 %   05/24/22 1330  71 14 121/63 100 %   05/24/22 1325  72 13 118/75 100 %   05/24/22 1320  77 18 (!) 127/51 98 %   05/24/22 1316  76 18 137/75 100 %   05/24/22 1315 97.6 °F (36.4 °C) 73 16 137/75 98 %       Date 05/24/22 0700 - 05/25/22 0659 05/25/22 0700 - 05/26/22 0659   Shift 4660-4395 4508-3945 24 Hour Total 5122-0997 0286-8995 24 Hour Total   INTAKE   I.V.(mL/kg/hr) 1650(1.9)  1650(0.8)        I.V. 100  100        Volume (lactated Ringers infusion) 1200  1200        Volume (gentamicin (GARAMYCIN ADULT/PED) 250 mg in 0.9% sodium chloride 100 mL IVPB) 100  100        Volume (albumin human 5% (BUMINATE) solution) 250  250      Shift Total(mL/kg) 1650(22.2)  1650(20)      OUTPUT   Urine(mL/kg/hr) 725(0.8) 875(0.9) 1600(0.8)        Urine Output 650  650        Urine Output (mL) ([REMOVED] Urinary Catheter 05/24/22 Zambrano) 75 875 950      Blood 50  50        Estimated Blood Loss 50  50      Shift Total(mL/kg) 775(10.4) 875(10.6) 1650(20)       -875 0      Weight (kg) 74.2 82.3 82.3 82.3 82.3 82.3       PE  Pulm - CTAB  CV - RRR  Abd - soft, ND, BS present, incision c/d/i    Labs  Recent Results (from the past 12 hour(s))   METABOLIC PANEL, BASIC    Collection Time: 05/25/22  5:27 AM   Result Value Ref Range    Sodium 134 (L) 136 - 145 mmol/L    Potassium 3.9 3.5 - 5.1 mmol/L    Chloride 104 97 - 108 mmol/L    CO2 24 21 - 32 mmol/L    Anion gap 6 5 - 15 mmol/L    Glucose 122 (H) 65 - 100 mg/dL    BUN 15 6 - 20 MG/DL    Creatinine 0.67 0.55 - 1.02 MG/DL    BUN/Creatinine ratio 22 (H) 12 - 20      GFR est AA >60 >60 ml/min/1.73m2    GFR est non-AA >60 >60 ml/min/1.73m2    Calcium 9.2 8.5 - 10.1 MG/DL   MAGNESIUM    Collection Time: 05/25/22  5:27 AM   Result Value Ref Range    Magnesium 2.1 1.6 - 2.4 mg/dL   PHOSPHORUS    Collection Time: 05/25/22  5:27 AM   Result Value Ref Range    Phosphorus 3.8 2.6 - 4.7 MG/DL   CBC WITH AUTOMATED DIFF    Collection Time: 05/25/22  5:27 AM   Result Value Ref Range    WBC 9.7 3.6 - 11.0 K/uL    RBC 4.50 3.80 - 5.20 M/uL    HGB 13.1 11.5 - 16.0 g/dL    HCT 40.3 35.0 - 47.0 %    MCV 89.6 80.0 - 99.0 FL    MCH 29.1 26.0 - 34.0 PG    MCHC 32.5 30.0 - 36.5 g/dL    RDW 12.9 11.5 - 14.5 %    PLATELET 545 679 - 797 K/uL    MPV 9.3 8.9 - 12.9 FL    NRBC 0.0 0  WBC    ABSOLUTE NRBC 0.00 0.00 - 0.01 K/uL    NEUTROPHILS 74 32 - 75 %    LYMPHOCYTES 12 12 - 49 %    MONOCYTES 14 (H) 5 - 13 %    EOSINOPHILS 0 0 - 7 %    BASOPHILS 0 0 - 1 %    IMMATURE GRANULOCYTES 0 0.0 - 0.5 %    ABS. NEUTROPHILS 7.2 1.8 - 8.0 K/UL    ABS. LYMPHOCYTES 1.1 0.8 - 3.5 K/UL    ABS. MONOCYTES 1.3 (H) 0.0 - 1.0 K/UL    ABS. EOSINOPHILS 0.0 0.0 - 0.4 K/UL    ABS. BASOPHILS 0.0 0.0 - 0.1 K/UL    ABS. IMM. GRANS. 0.0 0.00 - 0.04 K/UL    DF AUTOMATED           Assessment     Michelle Katy is a 79 y. o.yr old female s/p laparoscopic colostomy takedown, LAR, ANTHONY splenic flexure takedown    Plan     Doing well on ERAS  Continue diet  Zambrano removed, due to void  Am labs look good  Continue ERAS medications  OOB more today  Continue IVF for now      Anand Wise MD

## 2022-05-26 PROCEDURE — 74011250636 HC RX REV CODE- 250/636: Performed by: SURGERY

## 2022-05-26 PROCEDURE — 0TJB8ZZ INSPECTION OF BLADDER, VIA NATURAL OR ARTIFICIAL OPENING ENDOSCOPIC: ICD-10-PCS | Performed by: UROLOGY

## 2022-05-26 PROCEDURE — 74011250637 HC RX REV CODE- 250/637: Performed by: SURGERY

## 2022-05-26 PROCEDURE — 65270000029 HC RM PRIVATE

## 2022-05-26 PROCEDURE — 74011000250 HC RX REV CODE- 250: Performed by: SURGERY

## 2022-05-26 RX ORDER — KETOROLAC TROMETHAMINE 30 MG/ML
15 INJECTION, SOLUTION INTRAMUSCULAR; INTRAVENOUS EVERY 6 HOURS
Status: DISCONTINUED | OUTPATIENT
Start: 2022-05-26 | End: 2022-05-27 | Stop reason: HOSPADM

## 2022-05-26 RX ADMIN — ACETAMINOPHEN 1000 MG: 500 TABLET ORAL at 09:01

## 2022-05-26 RX ADMIN — SODIUM CHLORIDE, PRESERVATIVE FREE 10 ML: 5 INJECTION INTRAVENOUS at 15:11

## 2022-05-26 RX ADMIN — BRIMONIDINE TARTRATE 1 DROP: 2 SOLUTION/ DROPS OPHTHALMIC at 18:10

## 2022-05-26 RX ADMIN — KETOROLAC TROMETHAMINE 15 MG: 30 INJECTION, SOLUTION INTRAMUSCULAR; INTRAVENOUS at 21:53

## 2022-05-26 RX ADMIN — ACETAMINOPHEN 1000 MG: 500 TABLET ORAL at 04:09

## 2022-05-26 RX ADMIN — ENOXAPARIN SODIUM 40 MG: 100 INJECTION SUBCUTANEOUS at 09:01

## 2022-05-26 RX ADMIN — BRIMONIDINE TARTRATE 1 DROP: 2 SOLUTION/ DROPS OPHTHALMIC at 09:01

## 2022-05-26 RX ADMIN — NALOXEGOL OXALATE 25 MG: 12.5 TABLET, FILM COATED ORAL at 09:01

## 2022-05-26 RX ADMIN — ZOLPIDEM TARTRATE 5 MG: 5 TABLET ORAL at 21:53

## 2022-05-26 RX ADMIN — KETOROLAC TROMETHAMINE 15 MG: 30 INJECTION, SOLUTION INTRAMUSCULAR; INTRAVENOUS at 15:12

## 2022-05-26 RX ADMIN — TIMOLOL MALEATE 1 DROP: 5 SOLUTION OPHTHALMIC at 09:01

## 2022-05-26 RX ADMIN — ACETAMINOPHEN 1000 MG: 500 TABLET ORAL at 17:12

## 2022-05-26 RX ADMIN — ACETAMINOPHEN 1000 MG: 500 TABLET ORAL at 21:53

## 2022-05-26 RX ADMIN — SODIUM CHLORIDE, PRESERVATIVE FREE 10 ML: 5 INJECTION INTRAVENOUS at 21:57

## 2022-05-26 RX ADMIN — KETOROLAC TROMETHAMINE 15 MG: 30 INJECTION, SOLUTION INTRAMUSCULAR; INTRAVENOUS at 09:01

## 2022-05-26 NOTE — PROGRESS NOTES
Bedside shift change report given to Anuradha Rasmussen RN (oncoming nurse) by Enrrique Ng RN (offgoing nurse). Report included the following information SBAR, Kardex, Intake/Output, MAR, Recent Results and Med Rec Status.

## 2022-05-26 NOTE — PROGRESS NOTES
[facility] at [facility]    POD #2    Subjective     Doing well, had some nausea yesteday, vomited 1-2 times, no N/V, today, had 2 BM, pain controlled    Objective     Patient Vitals for the past 24 hrs:   Temp Pulse Resp BP SpO2   05/26/22 0053 97.5 °F (36.4 °C) 62 16 123/75 95 %   05/25/22 1925 98.2 °F (36.8 °C) (!) 57 16 117/70 96 %   05/25/22 1449 98.5 °F (36.9 °C) 62 16 (!) 102/58 99 %   05/25/22 0749 98.9 °F (37.2 °C) 62 16 116/63 96 %       Date 05/25/22 0700 - 05/26/22 0659 05/26/22 0700 - 05/27/22 0659   Shift 0359-1849 6802-1211 24 Hour Total 6809-9618 7514-1192 24 Hour Total   INTAKE   Shift Total(mL/kg)         OUTPUT   Urine(mL/kg/hr) 100(0.1)  100(0.1)        Urine Voided 100  100        Urine Occurrence(s) 1 x  1 x      Stool           Stool Occurrence(s) 1 x  1 x      Shift Total(mL/kg) 100(1.2)  100(1.2)      NET -100  -100      Weight (kg) 82.3 82.3 82.3 82.3 82.3 82.3       PE  Pulm - CTAB  CV - RRR  Abd - soft, ND, BS present, incisions c/d/i    Labs  No results found for this or any previous visit (from the past 12 hour(s)). Yvette Lo is a 79 y. o.yr old female s/p laparscopic colostomy takedown and LAR    Plan     Doing well post op  Continue diet as tolerated, hopefully nausea will improve  OOB walking more today, needs to walk the halls  Already having BM and flatus  Continue IVF for now  Not quite ready for DC home yet but doing well    Ed Chen MD

## 2022-05-26 NOTE — OP NOTES
UROLOGY OPERATIVE NOTE    Patient: Clement Peralta MRN: [id number]  SSN: [id number]    YOB: 1955  Age: 79 y.o. Sex: female          Pre-operative Diagnosis: Diverticulitis  Post-operative Diagnosis: [x]          Same  Procedure: Cystoscopy,  bilateral ureteral catheter placement for ureteral identification  Surgeon: Erin Mendoza MD  Assistant: None  Anesthesia:  General  Complications: none  EBL: none  Specimens: none      Procedure: We are asked to place ureteral catheters in preparation for a colon resection. The patient is placed in the dorsolithotomy position and the genitalia is prepared with Betadine solution. Cystoscopy was carried out with a 21 panendoscope under camera control demonstrating a normal urethra, bladder neck and bladder mucosa. There are no stones seen within the confines of the bladder or evidence of any neoplastic changes. Using an open-ended catheter, bilateral ureteral catheters were placed using 5 Fr tiger tail catheters and positioned properly. A 16 Fr leon was then placed and the catheters were secured to the leon with the supplied clips. The catheters were placed into a catheter adaptor for temporary urine collection as they will be removed at the end of the case    The patient tolerated the procedure well and was then turned over to Dr. Eliana Light to complete the case in satisfactory condition.     Erin Mendoza MD   5/26/2022   10:59 AM

## 2022-05-26 NOTE — PROGRESS NOTES
Bedside shift change report given to Brant aDlal RN (oncoming nurse) by Enrique Rodriguez RN (offgoing nurse). Report included the following information SBAR, Kardex, ED Summary, Intake/Output, MAR, Accordion, Recent Results and Med Rec Status.

## 2022-05-26 NOTE — PROGRESS NOTES
Bedside shift change report given to Minal Lawton RN (oncoming nurse) by Griselda Benson RN (offgoing nurse). Report included the following information SBAR, Kardex, ED Summary, Intake/Output, MAR, Accordion, Recent Results, Med Rec Status and Cardiac Rhythm NSR.

## 2022-05-27 VITALS
SYSTOLIC BLOOD PRESSURE: 131 MMHG | WEIGHT: 181.44 LBS | HEIGHT: 62 IN | HEART RATE: 56 BPM | DIASTOLIC BLOOD PRESSURE: 78 MMHG | RESPIRATION RATE: 16 BRPM | TEMPERATURE: 97.7 F | BODY MASS INDEX: 33.39 KG/M2 | OXYGEN SATURATION: 96 %

## 2022-05-27 PROCEDURE — 74011250637 HC RX REV CODE- 250/637: Performed by: SURGERY

## 2022-05-27 PROCEDURE — 74011250637 HC RX REV CODE- 250/637: Performed by: NURSE PRACTITIONER

## 2022-05-27 PROCEDURE — 74011250636 HC RX REV CODE- 250/636: Performed by: SURGERY

## 2022-05-27 RX ORDER — IBUPROFEN 800 MG/1
800 TABLET ORAL
Qty: 30 TABLET | Refills: 0 | Status: SHIPPED | OUTPATIENT
Start: 2022-05-27

## 2022-05-27 RX ORDER — ONDANSETRON 4 MG/1
4 TABLET, ORALLY DISINTEGRATING ORAL
Qty: 30 TABLET | Refills: 0 | Status: SHIPPED | OUTPATIENT
Start: 2022-05-27

## 2022-05-27 RX ORDER — FAMOTIDINE 20 MG/1
20 TABLET, FILM COATED ORAL 2 TIMES DAILY
Status: DISCONTINUED | OUTPATIENT
Start: 2022-05-27 | End: 2022-05-27 | Stop reason: HOSPADM

## 2022-05-27 RX ADMIN — NALOXEGOL OXALATE 25 MG: 12.5 TABLET, FILM COATED ORAL at 09:51

## 2022-05-27 RX ADMIN — ACETAMINOPHEN 1000 MG: 500 TABLET ORAL at 04:41

## 2022-05-27 RX ADMIN — BRIMONIDINE TARTRATE 1 DROP: 2 SOLUTION/ DROPS OPHTHALMIC at 09:53

## 2022-05-27 RX ADMIN — KETOROLAC TROMETHAMINE 15 MG: 30 INJECTION, SOLUTION INTRAMUSCULAR; INTRAVENOUS at 09:51

## 2022-05-27 RX ADMIN — ACETAMINOPHEN 1000 MG: 500 TABLET ORAL at 09:52

## 2022-05-27 RX ADMIN — KETOROLAC TROMETHAMINE 15 MG: 30 INJECTION, SOLUTION INTRAMUSCULAR; INTRAVENOUS at 04:41

## 2022-05-27 RX ADMIN — TIMOLOL MALEATE 1 DROP: 5 SOLUTION OPHTHALMIC at 09:53

## 2022-05-27 RX ADMIN — FAMOTIDINE 20 MG: 20 TABLET ORAL at 09:57

## 2022-05-27 NOTE — PROGRESS NOTES
Patient and his spouse had discharge teaching done, both expressed understanding of the instructions presented and questions facilitated, paperwork given in print. Patient got dressed independently and was transported off the unit via wheelchair in no signs of distress. Patients belongings taken off unit by her spouse. No distress noted.

## 2022-05-27 NOTE — DISCHARGE INSTRUCTIONS
[address] [facility] at [address] [place] [address] Road Operative Instructions      Please call your surgeons  office for a 2 week follow-up appointment with Dr Darryl Murry . Office address is: [address]/ [address], [address] [place] [address] Pkw[address]  [phone]      Discharge Instructions: You may resume your usual diet as well as your usual medications. You are not cleared to drive if you are taking narcotic pain medication. If you are only using tylenol for pain and are comfortable sitting in a car, you may drive. No heavy lifting. No strenuous exercise. You are cleared to go up and down stairs. You may shower but no baths or swimming. Your incisions dont need any special care. You can wash them gently with  warm soap and water daily and pat them dry. Your stitches are under the skin and dont need to be removed. At the colostomy site the staples will be removed in a few weeks. Keep the colostomy site dry and clean, keep the wound covered each day with new dry gauze dressing    Ask you doctor when you can return to work. Call our office at  [phone] to make a 2 week follow up appointment. Call our office with any problems, questions or concerns. Call our office if you have any     Fevers of 101 or higher   Worsening pain  Nausea/Vomiting  Difficulty eating or drinking  Incisions that are red, open, draining or tender.

## 2022-05-27 NOTE — PROGRESS NOTES
Problem: Falls - Risk of  Goal: *Absence of Falls  Description: Document Annalisa Doherty Fall Risk and appropriate interventions in the flowsheet.   Outcome: Resolved/Met     Problem: Patient Education: Go to Patient Education Activity  Goal: Patient/Family Education  Outcome: Resolved/Met

## 2022-05-27 NOTE — PROGRESS NOTES
[facility] at [facility]    POD #3    Subjective     Doing well, tolerating diet, pain minimal, had 2 BM    Objective     Patient Vitals for the past 24 hrs:   Temp Pulse Resp BP SpO2   05/27/22 0740 97.7 °F (36.5 °C) (!) 56 16 131/78 96 %   05/27/22 0049 98.2 °F (36.8 °C) 62 18 122/71 95 %   05/26/22 2118 98.4 °F (36.9 °C) (!) 55 18 136/77 95 %   05/26/22 1503 97.5 °F (36.4 °C) 61 16 (!) 149/84 96 %           PE  Pulm - CTAB  CV - RRR  Abd - soft, ND, BS present, incisions c/d/i    Labs  No results found for this or any previous visit (from the past 12 hour(s)). Meliza Vera is a 79 y. o.yr old female s/p laparoscopic colostomy takedown and [facility] home today  Skin david removed from the colostomy site wound    Lyn Vickers MD

## 2022-05-27 NOTE — DISCHARGE SUMMARY
Physician Discharge Summary     Patient ID:  Maribell Estrada  [id number]  79 y.o.  1955    Admit Date: 5/24/2022    Discharge Date:     Admission Diagnoses: Diverticulitis [K57.92]    Discharge Diagnoses: Active Problems:    Diverticulitis (5/18/2021)         Admission Condition: Good    Discharge Condition: Good    Procedure(s):    5/24/2022 - Procedure(s):  LAPAROSCOPIC COLOSTOMY TAKEDOWN WITH SPLENIC FLEXURE MOBILIZATION, LOW ANTERIOR RESECTION, COLONOSCOPY, LYSIS OF ADHESIONS GREATER THAN 2 HOURS, CYSTOSCOPY URETERAL CATHETER INSERTION  CYSTOSCOPY URETERAL CATHETER INSERTION      Hospital Course:   Normal hospital course for this procedure. She started ERAS protocol post op. POD 1 she was passing flatus and tolerating some diet. On POD 2 started having BM. On POD 3 she was having 2 BM, tolerating diet, pain minimal and ready for DC home. Consults: None    Significant Diagnostic Studies: none    Disposition: home    Patient Instructions:   Current Discharge Medication List      START taking these medications    Details   ibuprofen (MOTRIN) 800 mg tablet Take 1 Tablet by mouth every six (6) hours as needed for Pain. Qty: 30 Tablet, Refills: 0      ondansetron (ZOFRAN ODT) 4 mg disintegrating tablet Take 1 Tablet by mouth every eight (8) hours as needed for Nausea or Vomiting. Qty: 30 Tablet, Refills: 0         CONTINUE these medications which have NOT CHANGED    Details   timoloL maleate 0.5 % drpd ophthalmic solution Administer 1 Drop to both eyes daily. azelastine HCl (AZELASTINE NA) 0.1 % by Nasal route. brimonidine (ALPHAGAN) 0.2 % ophthalmic solution INSTILL 1 DROP INTO BOTH EYES TWICE DAILY      zolpidem (AMBIEN) 5 mg tablet Take 5 mg by mouth nightly as needed for Sleep. !! OTHER 1 Caplet every morning. Zak Lubin      !! OTHER 1 Tablet nightly.  METHYL FOLATE      sodium-potassium-mag sulfate (Suprep Bowel Prep Kit) 17.5-3.13-1.6 gram solr oral solution Take 177 mL by mouth See Admin Instructions. Qty: 1 Kit, Refills: 0      vit A/vit C/vit E/zinc/copper (PRESERVISION AREDS PO) Take  by mouth two (2) times a day. cholecalciferol, vitamin D3, (Vitamin D3) 100 mcg (4,000 unit) cap Take 5,000 Units by mouth daily. L.acid,para-B. bifidum-S.therm (RISAQUAD) 8 billion cell cap cap Take 1 Capsule by mouth daily. Qty: 30 Capsule, Refills: 0       !! - Potential duplicate medications found. Please discuss with provider.           Activity: No heavy lifting for 2 weeks  Diet: Regular Diet  Wound Care: Keep wound clean and dry    Follow-up with Bettie Arroyo MD in 2 week(s)  Follow-up tests/labs none    Signed:  Bettie Arroyo MD  5/27/2022  9:25 AM   .

## 2022-05-27 NOTE — PROGRESS NOTES
Bedside shift change report given to [address] RN (oncoming nurse) by Júnior Littlejohn RN (offgoing nurse).  Report included the following information SBAR, Kardex, Intake/Output, MAR, Recent Results and Med Rec Status

## 2022-05-27 NOTE — PROGRESS NOTES
2200: Pt is doing well, no complaints of pain. Pt has been up ad john with no issues. Abd dressing is clean, dry, and intact. Pt stated she was able to eat more of her meals without feeling sick. Her only concerns are how to take care of her dressing while at home and what pain medication she will be sent home with.

## 2022-06-09 ENCOUNTER — OFFICE VISIT (OUTPATIENT)
Dept: SURGERY | Age: 67
End: 2022-06-09
Payer: COMMERCIAL

## 2022-06-09 VITALS
DIASTOLIC BLOOD PRESSURE: 83 MMHG | TEMPERATURE: 98.5 F | BODY MASS INDEX: 30.88 KG/M2 | RESPIRATION RATE: 16 BRPM | WEIGHT: 167.8 LBS | HEART RATE: 68 BPM | SYSTOLIC BLOOD PRESSURE: 138 MMHG | OXYGEN SATURATION: 96 % | HEIGHT: 62 IN

## 2022-06-09 DIAGNOSIS — Z09 POSTOPERATIVE EXAMINATION: Primary | ICD-10-CM

## 2022-06-09 PROCEDURE — 99024 POSTOP FOLLOW-UP VISIT: CPT | Performed by: NURSE PRACTITIONER

## 2022-06-09 NOTE — PROGRESS NOTES
1. Have you been to the ER, urgent care clinic since your last visit? Hospitalized since your last visit? no    2. Have you seen or consulted any other health care providers outside of the [address] [place] [address] since your last visit? Include any pap smears or colon screening.  no

## 2022-06-09 NOTE — PROGRESS NOTES
Subjective:      Luis E Flores is a 79 y.o. female presents for postop care following laparoscopic colostomy take down  With splenci flex removal station, lower anterior resection, colonoscopy, lysis of adhesions and cystoscopy ureteral catheter insertion. 2 weeks ago  Appetite is good. Eating a regular diet without difficulty. Bowel movements are regular. The patient is voiding without difficulty. Ms. Aniceto Cruz has a reminder for a \"due or due soon\" health maintenance. I have asked that she contact her primary care provider for follow-up on this health maintenance. Objective:     Visit Vitals  /83   Pulse 68   Temp 98.5 °F (36.9 °C) (Oral)   Resp 16   Ht 5' 2\" (1.575 m)   Wt 167 lb 12.8 oz (76.1 kg)   SpO2 96%   BMI 30.69 kg/m²       General:  alert, cooperative, no distress   Abdomen: soft, bowel sounds active, non-tender   Incision:   healing well, no drainage, no erythema, no dehiscence, incision well approximated. Cover incision with some erythema around the incision. No pus noted. Staples removed from old colostomy site. Steri-Strips placed. Assessment:     Doing well postoperatively. Plan:     Follow-up as needed. Keep incision clean and dry. No lifting greater than 20 pounds x 4 weeks.

## 2022-09-06 ENCOUNTER — TELEPHONE (OUTPATIENT)
Dept: SURGERY | Age: 67
End: 2022-09-06

## 2022-09-06 ENCOUNTER — PATIENT MESSAGE (OUTPATIENT)
Dept: SURGERY | Age: 67
End: 2022-09-06

## 2022-09-06 NOTE — TELEPHONE ENCOUNTER
I spoke with the NP. I then called the patient. I asked her if she could take photos of the site and send them to us via [employer]. She informed me she would do it. I told her I will then route them to the NP. She acknowledged understanding and thanked me for he call.

## 2022-09-06 NOTE — TELEPHONE ENCOUNTER
I called the patient. I informed her the doctor and NP have viewed your photos. If you still feel you want to be seen you can come in tomorrow. The patient informed me she does want to be seen so she will come to the appointment tomorrow.

## 2022-09-06 NOTE — TELEPHONE ENCOUNTER
Patient called and stated that she is having problems with her suture site. She stated it is flared up, red, and hurts to the touch.

## 2022-09-06 NOTE — TELEPHONE ENCOUNTER
I spoke with the patient. She has not had a fever but the site is inflamed and tender to touch. She has been taking tylenol and using triple antibiotic ointment. I told her I will have one of the  staff call to get her in so that someone can assess the site.

## 2022-09-07 ENCOUNTER — OFFICE VISIT (OUTPATIENT)
Dept: SURGERY | Age: 67
End: 2022-09-07
Payer: COMMERCIAL

## 2022-09-07 VITALS
HEART RATE: 77 BPM | SYSTOLIC BLOOD PRESSURE: 136 MMHG | WEIGHT: 171 LBS | DIASTOLIC BLOOD PRESSURE: 70 MMHG | OXYGEN SATURATION: 95 % | TEMPERATURE: 98.3 F | HEIGHT: 62 IN | BODY MASS INDEX: 31.47 KG/M2 | RESPIRATION RATE: 16 BRPM

## 2022-09-07 DIAGNOSIS — L76.82 INCISIONAL PAIN: Primary | ICD-10-CM

## 2022-09-07 DIAGNOSIS — R14.1 GAS PAIN: ICD-10-CM

## 2022-09-07 PROCEDURE — 1123F ACP DISCUSS/DSCN MKR DOCD: CPT | Performed by: NURSE PRACTITIONER

## 2022-09-07 PROCEDURE — 99212 OFFICE O/P EST SF 10 MIN: CPT | Performed by: NURSE PRACTITIONER

## 2022-09-07 NOTE — PROGRESS NOTES
1. Have you been to the ER, urgent care clinic since your last visit? Hospitalized since your last visit? No    2. Have you seen or consulted any other health care providers outside of the [address] [place] [address] since your last visit? Include any pap smears or colon screening.  No

## 2022-09-07 NOTE — PROGRESS NOTES
Subjective:      Moody Bonilla is a 79 y.o. female presents for postop care 3-1/2-month status post laparoscopic colostomy takedown with splenic mobilization, lower anterior resection, colonoscopy, lysis of adhesions, cystoscopy urethral cath insertion, cystoscopy. She states that she has been feeling for more fatigued over the past 2 weeks. She became concerned on Friday when her lap site on her mid upper abdomen became red and tender. She applied antibacterial ointment to the red lap site and it has gotten better. She states that her pain underneath of her incision has gotten better as well and now it is pinpoint at the left side of the incision. She states that the pain is much better. She states that she has been feeling more gassy. Her  states that they just recently switched her fiber patient states that when her fiber was switched is when she started having more of the gaseous symptoms. She was having some nausea over the weekend took Zofran. No vomiting. No diarrhea. Eating well        Ms. Saunders Every has a reminder for a \"due or due soon\" health maintenance. I have asked that she contact her primary care provider for follow-up on this health maintenance. Objective:     Visit Vitals  /70 (BP 1 Location: Right upper arm, BP Patient Position: Sitting, BP Cuff Size: Large adult)   Pulse 77   Temp 98.3 °F (36.8 °C) (Oral)   Resp 16   Ht 5' 2\" (1.575 m)   Wt 171 lb (77.6 kg)   SpO2 95%   BMI 31.28 kg/m²       General:  alert, cooperative   Abdomen: soft, slight pinpoint tenderness to the left of the incision. No hernia noted   Incision:   healing well, no drainage, no erythema, no dehiscence, incision well approximated. Upper mid incision has a darker pigmentation.   No erythema noted     Assessment:     Moody Bonilla is a 79 y.o. female presents for postop care 3-1/2-month status post laparoscopic colostomy takedown with splenic mobilization, lower anterior resection, colonoscopy, lysis of adhesions, cystoscopy urethral cath insertion, cystoscopy. With complaints of pinpoint tenderness at incision site. Gas. Plan:     Continue to monitor pinpoint pain at incision site. If pain continues we will have patient follow-up with Dr. Cabrera Zimmerman. Advised patient to stop fiber as this may be what is causing her to be gassy. She finds that she needs to resume fiber she should resume that she was taking before she switched as this makes her less gassy. Advised patient to rest as needed  May take ibuprofen and use heat to the area of discomfort. Patient in agreement  Pt verbalized understanding and questions were answered to the best of my knowledge and ability.       15 minutes spent with patient greater than 50% of time spent counseling

## 2022-09-08 ENCOUNTER — TELEPHONE (OUTPATIENT)
Dept: SURGERY | Age: 67
End: 2022-09-08

## 2022-09-08 ENCOUNTER — DOCUMENTATION ONLY (OUTPATIENT)
Dept: SURGERY | Age: 67
End: 2022-09-08

## 2022-09-08 ENCOUNTER — HOSPITAL ENCOUNTER (OUTPATIENT)
Dept: CT IMAGING | Age: 67
Discharge: HOME | End: 2022-09-08
Attending: SURGERY
Payer: COMMERCIAL

## 2022-09-08 DIAGNOSIS — R11.0 NAUSEA: Primary | ICD-10-CM

## 2022-09-08 DIAGNOSIS — R10.30 LOWER ABDOMINAL PAIN: ICD-10-CM

## 2022-09-08 DIAGNOSIS — R10.84 GENERALIZED ABDOMINAL PAIN: ICD-10-CM

## 2022-09-08 DIAGNOSIS — R10.30 LOWER ABDOMINAL PAIN: Primary | ICD-10-CM

## 2022-09-08 PROCEDURE — 74177 CT ABD & PELVIS W/CONTRAST: CPT

## 2022-09-08 PROCEDURE — 74011000636 HC RX REV CODE- 636: Performed by: SURGERY

## 2022-09-08 RX ADMIN — IOPAMIDOL 100 ML: 755 INJECTION, SOLUTION INTRAVENOUS at 18:59

## 2022-09-08 NOTE — TELEPHONE ENCOUNTER
Destini Trujillo from [address] [place] [address] called and stated that the patient's CT abd-pelv requires a peer to peer. She stated to call [employer] at [phone].

## 2022-09-08 NOTE — TELEPHONE ENCOUNTER
Patient's  Fani Suazo called and stated that he has a couple questions. First, he states that the testing was scheduled as routine priority. However, he is requesting for the staff to take a second look and change it to stat priority because he believes that the patient will not make it 3 more days until her testing on Monday- believes patient may end up in the ER before them. Next, he states that [employer] requires a pre-auth for the CT scan and he wants to confirm that the patient will not be paying out of pocket.

## 2022-09-08 NOTE — TELEPHONE ENCOUNTER
I called CS was able to get patient scheduled for this evening at the [place] location. She needs to arrive by 6:30 PM. NPO only clear liquids no Ibuprofen. I called Mrs Norah Mora informed of appointment. I informed patient the authorization will be handled by CS.

## 2022-09-08 NOTE — TELEPHONE ENCOUNTER
Patient called and stated that she is really not feeling well at all and needs to speak with Mundo Patten or a nurse.

## 2022-09-08 NOTE — PROGRESS NOTES
Spoke with Dr. Adelia Rodriguez. Ordered CBC with differential, CMP and CT abdomen pelvis. Patient will need follow-up with Dr. Adelia Rodriguez after CT scan.

## 2022-09-08 NOTE — TELEPHONE ENCOUNTER
Returned call to Mrs Elin Davies verified all PHI. Reviewed notes patient 3-1/2-month status post laparoscopic colostomy takedown with splenic mobilization, lower anterior resection, colonoscopy, lysis of adhesions, cystoscopy urethral cath insertion, cystoscopy. Patient c/o nausea lower back pain pressure to the rectum with mucus discharge. Whenever she eats she has terrible gas pain. It feels like the same symptoms she had prior. There is no temp. Patient was seen by Daylin Osorio NP yesterday. She c/o feeling more fatigued past 2 weeks. Patient states  states that her pain underneath of her incision has gotten better as well and now it is pinpoint at the left side of the incision. The NP recommended continue to monitor pinpoint pain at incision site. If pain continues we will have patient follow-up with Dr. Paloma Hicks. Ms Elin Davies states she discussed having a Ct Scan and blood work done prior. She would like to have something to go off of prior to being seen. Ms Elin Davies expressed she does not want to go back to the ER. Daylin Osorio made aware ordered CT ABD PELV W CONT, labwork. Mrs Elin Davies made aware I will have orders scanned to the [facility]. I gave her the number to CS advised to call to schedule preferably for this evening.  Advised she should be NPO prior to the test.

## 2022-09-09 ENCOUNTER — PATIENT MESSAGE (OUTPATIENT)
Dept: SURGERY | Age: 67
End: 2022-09-09

## 2022-09-09 NOTE — TELEPHONE ENCOUNTER
From: Amanda Trinidad  To: Kelly Gomez NP  Sent: 9/9/2022 10:17 AM EDT  Subject: Amanda Trinidad    This is Foreign Tamez, Juan Farah . I wanted to give you an update from my perspective. Thanks for getting her CAT scan moved up. I have read the report and that looks like all good news from my read. I wanted you to know that unfortunately we were not able to get her blood drawn until early this morning. That probably means there will not be any results until Monday as we understand it. Blood was taken at the [place] location. Further observations on current status. 1 - Her symptoms including severe gas, frequent bowel movements (this morning relatively loose),and some abdominal (upper middle) and back pain. These seem to occur within and hour or two of eating and then subside over the next several hours. 2- She continues to not have any fever but does report that she feels like she is on the verge of chills. 3 - It's going to be a long weekend. Not sure if there is anything we can be doing to treat symptoms beyond what we are already doing. I am wondering if gall bladder or pancreatitis could be a cause? Understand these are not uncommon given the nature of her surgery. And many of her symptoms seem to line up with these in addition to presence of gallstones (although there seemed to be no inflammation). In any case symptoms seem to be acute (shortly after eating). Any further assistance or a suggested path forward would be very much appreciated.

## 2022-09-12 ENCOUNTER — OFFICE VISIT (OUTPATIENT)
Dept: SURGERY | Age: 67
End: 2022-09-12
Payer: COMMERCIAL

## 2022-09-12 VITALS
RESPIRATION RATE: 20 BRPM | HEART RATE: 70 BPM | DIASTOLIC BLOOD PRESSURE: 86 MMHG | HEIGHT: 62 IN | BODY MASS INDEX: 32.39 KG/M2 | WEIGHT: 176 LBS | SYSTOLIC BLOOD PRESSURE: 143 MMHG | TEMPERATURE: 98.4 F | OXYGEN SATURATION: 95 %

## 2022-09-12 DIAGNOSIS — K80.20 SYMPTOMATIC CHOLELITHIASIS: Primary | ICD-10-CM

## 2022-09-12 PROCEDURE — 1123F ACP DISCUSS/DSCN MKR DOCD: CPT | Performed by: SURGERY

## 2022-09-12 PROCEDURE — 99214 OFFICE O/P EST MOD 30 MIN: CPT | Performed by: SURGERY

## 2022-09-12 NOTE — PROGRESS NOTES
[facility] Specialists at [facility] Surgery History and Physical    History of Present Illness:      Angelica Clark is a 79 y.o. female who is status post laparoscopic colostomy takedown about 4 months ago. More recently she has been having some right upper quadrant and epigastric abdominal pain with some generalized abdominal pain. She generally has the pain after having a big meal or fatty meal.  She otherwise has some mild generalized abdominal pain periodically that seems to be more related to the right upper quadrant abdominal pain. She has been having normal bowel movements no nausea or vomiting. She was recently sent for labs and a CT scan. The pain when she has it is a 4-5 out of 10. The pain usually last for few hours and then goes away. Past Medical History:   Diagnosis Date    Colitis     Glaucoma     H/O seasonal allergies     Nausea & vomiting     Peripheral neuropathy     [facility] spotted fever     Sinus infection     UTI (urinary tract infection)        Past Surgical History:   Procedure Laterality Date    COLONOSCOPY N/A 4/12/2022    COLONOSCOPY performed by Tim Chacon MD at [facility] ENDOSCOPY    [address] DOWN  05/24/2022    Low anterior resection. coloscopy ,lysis of adhesion by Dr. Yumiko Hernandez. HX HEENT  1985    ethmoidectomy    HX HYSTERECTOMY  2003    [place] ABDOMEN SURGERY PROC UNLISTED  11/2021    colectomy with ostomy         Current Outpatient Medications:     OTHER, 1 Caplet every morning. NERVIVE, Disp: , Rfl:     OTHER, 1 Tablet nightly. METHYL FOLATE, Disp: , Rfl:     sodium-potassium-mag sulfate (Suprep Bowel Prep Kit) 17.5-3.13-1.6 gram solr oral solution, Take 177 mL by mouth See Admin Instructions. , Disp: 1 Kit, Rfl: 0    timoloL maleate 0.5 % drpd ophthalmic solution, Administer 1 Drop to both eyes daily. , Disp: , Rfl:     azelastine HCl (AZELASTINE NA), 0.1 % by Nasal route., Disp: , Rfl:     vit A/vit C/vit E/zinc/copper (PRESERVISION AREDS PO), Take  by mouth two (2) times a day., Disp: , Rfl:     brimonidine (ALPHAGAN) 0.2 % ophthalmic solution, INSTILL 1 DROP INTO BOTH EYES TWICE DAILY, Disp: , Rfl:     cholecalciferol, vitamin D3, (Vitamin D3) 100 mcg (4,000 unit) cap, Take 5,000 Units by mouth daily. , Disp: , Rfl:     L.acid,para-B. bifidum-S.therm (RISAQUAD) 8 billion cell cap cap, Take 1 Capsule by mouth daily. , Disp: 30 Capsule, Rfl: 0    ibuprofen (MOTRIN) 800 mg tablet, Take 1 Tablet by mouth every six (6) hours as needed for Pain. (Patient not taking: Reported on 9/12/2022), Disp: 30 Tablet, Rfl: 0    ondansetron (ZOFRAN ODT) 4 mg disintegrating tablet, Take 1 Tablet by mouth every eight (8) hours as needed for Nausea or Vomiting. (Patient not taking: Reported on 9/12/2022), Disp: 30 Tablet, Rfl: 0    zolpidem (AMBIEN) 5 mg tablet, Take 5 mg by mouth nightly as needed for Sleep.  (Patient not taking: Reported on 9/12/2022), Disp: , Rfl:     Allergies   Allergen Reactions    Keflex [Cephalexin] Diarrhea    Ceftin [Cefuroxime Axetil] Hives    Codeine Rash    Levaquin [Levofloxacin] Other (comments)     Joint swelling    Pcn [Penicillins] Hives    Sulfa (Sulfonamide Antibiotics) Hives    Erythromycin Hives       Social History     Socioeconomic History    Marital status:      Spouse name: Not on file    Number of children: Not on file    Years of education: Not on file    Highest education level: Not on file   Occupational History    Not on file   Tobacco Use    Smoking status: Never    Smokeless tobacco: Never   Vaping Use    Vaping Use: Never used   Substance and Sexual Activity    Alcohol use: Not Currently    Drug use: Not Currently    Sexual activity: Not on file   Other Topics Concern    Not on file   Social History Narrative    Not on file     Social Determinants of Health     Financial Resource Strain: Not on file   Food Insecurity: Not on file   Transportation Needs: Not on file   Physical Activity: Not on file   Stress: Not on file   Social Connections: Not on file   Intimate Partner Violence: Not on file   Housing Stability: Not on file       Family History   Problem Relation Age of Onset    Diabetes Mother     Hypertension Mother     OSTEOARTHRITIS Mother     Dementia Mother     Diabetes Father     Hypertension Father     Heart Disease Father     Heart Surgery Father     No Known Problems Sister     Mult Sclerosis Daughter     Diabetes Daughter     Hypertension Daughter     Anesth Problems Neg Hx        ROS   Constitutional: negative  Ears, Nose, Mouth, Throat, and Face: negative  Respiratory: negative  Cardiovascular: negative  Gastrointestinal: positive for abdominal pain  Genitourinary:negative  Integument/Breast: negative  Hematologic/Lymphatic: negative  Behavioral/Psychiatric: negative  Allergic/Immunologic: negative      Physical Exam:     Visit Vitals  BP (!) 143/86 (BP 1 Location: Left arm, BP Patient Position: Sitting, BP Cuff Size: Adult)   Pulse 70   Temp 98.4 °F (36.9 °C) (Oral)   Resp 20   Ht 5' 2\" (1.575 m)   Wt 176 lb (79.8 kg)   SpO2 95%   BMI 32.19 kg/m²       General - alert and oriented, no apparent distress  HEENT - no jaundice, no hearing imparement  Pulm - CTAB, no C/W/R  CV - RRR, no M/R/G  Abd -soft, nondistended, bowel sounds present, nontender to palpation, incisions all well-healed  Ext - pulses intact in UE and LE bilaterally, no edema  Skin - supple, no rashes  Psychiatric - normal affect, good mood    Labs  Lab Results   Component Value Date/Time    Sodium 139 09/09/2022 07:40 AM    Potassium 4.2 09/09/2022 07:40 AM    Chloride 103 09/09/2022 07:40 AM    CO2 27 09/09/2022 07:40 AM    Anion gap 9 09/09/2022 07:40 AM    Glucose 107 (H) 09/09/2022 07:40 AM    BUN 14 09/09/2022 07:40 AM    Creatinine 0.70 09/09/2022 07:40 AM    BUN/Creatinine ratio 20 09/09/2022 07:40 AM    GFR est AA >60 09/09/2022 07:40 AM    GFR est non-AA >60 09/09/2022 07:40 AM    Calcium 9.4 09/09/2022 07:40 AM    Bilirubin, total 0.8 09/09/2022 07:40 AM    Alk. phosphatase 120 (H) 09/09/2022 07:40 AM    Protein, total 6.9 09/09/2022 07:40 AM    Albumin 3.9 09/09/2022 07:40 AM    Globulin 3.0 09/09/2022 07:40 AM    A-G Ratio 1.3 09/09/2022 07:40 AM    ALT (SGPT) 23 09/09/2022 07:40 AM    AST (SGOT) 16 09/09/2022 07:40 AM     Lab Results   Component Value Date/Time    WBC 5.3 09/09/2022 07:40 AM    Hemoglobin (POC) 8.9 (L) 11/16/2021 12:23 PM    HGB 14.5 09/09/2022 07:40 AM    HCT 44.3 09/09/2022 07:40 AM    PLATELET 445 29/91/8609 07:40 AM    MCV 92.3 09/09/2022 07:40 AM         Imaging  CT abdomen pelvis-FINDINGS:   LOWER THORAX: No significant abnormality in the incidentally imaged lower chest.  LIVER: No mass. BILIARY TREE: Cholelithiasis without evidence of cholecystitis. CBD is not  dilated. SPLEEN: within normal limits. PANCREAS: No mass or ductal dilatation. ADRENALS: Unremarkable. KIDNEYS: No mass, calculus, or hydronephrosis. A few tiny hypodensities in the  kidneys bilaterally are too small to fully characterize but most likely reflect  simple cysts. STOMACH: Unremarkable. SMALL BOWEL: No dilatation or wall thickening. COLON: Status post sigmoidectomy. There is a 9 mm nodule at the superior margin  of the anastomosis (3-54) favoring hyperdense diverticulum. A few other  hyperdense diverticuli are present in the residual sigmoid and descending colon. Subtle stranding around the anastomosis most likely reflects postsurgical  change. APPENDIX: Unremarkable. PERITONEUM: No ascites or pneumoperitoneum. RETROPERITONEUM: No lymphadenopathy or aortic aneurysm. REPRODUCTIVE ORGANS: Status post hysterectomy. URINARY BLADDER: No mass or calculus. BONES: No destructive bone lesion. ABDOMINAL WALL: No mass or hernia. ADDITIONAL COMMENTS: N/A     IMPRESSION     1. Expected postoperative changes of sigmoid colectomy. There is some residual  diverticulosis, however no evidence of diverticulitis.   2. Cholelithiasis without evidence of cholecystitis. I have reviewed and agree with all of the pertinent images    Assessment:     Katya Benitez is a 79 y.o. female with symptomatic cholelithiasis    Recommendations:     1. She appears to have symptomatic cholelithiasis with gallstones present with right upper quadrant abdominal pain with fatty foods. This does not appear to be related to her colostomy takedown. Her colon looks fine there is no sign of any stricturing or recurrent diverticulitis. I will schedule her for laparoscopic cholecystectomy here in the near future. I have discussed the above procedure with the patient in detail. We reviewed the benefits and possible complications of the surgery which include bleeding, infection, damage to adjacent organs, venous thromboembolism, need for repeat surgery, death and other unforseen complications. The patient agreed to proceed with the surgery. Bernice Leos MD    Greater than half of the time: 30 minutes was used in counciling the patient about diagnosis and treatment plan    Ms. Merry La has a reminder for a \"due or due soon\" health maintenance. I have asked that she contact her primary care provider for follow-up on this health maintenance.

## 2022-09-12 NOTE — H&P (VIEW-ONLY)
[employer] [facility] at [facility] Surgery History and Physical    History of Present Illness:      Leopold Form is a 79 y.o. female who is status post laparoscopic colostomy takedown about 4 months ago. More recently she has been having some right upper quadrant and epigastric abdominal pain with some generalized abdominal pain. She generally has the pain after having a big meal or fatty meal.  She otherwise has some mild generalized abdominal pain periodically that seems to be more related to the right upper quadrant abdominal pain. She has been having normal bowel movements no nausea or vomiting. She was recently sent for labs and a CT scan. The pain when she has it is a 4-5 out of 10. The pain usually last for few hours and then goes away. Past Medical History:   Diagnosis Date    Colitis     Glaucoma     H/O seasonal allergies     Nausea & vomiting     Peripheral neuropathy     [facility] spotted fever     Sinus infection     UTI (urinary tract infection)        Past Surgical History:   Procedure Laterality Date    COLONOSCOPY N/A 4/12/2022    COLONOSCOPY performed by Mckenzie Jenkins MD at [facility] ENDOSCOPY    [address] DOWN  05/24/2022    Low anterior resection. coloscopy ,lysis of adhesion by Dr. Bj Roman. HX HEENT  1985    ethmoidectomy    HX HYSTERECTOMY  2003    [place] ABDOMEN SURGERY PROC UNLISTED  11/2021    colectomy with ostomy         Current Outpatient Medications:     OTHER, 1 Caplet every morning. NERVIVE, Disp: , Rfl:     OTHER, 1 Tablet nightly. METHYL FOLATE, Disp: , Rfl:     sodium-potassium-mag sulfate (Suprep Bowel Prep Kit) 17.5-3.13-1.6 gram solr oral solution, Take 177 mL by mouth See Admin Instructions. , Disp: 1 Kit, Rfl: 0    timoloL maleate 0.5 % drpd ophthalmic solution, Administer 1 Drop to both eyes daily. , Disp: , Rfl:     azelastine HCl (AZELASTINE NA), 0.1 % by Nasal route., Disp: , Rfl:     vit A/vit C/vit E/zinc/copper (PRESERVISION AREDS PO), Take  by mouth two (2) times a day., Disp: , Rfl:     brimonidine (ALPHAGAN) 0.2 % ophthalmic solution, INSTILL 1 DROP INTO BOTH EYES TWICE DAILY, Disp: , Rfl:     cholecalciferol, vitamin D3, (Vitamin D3) 100 mcg (4,000 unit) cap, Take 5,000 Units by mouth daily. , Disp: , Rfl:     L.acid,para-B. bifidum-S.therm (RISAQUAD) 8 billion cell cap cap, Take 1 Capsule by mouth daily. , Disp: 30 Capsule, Rfl: 0    ibuprofen (MOTRIN) 800 mg tablet, Take 1 Tablet by mouth every six (6) hours as needed for Pain. (Patient not taking: Reported on 9/12/2022), Disp: 30 Tablet, Rfl: 0    ondansetron (ZOFRAN ODT) 4 mg disintegrating tablet, Take 1 Tablet by mouth every eight (8) hours as needed for Nausea or Vomiting. (Patient not taking: Reported on 9/12/2022), Disp: 30 Tablet, Rfl: 0    zolpidem (AMBIEN) 5 mg tablet, Take 5 mg by mouth nightly as needed for Sleep.  (Patient not taking: Reported on 9/12/2022), Disp: , Rfl:     Allergies   Allergen Reactions    Keflex [Cephalexin] Diarrhea    Ceftin [Cefuroxime Axetil] Hives    Codeine Rash    Levaquin [Levofloxacin] Other (comments)     Joint swelling    Pcn [Penicillins] Hives    Sulfa (Sulfonamide Antibiotics) Hives    Erythromycin Hives       Social History     Socioeconomic History    Marital status:      Spouse name: Not on file    Number of children: Not on file    Years of education: Not on file    Highest education level: Not on file   Occupational History    Not on file   Tobacco Use    Smoking status: Never    Smokeless tobacco: Never   Vaping Use    Vaping Use: Never used   Substance and Sexual Activity    Alcohol use: Not Currently    Drug use: Not Currently    Sexual activity: Not on file   Other Topics Concern    Not on file   Social History Narrative    Not on file     Social Determinants of Health     Financial Resource Strain: Not on file   Food Insecurity: Not on file   Transportation Needs: Not on file   Physical Activity: Not on file   Stress: Not on file   Social Connections: Not on file   Intimate Partner Violence: Not on file   Housing Stability: Not on file       Family History   Problem Relation Age of Onset    Diabetes Mother     Hypertension Mother     OSTEOARTHRITIS Mother     Dementia Mother     Diabetes Father     Hypertension Father     Heart Disease Father     Heart Surgery Father     No Known Problems Sister     Mult Sclerosis Daughter     Diabetes Daughter     Hypertension Daughter     Anesth Problems Neg Hx        ROS   Constitutional: negative  Ears, Nose, Mouth, Throat, and Face: negative  Respiratory: negative  Cardiovascular: negative  Gastrointestinal: positive for abdominal pain  Genitourinary:negative  Integument/Breast: negative  Hematologic/Lymphatic: negative  Behavioral/Psychiatric: negative  Allergic/Immunologic: negative      Physical Exam:     Visit Vitals  BP (!) 143/86 (BP 1 Location: Left arm, BP Patient Position: Sitting, BP Cuff Size: Adult)   Pulse 70   Temp 98.4 °F (36.9 °C) (Oral)   Resp 20   Ht 5' 2\" (1.575 m)   Wt 176 lb (79.8 kg)   SpO2 95%   BMI 32.19 kg/m²       General - alert and oriented, no apparent distress  HEENT - no jaundice, no hearing imparement  Pulm - CTAB, no C/W/R  CV - RRR, no M/R/G  Abd -soft, nondistended, bowel sounds present, nontender to palpation, incisions all well-healed  Ext - pulses intact in UE and LE bilaterally, no edema  Skin - supple, no rashes  Psychiatric - normal affect, good mood    Labs  Lab Results   Component Value Date/Time    Sodium 139 09/09/2022 07:40 AM    Potassium 4.2 09/09/2022 07:40 AM    Chloride 103 09/09/2022 07:40 AM    CO2 27 09/09/2022 07:40 AM    Anion gap 9 09/09/2022 07:40 AM    Glucose 107 (H) 09/09/2022 07:40 AM    BUN 14 09/09/2022 07:40 AM    Creatinine 0.70 09/09/2022 07:40 AM    BUN/Creatinine ratio 20 09/09/2022 07:40 AM    GFR est AA >60 09/09/2022 07:40 AM    GFR est non-AA >60 09/09/2022 07:40 AM    Calcium 9.4 09/09/2022 07:40 AM    Bilirubin, total 0.8 09/09/2022 07:40 AM    Alk. phosphatase 120 (H) 09/09/2022 07:40 AM    Protein, total 6.9 09/09/2022 07:40 AM    Albumin 3.9 09/09/2022 07:40 AM    Globulin 3.0 09/09/2022 07:40 AM    A-G Ratio 1.3 09/09/2022 07:40 AM    ALT (SGPT) 23 09/09/2022 07:40 AM    AST (SGOT) 16 09/09/2022 07:40 AM     Lab Results   Component Value Date/Time    WBC 5.3 09/09/2022 07:40 AM    Hemoglobin (POC) 8.9 (L) 11/16/2021 12:23 PM    HGB 14.5 09/09/2022 07:40 AM    HCT 44.3 09/09/2022 07:40 AM    PLATELET 845 34/64/9262 07:40 AM    MCV 92.3 09/09/2022 07:40 AM         Imaging  CT abdomen pelvis-FINDINGS:   LOWER THORAX: No significant abnormality in the incidentally imaged lower chest.  LIVER: No mass. BILIARY TREE: Cholelithiasis without evidence of cholecystitis. CBD is not  dilated. SPLEEN: within normal limits. PANCREAS: No mass or ductal dilatation. ADRENALS: Unremarkable. KIDNEYS: No mass, calculus, or hydronephrosis. A few tiny hypodensities in the  kidneys bilaterally are too small to fully characterize but most likely reflect  simple cysts. STOMACH: Unremarkable. SMALL BOWEL: No dilatation or wall thickening. COLON: Status post sigmoidectomy. There is a 9 mm nodule at the superior margin  of the anastomosis (3-54) favoring hyperdense diverticulum. A few other  hyperdense diverticuli are present in the residual sigmoid and descending colon. Subtle stranding around the anastomosis most likely reflects postsurgical  change. APPENDIX: Unremarkable. PERITONEUM: No ascites or pneumoperitoneum. RETROPERITONEUM: No lymphadenopathy or aortic aneurysm. REPRODUCTIVE ORGANS: Status post hysterectomy. URINARY BLADDER: No mass or calculus. BONES: No destructive bone lesion. ABDOMINAL WALL: No mass or hernia. ADDITIONAL COMMENTS: N/A     IMPRESSION     1. Expected postoperative changes of sigmoid colectomy. There is some residual  diverticulosis, however no evidence of diverticulitis.   2. Cholelithiasis without evidence of cholecystitis. I have reviewed and agree with all of the pertinent images    Assessment:     Jamee Ramirez is a 79 y.o. female with symptomatic cholelithiasis    Recommendations:     1. She appears to have symptomatic cholelithiasis with gallstones present with right upper quadrant abdominal pain with fatty foods. This does not appear to be related to her colostomy takedown. Her colon looks fine there is no sign of any stricturing or recurrent diverticulitis. I will schedule her for laparoscopic cholecystectomy here in the near future. I have discussed the above procedure with the patient in detail. We reviewed the benefits and possible complications of the surgery which include bleeding, infection, damage to adjacent organs, venous thromboembolism, need for repeat surgery, death and other unforseen complications. The patient agreed to proceed with the surgery. Mihai Harden MD    Greater than half of the time: 30 minutes was used in counciling the patient about diagnosis and treatment plan    Ms. Maryann Anaya has a reminder for a \"due or due soon\" health maintenance. I have asked that she contact her primary care provider for follow-up on this health maintenance.

## 2022-09-12 NOTE — PROGRESS NOTES
1. Have you been to the ER, urgent care clinic since your last visit? Hospitalized since your last visit? No    2. Have you seen or consulted any other health care providers outside of the [address] [place] [address] since your last visit? Include any pap smears or colon screening.  No

## 2022-09-13 ENCOUNTER — TELEPHONE (OUTPATIENT)
Dept: SURGERY | Age: 67
End: 2022-09-13

## 2022-09-13 NOTE — TELEPHONE ENCOUNTER
Patient called stating that she is scheduled for the new Covid vaccine for tomorrow and she would like to know if it is okay to proceed with Vaccination prior to upcoming surgery on Monday.

## 2022-09-13 NOTE — TELEPHONE ENCOUNTER
Returned call to Ms Rexine Bernheim verified all PHI. I advised patient to get the booster as scheduled.

## 2022-09-19 ENCOUNTER — ANESTHESIA (OUTPATIENT)
Dept: SURGERY | Age: 67
End: 2022-09-19
Payer: COMMERCIAL

## 2022-09-19 ENCOUNTER — HOSPITAL ENCOUNTER (OUTPATIENT)
Age: 67
Setting detail: OUTPATIENT SURGERY
Discharge: HOME | End: 2022-09-19
Attending: SURGERY | Admitting: SURGERY
Payer: COMMERCIAL

## 2022-09-19 ENCOUNTER — ANESTHESIA EVENT (OUTPATIENT)
Dept: SURGERY | Age: 67
End: 2022-09-19
Payer: COMMERCIAL

## 2022-09-19 VITALS
RESPIRATION RATE: 18 BRPM | OXYGEN SATURATION: 100 % | TEMPERATURE: 96.9 F | BODY MASS INDEX: 32.21 KG/M2 | HEIGHT: 62 IN | DIASTOLIC BLOOD PRESSURE: 82 MMHG | HEART RATE: 63 BPM | WEIGHT: 175.04 LBS | SYSTOLIC BLOOD PRESSURE: 148 MMHG

## 2022-09-19 DIAGNOSIS — K80.20 SYMPTOMATIC CHOLELITHIASIS: Primary | ICD-10-CM

## 2022-09-19 PROCEDURE — 77030037032 HC INSRT SCIS CLICKLLINE DISP STOR -B: Performed by: SURGERY

## 2022-09-19 PROCEDURE — 74011000250 HC RX REV CODE- 250: Performed by: NURSE ANESTHETIST, CERTIFIED REGISTERED

## 2022-09-19 PROCEDURE — 74011250637 HC RX REV CODE- 250/637: Performed by: ANESTHESIOLOGY

## 2022-09-19 PROCEDURE — 77030008756 HC TU IRR SUC STRY -B: Performed by: SURGERY

## 2022-09-19 PROCEDURE — 77030008608 HC TRCR ENDOSC SMTH AMR -B: Performed by: SURGERY

## 2022-09-19 PROCEDURE — 76210000000 HC OR PH I REC 2 TO 2.5 HR: Performed by: SURGERY

## 2022-09-19 PROCEDURE — 77030010507 HC ADH SKN DERMBND J&J -B: Performed by: SURGERY

## 2022-09-19 PROCEDURE — 47562 LAPAROSCOPIC CHOLECYSTECTOMY: CPT | Performed by: PHYSICIAN ASSISTANT

## 2022-09-19 PROCEDURE — 74011250636 HC RX REV CODE- 250/636: Performed by: NURSE ANESTHETIST, CERTIFIED REGISTERED

## 2022-09-19 PROCEDURE — 77030040361 HC SLV COMPR DVT MDII -B: Performed by: SURGERY

## 2022-09-19 PROCEDURE — 77030007955 HC PCH ENDOSC SPEC J&J -B: Performed by: SURGERY

## 2022-09-19 PROCEDURE — 76060000033 HC ANESTHESIA 1 TO 1.5 HR: Performed by: SURGERY

## 2022-09-19 PROCEDURE — 77030008684 HC TU ET CUF COVD -B: Performed by: ANESTHESIOLOGY

## 2022-09-19 PROCEDURE — 74011250636 HC RX REV CODE- 250/636: Performed by: SURGERY

## 2022-09-19 PROCEDURE — 77030020829: Performed by: SURGERY

## 2022-09-19 PROCEDURE — 77030039895 HC SYST SMK EVAC LAP COVD -B: Performed by: SURGERY

## 2022-09-19 PROCEDURE — 74011250636 HC RX REV CODE- 250/636: Performed by: ANESTHESIOLOGY

## 2022-09-19 PROCEDURE — 74011000250 HC RX REV CODE- 250: Performed by: SURGERY

## 2022-09-19 PROCEDURE — 2709999900 HC NON-CHARGEABLE SUPPLY: Performed by: SURGERY

## 2022-09-19 PROCEDURE — 77030031139 HC SUT VCRL2 J&J -A: Performed by: SURGERY

## 2022-09-19 PROCEDURE — 77030020053 HC ELECTRD LAPSCP COVD -B: Performed by: SURGERY

## 2022-09-19 PROCEDURE — 77030012770 HC TRCR OPT FX AMR -B: Performed by: SURGERY

## 2022-09-19 PROCEDURE — 88342 IMHCHEM/IMCYTCHM 1ST ANTB: CPT

## 2022-09-19 PROCEDURE — 76010000149 HC OR TIME 1 TO 1.5 HR: Performed by: SURGERY

## 2022-09-19 PROCEDURE — 47562 LAPAROSCOPIC CHOLECYSTECTOMY: CPT | Performed by: SURGERY

## 2022-09-19 PROCEDURE — 77030002933 HC SUT MCRYL J&J -A: Performed by: SURGERY

## 2022-09-19 PROCEDURE — 77030002895 HC DEV VASC CLOSR COVD -B: Performed by: SURGERY

## 2022-09-19 PROCEDURE — 77030010513 HC APPL CLP LIG J&J -C: Performed by: SURGERY

## 2022-09-19 PROCEDURE — 88304 TISSUE EXAM BY PATHOLOGIST: CPT

## 2022-09-19 PROCEDURE — 77030008477 HC STYL SATN SLP COVD -A: Performed by: ANESTHESIOLOGY

## 2022-09-19 RX ORDER — SODIUM CHLORIDE, SODIUM LACTATE, POTASSIUM CHLORIDE, CALCIUM CHLORIDE 600; 310; 30; 20 MG/100ML; MG/100ML; MG/100ML; MG/100ML
125 INJECTION, SOLUTION INTRAVENOUS CONTINUOUS
Status: DISCONTINUED | OUTPATIENT
Start: 2022-09-19 | End: 2022-09-19 | Stop reason: HOSPADM

## 2022-09-19 RX ORDER — SODIUM CHLORIDE 0.9 % (FLUSH) 0.9 %
5-40 SYRINGE (ML) INJECTION EVERY 8 HOURS
Status: DISCONTINUED | OUTPATIENT
Start: 2022-09-19 | End: 2022-09-19 | Stop reason: HOSPADM

## 2022-09-19 RX ORDER — FENTANYL CITRATE 50 UG/ML
25 INJECTION, SOLUTION INTRAMUSCULAR; INTRAVENOUS
Status: COMPLETED | OUTPATIENT
Start: 2022-09-19 | End: 2022-09-19

## 2022-09-19 RX ORDER — SODIUM CHLORIDE 0.9 % (FLUSH) 0.9 %
5-40 SYRINGE (ML) INJECTION AS NEEDED
Status: DISCONTINUED | OUTPATIENT
Start: 2022-09-19 | End: 2022-09-19 | Stop reason: HOSPADM

## 2022-09-19 RX ORDER — FENTANYL CITRATE 50 UG/ML
INJECTION, SOLUTION INTRAMUSCULAR; INTRAVENOUS AS NEEDED
Status: DISCONTINUED | OUTPATIENT
Start: 2022-09-19 | End: 2022-09-19 | Stop reason: HOSPADM

## 2022-09-19 RX ORDER — LEVOFLOXACIN 5 MG/ML
500 INJECTION, SOLUTION INTRAVENOUS ONCE
Status: COMPLETED | OUTPATIENT
Start: 2022-09-19 | End: 2022-09-19

## 2022-09-19 RX ORDER — MIDAZOLAM HYDROCHLORIDE 1 MG/ML
1 INJECTION, SOLUTION INTRAMUSCULAR; INTRAVENOUS AS NEEDED
Status: DISCONTINUED | OUTPATIENT
Start: 2022-09-19 | End: 2022-09-19 | Stop reason: HOSPADM

## 2022-09-19 RX ORDER — LIDOCAINE HYDROCHLORIDE 10 MG/ML
0.1 INJECTION, SOLUTION EPIDURAL; INFILTRATION; INTRACAUDAL; PERINEURAL AS NEEDED
Status: DISCONTINUED | OUTPATIENT
Start: 2022-09-19 | End: 2022-09-19 | Stop reason: HOSPADM

## 2022-09-19 RX ORDER — BUPIVACAINE HYDROCHLORIDE AND EPINEPHRINE 5; 5 MG/ML; UG/ML
INJECTION, SOLUTION EPIDURAL; INTRACAUDAL; PERINEURAL AS NEEDED
Status: DISCONTINUED | OUTPATIENT
Start: 2022-09-19 | End: 2022-09-19 | Stop reason: HOSPADM

## 2022-09-19 RX ORDER — ONDANSETRON 2 MG/ML
INJECTION INTRAMUSCULAR; INTRAVENOUS AS NEEDED
Status: DISCONTINUED | OUTPATIENT
Start: 2022-09-19 | End: 2022-09-19 | Stop reason: HOSPADM

## 2022-09-19 RX ORDER — OXYCODONE AND ACETAMINOPHEN 5; 325 MG/1; MG/1
1 TABLET ORAL
Qty: 20 TABLET | Refills: 0 | Status: SHIPPED | OUTPATIENT
Start: 2022-09-19 | End: 2022-09-26

## 2022-09-19 RX ORDER — LIDOCAINE HYDROCHLORIDE 20 MG/ML
INJECTION, SOLUTION EPIDURAL; INFILTRATION; INTRACAUDAL; PERINEURAL AS NEEDED
Status: DISCONTINUED | OUTPATIENT
Start: 2022-09-19 | End: 2022-09-19 | Stop reason: HOSPADM

## 2022-09-19 RX ORDER — MIDAZOLAM HYDROCHLORIDE 1 MG/ML
0.5 INJECTION, SOLUTION INTRAMUSCULAR; INTRAVENOUS
Status: DISCONTINUED | OUTPATIENT
Start: 2022-09-19 | End: 2022-09-19 | Stop reason: HOSPADM

## 2022-09-19 RX ORDER — KETOROLAC TROMETHAMINE 30 MG/ML
INJECTION, SOLUTION INTRAMUSCULAR; INTRAVENOUS AS NEEDED
Status: DISCONTINUED | OUTPATIENT
Start: 2022-09-19 | End: 2022-09-19 | Stop reason: HOSPADM

## 2022-09-19 RX ORDER — ONDANSETRON 4 MG/1
4 TABLET, ORALLY DISINTEGRATING ORAL
Qty: 20 TABLET | Refills: 0 | Status: SHIPPED | OUTPATIENT
Start: 2022-09-19

## 2022-09-19 RX ORDER — IBUPROFEN 800 MG/1
800 TABLET ORAL
Qty: 30 TABLET | Refills: 0 | Status: SHIPPED | OUTPATIENT
Start: 2022-09-19

## 2022-09-19 RX ORDER — MORPHINE SULFATE 2 MG/ML
2 INJECTION, SOLUTION INTRAMUSCULAR; INTRAVENOUS
Status: DISCONTINUED | OUTPATIENT
Start: 2022-09-19 | End: 2022-09-19 | Stop reason: HOSPADM

## 2022-09-19 RX ORDER — SODIUM CHLORIDE 9 MG/ML
50 INJECTION, SOLUTION INTRAVENOUS CONTINUOUS
Status: DISCONTINUED | OUTPATIENT
Start: 2022-09-19 | End: 2022-09-19 | Stop reason: HOSPADM

## 2022-09-19 RX ORDER — DIPHENHYDRAMINE HYDROCHLORIDE 50 MG/ML
12.5 INJECTION, SOLUTION INTRAMUSCULAR; INTRAVENOUS AS NEEDED
Status: DISCONTINUED | OUTPATIENT
Start: 2022-09-19 | End: 2022-09-19 | Stop reason: HOSPADM

## 2022-09-19 RX ORDER — SCOLOPAMINE TRANSDERMAL SYSTEM 1 MG/1
1 PATCH, EXTENDED RELEASE TRANSDERMAL ONCE
Status: DISCONTINUED | OUTPATIENT
Start: 2022-09-19 | End: 2022-09-19 | Stop reason: HOSPADM

## 2022-09-19 RX ORDER — DEXAMETHASONE SODIUM PHOSPHATE 4 MG/ML
INJECTION, SOLUTION INTRA-ARTICULAR; INTRALESIONAL; INTRAMUSCULAR; INTRAVENOUS; SOFT TISSUE AS NEEDED
Status: DISCONTINUED | OUTPATIENT
Start: 2022-09-19 | End: 2022-09-19 | Stop reason: HOSPADM

## 2022-09-19 RX ORDER — PROPOFOL 10 MG/ML
INJECTION, EMULSION INTRAVENOUS AS NEEDED
Status: DISCONTINUED | OUTPATIENT
Start: 2022-09-19 | End: 2022-09-19 | Stop reason: HOSPADM

## 2022-09-19 RX ORDER — HYDROMORPHONE HYDROCHLORIDE 1 MG/ML
0.2 INJECTION, SOLUTION INTRAMUSCULAR; INTRAVENOUS; SUBCUTANEOUS
Status: DISCONTINUED | OUTPATIENT
Start: 2022-09-19 | End: 2022-09-19 | Stop reason: HOSPADM

## 2022-09-19 RX ORDER — HYDROMORPHONE HYDROCHLORIDE 2 MG/ML
INJECTION, SOLUTION INTRAMUSCULAR; INTRAVENOUS; SUBCUTANEOUS AS NEEDED
Status: DISCONTINUED | OUTPATIENT
Start: 2022-09-19 | End: 2022-09-19 | Stop reason: HOSPADM

## 2022-09-19 RX ORDER — SUCCINYLCHOLINE CHLORIDE 20 MG/ML
INJECTION INTRAMUSCULAR; INTRAVENOUS AS NEEDED
Status: DISCONTINUED | OUTPATIENT
Start: 2022-09-19 | End: 2022-09-19 | Stop reason: HOSPADM

## 2022-09-19 RX ORDER — FENTANYL CITRATE 50 UG/ML
50 INJECTION, SOLUTION INTRAMUSCULAR; INTRAVENOUS AS NEEDED
Status: DISCONTINUED | OUTPATIENT
Start: 2022-09-19 | End: 2022-09-19 | Stop reason: HOSPADM

## 2022-09-19 RX ORDER — MIDAZOLAM HYDROCHLORIDE 1 MG/ML
INJECTION, SOLUTION INTRAMUSCULAR; INTRAVENOUS AS NEEDED
Status: DISCONTINUED | OUTPATIENT
Start: 2022-09-19 | End: 2022-09-19 | Stop reason: HOSPADM

## 2022-09-19 RX ORDER — ACETAMINOPHEN 325 MG/1
650 TABLET ORAL ONCE
Status: COMPLETED | OUTPATIENT
Start: 2022-09-19 | End: 2022-09-19

## 2022-09-19 RX ORDER — OXYCODONE AND ACETAMINOPHEN 5; 325 MG/1; MG/1
1 TABLET ORAL AS NEEDED
Status: DISCONTINUED | OUTPATIENT
Start: 2022-09-19 | End: 2022-09-19 | Stop reason: HOSPADM

## 2022-09-19 RX ORDER — ROCURONIUM BROMIDE 10 MG/ML
INJECTION, SOLUTION INTRAVENOUS AS NEEDED
Status: DISCONTINUED | OUTPATIENT
Start: 2022-09-19 | End: 2022-09-19 | Stop reason: HOSPADM

## 2022-09-19 RX ORDER — ONDANSETRON 2 MG/ML
4 INJECTION INTRAMUSCULAR; INTRAVENOUS AS NEEDED
Status: DISCONTINUED | OUTPATIENT
Start: 2022-09-19 | End: 2022-09-19 | Stop reason: HOSPADM

## 2022-09-19 RX ORDER — KETAMINE HYDROCHLORIDE 50 MG/ML
INJECTION, SOLUTION INTRAMUSCULAR; INTRAVENOUS AS NEEDED
Status: DISCONTINUED | OUTPATIENT
Start: 2022-09-19 | End: 2022-09-19 | Stop reason: HOSPADM

## 2022-09-19 RX ORDER — SODIUM CHLORIDE 9 MG/ML
1000 INJECTION, SOLUTION INTRAVENOUS CONTINUOUS
Status: DISCONTINUED | OUTPATIENT
Start: 2022-09-19 | End: 2022-09-19 | Stop reason: HOSPADM

## 2022-09-19 RX ORDER — SODIUM CHLORIDE, SODIUM LACTATE, POTASSIUM CHLORIDE, CALCIUM CHLORIDE 600; 310; 30; 20 MG/100ML; MG/100ML; MG/100ML; MG/100ML
INJECTION, SOLUTION INTRAVENOUS
Status: DISCONTINUED | OUTPATIENT
Start: 2022-09-19 | End: 2022-09-19 | Stop reason: HOSPADM

## 2022-09-19 RX ADMIN — PROPOFOL 150 MG: 10 INJECTION, EMULSION INTRAVENOUS at 10:24

## 2022-09-19 RX ADMIN — SUGAMMADEX 200 MG: 100 INJECTION, SOLUTION INTRAVENOUS at 11:30

## 2022-09-19 RX ADMIN — LEVOFLOXACIN 500 MG: 5 INJECTION, SOLUTION INTRAVENOUS at 10:39

## 2022-09-19 RX ADMIN — HYDROMORPHONE HYDROCHLORIDE 0.4 MG: 2 INJECTION, SOLUTION INTRAMUSCULAR; INTRAVENOUS; SUBCUTANEOUS at 11:43

## 2022-09-19 RX ADMIN — FENTANYL CITRATE 25 MCG: 50 INJECTION, SOLUTION INTRAMUSCULAR; INTRAVENOUS at 12:44

## 2022-09-19 RX ADMIN — LIDOCAINE HYDROCHLORIDE 80 MG: 20 INJECTION, SOLUTION EPIDURAL; INFILTRATION; INTRACAUDAL; PERINEURAL at 10:24

## 2022-09-19 RX ADMIN — FENTANYL CITRATE 25 MCG: 50 INJECTION, SOLUTION INTRAMUSCULAR; INTRAVENOUS at 12:28

## 2022-09-19 RX ADMIN — ACETAMINOPHEN 650 MG: 325 TABLET, FILM COATED ORAL at 09:31

## 2022-09-19 RX ADMIN — FENTANYL CITRATE 25 MCG: 50 INJECTION, SOLUTION INTRAMUSCULAR; INTRAVENOUS at 12:14

## 2022-09-19 RX ADMIN — FENTANYL CITRATE 25 MCG: 50 INJECTION, SOLUTION INTRAMUSCULAR; INTRAVENOUS at 12:19

## 2022-09-19 RX ADMIN — ONDANSETRON HYDROCHLORIDE 4 MG: 2 INJECTION, SOLUTION INTRAMUSCULAR; INTRAVENOUS at 10:41

## 2022-09-19 RX ADMIN — KETAMINE HYDROCHLORIDE 20 MG: 50 INJECTION, SOLUTION INTRAMUSCULAR; INTRAVENOUS at 10:44

## 2022-09-19 RX ADMIN — SODIUM CHLORIDE, POTASSIUM CHLORIDE, SODIUM LACTATE AND CALCIUM CHLORIDE 125 ML/HR: 600; 310; 30; 20 INJECTION, SOLUTION INTRAVENOUS at 09:41

## 2022-09-19 RX ADMIN — FENTANYL CITRATE 50 MCG: 50 INJECTION, SOLUTION INTRAMUSCULAR; INTRAVENOUS at 10:24

## 2022-09-19 RX ADMIN — SUCCINYLCHOLINE CHLORIDE 160 MG: 20 INJECTION, SOLUTION INTRAMUSCULAR; INTRAVENOUS at 10:24

## 2022-09-19 RX ADMIN — ROCURONIUM BROMIDE 5 MG: 10 SOLUTION INTRAVENOUS at 10:24

## 2022-09-19 RX ADMIN — MIDAZOLAM 2 MG: 1 INJECTION INTRAMUSCULAR; INTRAVENOUS at 10:06

## 2022-09-19 RX ADMIN — KETOROLAC TROMETHAMINE 30 MG: 30 INJECTION, SOLUTION INTRAMUSCULAR; INTRAVENOUS at 11:37

## 2022-09-19 RX ADMIN — DEXAMETHASONE SODIUM PHOSPHATE 8 MG: 4 INJECTION, SOLUTION INTRAMUSCULAR; INTRAVENOUS at 10:41

## 2022-09-19 RX ADMIN — FENTANYL CITRATE 50 MCG: 50 INJECTION, SOLUTION INTRAMUSCULAR; INTRAVENOUS at 10:34

## 2022-09-19 RX ADMIN — ONDANSETRON HYDROCHLORIDE 4 MG: 2 INJECTION, SOLUTION INTRAMUSCULAR; INTRAVENOUS at 11:30

## 2022-09-19 RX ADMIN — SODIUM CHLORIDE, POTASSIUM CHLORIDE, SODIUM LACTATE AND CALCIUM CHLORIDE: 600; 310; 30; 20 INJECTION, SOLUTION INTRAVENOUS at 10:01

## 2022-09-19 NOTE — PROGRESS NOTES
09/19/22 1058   Family Communication   Family Update Message Procedure started   Delivery Origin Nurse    Relationship to Patient Spouse    Phone Number Nicole Noriega [phone]   Family/Significant Other Update Called

## 2022-09-19 NOTE — ANESTHESIA PREPROCEDURE EVALUATION
Relevant Problems   No relevant active problems       Anesthetic History   No history of anesthetic complications  PONV          Review of Systems / Medical History  Patient summary reviewed, nursing notes reviewed and pertinent labs reviewed    Pulmonary  Within defined limits                 Neuro/Psych   Within defined limits           Cardiovascular  Within defined limits                     GI/Hepatic/Renal  Within defined limits              Endo/Other  Within defined limits           Other Findings              Physical Exam    Airway  Mallampati: II  TM Distance: > 6 cm  Neck ROM: normal range of motion   Mouth opening: Normal     Cardiovascular  Regular rate and rhythm,  S1 and S2 normal,  no murmur, click, rub, or gallop             Dental  No notable dental hx       Pulmonary  Breath sounds clear to auscultation               Abdominal  GI exam deferred       Other Findings            Anesthetic Plan    ASA: 2  Anesthesia type: general          Induction: Intravenous  Anesthetic plan and risks discussed with: Patient

## 2022-09-19 NOTE — INTERVAL H&P NOTE
Update History & Physical      The surgery was reviewed with the patient and I examined the patient. There was no change. The surgical site was confirmed by the patient and me. Plan:  The risk, benefits, expected outcome, and alternative to the recommended procedure have been discussed with the patient. Patient understands and wants to proceed with the procedure.     Electronically signed by Noris Celis MD on 9/19/2022 at 9:43 AM

## 2022-09-19 NOTE — BRIEF OP NOTE
Brief Postoperative Note    Patient: Vamshi Sosa  YOB: 1955  MRN: [id number]    Date of Procedure: 9/19/2022     Pre-Op Diagnosis: SYMPTOMATIC CHOLELITHIASIS    Post-Op Diagnosis: Same as preoperative diagnosis.       Procedure(s):  LAPAROSCOPIC CHOLECYSTECTOMY    Surgeon(s):  Ori Lawson MD    Surgical Assistant: Physician Assistant: TANNER Perdue    Anesthesia: General     Estimated Blood Loss (mL): Minimal    Complications: None    Specimens:   ID Type Source Tests Collected by Time Destination   1 : GALLBLADDER Fresh Gallbladder  Ori Lawson MD 9/19/2022 1054 Pathology        Implants: * No implants in log *    Drains: * No LDAs found *    Findings: numerous gallstones in GB, mild inflammation     Electronically Signed by Hillary Veronica MD on 9/19/2022 at 11:32 AM

## 2022-09-19 NOTE — ANESTHESIA POSTPROCEDURE EVALUATION
Procedure(s):  LAPAROSCOPIC CHOLECYSTECTOMY. general    Anesthesia Post Evaluation      Multimodal analgesia: multimodal analgesia used between 6 hours prior to anesthesia start to PACU discharge  Patient location during evaluation: PACU  Patient participation: complete - patient participated  Level of consciousness: awake  Pain score: 2  Pain management: adequate  Airway patency: patent  Anesthetic complications: no  Cardiovascular status: acceptable  Respiratory status: acceptable  Hydration status: acceptable  Comments: I have evaluated the patient and meets criteria for discharge from PACU. Karrie Roman MD  Post anesthesia nausea and vomiting:  controlled  Final Post Anesthesia Temperature Assessment:  Normothermia (36.0-37.5 degrees C)      INITIAL Post-op Vital signs:   Vitals Value Taken Time   /72 09/19/22 1205   Temp 36.1 °C (97 °F) 09/19/22 1149   Pulse 56 09/19/22 1211   Resp 10 09/19/22 1211   SpO2 100 % 09/19/22 1211   Vitals shown include unvalidated device data.

## 2022-09-19 NOTE — OP NOTES
[address]   OPERATIVE REPORT    Name:  Margie Ortega  MR#:  [id number]  :  1955  ACCOUNT #:  [de-identified]  DATE OF SERVICE:  2022    PREOPERATIVE DIAGNOSIS:  Symptomatic cholelithiasis. POSTOPERATIVE DIAGNOSIS:  Symptomatic cholelithiasis. PROCEDURE PERFORMED:  Laparoscopic cholecystectomy. SURGEON:  Guanako aMo MD    ASSISTANT:  TANNER Barron    ANESTHESIA:  General.    COMPLICATIONS:  None. SPECIMENS REMOVED:  Gallbladder. IMPLANTS:  None. DRAINS:  None. ESTIMATED BLOOD LOSS:  Minimal.    FINDINGS:  Numerous gallstones in the gallbladder with mild inflammation. INDICATIONS FOR PROCEDURE:  The patient is a 14-year-old female, who is having right upper quadrant abdominal pain. She has recent past surgical history of diverticulitis with colostomy and colostomy takedown. She has now recovered from that and has been having right upper quadrant abdominal pain and large gallstones on ultrasound and CT scan and needing laparoscopic cholecystectomy. My assistant, TANNER Barron, was necessary for the operation due to the complex nature of the laparoscopic operation. She was necessary for operation of the camera, retraction, and intraoperative decision-making. DESCRIPTION OF PROCEDURE:  The patient was met in the preop holding area. H and P was updated. Consent was signed. All risks and benefits were explained to the patient prior to the start of the operation. She was taken back to the operating room. She was lying in a supine position. The abdomen was prepped and draped in a standard sterile fashion. Time out was called. The antibiotics were given. SCDs were on lower extremities. Started the operation by making a 5-mm incision to the right upper quadrant inserting a VisiPort trocar into the intra-abdominal cavity insufflating to 15 mmHg.   I then placed a 5-mm periumbilical trocar, a 77-UA subxiphoid trocar, and a 5-mm right lateral trocar. We then looked up underneath the gallbladder, liver bed and lifted up on the gallbladder. We could see that the gallbladder had a lot of fatty tissue around it. There was may be a little bit of mild inflammation, but no signs of acute cholecystitis. We then dissected down to the infundibulum of the gallbladder. There was a small crossing vessel going over the area of the cystic duct that was not the cystic artery. We doubly clipped and ligated that and divided that with the scissors and I placed hemoclips on it. We dissected out the infundibulum further, dissecting out the cystic artery and cystic duct, identifying both structures clearly going into the gallbladder with no intervening structures in between. We dissected up underneath the gallbladder fossa to make sure that there were no other underlying ducts and we had identified our anatomy correctly. Our critical view of safety was obtained. We then placed two clips on the cystic artery on the patient's side and one on the distal side and cut in between. We dissected some fatty tissue more off of the cystic duct and then placed three clips on the cystic duct on the patient's side, one on the distal side and cut in between. We then removed the gallbladder from the gallbladder fossa with a hook cautery cauterizing any bleeding from the liver bed along the way. The gallbladder was removed from a 12-mm Endo Catch bag. We did have to stretch the port site a little bit further due to the multiple large stones she had in her gallbladder. Gallbladder was passed off the field. We then washed out the right upper quadrant with 500 mL of saline irrigation. Our gallbladder was hemostatic. There was no bleeding from it. The clips were all in place and intact. We then closed our 12-mm trocar site fascial defect with an Endoclose suture passing device in an interrupted figure-of-eight fashion with a 0 Vicryl suture using two sutures.   The fascial defect was closed. Everything was good. We then desufflated the abdominal cavity, removed the trocars, and closed the skin with 4-0 Monocryl and Dermabond to complete the operation. Dr. Cipriano Miller was present and scrubbed during the entire operation. The counts were correct.   The anesthesia was general.  Estimated blood loss was minimal.      Xu Bardales MD      [id number]  D:  09/19/2022 11:38  T:  09/19/2022 14:17  JOB #:  [id number]

## 2022-09-19 NOTE — PROGRESS NOTES
09/19/22 1048   Bowel Bundle Checklist   Wound Protector Used? N/A   Closing instruments isolated and used for closing fascia and skin? N/A   Sterile glove change by all team members prior to close? N/A   Gown changed by all team members prior to close?  N/A

## 2022-09-19 NOTE — DISCHARGE INSTRUCTIONS
Laparoscopic cholecystectomy      Patient Discharge Instructions    Nilson Parisi / [id number] : 1955    Admitted 2022 Discharged: 2022       PATIENT INSTRUCTIONS  GALLBLADDER SURGERY  (CHOLECYSTECTOMY)    FOLLOW-UP:  Please make an appointment with your physician in 10 - 14 day(s). Call your physician immediately if you have any fevers greater than 101.5, drainage from your wound that is not clear or looks infected, persistent bleeding, increasing abdominal pain, problems urinating, or persistent nausea/vomiting. You should be aware that you may have right shoulder pain after surgery and that this will progressively go away. This is called 'referred pain' and is from the area of the gallbladder. It can also be caused by gas that may be trapped under the diaphragm from the surgery, especially if it was performed laparoscopically through mini-incisions. This gas will progressively get reabsorbed by your body. WOUND CARE INSTRUCTIONS:   You may shower at home. If clothing rubs against the wound or causes irritation and the wound is not draining you may cover it with a dry dressing during the daytime. Try to keep the wound dry and avoid ointments on the wound unless directed to do so. If the wound becomes bright red and painful or starts to drain infected material that is not clear, please contact your physician immediately. You should also call if you begin to drain fluid that is thin and greenish-brown from the wound and appears to look like bile. If the wound though is mildly pink and has a thick firm ridge underneath it, this is normal, and is referred to as a healing ridge. This will resolve over the next 4-6 weeks. Place an ice pack on the navel incision for the next 48 hours. After that, you may use a heating pad if you feel muscle tightening or pulling. DIET:  You may eat any foods that you can tolerate.   It is a good idea to eat a high fiber diet and take in plenty of fluids to prevent constipation. If you do become constipated you may want to take a mild laxative or take ducolax tablets on a daily basis until your bowel habits are regular. Constipation can be very uncomfortable, along with straining, after recent abdominal surgery. ACTIVITY:  You are encouraged to cough and deep breath or use your incentive spirometer if you were given one, every 15-30 minutes when awake. This will help prevent respiratory complications and low grade fevers post-operatively. You may want to hug a pillow when coughing and sneezing to add additional support to the surgical area(s) which will decrease pain during these times. You are encouraged to walk and engage in light activity for the next two weeks. You should not lift more than 20 pounds during this time frame as it could put you at increased risk for a post-operative hernia. Twenty pounds is roughly equivalent to a plastic bag of groceries. Most people are able to return to work within 1 to 2 weeks after surgery. You may shower 24 hours after surgery. Pat the cut (incision) dry. Do not take a bath for the first week. Your doctor will tell you when you can have sex again. MEDICATIONS:  Try to take narcotic medications and anti-inflammatory medications, such as tylenol, ibuprofen, naprosyn, etc., with food. This will minimize stomach upset from the medication. Should you develop nausea and vomiting from the pain medication, or develop a rash, please discontinue the medication and contact your physician. You should not drive, make important decisions, or operate machinery when taking narcotic pain medication. Take ibuprofen (Motrin) as scheduled then combine with oxycodone/acetaminophen (Percocet, Roxicet, Tylox) as needed for severe pain. QUESTIONS:  Please feel free to call Dr. Saúl Go office ([phone]) if you have any questions, and they will be glad to assist you. Follow-up with Dr. Bre Key in 2 week(s).  Call the office to schedule your appointment. Information obtained by :    I understand that if any problems occur once I am at home I am to contact my physician. I understand and acknowledge receipt of the instructions indicated above. Physician's or R.N.'s Signature                                                                  Date/Time                                                                                                                                              Patient or Representative Signature                                                          Date/Time         ______________________________________________________________________    Anesthesia Discharge Instructions    After general anesthesia or intervenous sedation, for 24 hours or while taking prescription Narcotics:  Limit your activities  Do not drive or operate hazardous machinery  If you have not urinated within 8 hours after discharge, please contact your surgeon on call. Do not make important personal or business decisions  Do not drink alcoholic beverages    Report the following to your surgeon:  Excessive pain, swelling, redness or odor of or around the surgical area  Temperature over 100.5 degrees  Nausea and vomiting lasting longer than 4 hours or if unable to take medication  Any signs of decreased circulation or nerve impairment to extremity:  Change in color, persistent numbness, tingling, coldness or increased pain.   Any questions

## 2022-10-03 ENCOUNTER — OFFICE VISIT (OUTPATIENT)
Dept: SURGERY | Age: 67
End: 2022-10-03
Payer: COMMERCIAL

## 2022-10-03 VITALS
OXYGEN SATURATION: 97 % | TEMPERATURE: 97.8 F | WEIGHT: 174 LBS | SYSTOLIC BLOOD PRESSURE: 127 MMHG | HEIGHT: 62 IN | RESPIRATION RATE: 18 BRPM | BODY MASS INDEX: 32.02 KG/M2 | HEART RATE: 66 BPM | DIASTOLIC BLOOD PRESSURE: 88 MMHG

## 2022-10-03 DIAGNOSIS — Z09 POSTOPERATIVE EXAMINATION: Primary | ICD-10-CM

## 2022-10-03 PROCEDURE — 99024 POSTOP FOLLOW-UP VISIT: CPT

## 2022-10-03 NOTE — PATIENT INSTRUCTIONS
-  No lifting greater than 20 lbs x1 week then 30 lbs x1 week then may advance activity as tolerated. -  Caution with fatty / fried foods as can cause some stomach upset and diarrhea     -   If you notice symptoms of upset stomach diarrhea ---->Recommend food journal as you introduce foods back into your diet.     -  May use heating pad and/or supportive wear for any abdominal pain/soreness     -  Ok to bath as normal and you may get in a swimming pool     -  Use sun block on exposed skin and ok to moisturize the incisions as desired     -  Call with any questions or concerns    -  Follow up only as needed

## 2022-10-03 NOTE — PROGRESS NOTES
CC: Post Operative state    Subjective:     Raisa Don is a 79 y.o. female presents for postop care 2 weeks s/p Laparoscopic cholecystectomy for Symptomatic cholelithiasis. Patient doing well postoperatively. She states that 3 days postop she had nausea/vomiting/diarrhea that lasted 1 day and has since resolved. She is now tolerating a regular low-fat diet without nausea or vomiting. She takes collagen and prebiotic's daily and reports bowel movements are regular. The patient is voiding without difficulty. Reports normal yellow urine. She still has some abdominal tenderness mainly around incision sites but well controlled with Tylenol PRN. Patient denies fever, chest pain, or shortness of breath. Review of Systems:  A comprehensive review of systems was negative except for that written above      Ms. Nadia Pendleton has a reminder for a \"due or due soon\" health maintenance. I have asked that she contact her primary care provider for follow-up on this health maintenance. Objective:     Visit Vitals  /88 (BP 1 Location: Right upper arm, BP Patient Position: Sitting, BP Cuff Size: Large adult)   Pulse 66   Temp 97.8 °F (36.6 °C) (Oral)   Resp 18   Ht 5' 2\" (1.575 m)   Wt 174 lb (78.9 kg)   SpO2 97%   BMI 31.83 kg/m²       General: alert, cooperative, no distress, appears stated age  CV: Regular rate and rhythm  Pulmonary: Lungs clear to auscultation   Abdomen:soft, bowel sounds active, non-tender  Lap sites:  healing well, no drainage, no erythema, no swelling, no dehiscence, incision well approximated. No signs of infection    Assessment:       ICD-10-CM ICD-9-CM    1. Postoperative examination  Z09 V67.00           Plan:   2+ weeks s/p Laparoscopic cholecystectomy for Symptomatic cholelithiasis. Reviewed pathology with patient   Gallbladder, cholecystectomy:        Chronic cholecystitis. Cholelithiasis. Note: Pancytokeratin shows no evidence of a subtle carcinoma.      Reassured patient that her surgical sites were healing well. Wound care discussed. May submerge in water and continue to wash with mild soap and water. May moisturize as desired. We discussed the importance of proper diet post op- Caution with fatty / fried foods as can cause some stomach upset and diarrhea. Recommended keeping a food journal as she reintroduces food into her diet. May use heating pad and/or supportive wear for any abdominal soreness. No lifting greater than 20 lbs x1 week then 30 lbs x1 week then may advance activity as tolerated. Nikolay Valdovinos verbalized understanding and questions were answered to the best of my knowledge and ability. Instructed patient to call with any questions or concerns.   Discharged from surgical care with prn follow up         > 15 minutes were spent with patient with greater than 50% of that time spent face to face counseling    Debra Suresh NP  [facility]   10/3/2022

## 2023-05-21 RX ORDER — BRIMONIDINE TARTRATE 2 MG/ML
SOLUTION/ DROPS OPHTHALMIC
COMMUNITY
Start: 2021-06-17

## 2023-05-21 RX ORDER — ZOLPIDEM TARTRATE 5 MG/1
5 TABLET ORAL NIGHTLY PRN
COMMUNITY

## 2023-05-21 RX ORDER — IBUPROFEN 800 MG/1
800 TABLET ORAL EVERY 6 HOURS PRN
COMMUNITY
Start: 2022-05-27

## 2023-05-21 RX ORDER — ONDANSETRON 4 MG/1
4 TABLET, ORALLY DISINTEGRATING ORAL EVERY 8 HOURS PRN
COMMUNITY
Start: 2022-05-27

## 2023-05-21 RX ORDER — TIMOLOL MALEATE 6.8 MG/ML
1 SOLUTION/ DROPS OPHTHALMIC DAILY
COMMUNITY

## 2024-01-16 ENCOUNTER — HOSPITAL ENCOUNTER (OUTPATIENT)
Facility: HOSPITAL | Age: 69
Discharge: HOME | End: 2024-01-19
Payer: MEDICARE

## 2024-01-16 DIAGNOSIS — K57.92 DIVERTICULITIS: ICD-10-CM

## 2024-01-16 LAB — CREAT BLD-MCNC: 0.7 MG/DL (ref 0.6–1.3)

## 2024-01-16 PROCEDURE — 6360000004 HC RX CONTRAST MEDICATION

## 2024-01-16 PROCEDURE — 74177 CT ABD & PELVIS W/CONTRAST: CPT

## 2024-01-16 PROCEDURE — 82565 ASSAY OF CREATININE: CPT

## 2024-01-16 RX ADMIN — IOPAMIDOL 100 ML: 755 INJECTION, SOLUTION INTRAVENOUS at 12:57

## 2024-01-18 ENCOUNTER — TELEPHONE (OUTPATIENT)
Age: 69
End: 2024-01-18

## 2024-01-18 NOTE — TELEPHONE ENCOUNTER
Returned call to Mr Roa verified patient with 2 patient identifiers.     Mr Roa is requesting patient follow up with Dr Rincon after having a colonoscopy with Waqas next week. He was informed the first available appointment until 1/29/24.     Mr Roa states he is worried cause his wife has been having loose stool and blood in her stools. Patient was recently seen by GI. I advised Mr Roa if his wife is having blood in her stools she should go to the ER. He goes on to say \"it's not consistent.\"    I informed him I will notify Dr Rincon and get back with him.      I called the  back I informed him I spoke with SOBIA Celis advised will need to follow up with GI regarding the issues follow up with Dr Rincon after the colonoscopy. I reiterated if any worsening take patient to the ER. He verbalized full understanding.

## 2024-01-18 NOTE — TELEPHONE ENCOUNTER
Patient's  called and stated that patient is having a colonoscopy with Waqas next week and he wanted her to come in to see Dr. Rincon next week as well to have her seen for diverticulitis. Next available for Dr. Rincon was 1/29/24 which was offered. Patient's  stated that he would take this appointment but wanted something sooner. I advised that I would get a message back to the nurses to see if we would be able to fit her in. Patient stated he wanted a call \"as soon as possible\" and disconnected the phone.

## 2024-01-23 ENCOUNTER — ANESTHESIA EVENT (OUTPATIENT)
Facility: HOSPITAL | Age: 69
End: 2024-01-23
Payer: MEDICARE

## 2024-01-23 ENCOUNTER — ANESTHESIA (OUTPATIENT)
Facility: HOSPITAL | Age: 69
End: 2024-01-23
Payer: MEDICARE

## 2024-01-23 ENCOUNTER — HOSPITAL ENCOUNTER (OUTPATIENT)
Facility: HOSPITAL | Age: 69
Setting detail: OUTPATIENT SURGERY
Discharge: HOME | End: 2024-01-23
Attending: INTERNAL MEDICINE | Admitting: INTERNAL MEDICINE
Payer: MEDICARE

## 2024-01-23 VITALS
OXYGEN SATURATION: 99 % | HEIGHT: 62 IN | SYSTOLIC BLOOD PRESSURE: 122 MMHG | TEMPERATURE: 97.8 F | WEIGHT: 179 LBS | RESPIRATION RATE: 13 BRPM | DIASTOLIC BLOOD PRESSURE: 71 MMHG | BODY MASS INDEX: 32.94 KG/M2 | HEART RATE: 64 BPM

## 2024-01-23 PROCEDURE — 6360000002 HC RX W HCPCS: Performed by: NURSE ANESTHETIST, CERTIFIED REGISTERED

## 2024-01-23 PROCEDURE — 2500000003 HC RX 250 WO HCPCS: Performed by: NURSE ANESTHETIST, CERTIFIED REGISTERED

## 2024-01-23 PROCEDURE — 3600007502: Performed by: INTERNAL MEDICINE

## 2024-01-23 PROCEDURE — 2580000003 HC RX 258: Performed by: INTERNAL MEDICINE

## 2024-01-23 PROCEDURE — 7100000011 HC PHASE II RECOVERY - ADDTL 15 MIN: Performed by: INTERNAL MEDICINE

## 2024-01-23 PROCEDURE — 7100000010 HC PHASE II RECOVERY - FIRST 15 MIN: Performed by: INTERNAL MEDICINE

## 2024-01-23 PROCEDURE — 3600007512: Performed by: INTERNAL MEDICINE

## 2024-01-23 PROCEDURE — 3700000000 HC ANESTHESIA ATTENDED CARE: Performed by: INTERNAL MEDICINE

## 2024-01-23 PROCEDURE — 88305 TISSUE EXAM BY PATHOLOGIST: CPT

## 2024-01-23 PROCEDURE — 3700000001 HC ADD 15 MINUTES (ANESTHESIA): Performed by: INTERNAL MEDICINE

## 2024-01-23 PROCEDURE — 2709999900 HC NON-CHARGEABLE SUPPLY: Performed by: INTERNAL MEDICINE

## 2024-01-23 RX ORDER — AZELASTINE HYDROCHLORIDE 0.5 MG/ML
1 SOLUTION/ DROPS OPHTHALMIC 2 TIMES DAILY
COMMUNITY

## 2024-01-23 RX ORDER — LIDOCAINE HYDROCHLORIDE 20 MG/ML
INJECTION, SOLUTION EPIDURAL; INFILTRATION; INTRACAUDAL; PERINEURAL PRN
Status: DISCONTINUED | OUTPATIENT
Start: 2024-01-23 | End: 2024-01-23 | Stop reason: SDUPTHER

## 2024-01-23 RX ORDER — HYDROCHLOROTHIAZIDE 25 MG/1
25 TABLET ORAL DAILY
COMMUNITY

## 2024-01-23 RX ORDER — MESALAMINE 1.2 G/1
4800 TABLET, DELAYED RELEASE ORAL
Qty: 360 TABLET | Refills: 3 | Status: SHIPPED | OUTPATIENT
Start: 2024-01-23 | End: 2025-01-17

## 2024-01-23 RX ORDER — DICYCLOMINE HCL 20 MG
20 TABLET ORAL EVERY 6 HOURS
COMMUNITY

## 2024-01-23 RX ORDER — LORAZEPAM 0.5 MG/1
0.5 TABLET ORAL EVERY 6 HOURS PRN
COMMUNITY

## 2024-01-23 RX ORDER — SODIUM CHLORIDE 9 MG/ML
25 INJECTION, SOLUTION INTRAVENOUS PRN
Status: DISCONTINUED | OUTPATIENT
Start: 2024-01-23 | End: 2024-01-23 | Stop reason: HOSPADM

## 2024-01-23 RX ORDER — SODIUM CHLORIDE 9 MG/ML
INJECTION, SOLUTION INTRAVENOUS CONTINUOUS
Status: DISCONTINUED | OUTPATIENT
Start: 2024-01-23 | End: 2024-01-23 | Stop reason: HOSPADM

## 2024-01-23 RX ORDER — SODIUM CHLORIDE 0.9 % (FLUSH) 0.9 %
5-40 SYRINGE (ML) INJECTION EVERY 12 HOURS SCHEDULED
Status: DISCONTINUED | OUTPATIENT
Start: 2024-01-23 | End: 2024-01-23 | Stop reason: HOSPADM

## 2024-01-23 RX ORDER — SODIUM CHLORIDE 0.9 % (FLUSH) 0.9 %
5-40 SYRINGE (ML) INJECTION PRN
Status: DISCONTINUED | OUTPATIENT
Start: 2024-01-23 | End: 2024-01-23 | Stop reason: HOSPADM

## 2024-01-23 RX ADMIN — PROPOFOL 40 MG: 10 INJECTION, EMULSION INTRAVENOUS at 13:50

## 2024-01-23 RX ADMIN — PROPOFOL 80 MG: 10 INJECTION, EMULSION INTRAVENOUS at 13:41

## 2024-01-23 RX ADMIN — LIDOCAINE HYDROCHLORIDE 60 MG: 20 INJECTION, SOLUTION EPIDURAL; INFILTRATION; INTRACAUDAL; PERINEURAL at 13:41

## 2024-01-23 RX ADMIN — SODIUM CHLORIDE: 9 INJECTION, SOLUTION INTRAVENOUS at 13:49

## 2024-01-23 RX ADMIN — PROPOFOL 40 MG: 10 INJECTION, EMULSION INTRAVENOUS at 13:44

## 2024-01-23 RX ADMIN — SODIUM CHLORIDE: 9 INJECTION, SOLUTION INTRAVENOUS at 12:38

## 2024-01-23 RX ADMIN — PROPOFOL 40 MG: 10 INJECTION, EMULSION INTRAVENOUS at 13:47

## 2024-01-23 ASSESSMENT — PAIN - FUNCTIONAL ASSESSMENT: PAIN_FUNCTIONAL_ASSESSMENT: NONE - DENIES PAIN

## 2024-01-23 NOTE — ANESTHESIA POSTPROCEDURE EVALUATION
Department of Anesthesiology  Postprocedure Note    Patient: Carito Roa  MRN: [id number]  YOB: 1955  Date of evaluation: 1/23/2024    Procedure Summary       Date: 01/23/24 Room / Location: [facility] ENDO 06 / [facility] ENDOSCOPY    Anesthesia Start: 1335 Anesthesia Stop: 1353    Procedure: COLONOSCOPY (Lower GI Region) Diagnosis:       Rectal bleed      (Rectal bleed [K62.5])    Surgeons: Wilmer Alan MD Responsible Provider: Suzi العراقي MD    Anesthesia Type: MAC ASA Status: 2            Anesthesia Type: MAC    Lori Phase I: Lori Score: 10    Lori Phase II: Lori Score: 9    Anesthesia Post Evaluation    Patient location during evaluation: PACU  Level of consciousness: awake  Airway patency: patent  Nausea & Vomiting: no nausea  Cardiovascular status: blood pressure returned to baseline  Respiratory status: acceptable  Hydration status: euvolemic  Comments: ---------------------------               01/23/24                      1418         ---------------------------   BP:                         Pulse:                      Resp:                       Temp:   97.8 °F (36.6 °C)   SpO2:                      ---------------------------   Pain management: satisfactory to patient    No notable events documented.

## 2024-01-23 NOTE — H&P
[facility] [address] [address]        History and Physical       NAME:  Carito Roa   :   1955   MRN:   [id number]             History of Present Illness:  Patient is a 68 y.o. who is seen for rectal bleeding.     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 Bala Rincon.    HEENT      ethmoidectomy    HYSTERECTOMY (CERVIX STATUS UNKNOWN)      ID UNLISTED PROCEDURE ABDOMEN PERITONEUM & OMENTUM  2022    OSTOMY TAKEDOWN    ID UNLISTED PROCEDURE ABDOMEN PERITONEUM & OMENTUM  2021    colectomy with ostomy       Allergies:  Allergies   Allergen Reactions    Cephalexin Diarrhea    Cefuroxime Axetil Hives    Levofloxacin Other (See Comments)     Joint swelling    Penicillins Hives     NO REPORTED SEVERE NON-IGE-MEDIATED REACTIONS    Phenergan [Promethazine] Hallucinations    Sulfa Antibiotics Hives    Codeine Rash    Erythromycin Hives       Home Medications:  Prior to Admission Medications   Prescriptions Last Dose Informant Patient Reported? Taking?   Cholecalciferol 100 MCG (4000 UT) CAPS 2024  Yes No   Sig: Take 5,000 Units by mouth daily   LORazepam (ATIVAN) 0.5 MG tablet 2024  Yes Yes   Sig: Take 1 tablet by mouth every 6 hours as needed for Anxiety. Max Daily Amount: 2 mg   Timolol (TIMOPTIC) 0.5 % SOLN ophthalmic solution 2024  Yes No   Sig: Apply 1 drop to eye daily   azelastine (OPTIVAR) 0.05 % ophthalmic solution 2024  Yes Yes   Si drop 2 times

## 2024-01-23 NOTE — PROGRESS NOTES

## 2024-01-23 NOTE — OP NOTE
[id number] [place], [place] [address]        Colonoscopy Operative Report    Carito Roa  [id number]  1955      Procedure Type:   Colonoscopy with biopsy     Indications:    Lower GI bleeding         Pre-operative Diagnosis: see indication above    Post-operative Diagnosis:  See findings below    :  Wilmer Alan MD    Staff: Circulator: Flakita Regan RN  Endoscopy Technician: Jim Callahan     Referring Provider: Nessa Flores MD      Sedation:  MAC      Procedure Details:  After informed consent was obtained with all risks and benefits of procedure explained and preoperative exam completed, the patient was taken to the endoscopy suite and placed in the left lateral decubitus position.  Upon sequential sedation as per above, a digital rectal exam was performed demonstrating internal hemorrhoids.  The Olympus pediatric videocolonoscope  was inserted in the rectum and carefully advanced to the terminal ileum.  The cecum was identified by the ileocecal valve and appendiceal orifice.  The quality of preparation was good.  The colonoscope was slowly withdrawn with careful evaluation between folds. Retroflexion in the rectum was completed .     Findings:   Pan-colitis was seen in the colon including erythema and ulceration. The terminal ileum was normal. Cold biopsies were taken.    Sigmoid anastomosis was seen and was patent. Colitis was present at the anastomosis. There was minimal left-sided diverticulosis    Specimen Removed:  1. Right colon, 2. Left colon, 3. Rectum    Complications: None.     EBL:  None.    Impression:     As above    Recommendations:  Follow up surgical pathology  Mesalamine 4.8 g daily - script provided  Follow up in 3-4 weeks  Discontinue NSAID's    Signed By: Wilmer Alan MD     1/23/2024  2:01 PM

## 2024-01-23 NOTE — ANESTHESIA PRE PROCEDURE
(!) 148/82   09/12/22 (!) 143/86       NPO Status: Time of last liquid consumption: 0800                        Time of last solid consumption: 2100                        Date of last liquid consumption: 01/23/24                        Date of last solid food consumption: 01/21/24    BMI:   Wt Readings from Last 3 Encounters:   10/03/22 78.9 kg (174 lb)   09/19/22 79.4 kg (175 lb 0.7 oz)   09/12/22 79.8 kg (176 lb)     There is no height or weight on file to calculate BMI.    CBC:   Lab Results   Component Value Date/Time    WBC 5.3 09/09/2022 07:40 AM    RBC 4.80 09/09/2022 07:40 AM    HGB 14.5 09/09/2022 07:40 AM    HCT 44.3 09/09/2022 07:40 AM    MCV 92.3 09/09/2022 07:40 AM    RDW 12.7 09/09/2022 07:40 AM     09/09/2022 07:40 AM       CMP:   Lab Results   Component Value Date/Time     09/09/2022 07:40 AM    K 4.2 09/09/2022 07:40 AM     09/09/2022 07:40 AM    CO2 27 09/09/2022 07:40 AM    BUN 14 09/09/2022 07:40 AM    CREATININE 0.70 01/16/2024 12:55 PM    CREATININE 0.70 09/09/2022 07:40 AM    GFRAA >60 09/09/2022 07:40 AM    AGRATIO 1.3 09/09/2022 07:40 AM    GLUCOSE 107 09/09/2022 07:40 AM    PROT 6.9 09/09/2022 07:40 AM    CALCIUM 9.4 09/09/2022 07:40 AM    BILITOT 0.8 09/09/2022 07:40 AM    ALKPHOS 120 09/09/2022 07:40 AM    AST 16 09/09/2022 07:40 AM    ALT 23 09/09/2022 07:40 AM       POC Tests: No results for input(s): \"POCGLU\", \"POCNA\", \"POCK\", \"POCCL\", \"POCBUN\", \"POCHEMO\", \"POCHCT\" in the last 72 hours.    Coags: No results found for: \"PROTIME\", \"INR\", \"APTT\"    HCG (If Applicable): No results found for: \"PREGTESTUR\", \"PREGSERUM\", \"HCG\", \"HCGQUANT\"     ABGs: No results found for: \"PHART\", \"PO2ART\", \"QJA7OWG\", \"XKO5GNO\", \"BEART\", \"D2SJUCXF\"     Type & Screen (If Applicable):  No results found for: \"LABABO\", \"LABRH\"    Drug/Infectious Status (If Applicable):  No results found for: \"HIV\", \"HEPCAB\"    COVID-19 Screening (If Applicable):   Lab Results   Component Value Date/Time

## 2024-01-23 NOTE — DISCHARGE INSTRUCTIONS
[facility]  [address] [place] [id number]    COLON DISCHARGE INSTRUCTIONS    Carito Roa  [id number]  1955    Discomfort:  Redness at IV site- apply warm compress to area; if redness or soreness persist- contact your physician  There may be a slight amount of blood passed from the rectum  Gaseous discomfort- walking, belching will help relieve any discomfort  You may not operate a vehicle for 12 hours  You may not engage in an occupation involving machinery or appliances for rest of today  You may not drink alcoholic beverages for at least 12 hours  Avoid making any critical decisions for at least 24 hour  DIET:  You may resume your regular diet - however -  remember your colon is empty and a heavy meal will produce gas.  Avoid these foods:  vegetables, fried / greasy foods, carbonated drinks     ACTIVITY:  You may  resume your normal daily activities it is recommended that you spend the remainder of the day resting -  avoid any strenuous activity.    CALL M.D.  ANY SIGN OF:   Increasing pain, nausea, vomiting  Abdominal distension (swelling)  New increased bleeding (oral or rectal)  Fever (chills)  Pain in chest area  Bloody discharge from nose or mouth  Shortness of breath      Follow-up Instructions:   Call Dr. Wilmer Alan for any questions or problems at [phone]          ENDOSCOPY FINDINGS:   Your colonoscopy showed colitis throughout the colon. Biopsies were taken. We will contact you about the pathology results. A prescription for Lialda (mesalamine) has been sent to [facility]. Please start this medication.   Telephone # [phone]      Signed By: Wilmer Alan MD     1/23/2024  2:00 PM

## 2024-06-03 ENCOUNTER — OFFICE VISIT (OUTPATIENT)
Age: 69
End: 2024-06-03
Payer: MEDICARE

## 2024-06-03 ENCOUNTER — TELEPHONE (OUTPATIENT)
Age: 69
End: 2024-06-03

## 2024-06-03 ENCOUNTER — PREP FOR PROCEDURE (OUTPATIENT)
Age: 69
End: 2024-06-03

## 2024-06-03 VITALS
HEIGHT: 62 IN | HEART RATE: 65 BPM | OXYGEN SATURATION: 95 % | WEIGHT: 168 LBS | DIASTOLIC BLOOD PRESSURE: 80 MMHG | TEMPERATURE: 98.3 F | SYSTOLIC BLOOD PRESSURE: 128 MMHG | RESPIRATION RATE: 18 BRPM | BODY MASS INDEX: 30.91 KG/M2

## 2024-06-03 DIAGNOSIS — K51.919 ULCERATIVE COLITIS WITH COMPLICATION, UNSPECIFIED LOCATION (HCC): ICD-10-CM

## 2024-06-03 DIAGNOSIS — K43.2 INCISIONAL HERNIA, WITHOUT OBSTRUCTION OR GANGRENE: Primary | ICD-10-CM

## 2024-06-03 PROCEDURE — G8427 DOCREV CUR MEDS BY ELIG CLIN: HCPCS | Performed by: SURGERY

## 2024-06-03 PROCEDURE — 1090F PRES/ABSN URINE INCON ASSESS: CPT | Performed by: SURGERY

## 2024-06-03 PROCEDURE — G8400 PT W/DXA NO RESULTS DOC: HCPCS | Performed by: SURGERY

## 2024-06-03 PROCEDURE — 1036F TOBACCO NON-USER: CPT | Performed by: SURGERY

## 2024-06-03 PROCEDURE — G8417 CALC BMI ABV UP PARAM F/U: HCPCS | Performed by: SURGERY

## 2024-06-03 PROCEDURE — 3017F COLORECTAL CA SCREEN DOC REV: CPT | Performed by: SURGERY

## 2024-06-03 PROCEDURE — 1123F ACP DISCUSS/DSCN MKR DOCD: CPT | Performed by: SURGERY

## 2024-06-03 PROCEDURE — 99215 OFFICE O/P EST HI 40 MIN: CPT | Performed by: SURGERY

## 2024-06-03 RX ORDER — ACETAMINOPHEN 325 MG/1
1000 TABLET ORAL ONCE
OUTPATIENT
Start: 2024-06-03 | End: 2024-06-03

## 2024-06-03 RX ORDER — SODIUM CHLORIDE 9 MG/ML
INJECTION, SOLUTION INTRAVENOUS PRN
OUTPATIENT
Start: 2024-06-03

## 2024-06-03 RX ORDER — SODIUM CHLORIDE 0.9 % (FLUSH) 0.9 %
5-40 SYRINGE (ML) INJECTION EVERY 12 HOURS SCHEDULED
OUTPATIENT
Start: 2024-06-03

## 2024-06-03 RX ORDER — SODIUM CHLORIDE 0.9 % (FLUSH) 0.9 %
5-40 SYRINGE (ML) INJECTION PRN
OUTPATIENT
Start: 2024-06-03

## 2024-06-03 RX ORDER — FAMOTIDINE 10 MG
10 TABLET ORAL AS NEEDED
COMMUNITY

## 2024-06-03 RX ORDER — SODIUM CHLORIDE, SODIUM LACTATE, POTASSIUM CHLORIDE, CALCIUM CHLORIDE 600; 310; 30; 20 MG/100ML; MG/100ML; MG/100ML; MG/100ML
INJECTION, SOLUTION INTRAVENOUS CONTINUOUS
OUTPATIENT
Start: 2024-06-03

## 2024-06-03 ASSESSMENT — PATIENT HEALTH QUESTIONNAIRE - PHQ9
1. LITTLE INTEREST OR PLEASURE IN DOING THINGS: NOT AT ALL
SUM OF ALL RESPONSES TO PHQ QUESTIONS 1-9: 0
SUM OF ALL RESPONSES TO PHQ9 QUESTIONS 1 & 2: 0
2. FEELING DOWN, DEPRESSED OR HOPELESS: NOT AT ALL

## 2024-06-03 NOTE — TELEPHONE ENCOUNTER
Contacted patient to schedule surgery with Dr. Rincon. Offered patient 6/11, 6/13 and 6/14. Patient accepted 6/13. I notified patient that [facility]. Patient thanked me for the call.

## 2024-06-03 NOTE — PROGRESS NOTES
Identified patient with two patient identifiers (name and ). Reviewed chart in preparation for visit and have obtained necessary documentation.    Carito Roa is a 69 y.o. female  Chief Complaint   Patient presents with    Follow-up     Hernia evaluation      /80 (Site: Left Upper Arm, Position: Sitting, Cuff Size: Medium Adult)   Pulse 65   Temp 98.3 °F (36.8 °C) (Oral)   Resp 18   Ht 1.575 m (5' 2\")   Wt 76.2 kg (168 lb)   SpO2 95%   BMI 30.73 kg/m²     1. Have you been to the ER, urgent care clinic since your last visit?  Hospitalized since your last visit?no    2. Have you seen or consulted any other health care providers outside of the [facility] since your last visit?  Include any pap smears or colon screening. no  
tablet, Take 1 tablet by mouth daily, Disp: , Rfl:     azelastine (OPTIVAR) 0.05 % ophthalmic solution, 1 drop 2 times daily, Disp: , Rfl:     mesalamine (LIALDA) 1.2 g EC tablet, Take 4 tablets by mouth daily (with breakfast), Disp: 360 tablet, Rfl: 3    brimonidine (ALPHAGAN) 0.2 % ophthalmic solution, INSTILL 1 DROP INTO BOTH EYES TWICE DAILY, Disp: , Rfl:     Cholecalciferol 100 MCG (4000 UT) CAPS, Take 5,000 Units by mouth daily, Disp: , Rfl:     Timolol (TIMOPTIC) 0.5 % SOLN ophthalmic solution, Apply 1 drop to eye daily, Disp: , Rfl:     LORazepam (ATIVAN) 0.5 MG tablet, Take 1 tablet by mouth every 6 hours as needed for Anxiety. Max Daily Amount: 2 mg (Patient not taking: Reported on 6/3/2024), Disp: , Rfl:     ondansetron (ZOFRAN-ODT) 4 MG disintegrating tablet, Take 1 tablet by mouth every 8 hours as needed (Patient not taking: Reported on 6/3/2024), Disp: , Rfl:     zolpidem (AMBIEN) 5 MG tablet, Take 1 tablet by mouth nightly as needed. Max Daily Amount: 5 mg, Disp: , Rfl:     Allergies   Allergen Reactions    Cephalexin Diarrhea    Cefuroxime Axetil Hives    Levofloxacin Other (See Comments)     Joint swelling    Penicillins Hives     NO REPORTED SEVERE NON-IGE-MEDIATED REACTIONS    Phenergan [Promethazine] Hallucinations    Sulfa Antibiotics Hives    Codeine Rash    Erythromycin Hives       Social History     Socioeconomic History    Marital status:      Spouse name: Not on file    Number of children: Not on file    Years of education: Not on file    Highest education level: Not on file   Occupational History    Not on file   Tobacco Use    Smoking status: Never    Smokeless tobacco: Never   Substance and Sexual Activity    Alcohol use: Not Currently    Drug use: Not Currently    Sexual activity: Not on file   Other Topics Concern    Not on file   Social History Narrative    Not on file     Social Determinants of Health     Financial Resource Strain: Not on file   Food Insecurity: Not on file

## 2024-06-04 ENCOUNTER — HOSPITAL ENCOUNTER (OUTPATIENT)
Facility: HOSPITAL | Age: 69
Discharge: HOME | End: 2024-06-07
Payer: MEDICARE

## 2024-06-04 ENCOUNTER — HOSPITAL ENCOUNTER (OUTPATIENT)
Age: 69
Discharge: HOME | End: 2024-06-07
Payer: MEDICARE

## 2024-06-04 VITALS
BODY MASS INDEX: 30.64 KG/M2 | HEIGHT: 62 IN | OXYGEN SATURATION: 97 % | HEART RATE: 59 BPM | SYSTOLIC BLOOD PRESSURE: 132 MMHG | TEMPERATURE: 98.3 F | DIASTOLIC BLOOD PRESSURE: 79 MMHG | WEIGHT: 166.5 LBS

## 2024-06-04 LAB
BASOPHILS # BLD: 0.1 K/UL (ref 0–0.1)
BASOPHILS NFR BLD: 1 % (ref 0–1)
DIFFERENTIAL METHOD BLD: NORMAL
EKG ATRIAL RATE: 57 BPM
EKG DIAGNOSIS: NORMAL
EKG P AXIS: 40 DEGREES
EKG P-R INTERVAL: 176 MS
EKG Q-T INTERVAL: 430 MS
EKG QRS DURATION: 74 MS
EKG QTC CALCULATION (BAZETT): 418 MS
EKG R AXIS: -4 DEGREES
EKG T AXIS: 16 DEGREES
EKG VENTRICULAR RATE: 57 BPM
EOSINOPHIL # BLD: 0.1 K/UL (ref 0–0.4)
EOSINOPHIL NFR BLD: 2 % (ref 0–7)
ERYTHROCYTE [DISTWIDTH] IN BLOOD BY AUTOMATED COUNT: 13.3 % (ref 11.5–14.5)
HCT VFR BLD AUTO: 42.1 % (ref 35–47)
HGB BLD-MCNC: 14.2 G/DL (ref 11.5–16)
IMM GRANULOCYTES # BLD AUTO: 0 K/UL (ref 0–0.04)
IMM GRANULOCYTES NFR BLD AUTO: 0 % (ref 0–0.5)
LYMPHOCYTES # BLD: 1.7 K/UL (ref 0.8–3.5)
LYMPHOCYTES NFR BLD: 30 % (ref 12–49)
MCH RBC QN AUTO: 28.9 PG (ref 26–34)
MCHC RBC AUTO-ENTMCNC: 33.7 G/DL (ref 30–36.5)
MCV RBC AUTO: 85.7 FL (ref 80–99)
MONOCYTES # BLD: 0.6 K/UL (ref 0–1)
MONOCYTES NFR BLD: 11 % (ref 5–13)
NEUTS SEG # BLD: 3.1 K/UL (ref 1.8–8)
NEUTS SEG NFR BLD: 56 % (ref 32–75)
NRBC # BLD: 0 K/UL (ref 0–0.01)
NRBC BLD-RTO: 0 PER 100 WBC
PLATELET # BLD AUTO: 210 K/UL (ref 150–400)
PMV BLD AUTO: 9.3 FL (ref 8.9–12.9)
RBC # BLD AUTO: 4.91 M/UL (ref 3.8–5.2)
WBC # BLD AUTO: 5.6 K/UL (ref 3.6–11)

## 2024-06-04 PROCEDURE — 71046 X-RAY EXAM CHEST 2 VIEWS: CPT

## 2024-06-04 PROCEDURE — 36415 COLL VENOUS BLD VENIPUNCTURE: CPT

## 2024-06-04 PROCEDURE — 85025 COMPLETE CBC W/AUTO DIFF WBC: CPT

## 2024-06-04 PROCEDURE — 93005 ELECTROCARDIOGRAM TRACING: CPT | Performed by: SURGERY

## 2024-06-04 NOTE — PERIOP NOTE
Preoperative instructions reviewed with patient.  Patient given SSI infection FAQS sheet.  Given two bottles of skin prep chlorhexidine soap; given written and verbal instructions on use. Patient was given the opportunity to ask questions on the information provided.Patient instructed to [facility] upon leaving PAT department.

## 2024-06-04 NOTE — PERIOP NOTE
[address] [address]   MAIN OR                                  [phone]    MAIN PRE OP             [phone]                                                                                AMBULATORY PRE OP          [phone]  PRE-ADMISSION TESTING    [phone]     Surgery Date:  6/13/24       Is surgery arrival time given by surgeon?  YES  NO    If “NO”, [facility] staff will call you between 4 and 7pm the day before your surgery with your arrival time. (If your surgery is on a Monday, we will call you the Friday before.)    Call [phone] after 7pm Monday-Friday if you did not receive this call.    INSTRUCTIONS BEFORE YOUR SURGERY   When You  Arrive Arrive at [facility] Patient Access on 1st floor the day of your surgery.  Have your insurance card, photo ID,living will/advanced directive/POA (if applicable),  and any copayment (if needed)   Food   and   Drink NO solid food after midnight the night before surgery. You can drink clear liquids from midnight until ONE hour prior to your arrival at the hospital on the day of your surgery. Clear liquids include:  Water  Fruit juices without pulp  Carbonated beverages  Black coffee(no cream/milk)  Tea(no cream/milk)  Gatorade    No alcohol (beer, wine, liquor) or marijuana (smoking) 24 hours, edibles (3 days). Stop smoking cigarettes 14 days before surgery (helps w/healing and breathing).   Medications to   TAKE   Morning of Surgery MEDICATIONS TO TAKE THE MORNING OF SURGERY WITH A SIP OF WATER:   NONE  You may take these medications, IF NEEDED, the morning of surgery: FAMOTIDINE  Ask your surgeon/prescribing doctor for instructions on taking or stopping these medications prior to surgery: NONE   Medications to STOP  before surgery Non-Steroidal anti-inflammatory Drugs (NSAID's): for example, Ibuprofen (Advil, Motrin), Naproxen (Aleve) 3 days  STOP all herbal supplements and

## 2024-06-11 ENCOUNTER — TELEPHONE (OUTPATIENT)
Age: 69
End: 2024-06-11

## 2024-06-11 NOTE — TELEPHONE ENCOUNTER
Returned call to Ms Darnellchristian verified patient with 2 patient identifiers.     Reviewed notes patient is scheduled for robotic assisted left lower quadrant incisional hernia repair surgery on 6/13/24.     Ms Nunezdedechristian wants to know the purpose of the mesh. I explained the mesh helps hold the hernia in place.

## 2024-06-13 ENCOUNTER — ANESTHESIA EVENT (OUTPATIENT)
Facility: HOSPITAL | Age: 69
End: 2024-06-13
Payer: MEDICARE

## 2024-06-13 ENCOUNTER — ANESTHESIA (OUTPATIENT)
Facility: HOSPITAL | Age: 69
End: 2024-06-13
Payer: MEDICARE

## 2024-06-13 ENCOUNTER — HOSPITAL ENCOUNTER (OUTPATIENT)
Facility: HOSPITAL | Age: 69
Setting detail: OBSERVATION
Discharge: HOME | End: 2024-06-14
Attending: SURGERY | Admitting: SURGERY
Payer: MEDICARE

## 2024-06-13 DIAGNOSIS — K43.0 INCARCERATED INCISIONAL HERNIA: Primary | ICD-10-CM

## 2024-06-13 PROCEDURE — C1781 MESH (IMPLANTABLE): HCPCS | Performed by: SURGERY

## 2024-06-13 PROCEDURE — 6360000002 HC RX W HCPCS: Performed by: ANESTHESIOLOGY

## 2024-06-13 PROCEDURE — 7100000001 HC PACU RECOVERY - ADDTL 15 MIN: Performed by: SURGERY

## 2024-06-13 PROCEDURE — 2580000003 HC RX 258: Performed by: ANESTHESIOLOGY

## 2024-06-13 PROCEDURE — 3700000000 HC ANESTHESIA ATTENDED CARE: Performed by: SURGERY

## 2024-06-13 PROCEDURE — G0378 HOSPITAL OBSERVATION PER HR: HCPCS

## 2024-06-13 PROCEDURE — 3600000009 HC SURGERY ROBOT BASE: Performed by: SURGERY

## 2024-06-13 PROCEDURE — 2709999900 HC NON-CHARGEABLE SUPPLY: Performed by: SURGERY

## 2024-06-13 PROCEDURE — 3600000019 HC SURGERY ROBOT ADDTL 15MIN: Performed by: SURGERY

## 2024-06-13 PROCEDURE — 2500000003 HC RX 250 WO HCPCS: Performed by: NURSE ANESTHETIST, CERTIFIED REGISTERED

## 2024-06-13 PROCEDURE — 49594 RPR AA HRN 1ST 3-10 NCR/STRN: CPT | Performed by: SURGERY

## 2024-06-13 PROCEDURE — 49594 RPR AA HRN 1ST 3-10 NCR/STRN: CPT | Performed by: PHYSICIAN ASSISTANT

## 2024-06-13 PROCEDURE — 6360000002 HC RX W HCPCS: Performed by: SURGERY

## 2024-06-13 PROCEDURE — 3700000001 HC ADD 15 MINUTES (ANESTHESIA): Performed by: SURGERY

## 2024-06-13 PROCEDURE — 6360000002 HC RX W HCPCS: Performed by: NURSE ANESTHETIST, CERTIFIED REGISTERED

## 2024-06-13 PROCEDURE — 6370000000 HC RX 637 (ALT 250 FOR IP): Performed by: SURGERY

## 2024-06-13 PROCEDURE — 7100000000 HC PACU RECOVERY - FIRST 15 MIN: Performed by: SURGERY

## 2024-06-13 PROCEDURE — 6370000000 HC RX 637 (ALT 250 FOR IP): Performed by: ANESTHESIOLOGY

## 2024-06-13 PROCEDURE — 2500000003 HC RX 250 WO HCPCS: Performed by: SURGERY

## 2024-06-13 PROCEDURE — S2900 ROBOTIC SURGICAL SYSTEM: HCPCS | Performed by: SURGERY

## 2024-06-13 DEVICE — VENTRALIGHT ST MESH WITH ECHO PS POSITIONING SYSTEM, 4" X 6" (10.2 X 15.2 CM), ELLIPSE
Type: IMPLANTABLE DEVICE | Site: ABDOMEN | Status: FUNCTIONAL
Brand: VENTRALIGHT

## 2024-06-13 RX ORDER — FAMOTIDINE 20 MG/1
10 TABLET, FILM COATED ORAL DAILY
Status: DISCONTINUED | OUTPATIENT
Start: 2024-06-13 | End: 2024-06-14 | Stop reason: HOSPADM

## 2024-06-13 RX ORDER — SODIUM CHLORIDE 0.9 % (FLUSH) 0.9 %
5-40 SYRINGE (ML) INJECTION EVERY 12 HOURS SCHEDULED
Status: DISCONTINUED | OUTPATIENT
Start: 2024-06-13 | End: 2024-06-13 | Stop reason: HOSPADM

## 2024-06-13 RX ORDER — FENTANYL CITRATE 50 UG/ML
50 INJECTION, SOLUTION INTRAMUSCULAR; INTRAVENOUS
Status: COMPLETED | OUTPATIENT
Start: 2024-06-13 | End: 2024-06-13

## 2024-06-13 RX ORDER — BRIMONIDINE TARTRATE 2 MG/ML
1 SOLUTION/ DROPS OPHTHALMIC 2 TIMES DAILY
Status: DISCONTINUED | OUTPATIENT
Start: 2024-06-13 | End: 2024-06-14 | Stop reason: HOSPADM

## 2024-06-13 RX ORDER — SODIUM CHLORIDE 0.9 % (FLUSH) 0.9 %
5-40 SYRINGE (ML) INJECTION EVERY 12 HOURS SCHEDULED
Status: DISCONTINUED | OUTPATIENT
Start: 2024-06-13 | End: 2024-06-14 | Stop reason: HOSPADM

## 2024-06-13 RX ORDER — SODIUM CHLORIDE 0.9 % (FLUSH) 0.9 %
5-40 SYRINGE (ML) INJECTION PRN
Status: DISCONTINUED | OUTPATIENT
Start: 2024-06-13 | End: 2024-06-13 | Stop reason: HOSPADM

## 2024-06-13 RX ORDER — SODIUM CHLORIDE 9 MG/ML
INJECTION, SOLUTION INTRAVENOUS PRN
Status: DISCONTINUED | OUTPATIENT
Start: 2024-06-13 | End: 2024-06-13 | Stop reason: HOSPADM

## 2024-06-13 RX ORDER — KETAMINE HCL IN NACL, ISO-OSM 100MG/10ML
SYRINGE (ML) INJECTION PRN
Status: DISCONTINUED | OUTPATIENT
Start: 2024-06-13 | End: 2024-06-13 | Stop reason: SDUPTHER

## 2024-06-13 RX ORDER — LEVOFLOXACIN 5 MG/ML
INJECTION, SOLUTION INTRAVENOUS PRN
Status: DISCONTINUED | OUTPATIENT
Start: 2024-06-13 | End: 2024-06-13 | Stop reason: SDUPTHER

## 2024-06-13 RX ORDER — KETOROLAC TROMETHAMINE 30 MG/ML
INJECTION, SOLUTION INTRAMUSCULAR; INTRAVENOUS PRN
Status: DISCONTINUED | OUTPATIENT
Start: 2024-06-13 | End: 2024-06-13 | Stop reason: SDUPTHER

## 2024-06-13 RX ORDER — ONDANSETRON 4 MG/1
4 TABLET, ORALLY DISINTEGRATING ORAL EVERY 8 HOURS PRN
Status: DISCONTINUED | OUTPATIENT
Start: 2024-06-13 | End: 2024-06-14 | Stop reason: HOSPADM

## 2024-06-13 RX ORDER — SODIUM CHLORIDE 9 MG/ML
INJECTION, SOLUTION INTRAVENOUS PRN
Status: DISCONTINUED | OUTPATIENT
Start: 2024-06-13 | End: 2024-06-14 | Stop reason: HOSPADM

## 2024-06-13 RX ORDER — PHENYLEPHRINE HCL IN 0.9% NACL 0.4MG/10ML
SYRINGE (ML) INTRAVENOUS PRN
Status: DISCONTINUED | OUTPATIENT
Start: 2024-06-13 | End: 2024-06-13 | Stop reason: SDUPTHER

## 2024-06-13 RX ORDER — MEPERIDINE HYDROCHLORIDE 25 MG/ML
12.5 INJECTION INTRAMUSCULAR; INTRAVENOUS; SUBCUTANEOUS EVERY 5 MIN PRN
Status: DISCONTINUED | OUTPATIENT
Start: 2024-06-13 | End: 2024-06-13 | Stop reason: HOSPADM

## 2024-06-13 RX ORDER — FENTANYL CITRATE 50 UG/ML
25 INJECTION, SOLUTION INTRAMUSCULAR; INTRAVENOUS
Status: COMPLETED | OUTPATIENT
Start: 2024-06-13 | End: 2024-06-13

## 2024-06-13 RX ORDER — OXYCODONE HYDROCHLORIDE 5 MG/1
5 TABLET ORAL
Status: DISCONTINUED | OUTPATIENT
Start: 2024-06-13 | End: 2024-06-13 | Stop reason: HOSPADM

## 2024-06-13 RX ORDER — BUPIVACAINE HYDROCHLORIDE AND EPINEPHRINE 5; 5 MG/ML; UG/ML
INJECTION, SOLUTION PERINEURAL PRN
Status: DISCONTINUED | OUTPATIENT
Start: 2024-06-13 | End: 2024-06-13 | Stop reason: HOSPADM

## 2024-06-13 RX ORDER — ONDANSETRON 2 MG/ML
4 INJECTION INTRAMUSCULAR; INTRAVENOUS
Status: DISCONTINUED | OUTPATIENT
Start: 2024-06-13 | End: 2024-06-13 | Stop reason: HOSPADM

## 2024-06-13 RX ORDER — DIPHENHYDRAMINE HYDROCHLORIDE 50 MG/ML
12.5 INJECTION INTRAMUSCULAR; INTRAVENOUS
Status: DISCONTINUED | OUTPATIENT
Start: 2024-06-13 | End: 2024-06-13 | Stop reason: HOSPADM

## 2024-06-13 RX ORDER — SODIUM CHLORIDE 9 MG/ML
INJECTION, SOLUTION INTRAVENOUS CONTINUOUS
Status: DISCONTINUED | OUTPATIENT
Start: 2024-06-13 | End: 2024-06-13 | Stop reason: HOSPADM

## 2024-06-13 RX ORDER — LIDOCAINE HYDROCHLORIDE 10 MG/ML
1 INJECTION, SOLUTION EPIDURAL; INFILTRATION; INTRACAUDAL; PERINEURAL
Status: DISCONTINUED | OUTPATIENT
Start: 2024-06-13 | End: 2024-06-13 | Stop reason: HOSPADM

## 2024-06-13 RX ORDER — ACETAMINOPHEN 500 MG
1000 TABLET ORAL ONCE
Status: DISCONTINUED | OUTPATIENT
Start: 2024-06-13 | End: 2024-06-13 | Stop reason: HOSPADM

## 2024-06-13 RX ORDER — SODIUM CHLORIDE 0.9 % (FLUSH) 0.9 %
5-40 SYRINGE (ML) INJECTION PRN
Status: DISCONTINUED | OUTPATIENT
Start: 2024-06-13 | End: 2024-06-14 | Stop reason: HOSPADM

## 2024-06-13 RX ORDER — ACETAMINOPHEN 325 MG/1
650 TABLET ORAL EVERY 6 HOURS
Status: DISCONTINUED | OUTPATIENT
Start: 2024-06-13 | End: 2024-06-14 | Stop reason: HOSPADM

## 2024-06-13 RX ORDER — HYDROMORPHONE HYDROCHLORIDE 1 MG/ML
1 INJECTION, SOLUTION INTRAMUSCULAR; INTRAVENOUS; SUBCUTANEOUS
Status: DISCONTINUED | OUTPATIENT
Start: 2024-06-13 | End: 2024-06-14 | Stop reason: HOSPADM

## 2024-06-13 RX ORDER — SODIUM CHLORIDE, SODIUM LACTATE, POTASSIUM CHLORIDE, CALCIUM CHLORIDE 600; 310; 30; 20 MG/100ML; MG/100ML; MG/100ML; MG/100ML
INJECTION, SOLUTION INTRAVENOUS CONTINUOUS
Status: DISCONTINUED | OUTPATIENT
Start: 2024-06-13 | End: 2024-06-14 | Stop reason: HOSPADM

## 2024-06-13 RX ORDER — ENOXAPARIN SODIUM 100 MG/ML
40 INJECTION SUBCUTANEOUS DAILY
Status: DISCONTINUED | OUTPATIENT
Start: 2024-06-14 | End: 2024-06-14 | Stop reason: HOSPADM

## 2024-06-13 RX ORDER — MIDAZOLAM HYDROCHLORIDE 2 MG/2ML
2 INJECTION, SOLUTION INTRAMUSCULAR; INTRAVENOUS
Status: COMPLETED | OUTPATIENT
Start: 2024-06-13 | End: 2024-06-13

## 2024-06-13 RX ORDER — LABETALOL HYDROCHLORIDE 5 MG/ML
10 INJECTION, SOLUTION INTRAVENOUS
Status: DISCONTINUED | OUTPATIENT
Start: 2024-06-13 | End: 2024-06-13 | Stop reason: HOSPADM

## 2024-06-13 RX ORDER — ACETAMINOPHEN 325 MG/1
650 TABLET ORAL ONCE
Status: COMPLETED | OUTPATIENT
Start: 2024-06-13 | End: 2024-06-13

## 2024-06-13 RX ORDER — HYDROCHLOROTHIAZIDE 25 MG/1
25 TABLET ORAL DAILY
Status: DISCONTINUED | OUTPATIENT
Start: 2024-06-13 | End: 2024-06-14 | Stop reason: HOSPADM

## 2024-06-13 RX ORDER — DEXAMETHASONE SODIUM PHOSPHATE 4 MG/ML
INJECTION, SOLUTION INTRA-ARTICULAR; INTRALESIONAL; INTRAMUSCULAR; INTRAVENOUS; SOFT TISSUE PRN
Status: DISCONTINUED | OUTPATIENT
Start: 2024-06-13 | End: 2024-06-13 | Stop reason: SDUPTHER

## 2024-06-13 RX ORDER — TIMOLOL MALEATE 5 MG/ML
1 SOLUTION/ DROPS OPHTHALMIC 2 TIMES DAILY
Status: DISCONTINUED | OUTPATIENT
Start: 2024-06-13 | End: 2024-06-14 | Stop reason: HOSPADM

## 2024-06-13 RX ORDER — FENTANYL CITRATE 50 UG/ML
25 INJECTION, SOLUTION INTRAMUSCULAR; INTRAVENOUS EVERY 5 MIN PRN
Status: DISCONTINUED | OUTPATIENT
Start: 2024-06-13 | End: 2024-06-13 | Stop reason: HOSPADM

## 2024-06-13 RX ORDER — ONDANSETRON 2 MG/ML
4 INJECTION INTRAMUSCULAR; INTRAVENOUS EVERY 6 HOURS PRN
Status: DISCONTINUED | OUTPATIENT
Start: 2024-06-13 | End: 2024-06-14 | Stop reason: HOSPADM

## 2024-06-13 RX ORDER — KETOROLAC TROMETHAMINE 30 MG/ML
15 INJECTION, SOLUTION INTRAMUSCULAR; INTRAVENOUS EVERY 6 HOURS
Status: DISCONTINUED | OUTPATIENT
Start: 2024-06-13 | End: 2024-06-14 | Stop reason: HOSPADM

## 2024-06-13 RX ORDER — HYDROMORPHONE HYDROCHLORIDE 1 MG/ML
0.5 INJECTION, SOLUTION INTRAMUSCULAR; INTRAVENOUS; SUBCUTANEOUS EVERY 5 MIN PRN
Status: COMPLETED | OUTPATIENT
Start: 2024-06-13 | End: 2024-06-13

## 2024-06-13 RX ORDER — SODIUM CHLORIDE, SODIUM LACTATE, POTASSIUM CHLORIDE, CALCIUM CHLORIDE 600; 310; 30; 20 MG/100ML; MG/100ML; MG/100ML; MG/100ML
INJECTION, SOLUTION INTRAVENOUS CONTINUOUS
Status: DISCONTINUED | OUTPATIENT
Start: 2024-06-13 | End: 2024-06-13 | Stop reason: HOSPADM

## 2024-06-13 RX ORDER — NALOXONE HYDROCHLORIDE 0.4 MG/ML
INJECTION, SOLUTION INTRAMUSCULAR; INTRAVENOUS; SUBCUTANEOUS PRN
Status: DISCONTINUED | OUTPATIENT
Start: 2024-06-13 | End: 2024-06-13 | Stop reason: HOSPADM

## 2024-06-13 RX ORDER — OXYCODONE HYDROCHLORIDE 5 MG/1
5 TABLET ORAL EVERY 4 HOURS PRN
Status: DISCONTINUED | OUTPATIENT
Start: 2024-06-13 | End: 2024-06-14 | Stop reason: HOSPADM

## 2024-06-13 RX ORDER — ONDANSETRON 2 MG/ML
INJECTION INTRAMUSCULAR; INTRAVENOUS PRN
Status: DISCONTINUED | OUTPATIENT
Start: 2024-06-13 | End: 2024-06-13 | Stop reason: SDUPTHER

## 2024-06-13 RX ORDER — ROCURONIUM BROMIDE 10 MG/ML
INJECTION, SOLUTION INTRAVENOUS PRN
Status: DISCONTINUED | OUTPATIENT
Start: 2024-06-13 | End: 2024-06-13 | Stop reason: SDUPTHER

## 2024-06-13 RX ADMIN — SODIUM CHLORIDE, POTASSIUM CHLORIDE, SODIUM LACTATE AND CALCIUM CHLORIDE: 600; 310; 30; 20 INJECTION, SOLUTION INTRAVENOUS at 13:08

## 2024-06-13 RX ADMIN — DEXAMETHASONE SODIUM PHOSPHATE 4 MG: 4 INJECTION, SOLUTION INTRAMUSCULAR; INTRAVENOUS at 13:24

## 2024-06-13 RX ADMIN — BRIMONIDINE TARTRATE 1 DROP: 2 SOLUTION OPHTHALMIC at 22:22

## 2024-06-13 RX ADMIN — PROPOFOL 100 MCG/KG/MIN: 10 INJECTION, EMULSION INTRAVENOUS at 13:14

## 2024-06-13 RX ADMIN — ROCURONIUM BROMIDE 40 MG: 10 SOLUTION INTRAVENOUS at 13:16

## 2024-06-13 RX ADMIN — FAMOTIDINE 10 MG: 20 TABLET, FILM COATED ORAL at 19:04

## 2024-06-13 RX ADMIN — HYDROCHLOROTHIAZIDE 25 MG: 25 TABLET ORAL at 19:04

## 2024-06-13 RX ADMIN — ONDANSETRON 4 MG: 2 INJECTION INTRAMUSCULAR; INTRAVENOUS at 15:15

## 2024-06-13 RX ADMIN — Medication 80 MCG: at 13:24

## 2024-06-13 RX ADMIN — FENTANYL CITRATE 100 MCG: 50 INJECTION, SOLUTION INTRAMUSCULAR; INTRAVENOUS at 13:16

## 2024-06-13 RX ADMIN — PROPOFOL 20 MG: 10 INJECTION, EMULSION INTRAVENOUS at 13:56

## 2024-06-13 RX ADMIN — ACETAMINOPHEN 650 MG: 325 TABLET ORAL at 19:02

## 2024-06-13 RX ADMIN — KETOROLAC TROMETHAMINE 15 MG: 30 INJECTION, SOLUTION INTRAMUSCULAR at 19:04

## 2024-06-13 RX ADMIN — SUGAMMADEX 200 MG: 100 INJECTION, SOLUTION INTRAVENOUS at 15:25

## 2024-06-13 RX ADMIN — SODIUM CHLORIDE, POTASSIUM CHLORIDE, SODIUM LACTATE AND CALCIUM CHLORIDE: 600; 310; 30; 20 INJECTION, SOLUTION INTRAVENOUS at 15:32

## 2024-06-13 RX ADMIN — Medication 30 MG: at 13:16

## 2024-06-13 RX ADMIN — OXYCODONE HYDROCHLORIDE 5 MG: 5 TABLET ORAL at 22:21

## 2024-06-13 RX ADMIN — PROPOFOL 30 MG: 10 INJECTION, EMULSION INTRAVENOUS at 13:32

## 2024-06-13 RX ADMIN — ROCURONIUM BROMIDE 10 MG: 10 SOLUTION INTRAVENOUS at 14:07

## 2024-06-13 RX ADMIN — MIDAZOLAM HYDROCHLORIDE 2 MG: 1 INJECTION, SOLUTION INTRAMUSCULAR; INTRAVENOUS at 13:08

## 2024-06-13 RX ADMIN — KETOROLAC TROMETHAMINE 20 MG: 30 INJECTION, SOLUTION INTRAMUSCULAR at 15:25

## 2024-06-13 RX ADMIN — SODIUM CHLORIDE, POTASSIUM CHLORIDE, SODIUM LACTATE AND CALCIUM CHLORIDE: 600; 310; 30; 20 INJECTION, SOLUTION INTRAVENOUS at 12:06

## 2024-06-13 RX ADMIN — MEPERIDINE HYDROCHLORIDE 12.5 MG: 25 INJECTION INTRAMUSCULAR; INTRAVENOUS; SUBCUTANEOUS at 16:03

## 2024-06-13 RX ADMIN — HYDROMORPHONE HYDROCHLORIDE 0.5 MG: 1 INJECTION, SOLUTION INTRAMUSCULAR; INTRAVENOUS; SUBCUTANEOUS at 16:44

## 2024-06-13 RX ADMIN — PROPOFOL 160 MG: 10 INJECTION, EMULSION INTRAVENOUS at 13:16

## 2024-06-13 RX ADMIN — TIMOLOL MALEATE 1 DROP: 5 SOLUTION OPHTHALMIC at 22:22

## 2024-06-13 RX ADMIN — ACETAMINOPHEN 650 MG: 325 TABLET ORAL at 11:47

## 2024-06-13 RX ADMIN — Medication 10 MG: at 14:20

## 2024-06-13 RX ADMIN — HYDROMORPHONE HYDROCHLORIDE 0.5 MG: 1 INJECTION, SOLUTION INTRAMUSCULAR; INTRAVENOUS; SUBCUTANEOUS at 17:06

## 2024-06-13 RX ADMIN — LEVOFLOXACIN 500 MG: 5 INJECTION, SOLUTION INTRAVENOUS at 13:24

## 2024-06-13 RX ADMIN — MEPERIDINE HYDROCHLORIDE 12.5 MG: 25 INJECTION INTRAMUSCULAR; INTRAVENOUS; SUBCUTANEOUS at 16:09

## 2024-06-13 RX ADMIN — MIDAZOLAM 2 MG: 1 INJECTION INTRAMUSCULAR; INTRAVENOUS at 16:02

## 2024-06-13 ASSESSMENT — PAIN SCALES - GENERAL
PAINLEVEL_OUTOF10: 9
PAINLEVEL_OUTOF10: 9
PAINLEVEL_OUTOF10: 0
PAINLEVEL_OUTOF10: 7
PAINLEVEL_OUTOF10: 10
PAINLEVEL_OUTOF10: 6

## 2024-06-13 ASSESSMENT — PAIN DESCRIPTION - DESCRIPTORS
DESCRIPTORS: PRESSURE;THROBBING
DESCRIPTORS: THROBBING

## 2024-06-13 ASSESSMENT — PAIN DESCRIPTION - LOCATION
LOCATION: ABDOMEN
LOCATION: ABDOMEN

## 2024-06-13 ASSESSMENT — PAIN DESCRIPTION - ORIENTATION
ORIENTATION: MID
ORIENTATION: LEFT;LOWER

## 2024-06-13 ASSESSMENT — PAIN DESCRIPTION - ONSET: ONSET: AWAKENED FROM SLEEP

## 2024-06-13 ASSESSMENT — PAIN DESCRIPTION - PAIN TYPE: TYPE: SURGICAL PAIN

## 2024-06-13 NOTE — ANESTHESIA PRE PROCEDURE
Department of Anesthesiology  Preprocedure Note       Name:  Carito Roa   Age:  69 y.o.  :  1955                                          MRN:  [id number]         Date:  2024      Surgeon: Surgeon(s):  Bala Rincon MD    Procedure: Procedure(s):  ROBOTIC ASSISTED LEFT LOWER QUADRANT INCISIONAL HERNIA REPAIR WITH MESH    Medications prior to admission:   Prior to Admission medications    Medication Sig Start Date End Date Taking? Authorizing Provider   Levomefolate Glucosamine (METHYL-FOLATE PO) Take by mouth    Alpa Gaines MD   NONFORMULARY AG1 SYMBIOTIC  COLLOSTRUM    ProviderAlpa MD   Multiple Vitamins-Minerals (PRESERVISION AREDS PO) Take by mouth    Alpa Gaines MD   famotidine (PEPCID) 10 MG tablet Take 1 tablet by mouth as needed    Alpa Gaines MD   dicyclomine (BENTYL) 20 MG tablet Take 1 tablet by mouth every 6 hours    Alpa Gaines MD   hydroCHLOROthiazide (HYDRODIURIL) 25 MG tablet Take 1 tablet by mouth daily    ProviderAlpa MD   azelastine (OPTIVAR) 0.05 % ophthalmic solution 1 drop 2 times daily    Alpa Gaines MD   mesalamine (LIALDA) 1.2 g EC tablet Take 4 tablets by mouth daily (with breakfast) 24  Wilmer Alan MD   brimonidine (ALPHAGAN) 0.2 % ophthalmic solution INSTILL 1 DROP INTO BOTH EYES TWICE DAILY 21   Automatic Reconciliation, Ar   Cholecalciferol 100 MCG (4000 UT) CAPS Take 5,000 Units by mouth daily    Automatic Reconciliation, Ar   Timolol (TIMOPTIC) 0.5 % SOLN ophthalmic solution Apply 1 drop to eye 2 times daily    Automatic Reconciliation, Ar       Current medications:    Current Facility-Administered Medications   Medication Dose Route Frequency Provider Last Rate Last Admin   • lidocaine PF 1 % injection 1 mL  1 mL IntraDERmal Once PRN Kale Isabel MD       • fentaNYL (SUBLIMAZE) injection 25 mcg  25 mcg IntraVENous Once PRN Kale Isabel MD        Or   • fentaNYL

## 2024-06-13 NOTE — H&P
[facility] [place] [facility] History and Physical     History of Present Illness:      Carito Roa is a 69 y.o. female who has a past surgical history of diverticulitis with Wiseman's procedure and colostomy takedown a number of years ago.  She also had laparoscopic cholecystectomy.  She now appears to have a hernia at the left lower quadrant colostomy site.  She noticed some pain and a bulge in the left lower quadrant abdomen about 6 months ago or more.  She has occasional pain and discomfort from the area which is a 1-2 out of 10 with heavy lifting or straining.  Recently she has been diagnosed with ulcerative colitis and is on mesalamine which appears to be improving her symptoms now.     Past Medical History        Past Medical History:   Diagnosis Date    Colitis      Glaucoma      H/O seasonal allergies      Hypertension      Nausea & vomiting      Peripheral neuropathy      [place] spotted fever      Sinus infection      UTI (urinary tract infection)              Past Surgical History         Past Surgical History:   Procedure Laterality Date    CHOLECYSTECTOMY        COLONOSCOPY N/A 04/12/2022     COLONOSCOPY performed by Wilmer Alan MD at [facility] ENDOSCOPY    COLONOSCOPY N/A 1/23/2024     COLONOSCOPY performed by Wilmer Alan MD at [facility] ENDOSCOPY    COLOSTOMY CLOSURE   05/24/2022     Low anterior resection.coloscopy ,lysis of adhesion by Dr. Bala Rincon.    HEENT   1985     ethmoidectomy    HYSTERECTOMY (CERVIX STATUS UNKNOWN)   2003    KS UNLISTED PROCEDURE ABDOMEN PERITONEUM & OMENTUM   05/24/2022     OSTOMY TAKEDOWN    KS UNLISTED PROCEDURE ABDOMEN PERITONEUM & OMENTUM   11/2021     colectomy with ostomy              Current Medication      Current Outpatient Medications:     Multiple Vitamins-Minerals (PRESERVISION AREDS PO), Take by mouth, Disp: , Rfl:     famotidine (PEPCID) 10 MG tablet, Take 1 tablet by mouth as needed, Disp: , Rfl:     dicyclomine  identified.  I have reviewed and agree with all of the pertinent images     Assessment:      Carito Roa is a 69 y.o. female with left lower quadrant previous colostomy site incisional hernia, ulcerative colitis     Recommendations:      1.   She appears to have a left lower quadrant colostomy site hernia.  The hernia appears to be about 3 to 4 cm vertically and about 2 cm wide.  There does appear to be some small bowel going into the hernia.  She is having increasing symptoms but without any sign of any obstruction or urgent need for operation right now.  I do recommend her having surgery to get this repaired.  My plan would be able to do a robotic left lower quadrant incisional hernia repair with mesh.  I would likely plan on doing a preperitoneal repair if possible with primary closure and mesh.  There is a possibility of needing to do it open.  In the lower midline and there is little laxity but there is no obvious hernia that I can see.  She will likely have some adhesions in the lower midline which we will need to take down to be able to get to the hernia site.  This should be possible robotic with a preperitoneal type approach potentially.  She is not requiring any immunosuppression for her ulcerative colitis at this point and should be okay to move forward with surgery.  I will plan on keeping her overnight after the hernia repair.  I have discussed the above procedure with the patient in detail.  We reviewed the benefits and possible complications of the surgery which include bleeding, infection, damage to adjacent organs, venous thromboembolism, need for repeat surgery, death and other unforseen complications.  The patient agreed to proceed with the surgery.          Bala Rincon MD

## 2024-06-13 NOTE — BRIEF OP NOTE
Brief Postoperative Note      Patient: Carito Roa  YOB: 1955  MRN: [id number]    Date of Procedure: 6/13/2024    Pre-Op Diagnosis Codes:     * Incisional hernia [K43.2]    Post-Op Diagnosis: Same       Procedure(s):  ROBOTIC ASSISTED LEFT LOWER QUADRANT INCARCERATED INCISIONAL HERNIA REPAIR WITH MESH AND OPEN UMBILICAL HERNIA REPAIR    Surgeon(s):  Bala Rincon MD    Assistant:  Physician Assistant: Mckenna Sanchez PA    Anesthesia: General    Estimated Blood Loss (mL): Minimal    Complications: None    Specimens:   * No specimens in log *    Implants:  Implant Name Type Inv. Item Serial No.  Lot No. LRB No. Used Action   MESH LEIGH [id number] ELLIPSE W/ ECHO PS POS SYS VENTRALIGHT ST - SN/A Mesh MESH LEIGH [id number] ELLIPSE W/ ECHO PS POS SYS VENTRALIGHT ST N/A BARD DAVOL-WD [id number] Left 1 Implanted         Drains: * No LDAs found *    Findings:  Infection Present At Time Of Surgery (PATOS) (choose all levels that have infection present):  No infection present  Other Findings: incisional hernia in LLQ at the colostomy site 4cm vertical x 2.5cm horizontal.  Incarcerated omental fat in the defect.  Hernia repaired primarily and with [id number] Bard Ventralyte ST mesh placed IPOM.  Umbilical hernia 1.5cm repaired open separately through an umbilical incision about 8cm away from the incisional hernia.  Some rectus diastasis in lower abdomen with no hernia in lower midline.  Minimal adhesions in the abdominal cavity    Electronically signed by Bala Rincon MD on 6/13/2024 at 3:29 PM

## 2024-06-13 NOTE — PROGRESS NOTES
Patient assessed for readiness to ambulate.   Patient ambulated with assistance of 2 nurses. Patient ambulated with gait belt and walker. Patient walked to bathroom. Patient returned safely to bed.     Vitals:    06/13/24 1900   BP: (!) 143/82   Pulse: 57   Resp: 14   Temp: 97.5 °F (36.4 °C)   SpO2: 96%

## 2024-06-13 NOTE — PROGRESS NOTES
1710 VS stable. Awake and alert. Resting comfortably. Patient denies nausea. No signs of excessive bleeding. Tolerating ice chips without difficulty. Ready to transfer from PACU. Awaiting room assignment.

## 2024-06-13 NOTE — FLOWSHEET NOTE
1735 TRANSFER - OUT REPORT:    Verbal report given to Lola(name) on Carito Roa  being transferred to 528(unit) for routine post-op       Report consisted of patient’s Situation, Background, Assessment and   Recommendations(SBAR).     Information from the following report(s) OR Summary, Intake/Output, MAR, and Cardiac Rhythm SB  was reviewed with the receiving nurse.    Opportunity for questions and clarification was provided.     Is the patient on 02? Yes       L/Min 2    Is the patient on a monitor? No    Is the nurse transporting with the patient? No    Surgical Waiting Area notified of patient's transfer from PACU? Yes

## 2024-06-13 NOTE — ANESTHESIA POSTPROCEDURE EVALUATION
Department of Anesthesiology  Postprocedure Note    Patient: Carito Roa  MRN: [id number]  YOB: 1955  Date of evaluation: 6/13/2024    Procedure Summary       Date: 06/13/24 Room / Location: [facility] MAIN OR [address] [place] [address] MAIN OR    Anesthesia Start: 1308 Anesthesia Stop: 1547    Procedure: ROBOTIC ASSISTED LEFT LOWER QUADRANT INCISIONAL HERNIA REPAIR WITH MESH (Left: Abdomen) Diagnosis:       Incisional hernia      (Incisional hernia [K43.2])    Providers: Bala Rincon MD Responsible Provider: Kale Isabel MD    Anesthesia Type: General, TIVA ASA Status: 2            Anesthesia Type: General, TIVA    Lori Phase I: Lori Score: 10    Lori Phase II:      Anesthesia Post Evaluation  Post-Anesthesia Evaluation and Assessment    Patient: Carito Roa MRN: [id number]  SSN: [id number]    YOB: 1955  Age: 69 y.o.  Sex: female      I have evaluated the patient and they are stable and ready for discharge from the PACU.     Cardiovascular Function/Vital Signs  Visit Vitals  BP (!) 148/77   Pulse 57   Temp 97.5 °F (36.4 °C)   Resp 14   SpO2 97%       Patient is status post General anesthesia for Procedure(s):  ROBOTIC ASSISTED LEFT LOWER QUADRANT INCISIONAL HERNIA REPAIR WITH MESH.    Nausea/Vomiting: None    Postoperative hydration reviewed and adequate.    Pain:      Managed    Neurological Status:       At baseline    Mental Status, Level of Consciousness: Alert and  oriented to person, place, and time    Pulmonary Status:       Adequate oxygenation and airway patent    Complications related to anesthesia: None    Post-anesthesia assessment completed. No concerns    Signed By: Kale Isabel MD     June 13, 2024                No notable events documented.

## 2024-06-14 VITALS
RESPIRATION RATE: 16 BRPM | DIASTOLIC BLOOD PRESSURE: 79 MMHG | TEMPERATURE: 97.9 F | SYSTOLIC BLOOD PRESSURE: 138 MMHG | HEART RATE: 51 BPM | OXYGEN SATURATION: 93 %

## 2024-06-14 LAB
ANION GAP SERPL CALC-SCNC: 6 MMOL/L (ref 5–15)
BUN SERPL-MCNC: 10 MG/DL (ref 6–20)
BUN/CREAT SERPL: 18 (ref 12–20)
CALCIUM SERPL-MCNC: 9.5 MG/DL (ref 8.5–10.1)
CHLORIDE SERPL-SCNC: 104 MMOL/L (ref 97–108)
CO2 SERPL-SCNC: 26 MMOL/L (ref 21–32)
CREAT SERPL-MCNC: 0.55 MG/DL (ref 0.55–1.02)
ERYTHROCYTE [DISTWIDTH] IN BLOOD BY AUTOMATED COUNT: 13.2 % (ref 11.5–14.5)
GLUCOSE SERPL-MCNC: 119 MG/DL (ref 65–100)
HCT VFR BLD AUTO: 38 % (ref 35–47)
HGB BLD-MCNC: 12.7 G/DL (ref 11.5–16)
MAGNESIUM SERPL-MCNC: 2.1 MG/DL (ref 1.6–2.4)
MCH RBC QN AUTO: 29.2 PG (ref 26–34)
MCHC RBC AUTO-ENTMCNC: 33.4 G/DL (ref 30–36.5)
MCV RBC AUTO: 87.4 FL (ref 80–99)
NRBC # BLD: 0 K/UL (ref 0–0.01)
NRBC BLD-RTO: 0 PER 100 WBC
PHOSPHATE SERPL-MCNC: 3.6 MG/DL (ref 2.6–4.7)
PLATELET # BLD AUTO: 181 K/UL (ref 150–400)
PMV BLD AUTO: 9.6 FL (ref 8.9–12.9)
POTASSIUM SERPL-SCNC: 3.7 MMOL/L (ref 3.5–5.1)
RBC # BLD AUTO: 4.35 M/UL (ref 3.8–5.2)
SODIUM SERPL-SCNC: 136 MMOL/L (ref 136–145)
WBC # BLD AUTO: 7.7 K/UL (ref 3.6–11)

## 2024-06-14 PROCEDURE — 83735 ASSAY OF MAGNESIUM: CPT

## 2024-06-14 PROCEDURE — 96376 TX/PRO/DX INJ SAME DRUG ADON: CPT

## 2024-06-14 PROCEDURE — 99232 SBSQ HOSP IP/OBS MODERATE 35: CPT | Performed by: SURGERY

## 2024-06-14 PROCEDURE — 84100 ASSAY OF PHOSPHORUS: CPT

## 2024-06-14 PROCEDURE — 2700000000 HC OXYGEN THERAPY PER DAY

## 2024-06-14 PROCEDURE — 80048 BASIC METABOLIC PNL TOTAL CA: CPT

## 2024-06-14 PROCEDURE — G0378 HOSPITAL OBSERVATION PER HR: HCPCS

## 2024-06-14 PROCEDURE — 6360000002 HC RX W HCPCS: Performed by: SURGERY

## 2024-06-14 PROCEDURE — 6370000000 HC RX 637 (ALT 250 FOR IP): Performed by: SURGERY

## 2024-06-14 PROCEDURE — 2580000003 HC RX 258: Performed by: SURGERY

## 2024-06-14 PROCEDURE — 96374 THER/PROPH/DIAG INJ IV PUSH: CPT

## 2024-06-14 PROCEDURE — 36415 COLL VENOUS BLD VENIPUNCTURE: CPT

## 2024-06-14 PROCEDURE — 85027 COMPLETE CBC AUTOMATED: CPT

## 2024-06-14 RX ORDER — IBUPROFEN 600 MG/1
600 TABLET ORAL 3 TIMES DAILY PRN
Qty: 30 TABLET | Refills: 0 | Status: SHIPPED | OUTPATIENT
Start: 2024-06-14

## 2024-06-14 RX ORDER — OXYCODONE HYDROCHLORIDE AND ACETAMINOPHEN 5; 325 MG/1; MG/1
1 TABLET ORAL EVERY 6 HOURS PRN
Qty: 20 TABLET | Refills: 0 | Status: SHIPPED | OUTPATIENT
Start: 2024-06-14 | End: 2024-06-21

## 2024-06-14 RX ADMIN — BRIMONIDINE TARTRATE 1 DROP: 2 SOLUTION OPHTHALMIC at 08:28

## 2024-06-14 RX ADMIN — ACETAMINOPHEN 650 MG: 325 TABLET ORAL at 06:44

## 2024-06-14 RX ADMIN — OXYCODONE HYDROCHLORIDE 5 MG: 5 TABLET ORAL at 03:25

## 2024-06-14 RX ADMIN — KETOROLAC TROMETHAMINE 15 MG: 30 INJECTION, SOLUTION INTRAMUSCULAR at 01:16

## 2024-06-14 RX ADMIN — HYDROCHLOROTHIAZIDE 25 MG: 25 TABLET ORAL at 08:28

## 2024-06-14 RX ADMIN — KETOROLAC TROMETHAMINE 15 MG: 30 INJECTION, SOLUTION INTRAMUSCULAR at 06:44

## 2024-06-14 RX ADMIN — ACETAMINOPHEN 650 MG: 325 TABLET ORAL at 01:16

## 2024-06-14 RX ADMIN — SODIUM CHLORIDE, POTASSIUM CHLORIDE, SODIUM LACTATE AND CALCIUM CHLORIDE: 600; 310; 30; 20 INJECTION, SOLUTION INTRAVENOUS at 00:38

## 2024-06-14 RX ADMIN — TIMOLOL MALEATE 1 DROP: 5 SOLUTION OPHTHALMIC at 08:28

## 2024-06-14 RX ADMIN — FAMOTIDINE 10 MG: 20 TABLET, FILM COATED ORAL at 08:28

## 2024-06-14 ASSESSMENT — PAIN DESCRIPTION - LOCATION
LOCATION: ABDOMEN
LOCATION: FLANK

## 2024-06-14 ASSESSMENT — PAIN SCALES - GENERAL
PAINLEVEL_OUTOF10: 5
PAINLEVEL_OUTOF10: 8

## 2024-06-14 ASSESSMENT — PAIN DESCRIPTION - DESCRIPTORS: DESCRIPTORS: ACHING

## 2024-06-14 ASSESSMENT — PAIN DESCRIPTION - ORIENTATION: ORIENTATION: LEFT

## 2024-06-14 NOTE — PROGRESS NOTES
[facility] [place] [facility]    POD #1    Subjective     Doing well, pain controlled, no N/V    Objective     Patient Vitals for the past 24 hrs:   Temp Pulse Resp BP SpO2   06/14/24 0321 97.7 °F (36.5 °C) (!) 49 16 134/74 95 %   06/13/24 1900 97.5 °F (36.4 °C) 57 14 (!) 143/82 96 %   06/13/24 1800 97.5 °F (36.4 °C) 57 14 (!) 148/77 97 %   06/13/24 1730 -- 55 13 126/76 94 %   06/13/24 1715 -- 56 14 131/78 93 %   06/13/24 1700 97.8 °F (36.6 °C) 57 15 134/75 95 %   06/13/24 1645 -- 57 16 (!) 145/74 98 %   06/13/24 1630 -- 59 17 134/74 95 %   06/13/24 1615 -- 62 20 135/77 98 %   06/13/24 1600 -- 60 14 (!) 144/67 98 %   06/13/24 1555 97.7 °F (36.5 °C) 50 15 132/60 98 %   06/13/24 1550 -- 51 14 116/68 97 %   06/13/24 1545 97.7 °F (36.5 °C) 52 13 (!) 127/52 99 %   06/13/24 1138 97.7 °F (36.5 °C) 60 16 137/70 96 %         Intake/Output Summary (Last 24 hours) at 6/14/2024 0728  Last data filed at 6/13/2024 2059  Gross per 24 hour   Intake 1309.18 ml   Output 325 ml   Net 984.18 ml        PE  Pulm - CTAB  CV - RRR  Abd - soft,ND BS present, incisions c/d/i    Labs  Lab Results   Component Value Date/Time     06/14/2024 03:35 AM    K 3.7 06/14/2024 03:35 AM     06/14/2024 03:35 AM    CO2 26 06/14/2024 03:35 AM    BUN 10 06/14/2024 03:35 AM    CREATININE 0.55 06/14/2024 03:35 AM    GLUCOSE 119 06/14/2024 03:35 AM    CALCIUM 9.5 06/14/2024 03:35 AM    LABGLOM >90 06/14/2024 03:35 AM      Lab Results   Component Value Date    WBC 7.7 06/14/2024    HGB 12.7 06/14/2024    HCT 38.0 06/14/2024    MCV 87.4 06/14/2024     06/14/2024         Assessment     Carito Roa is a 69 y.o.yr old female s/p robotic incisional hernia repair with mesh and open umbilical hernia repair    Plan     DC home today  Minimal pain, tolerating diet    Bala Rincon MD

## 2024-06-14 NOTE — PLAN OF CARE
Problem: ABCDS Injury Assessment  Goal: Absence of physical injury  Outcome: HH/HSPC Resolved Met

## 2024-06-14 NOTE — PLAN OF CARE
Problem: Pain  Goal: Verbalizes/displays adequate comfort level or baseline comfort level  Outcome: Progressing     Problem: Safety - Adult  Goal: Free from fall injury  Outcome: Progressing     Problem: Gastrointestinal - Adult  Goal: Minimal or absence of nausea and vomiting  Outcome: Progressing

## 2024-06-14 NOTE — DISCHARGE INSTRUCTIONS
[facility]   Post Operative Instructions      Please call your surgeons  office for a 2 week follow-up appointment with Dr Rincon .  Office address is:    [facility] [place] [address]  [phone]      Discharge Instructions:    You may resume your usual diet as well as your usual medications.      You are not cleared to drive if you are taking narcotic pain medication.  If you are only using tylenol for pain and are comfortable sitting in a car, you may drive.    No heavy lifting.  No strenuous exercise.  You are cleared to go up and down stairs.  You may shower but no baths or swimming.      Your incisions dont need any special care.  You can wash them gently with  warm soap and water daily and pat them dry.  Your stitches are under the skin and dont need to be removed.    Ask you doctor when you can return to work.      Call our office at  [phone] to make a 2 week follow up appointment.  Call our office with any problems, questions or concerns.  Call our office if you have any     Fevers of 101 or higher   Worsening pain  Nausea/Vomiting  Difficulty eating or drinking  Incisions that are red, open, draining or tender.

## 2024-06-14 NOTE — OP NOTE
[address]  [address]                            OPERATIVE REPORT      PATIENT NAME: MARY DUKE           : 1955  MED REC NO: [id number]                       ROOM: 528  ACCOUNT NO: [id number]                       ADMIT DATE: 2024  PROVIDER: Bala Rincon MD    DATE OF SERVICE:  2024    PREOPERATIVE DIAGNOSES:  Incisional hernia.    POSTOPERATIVE DIAGNOSES:  Incisional hernia.    PROCEDURES PERFORMED:  Robotic assisted left lower quadrant incarcerated incisional hernia repair with mesh and open umbilical hernia repair.    SURGEON:  Bala Rincon MD    ASSISTANT:  Physician assistant, Mckenna Sanchez.    ANESTHESIA:  General.    ESTIMATED BLOOD LOSS:  Minimal.    SPECIMENS REMOVED:  None.    INTRAOPERATIVE FINDINGS:  Incisional hernia in the left lower quadrant at the colostomy site that was 4 cm vertically and 2.5 cm horizontally.  Incarcerated omental fat and pericolonic fat was in the defect.  The hernia was repaired primarily and with a 13 x 10 cm Bard Ventralight ST mesh placed intraperitoneal or IPOM style repair.  Umbilical hernia was 1.5 cm repaired open separately through an umbilical incision about 8 cm away from the incisional hernia.  There was some rectus diastasis in the lower abdomen, but no hernia in the lower midline.  Minimal adhesions to the abdominal cavity were noted.     COMPLICATIONS:  None.    IMPLANTS:  13 x 10 cm Bard Ventralight ST mesh with Echo positioning device.    INDICATIONS:  The patient is a 69-year-old female, who has a history of perforated diverticulitis with colostomy and colostomy takedown, who has a hernia at the left lower quadrant colostomy site and is needing repair with mesh in the operating room.  My assisting PA, Mckenna Sanchez was necessary for the operation due to the complex nature of the robotic operation and there were no other skilled assistants available.  She was  necessary for operation of the camera, retraction, and intraoperative decision making, and operation of the robot while I was on the console.    DESCRIPTION OF PROCEDURE:  The patient was met in the preoperative holding area.  H and P was updated.  Consent was signed.  All risks and benefits were explained to the patient prior to the start of the operation.  She was taken back to the operating room.  She was lying in the supine position.  The abdomen was prepped and draped in standard sterile fashion.  Time-out was called.  The antibiotics were given.  SCDs were on lower extremities.  I started the operation by making an 8 mm incision into the right upper quadrant, inserting a da Faheem Visiport trocar into the intraabdominal cavity insufflating to 15 mmHg.  I then placed an 8 mm trocar just in the subxiphoid position and another one in the left upper quadrant.  All the trocars were placed under direct visualization.  We could see the abdominal cavity very thoroughly.  There were no adhesions to the anterior abdominal wall other than some omentum and pericolonic fat going into the left lower quadrant hernia defect, which was incarcerated.  We dissected that out with sharp and blunt dissection, removing the incarcerated fat and a little bit of the pericolonic fat going into the defects were removed and placed back through the abdominal cavity.  We could see the incisional hernia in the left lower quadrant and measured about 2.5 cm wide and about 4 cm tall in more of a vertical oval type shape.  We could also see and did not see this on her previous CT scan that she had an umbilical hernia about 1.5 cm, which we would repair later on in the case.  We looked down in the lower midline and could see some diastasis of the abdominal wall, but not a true hernia.  It appeared that the fascia was intact.  The muscles were splayed out a little bit, but there was no hernia defect, so we would not need to repair any midline

## 2024-06-26 ENCOUNTER — OFFICE VISIT (OUTPATIENT)
Age: 69
End: 2024-06-26

## 2024-06-26 VITALS
HEART RATE: 57 BPM | SYSTOLIC BLOOD PRESSURE: 131 MMHG | RESPIRATION RATE: 18 BRPM | BODY MASS INDEX: 30.84 KG/M2 | HEIGHT: 62 IN | DIASTOLIC BLOOD PRESSURE: 63 MMHG | TEMPERATURE: 97.6 F | OXYGEN SATURATION: 98 % | WEIGHT: 167.6 LBS

## 2024-06-26 DIAGNOSIS — Z09 POSTOPERATIVE EXAMINATION: Primary | ICD-10-CM

## 2024-06-26 DIAGNOSIS — L23.1 ALLERGIC CONTACT DERMATITIS DUE TO ADHESIVES: ICD-10-CM

## 2024-06-26 ASSESSMENT — PATIENT HEALTH QUESTIONNAIRE - PHQ9
SUM OF ALL RESPONSES TO PHQ9 QUESTIONS 1 & 2: 0
1. LITTLE INTEREST OR PLEASURE IN DOING THINGS: NOT AT ALL
SUM OF ALL RESPONSES TO PHQ QUESTIONS 1-9: 0
2. FEELING DOWN, DEPRESSED OR HOPELESS: NOT AT ALL
SUM OF ALL RESPONSES TO PHQ QUESTIONS 1-9: 0

## 2024-06-26 NOTE — PROGRESS NOTES
Identified patient with two patient identifiers (name and ). Reviewed chart in preparation for visit and have obtained necessary documentation.    Carito Roa is a 69 y.o. female  Chief Complaint   Patient presents with    Post-Op Check     S/p LEFT LOWER QUADRANT INCISIONAL HERNIA REPAIR WITH MESH     /63 (Site: Right Upper Arm, Position: Sitting, Cuff Size: Large Adult)   Pulse 57   Temp 97.6 °F (36.4 °C) (Oral)   Resp 18   Ht 1.575 m (5' 2\")   Wt 76 kg (167 lb 9.6 oz)   SpO2 98%   BMI 30.65 kg/m²     1. Have you been to the ER, urgent care clinic since your last visit?  Hospitalized since your last visit?no    2. Have you seen or consulted any other health care providers outside of the [facility] since your last visit?  Include any pap smears or colon screening. no

## 2024-06-26 NOTE — PROGRESS NOTES
CC: Post Operative state    Subjective:     Carito Roa is a 69 y.o. female presents for postop care 2 weeks s/p robotic assisted left lower quadrant incarcerated incisional hernia repair with mesh and open umbilical hernia repair.  Patient states she believes she is \"slowly recovering\".  Still having some mild pain to surgical sites, but has improved since surgery and is well-tolerated with Tylenol.  Reports she is taking at 1000 mg of Tylenol in the morning and again at night.  Reports she has had some lower back pain since surgery and is wondering what this is from.  States she has small rash surrounding each lap site and above and below her abdomen.  She has been taking hydroxyzine that she had leftover from previous surgery which helped a lot.  The rash above and below abd seems to have completely resolved, however, surgical incisions still feel very itchy.   Tolerating a regular diet; No nausea or vomiting.   Bowel movements are regular.  Voiding without difficulty.  Denies fever, chest pain, or shortness of breath.     Review of Systems:  A comprehensive review of systems was negative except for that written above      Ms. Roa has a reminder for a \"due or due soon\" health maintenance. I have asked that she contact her primary care provider for follow-up on this health maintenance.      Objective:     /63 (Site: Right Upper Arm, Position: Sitting, Cuff Size: Large Adult)   Pulse 57   Temp 97.6 °F (36.4 °C) (Oral)   Resp 18   Ht 1.575 m (5' 2\")   Wt 76 kg (167 lb 9.6 oz)   SpO2 98%   BMI 30.65 kg/m²     General: alert, appears stated age, cooperative, and no distress  CV: Regular rate and rhythm  Pulmonary: Lungs clear to auscultation; unlabored  Abdomen:soft, bowel sounds active, mild appropriate incisional TTP. No guarding or rebound  NO CVA Tenderness  Lap sites and umbilical incision:  mild swelling at umbilical site, healing well, no drainage, no erythema, no dehiscence, incision

## 2024-06-28 ENCOUNTER — PATIENT MESSAGE (OUTPATIENT)
Age: 69
End: 2024-06-28

## 2024-06-28 NOTE — TELEPHONE ENCOUNTER
From: Carito Roa  To: Lynn Carmen  Sent: 6/28/2024 1:23 PM EDT  Subject: Request to Send Results of Post-Op Blood Tests    I have been seeing Dr Alan at [facility] for treatment of my ulcerative colitis. I had an appointment with them this week. They were wanting to run some routine blood tests when I mentioned to them my hernia surgery and fairly extensive testing completed both Pre and Post Op. They asked if they could request these from your office and I gave them permission. I have not heard back from them so I checked and they indicated they have not received anything. I am not sure who they requested these from but thought I would ask if you could facilitate these being sent to their office.    Dr. Waqas Blas's PA is who is looking for these. Her fax number is [phone].    Please call me at [phone] if you have any questions. Thank-you for your assistance.

## 2024-06-28 NOTE — TELEPHONE ENCOUNTER
I spoke with the patient. I informed her we have one of two options. I can have you sign a release of information form or you can view and download the labs results from [employer]. She said, \"I can do that!\"

## 2024-10-10 NOTE — BRIEF OP NOTE
Brief Postoperative Note    Patient: Cristina Lopez  YOB: 1955  MRN: [id number]    Date of Procedure: 5/24/2022     Pre-Op Diagnosis: Diverticulitis [K57.92]    Post-Op Diagnosis: Same as preoperative diagnosis.       Procedure(s):  LAPAROSCOPIC COLOSTOMY TAKEDOWN WITH SPLENIC FLEXURE MOBILIZATION, LOW ANTERIOR RESECTION, COLONOSCOPY, LYSIS OF ADHESIONS GREATER THAN 2 HOURS, CYSTOSCOPY URETERAL CATHETER INSERTION  CYSTOSCOPY URETERAL CATHETER INSERTION     Surgeon(s):  Hoa Andrews MD Evelina Iron, MD Prudence Millers, MD Dr Kelli Bunch - Primary surgeon  Dr Jerome Patel  Surgeon      Surgical Assistant: None    Anesthesia: General     Estimated Blood Loss (mL): less than 060     Complications: None    Specimens:   ID Type Source Tests Collected by Time Destination   1 : COLOSTOMY Fresh Colon  Sofie Burnette MD 5/24/2022 1112 Pathology   2 : LOW ANTERIOR RESECTION Fresh Colon  Sofie Burnette MD 5/24/2022 1209 Pathology   3 : DISTAL AND PROXIMAL ANASTOMOTIC RINGS Fresh Colon  Sofie Burnette MD 5/24/2022 1218 Pathology        Implants: * No implants in log *    Drains: * No LDAs found *    Findings: low anterior resection needed due to narrowing of the distal rectum sigmoid junction, resected about 5cm of rectosigmoid stump, colon anastomosis with 29mm stapler, splenic flexure taken down for length of colon, lysis of adhesions greater than 2 hours    Electronically Signed by Shell Friedman MD on 5/24/2022 at 1:06 PM Per MA, it is okay to book her using a RES24 slot.

## 2025-04-08 ENCOUNTER — TRANSCRIBE ORDERS (OUTPATIENT)
Facility: HOSPITAL | Age: 70
End: 2025-04-08

## 2025-04-08 DIAGNOSIS — Z12.31 VISIT FOR SCREENING MAMMOGRAM: Primary | ICD-10-CM

## 2025-04-24 ENCOUNTER — HOSPITAL ENCOUNTER (OUTPATIENT)
Facility: HOSPITAL | Age: 70
Discharge: HOME | End: 2025-04-27
Payer: MEDICARE

## 2025-04-24 DIAGNOSIS — Z12.31 VISIT FOR SCREENING MAMMOGRAM: ICD-10-CM

## 2025-04-24 PROCEDURE — 77063 BREAST TOMOSYNTHESIS BI: CPT

## 2025-05-30 ENCOUNTER — HOSPITAL ENCOUNTER (OUTPATIENT)
Facility: HOSPITAL | Age: 70
Discharge: HOME | End: 2025-05-30
Payer: MEDICARE

## 2025-05-30 DIAGNOSIS — K51.90 ULCERATIVE COLITIS WITHOUT COMPLICATIONS, UNSPECIFIED LOCATION (HCC): ICD-10-CM

## 2025-05-30 DIAGNOSIS — K57.90 DIVERTICULOSIS: ICD-10-CM

## 2025-05-30 DIAGNOSIS — K62.89 RECTAL PAIN: ICD-10-CM

## 2025-05-30 LAB — CREAT BLD-MCNC: 0.8 MG/DL (ref 0.6–1.3)

## 2025-05-30 PROCEDURE — 82565 ASSAY OF CREATININE: CPT

## 2025-05-30 PROCEDURE — 6360000004 HC RX CONTRAST MEDICATION: Performed by: FAMILY MEDICINE

## 2025-05-30 PROCEDURE — 74177 CT ABD & PELVIS W/CONTRAST: CPT

## 2025-05-30 RX ORDER — IOPAMIDOL 755 MG/ML
100 INJECTION, SOLUTION INTRAVASCULAR
Status: COMPLETED | OUTPATIENT
Start: 2025-05-30 | End: 2025-05-30

## 2025-05-30 RX ADMIN — IOPAMIDOL 100 ML: 755 INJECTION, SOLUTION INTRAVENOUS at 12:47

## (undated) DEVICE — MARYLAND JAW LAPAROSCOPIC SEALER/DIVIDER COATED: Brand: LIGASURE

## (undated) DEVICE — SUTURE VCRL SZ 3-0 L27IN ABSRB UD L26MM SH 1/2 CIR J416H

## (undated) DEVICE — APPLIER CLP M/L SHFT DIA5MM 15 LIG LIGAMAX 5

## (undated) DEVICE — Device

## (undated) DEVICE — BAG SPEC REM 224ML W4XL6IN DIA10MM 1 HND GYN DISP ENDOPCH

## (undated) DEVICE — TIP COVER ACCESSORY

## (undated) DEVICE — GARMENT,MEDLINE,DVT,INT,CALF,MED, GEN2: Brand: MEDLINE

## (undated) DEVICE — PENCIL SMK EVAC 10 FT BLADE ELECTRD ROCKER FOR TELSCP

## (undated) DEVICE — COLON CLOSING PACK: Brand: MEDLINE INDUSTRIES, INC.

## (undated) DEVICE — ARTICULATING RELOAD WITH TRI-STAPLE TECHNOLOGY: Brand: ENDO GIA

## (undated) DEVICE — SUTURE MCRYL SZ 4-0 L27IN ABSRB UD L19MM PS-2 1/2 CIR PRIM Y426H

## (undated) DEVICE — SUTURE ETHIBOND EXCEL SZ 0 L18IN NONABSORBABLE GRN L36MM CT-1 CX21D

## (undated) DEVICE — BINDER ABD M/L H12IN FOR 46-62IN WHT 4 SLD PNL DSGN HOOP

## (undated) DEVICE — JELLY,LUBE,STERILE,FLIP TOP,TUBE,4-OZ: Brand: MEDLINE

## (undated) DEVICE — LIQUIBAND RAPID ADHESIVE 36/CS 0.8ML: Brand: MEDLINE

## (undated) DEVICE — GENERAL LAPAROSCOPY - SMH: Brand: MEDLINE INDUSTRIES, INC.

## (undated) DEVICE — TROCAR: Brand: KII® SLEEVE

## (undated) DEVICE — 40580 - THE PINK PAD - ADVANCED TRENDELENBURG POSITIONING KIT: Brand: 40580 - THE PINK PAD - ADVANCED TRENDELENBURG POSITIONING KIT

## (undated) DEVICE — POWER SHELL: Brand: SIGNIA

## (undated) DEVICE — SOLUTION IRRIGATION H2O 0797305] ICU MEDICAL INC]

## (undated) DEVICE — VISUALIZATION SYSTEM: Brand: CLEARIFY

## (undated) DEVICE — SUTURE SZ 0 27IN 5/8 CIR UR-6  TAPER PT VIOLET ABSRB VICRYL J603H

## (undated) DEVICE — CYSTO/BLADDER IRRIGATION SET, REGULATING CLAMP

## (undated) DEVICE — LAPAROSCOPIC TROCAR SLEEVE/SINGLE USE: Brand: KII® OPTICAL ACCESS SYSTEM

## (undated) DEVICE — SUTURE VICRYL + SZ 3-0 L27IN ABSRB UD L26MM SH 1/2 CIR VCP416H

## (undated) DEVICE — FILTER SMK EVAC FLO CLR MEGADYNE

## (undated) DEVICE — GLOVE ORANGE PI 7 1/2   MSG9075

## (undated) DEVICE — SUT PROL 2-0 30IN CT1 BLU --

## (undated) DEVICE — TROCAR: Brand: KII® OPTICAL ACCESS SYSTEM

## (undated) DEVICE — PACK,BASIC,SIRUS,V: Brand: MEDLINE

## (undated) DEVICE — BASIN ST MAJOR-NO CAUTERY: Brand: MEDLINE INDUSTRIES, INC.

## (undated) DEVICE — DRAPE,UTILTY,TAPE,15X26, 4EA/PK: Brand: MEDLINE

## (undated) DEVICE — SYR 10ML LUER LOK 1/5ML GRAD --

## (undated) DEVICE — PREP SKN CHLRAPRP APL 26ML STR --

## (undated) DEVICE — HYPODERMIC SAFETY NEEDLE: Brand: MAGELLAN

## (undated) DEVICE — DRAPE,ROBOTICS,STERILE: Brand: MEDLINE

## (undated) DEVICE — SUTURE VICRYL + SZ 0 L27IN ABSRB VLT L26MM UR-6 5/8 CIR VCP603H

## (undated) DEVICE — ARM DRAPE

## (undated) DEVICE — SUTURE VICRYL + SZ 2-0 L27IN ABSRB WHT SH 1/2 CIR TAPERCUT VCP417H

## (undated) DEVICE — CLICKLINE SCISSORS INSERT: Brand: CLICKLINE

## (undated) DEVICE — SUTURE PDS II SZ 1 L54IN ABSRB VLT L65MM TP-1 1/2 CIR Z879G

## (undated) DEVICE — SHEET,DRAPE,UNDERBUTTOCK,GRAD POUCH,PORT: Brand: MEDLINE

## (undated) DEVICE — DRAIN SURG 19FR 100% SIL RADPQ RND CHN FULL FLUT

## (undated) DEVICE — TROCAR SITE CLOSURE DEVICE: Brand: ENDO CLOSE

## (undated) DEVICE — 4-PORT MANIFOLD: Brand: NEPTUNE 2

## (undated) DEVICE — STAPLER INT DIA29MM CLS STPL H1.5-2.2MM OPN LEG L5.2MM 26

## (undated) DEVICE — WOUND RETRACTOR AND PROTECTOR: Brand: ALEXIS WOUND PROTECTOR-RETRACTOR

## (undated) DEVICE — GOWN,SIRUS,FABRNF,2XL,18/CS: Brand: MEDLINE

## (undated) DEVICE — TOTAL TRAY, 16FR 10ML SIL FOLEY, URN: Brand: MEDLINE

## (undated) DEVICE — SUTURE MONOCRYL ABSORBABLE MONOFILAMENT 0 CT1 18 IN VIO SXPP1B407

## (undated) DEVICE — SYSTEM EVAC SMOKE LAPARSCOPIC

## (undated) DEVICE — GLOVE SURG SZ 8 L12IN FNGR THK79MIL GRN LTX FREE

## (undated) DEVICE — GOWN,SIRUS,FABRNF,XL,20/CS: Brand: MEDLINE

## (undated) DEVICE — DERMABOND SKIN ADH 0.7ML -- DERMABOND ADVANCED 12/BX

## (undated) DEVICE — CYSTO-SMH: Brand: MEDLINE INDUSTRIES, INC.

## (undated) DEVICE — SOLUTION IRRIG 1000ML 0.9% SOD CHL USP POUR PLAS BTL

## (undated) DEVICE — SUTURE MONOCRYL SZ 4-0 L27IN ABSRB UD L19MM PS-2 1/2 CIR PRIM Y426H

## (undated) DEVICE — LEGGINGS, PAIR, 31X48, STERILE: Brand: MEDLINE

## (undated) DEVICE — Device: Brand: SENSURA MIO

## (undated) DEVICE — SYR 10ML CTRL LR LCK NSAF LF --

## (undated) DEVICE — STERILE-Z MAYO STAND COVERS CLEAR POLYETHYLENE STERILE UNIVERSAL FIT 20 PER CASE: Brand: STERILE-Z

## (undated) DEVICE — Z DUPLICATE USE 2716240 STAPLER INT CUT LN L40MM STPL L51MM GRN CRV HD B FRM

## (undated) DEVICE — TBG INSUFFLATION LUER LOCK: Brand: MEDLINE INDUSTRIES, INC.

## (undated) DEVICE — OBTURATOR ROBOTIC DIA8MM BLDELSS ENDOSCP DISP DA VINCI SI

## (undated) DEVICE — X-RAY DETECTABLE SPONGES,16 PLY: Brand: VISTEC

## (undated) DEVICE — INSUFFLATION NEEDLE: Brand: SURGINEEDLE

## (undated) DEVICE — REM POLYHESIVE ADULT PATIENT RETURN ELECTRODE: Brand: VALLEYLAB

## (undated) DEVICE — BLUNT TIP TROCAR: Brand: AUTO SUTURE

## (undated) DEVICE — TOTAL TRAY, DB, 100% SILI FOLEY, 16FR 10: Brand: MEDLINE

## (undated) DEVICE — MARKER,SKIN,WI/RULER AND LABELS: Brand: MEDLINE

## (undated) DEVICE — FORCEP BX MESH TOOTH MIC 2.8 MMX240 CM NDL STRL RADIAL JAW 4

## (undated) DEVICE — ACCESS PLATFORM FOR MINIMALLY INVASIVE SURGERY: Brand: GELPOINT®  MINI ADVANCED ACCESS PLATFORM

## (undated) DEVICE — SUTURE ABSORBABLE MONOFILAMENT 0 CT-1 36 MM DYED SPRL PDS + SXPP1B450

## (undated) DEVICE — SUTURE MONOCRYL + SZ 4-0 L27IN ABSRB UD L19MM PS-2 3/8 CIR MCP426H

## (undated) DEVICE — SPONGE LAP 18X18IN STRL -- 5/PK

## (undated) DEVICE — OPEN-END URETERAL CATHETER: Brand: COOK

## (undated) DEVICE — DRESSING HYDROCOLLOID BORDER 35X10 IN ALUM PRIMASEAL

## (undated) DEVICE — SUTURE PDS II SZ 1 L36IN ABSRB VLT L48MM CTX 1/2 CIR Z371T

## (undated) DEVICE — ELECTRODE ES 36CM LAP FLAT L HK COAT DISP CLEANCOAT

## (undated) DEVICE — COVER LT HNDL PLAS RIG 1 PER PK

## (undated) DEVICE — SOLUTION IRRIG 1000ML STRL H2O USP PLAS POUR BTL

## (undated) DEVICE — SURGICAL PROCEDURE PACK TISS 3X5 IN ABSORBABLE SEPRAFILM

## (undated) DEVICE — AGENT HEMSTAT 3GM PURIFIED PLNT STARCH PWD ABSRB ARISTA AH

## (undated) DEVICE — SOLUTION ANTIFOG VIS SYS CLEARIFY LAPSCP

## (undated) DEVICE — NEEDLE HYPO 22GA L1.5IN BLK S STL HUB POLYPR SHLD REG BVL

## (undated) DEVICE — SOLUTION SCRB 2OZ 10% POVIDONE IOD ANTIMIC BTL

## (undated) DEVICE — DEVICE SUT FOR TRCR SITE INCIS ENDO CLOSE